# Patient Record
Sex: MALE | Race: OTHER | NOT HISPANIC OR LATINO | URBAN - METROPOLITAN AREA
[De-identification: names, ages, dates, MRNs, and addresses within clinical notes are randomized per-mention and may not be internally consistent; named-entity substitution may affect disease eponyms.]

---

## 2018-03-04 ENCOUNTER — INPATIENT (INPATIENT)
Facility: HOSPITAL | Age: 68
LOS: 1 days | Discharge: ANOTHER HOSPITAL | End: 2018-03-06
Attending: HOSPITALIST | Admitting: HOSPITALIST
Payer: MEDICARE

## 2018-03-04 VITALS
WEIGHT: 169.98 LBS | HEART RATE: 94 BPM | OXYGEN SATURATION: 92 % | TEMPERATURE: 98 F | HEIGHT: 71 IN | SYSTOLIC BLOOD PRESSURE: 213 MMHG | RESPIRATION RATE: 22 BRPM | DIASTOLIC BLOOD PRESSURE: 84 MMHG

## 2018-03-04 DIAGNOSIS — Z98.890 OTHER SPECIFIED POSTPROCEDURAL STATES: Chronic | ICD-10-CM

## 2018-03-04 DIAGNOSIS — Z91.19 PATIENT'S NONCOMPLIANCE WITH OTHER MEDICAL TREATMENT AND REGIMEN: ICD-10-CM

## 2018-03-04 DIAGNOSIS — R73.9 HYPERGLYCEMIA, UNSPECIFIED: ICD-10-CM

## 2018-03-04 DIAGNOSIS — I21.4 NON-ST ELEVATION (NSTEMI) MYOCARDIAL INFARCTION: ICD-10-CM

## 2018-03-04 DIAGNOSIS — Z29.9 ENCOUNTER FOR PROPHYLACTIC MEASURES, UNSPECIFIED: ICD-10-CM

## 2018-03-04 DIAGNOSIS — I50.9 HEART FAILURE, UNSPECIFIED: ICD-10-CM

## 2018-03-04 DIAGNOSIS — N28.9 DISORDER OF KIDNEY AND URETER, UNSPECIFIED: ICD-10-CM

## 2018-03-04 DIAGNOSIS — Z11.59 ENCOUNTER FOR SCREENING FOR OTHER VIRAL DISEASES: ICD-10-CM

## 2018-03-04 LAB
ALBUMIN SERPL ELPH-MCNC: 2.6 G/DL — LOW (ref 3.3–5)
ALP SERPL-CCNC: 267 U/L — HIGH (ref 40–120)
ALT FLD-CCNC: 19 U/L — SIGNIFICANT CHANGE UP (ref 12–78)
ANION GAP SERPL CALC-SCNC: 8 MMOL/L — SIGNIFICANT CHANGE UP (ref 5–17)
APTT BLD: 42.4 SEC — HIGH (ref 27.5–37.4)
APTT BLD: 45.2 SEC — HIGH (ref 27.5–37.4)
APTT BLD: 60.6 SEC — HIGH (ref 27.5–37.4)
APTT BLD: 80.3 SEC — HIGH (ref 27.5–37.4)
AST SERPL-CCNC: 23 U/L — SIGNIFICANT CHANGE UP (ref 15–37)
BASOPHILS # BLD AUTO: 0.07 K/UL — SIGNIFICANT CHANGE UP (ref 0–0.2)
BASOPHILS NFR BLD AUTO: 0.7 % — SIGNIFICANT CHANGE UP (ref 0–2)
BILIRUB SERPL-MCNC: 0.3 MG/DL — SIGNIFICANT CHANGE UP (ref 0.2–1.2)
BUN SERPL-MCNC: 21 MG/DL — SIGNIFICANT CHANGE UP (ref 7–23)
CALCIUM SERPL-MCNC: 8.4 MG/DL — LOW (ref 8.5–10.1)
CHLORIDE SERPL-SCNC: 107 MMOL/L — SIGNIFICANT CHANGE UP (ref 96–108)
CHOLEST SERPL-MCNC: 221 MG/DL — HIGH (ref 10–199)
CK MB BLD-MCNC: 1.5 % — SIGNIFICANT CHANGE UP (ref 0–3.5)
CK MB BLD-MCNC: 2.5 % — SIGNIFICANT CHANGE UP (ref 0–3.5)
CK MB CFR SERPL CALC: 1.9 NG/ML — SIGNIFICANT CHANGE UP (ref 0.5–3.6)
CK MB CFR SERPL CALC: 2 NG/ML — SIGNIFICANT CHANGE UP (ref 0.5–3.6)
CK MB CFR SERPL CALC: 2.1 NG/ML — SIGNIFICANT CHANGE UP (ref 0.5–3.6)
CK SERPL-CCNC: 136 U/L — SIGNIFICANT CHANGE UP (ref 26–308)
CK SERPL-CCNC: 75 U/L — SIGNIFICANT CHANGE UP (ref 26–308)
CO2 SERPL-SCNC: 24 MMOL/L — SIGNIFICANT CHANGE UP (ref 22–31)
CREAT SERPL-MCNC: 1.5 MG/DL — HIGH (ref 0.5–1.3)
EOSINOPHIL # BLD AUTO: 0.2 K/UL — SIGNIFICANT CHANGE UP (ref 0–0.5)
EOSINOPHIL NFR BLD AUTO: 2 % — SIGNIFICANT CHANGE UP (ref 0–6)
GLUCOSE SERPL-MCNC: 355 MG/DL — HIGH (ref 70–99)
HBA1C BLD-MCNC: 11.4 % — HIGH (ref 4–5.6)
HCT VFR BLD CALC: 41.1 % — SIGNIFICANT CHANGE UP (ref 39–50)
HCT VFR BLD CALC: 42.3 % — SIGNIFICANT CHANGE UP (ref 39–50)
HCV AB S/CO SERPL IA: 0.29 S/CO — SIGNIFICANT CHANGE UP
HCV AB SERPL-IMP: SIGNIFICANT CHANGE UP
HDLC SERPL-MCNC: 40 MG/DL — SIGNIFICANT CHANGE UP (ref 40–125)
HGB BLD-MCNC: 14.3 G/DL — SIGNIFICANT CHANGE UP (ref 13–17)
HGB BLD-MCNC: 14.4 G/DL — SIGNIFICANT CHANGE UP (ref 13–17)
IMM GRANULOCYTES NFR BLD AUTO: 0.3 % — SIGNIFICANT CHANGE UP (ref 0–1.5)
INR BLD: 1.05 RATIO — SIGNIFICANT CHANGE UP (ref 0.88–1.16)
LIPID PNL WITH DIRECT LDL SERPL: 168 MG/DL — HIGH
LYMPHOCYTES # BLD AUTO: 1.53 K/UL — SIGNIFICANT CHANGE UP (ref 1–3.3)
LYMPHOCYTES # BLD AUTO: 15.6 % — SIGNIFICANT CHANGE UP (ref 13–44)
MCHC RBC-ENTMCNC: 30.8 PG — SIGNIFICANT CHANGE UP (ref 27–34)
MCHC RBC-ENTMCNC: 31.4 PG — SIGNIFICANT CHANGE UP (ref 27–34)
MCHC RBC-ENTMCNC: 34 GM/DL — SIGNIFICANT CHANGE UP (ref 32–36)
MCHC RBC-ENTMCNC: 34.8 GM/DL — SIGNIFICANT CHANGE UP (ref 32–36)
MCV RBC AUTO: 90.1 FL — SIGNIFICANT CHANGE UP (ref 80–100)
MCV RBC AUTO: 90.4 FL — SIGNIFICANT CHANGE UP (ref 80–100)
MONOCYTES # BLD AUTO: 0.73 K/UL — SIGNIFICANT CHANGE UP (ref 0–0.9)
MONOCYTES NFR BLD AUTO: 7.5 % — SIGNIFICANT CHANGE UP (ref 2–14)
NEUTROPHILS # BLD AUTO: 7.22 K/UL — SIGNIFICANT CHANGE UP (ref 1.8–7.4)
NEUTROPHILS NFR BLD AUTO: 73.9 % — SIGNIFICANT CHANGE UP (ref 43–77)
NRBC # BLD: 0 /100 WBCS — SIGNIFICANT CHANGE UP (ref 0–0)
NT-PROBNP SERPL-SCNC: HIGH PG/ML (ref 0–125)
PLATELET # BLD AUTO: 156 K/UL — SIGNIFICANT CHANGE UP (ref 150–400)
PLATELET # BLD AUTO: 162 K/UL — SIGNIFICANT CHANGE UP (ref 150–400)
POTASSIUM SERPL-MCNC: 4.6 MMOL/L — SIGNIFICANT CHANGE UP (ref 3.5–5.3)
POTASSIUM SERPL-SCNC: 4.6 MMOL/L — SIGNIFICANT CHANGE UP (ref 3.5–5.3)
PROT SERPL-MCNC: 6.8 GM/DL — SIGNIFICANT CHANGE UP (ref 6–8.3)
PROTHROM AB SERPL-ACNC: 11.5 SEC — SIGNIFICANT CHANGE UP (ref 9.8–12.7)
RBC # BLD: 4.56 M/UL — SIGNIFICANT CHANGE UP (ref 4.2–5.8)
RBC # BLD: 4.68 M/UL — SIGNIFICANT CHANGE UP (ref 4.2–5.8)
RBC # FLD: 13.4 % — SIGNIFICANT CHANGE UP (ref 10.3–14.5)
RBC # FLD: 13.5 % — SIGNIFICANT CHANGE UP (ref 10.3–14.5)
SODIUM SERPL-SCNC: 139 MMOL/L — SIGNIFICANT CHANGE UP (ref 135–145)
TOTAL CHOLESTEROL/HDL RATIO MEASUREMENT: 5.5 RATIO — SIGNIFICANT CHANGE UP (ref 3.4–9.6)
TRIGL SERPL-MCNC: 67 MG/DL — SIGNIFICANT CHANGE UP (ref 10–149)
TROPONIN I SERPL-MCNC: 0.98 NG/ML — HIGH (ref 0.01–0.04)
TROPONIN I SERPL-MCNC: 1.44 NG/ML — HIGH (ref 0.01–0.04)
TROPONIN I SERPL-MCNC: 1.46 NG/ML — HIGH (ref 0.01–0.04)
WBC # BLD: 11.29 K/UL — HIGH (ref 3.8–10.5)
WBC # BLD: 9.78 K/UL — SIGNIFICANT CHANGE UP (ref 3.8–10.5)
WBC # FLD AUTO: 11.29 K/UL — HIGH (ref 3.8–10.5)
WBC # FLD AUTO: 9.78 K/UL — SIGNIFICANT CHANGE UP (ref 3.8–10.5)

## 2018-03-04 PROCEDURE — 99291 CRITICAL CARE FIRST HOUR: CPT

## 2018-03-04 PROCEDURE — 12345: CPT | Mod: NC

## 2018-03-04 PROCEDURE — 99223 1ST HOSP IP/OBS HIGH 75: CPT

## 2018-03-04 PROCEDURE — 74177 CT ABD & PELVIS W/CONTRAST: CPT | Mod: 26

## 2018-03-04 PROCEDURE — 71045 X-RAY EXAM CHEST 1 VIEW: CPT | Mod: 26

## 2018-03-04 PROCEDURE — 93010 ELECTROCARDIOGRAM REPORT: CPT

## 2018-03-04 PROCEDURE — 99053 MED SERV 10PM-8AM 24 HR FAC: CPT

## 2018-03-04 RX ORDER — ASPIRIN/CALCIUM CARB/MAGNESIUM 324 MG
325 TABLET ORAL DAILY
Qty: 0 | Refills: 0 | Status: DISCONTINUED | OUTPATIENT
Start: 2018-03-05 | End: 2018-03-06

## 2018-03-04 RX ORDER — NITROGLYCERIN 6.5 MG
0.3 CAPSULE, EXTENDED RELEASE ORAL ONCE
Qty: 0 | Refills: 0 | Status: COMPLETED | OUTPATIENT
Start: 2018-03-04 | End: 2018-03-04

## 2018-03-04 RX ORDER — SODIUM CHLORIDE 9 MG/ML
1000 INJECTION, SOLUTION INTRAVENOUS
Qty: 0 | Refills: 0 | Status: DISCONTINUED | OUTPATIENT
Start: 2018-03-04 | End: 2018-03-06

## 2018-03-04 RX ORDER — INFLUENZA VIRUS VACCINE 15; 15; 15; 15 UG/.5ML; UG/.5ML; UG/.5ML; UG/.5ML
0.5 SUSPENSION INTRAMUSCULAR ONCE
Qty: 0 | Refills: 0 | Status: COMPLETED | OUTPATIENT
Start: 2018-03-04 | End: 2018-03-04

## 2018-03-04 RX ORDER — INSULIN HUMAN 100 [IU]/ML
6 INJECTION, SOLUTION SUBCUTANEOUS ONCE
Qty: 0 | Refills: 0 | Status: COMPLETED | OUTPATIENT
Start: 2018-03-04 | End: 2018-03-04

## 2018-03-04 RX ORDER — FUROSEMIDE 40 MG
40 TABLET ORAL EVERY 12 HOURS
Qty: 0 | Refills: 0 | Status: DISCONTINUED | OUTPATIENT
Start: 2018-03-04 | End: 2018-03-04

## 2018-03-04 RX ORDER — HEPARIN SODIUM 5000 [USP'U]/ML
INJECTION INTRAVENOUS; SUBCUTANEOUS
Qty: 25000 | Refills: 0 | Status: DISCONTINUED | OUTPATIENT
Start: 2018-03-04 | End: 2018-03-04

## 2018-03-04 RX ORDER — HEPARIN SODIUM 5000 [USP'U]/ML
850 INJECTION INTRAVENOUS; SUBCUTANEOUS
Qty: 25000 | Refills: 0 | Status: DISCONTINUED | OUTPATIENT
Start: 2018-03-04 | End: 2018-03-04

## 2018-03-04 RX ORDER — INSULIN LISPRO 100/ML
VIAL (ML) SUBCUTANEOUS
Qty: 0 | Refills: 0 | Status: DISCONTINUED | OUTPATIENT
Start: 2018-03-04 | End: 2018-03-06

## 2018-03-04 RX ORDER — ASPIRIN/CALCIUM CARB/MAGNESIUM 324 MG
81 TABLET ORAL ONCE
Qty: 0 | Refills: 0 | Status: COMPLETED | OUTPATIENT
Start: 2018-03-04 | End: 2018-03-04

## 2018-03-04 RX ORDER — ASPIRIN/CALCIUM CARB/MAGNESIUM 324 MG
162 TABLET ORAL ONCE
Qty: 0 | Refills: 0 | Status: DISCONTINUED | OUTPATIENT
Start: 2018-03-04 | End: 2018-03-04

## 2018-03-04 RX ORDER — DEXTROSE 50 % IN WATER 50 %
25 SYRINGE (ML) INTRAVENOUS ONCE
Qty: 0 | Refills: 0 | Status: DISCONTINUED | OUTPATIENT
Start: 2018-03-04 | End: 2018-03-06

## 2018-03-04 RX ORDER — FUROSEMIDE 40 MG
80 TABLET ORAL ONCE
Qty: 0 | Refills: 0 | Status: COMPLETED | OUTPATIENT
Start: 2018-03-04 | End: 2018-03-04

## 2018-03-04 RX ORDER — ENOXAPARIN SODIUM 100 MG/ML
80 INJECTION SUBCUTANEOUS EVERY 12 HOURS
Qty: 0 | Refills: 0 | Status: DISCONTINUED | OUTPATIENT
Start: 2018-03-05 | End: 2018-03-06

## 2018-03-04 RX ORDER — FUROSEMIDE 40 MG
40 TABLET ORAL DAILY
Qty: 0 | Refills: 0 | Status: DISCONTINUED | OUTPATIENT
Start: 2018-03-05 | End: 2018-03-05

## 2018-03-04 RX ORDER — GLUCAGON INJECTION, SOLUTION 0.5 MG/.1ML
1 INJECTION, SOLUTION SUBCUTANEOUS ONCE
Qty: 0 | Refills: 0 | Status: DISCONTINUED | OUTPATIENT
Start: 2018-03-04 | End: 2018-03-06

## 2018-03-04 RX ORDER — HEPARIN SODIUM 5000 [USP'U]/ML
4700 INJECTION INTRAVENOUS; SUBCUTANEOUS EVERY 6 HOURS
Qty: 0 | Refills: 0 | Status: DISCONTINUED | OUTPATIENT
Start: 2018-03-04 | End: 2018-03-04

## 2018-03-04 RX ORDER — DEXTROSE 50 % IN WATER 50 %
1 SYRINGE (ML) INTRAVENOUS ONCE
Qty: 0 | Refills: 0 | Status: DISCONTINUED | OUTPATIENT
Start: 2018-03-04 | End: 2018-03-06

## 2018-03-04 RX ORDER — HEPARIN SODIUM 5000 [USP'U]/ML
4700 INJECTION INTRAVENOUS; SUBCUTANEOUS ONCE
Qty: 0 | Refills: 0 | Status: COMPLETED | OUTPATIENT
Start: 2018-03-04 | End: 2018-03-04

## 2018-03-04 RX ORDER — INSULIN LISPRO 100/ML
VIAL (ML) SUBCUTANEOUS AT BEDTIME
Qty: 0 | Refills: 0 | Status: DISCONTINUED | OUTPATIENT
Start: 2018-03-04 | End: 2018-03-06

## 2018-03-04 RX ORDER — REGADENOSON 0.08 MG/ML
0.4 INJECTION, SOLUTION INTRAVENOUS ONCE
Qty: 0 | Refills: 0 | Status: COMPLETED | OUTPATIENT
Start: 2018-03-04 | End: 2018-03-05

## 2018-03-04 RX ORDER — DEXTROSE 50 % IN WATER 50 %
12.5 SYRINGE (ML) INTRAVENOUS ONCE
Qty: 0 | Refills: 0 | Status: DISCONTINUED | OUTPATIENT
Start: 2018-03-04 | End: 2018-03-06

## 2018-03-04 RX ADMIN — HEPARIN SODIUM 950 UNIT(S)/HR: 5000 INJECTION INTRAVENOUS; SUBCUTANEOUS at 01:47

## 2018-03-04 RX ADMIN — HEPARIN SODIUM 850 UNIT(S)/HR: 5000 INJECTION INTRAVENOUS; SUBCUTANEOUS at 18:22

## 2018-03-04 RX ADMIN — Medication 4: at 17:15

## 2018-03-04 RX ADMIN — HEPARIN SODIUM 850 UNIT(S)/HR: 5000 INJECTION INTRAVENOUS; SUBCUTANEOUS at 16:02

## 2018-03-04 RX ADMIN — Medication 0.3 MILLIGRAM(S): at 01:25

## 2018-03-04 RX ADMIN — INSULIN HUMAN 6 UNIT(S): 100 INJECTION, SOLUTION SUBCUTANEOUS at 03:22

## 2018-03-04 RX ADMIN — Medication 81 MILLIGRAM(S): at 01:42

## 2018-03-04 RX ADMIN — Medication 80 MILLIGRAM(S): at 01:05

## 2018-03-04 RX ADMIN — HEPARIN SODIUM 4700 UNIT(S): 5000 INJECTION INTRAVENOUS; SUBCUTANEOUS at 01:43

## 2018-03-04 RX ADMIN — HEPARIN SODIUM 850 UNIT(S)/HR: 5000 INJECTION INTRAVENOUS; SUBCUTANEOUS at 09:36

## 2018-03-04 RX ADMIN — Medication 40 MILLIGRAM(S): at 17:18

## 2018-03-04 NOTE — ED ADULT NURSE REASSESSMENT NOTE - NS ED NURSE REASSESS COMMENT FT1
Phlebotomist claudia PPT at 0803 this morning. PTT not resulted. LAB called and spoke to Stuart. Sample would be processed.

## 2018-03-04 NOTE — ED PROVIDER NOTE - OBJECTIVE STATEMENT
68 y/o male hx cad with ?cabg (open heart surgery) in 2014, not taking any meds c/o some sob/cp today, lower abd pain today and intermittently for several days, and generalized weight loss over past few months because of significantly decreased appetite. No fever, chills, cough, n/v/d. Translated by JASON Sales. Received nitro and 162 aspirin pta. Denies any symptoms now, states feels normal    limited ros from language

## 2018-03-04 NOTE — ED PROVIDER NOTE - PHYSICAL EXAMINATION
Gen: No acute distress, non toxic  HEENT: Mucous membranes moist, pink conjunctivae, EOMI  CV: RRR, nl s1/s2.  Resp: b/l crackles at bases. no retractions, speaking full sentences  GI: Abdomen soft, NT, ND. No rebound, no guarding  : No CVAT  Neuro: A&O x 3, moving all 4 extremities  MSK: No spine or joint tenderness to palpation  Skin: No rashes. intact and perfused.

## 2018-03-04 NOTE — ED ADULT NURSE NOTE - OBJECTIVE STATEMENT
68 y/o male PMHx CABG presents to the ED with midsternal chest pain and SOB that resided at this time. Patient admits to smoking tobacco. Denies medication use. , NITRO sublingual given by EMS, 18G R- AC

## 2018-03-04 NOTE — H&P ADULT - ASSESSMENT
Vital Signs Last 24 Hrs  T(C): 36.6 (04 Mar 2018 07:29), Max: 36.6 (04 Mar 2018 07:29)  T(F): 97.9 (04 Mar 2018 07:29), Max: 97.9 (04 Mar 2018 07:29)  HR: 82 (04 Mar 2018 07:29) (82 - 94)  BP: 151/84 (04 Mar 2018 07:29) (151/84 - 213/84)  BP(mean): --  RR: 16 (04 Mar 2018 07:29) (16 - 23)  SpO2: 94% (04 Mar 2018 07:29) (90% - 94%)        LABS:                        14.4   9.78  )-----------( 156      ( 04 Mar 2018 01:05 )             42.3     03-04    139  |  107  |  21  ----------------------------<  355<H>  4.6   |  24  |  1.50<H>    Ca    8.4<L>      04 Mar 2018 01:05    TPro  6.8  /  Alb  2.6<L>  /  TBili  0.3  /  DBili  x   /  AST  23  /  ALT  19  /  AlkPhos  267<H>  03-04    PT/INR - ( 04 Mar 2018 01:05 )   PT: 11.5 sec;   INR: 1.05 ratio         PTT - ( 04 Mar 2018 01:05 )  PTT:42.4 sec      RADIOLOGY & ADDITIONAL STUDIES:    CT ab/plv:  IMPRESSION:  No bowel obstruction or evidence of acute inflammation. Moderate to large   colonic fecal load.  Small to moderate bilateral pleural effusions with bilateral airspace   opacities, which may be seen in the setting of CHF. Non-complaint with patient with NSTEMI and CHF exacerbation needs admission for at least 2 midnights as detailed below:    IMPROVE VTE Individual Risk Assessment          RISK                                                          Points    [  ] Previous VTE                                                3    [  ] Thrombophilia                                             2    [  ] Lower limb paralysis                                    2        (unable to hold up >15 seconds)      [  ] Current Cancer                                             2         (within 6 months)    [ x ] Immobilization > 24 hrs                              1    [  ] ICU/CCU stay > 24 hours                            1    [ x ] Age > 60                                                    1    IMPROVE VTE Score _______2__

## 2018-03-04 NOTE — CONSULT NOTE ADULT - SUBJECTIVE AND OBJECTIVE BOX
HPI:  HPI:  67 year old man with PMH CAD s/p cabg 2014, not taking any medications presents with complaint of epigastric pressure and SOB on exertion.  He is not being cooperative during interview and is saying that his pain is not chest pain, it is his stomach and he needs to eat.  Denies palpitations, nausea, vomiting, diarrhea, lightheadedness, leg swelling.    In the ED, troponin 0.9, BNP 77354, CT Ab/plv shows pulm vasc congestion, pt hyperglycemic. (04 Mar 2018 03:35)      Chief Complaint:  Patient is a 67y old  Male who presents with a chief complaint of chest pain, SOB (04 Mar 2018 03:35)      Review of Systems:  see above          Social History/Family History  SOCHX:   tobacco,  -  alcohol    FMHX: FA/MO  - contributory       Discussed with:  PMD, Family    Physical Exam:    Vital Signs:  Vital Signs Last 24 Hrs  T(C): 36.8 (04 Mar 2018 14:19), Max: 36.8 (04 Mar 2018 14:19)  T(F): 98.2 (04 Mar 2018 14:19), Max: 98.2 (04 Mar 2018 14:19)  HR: 85 (04 Mar 2018 16:03) (82 - 94)  BP: 150/96 (04 Mar 2018 16:00) (150/96 - 213/84)  BP(mean): --  RR: 18 (04 Mar 2018 16:00) (16 - 24)  SpO2: 93% (04 Mar 2018 16:00) (90% - 96%)  Daily Height in cm: 180.34 (04 Mar 2018 00:32)    Daily   I&O's Summary    03 Mar 2018 07:01  -  04 Mar 2018 07:00  --------------------------------------------------------  IN: 0 mL / OUT: 500 mL / NET: -500 mL    04 Mar 2018 07:01  -  04 Mar 2018 19:51  --------------------------------------------------------  IN: 260 mL / OUT: 0 mL / NET: 260 mL            Chest:  Full & symmetric excursion, no increased effort, breath sounds clear  Cardiovascular:  Regular rhythm, S1, S2, no murmur/rub/S3/S4, no carotid/femoral/abdominal bruit, radial/pedal pulses 2+, no edema  Abdomen:  Soft, non-tender, non-distended, normoactive bowel sounds, no HSM       Laboratory:                          14.3   11.29 )-----------( 162      ( 04 Mar 2018 08:53 )             41.1     03-04    141  |  106  |  21  ----------------------------<  119<H>  3.7   |  27  |  1.41<H>    Ca    8.6      04 Mar 2018 08:57    TPro  6.8  /  Alb  2.6<L>  /  TBili  0.3  /  DBili  x   /  AST  23  /  ALT  19  /  AlkPhos  267<H>  03-04      CARDIAC MARKERS ( 04 Mar 2018 18:01 )  1.440 ng/mL / x     / 136 U/L / x     / 2.1 ng/mL  CARDIAC MARKERS ( 04 Mar 2018 08:57 )  1.460 ng/mL / x     / 75 U/L / x     / 1.9 ng/mL  CARDIAC MARKERS ( 04 Mar 2018 01:05 )  .982 ng/mL / x     / x     / x     / 2.0 ng/mL      CAPILLARY BLOOD GLUCOSE      POCT Blood Glucose.: 222 mg/dL (04 Mar 2018 17:14)  POCT Blood Glucose.: 130 mg/dL (04 Mar 2018 10:55)  POCT Blood Glucose.: 128 mg/dL (04 Mar 2018 08:52)  POCT Blood Glucose.: 282 mg/dL (04 Mar 2018 03:52)    LIVER FUNCTIONS - ( 04 Mar 2018 01:05 )  Alb: 2.6 g/dL / Pro: 6.8 gm/dL / ALK PHOS: 267 U/L / ALT: 19 U/L / AST: 23 U/L / GGT: x           PT/INR - ( 04 Mar 2018 01:05 )   PT: 11.5 sec;   INR: 1.05 ratio         PTT - ( 04 Mar 2018 14:37 )  PTT:60.6 sec          Assessment:  NSTEMI  Lovenox, ASA, Lasix  TTE  TELE  Stress test  H/O CAD, [facility]

## 2018-03-04 NOTE — H&P ADULT - PROBLEM SELECTOR PLAN 6
Discussed with patient and advised that lack of compliance could result in injury, disability, and/or Death.  Pt verbalized understanding.

## 2018-03-04 NOTE — ED PROVIDER NOTE - CRITICAL CARE PROVIDED
documentation/direct patient care (not related to procedure)/additional history taking/interpretation of diagnostic studies/consultation with other physicians

## 2018-03-04 NOTE — H&P ADULT - PROBLEM SELECTOR PLAN 1
Telemetry monitoring.  3 Sets of cardiac enzymes and ekg q8hrs--> 1st set elevated  Aspirin 325mg po qdaily  Start Heparin drip for anticoagulation, monitor PTT q6hrs  TTE  O2 via NC@2l/min, keep sat>94% at all times.  Cardiology consult

## 2018-03-04 NOTE — ED PROVIDER NOTE - CARE PLAN
Principal Discharge DX:	CHF (congestive heart failure)  Secondary Diagnosis:	NSTEMI (non-ST elevated myocardial infarction)

## 2018-03-04 NOTE — H&P ADULT - PROBLEM SELECTOR PLAN 2
Telemetry monitoring.  Lasix 40mg IVP Q12hrs.  Strict input/output monitoring,  Daily weight monitoring.  Fluid restriction to 1 liter/24 hrs.  Follow TTE  Cardiology consult  Elevate Head end of bed >35*, aspiration prec

## 2018-03-04 NOTE — H&P ADULT - HISTORY OF PRESENT ILLNESS
Pt being admitted for chest pain r/o ACS, in progress. 67 year old man with PMH CAD s/p cabg 2014, not taking any medications presents with complaint of epigastric pressure and SOB on exertion.  He is not being cooperative during interview and is saying that his pain is no 67 year old man with PMH CAD s/p cabg 2014, not taking any medications presents with complaint of epigastric pressure and SOB on exertion.  He is not being cooperative during interview and is saying that his pain is not chest pain, it is his stomach and he needs to eat.  Denies palpitations, nausea, vomiting, diarrhea, lightheadedness, leg swelling.    In the ED, troponin 0.9, BNP 17499, CTA shows pulm vasc congestion, pt hyperglycemic. 67 year old man with PMH CAD s/p cabg 2014, not taking any medications presents with complaint of epigastric pressure and SOB on exertion.  He is not being cooperative during interview and is saying that his pain is not chest pain, it is his stomach and he needs to eat.  Denies palpitations, nausea, vomiting, diarrhea, lightheadedness, leg swelling.    In the ED, troponin 0.9, BNP 79650, CT Ab/plv shows pulm vasc congestion, pt hyperglycemic.

## 2018-03-04 NOTE — CHART NOTE - NSCHARTNOTEFT_GEN_A_CORE
67 year old man with PMH CAD s/p cabg 2014, not taking any medications presents with complaint of epigastric pressure and SOB on exertion.  In the ED, troponin 0.9, BNP 91808, CT Ab/plv shows pulm vasc congestion, pt hyperglycemic.  CT ab/plv: IMPRESSION: No bowel obstruction or evidence of acute inflammation. Moderate to large colonic fecal load. Small to moderate bilateral pleural effusions with bilateral airspace opacities, which may be seen in the setting of CHF. Non-complaint with patient with NSTEMI and CHF exacerbation.   ·  Problem: NSTEMI (non-ST elevated myocardial infarction).  Plan: Telemetry monitoring.  3 Sets of cardiac enzymes and ekg q8hrs--> 1st set elevated  Aspirin 325mg po qdaily  Started Heparin drip for anticoagulation, monitor PTT q6hrs  TTE  O2 via NC@2l/min, keep sat>94% at all times.  Cardiology consult.     ·  Problem: Acute resp distress 2/2 suspected acute syst Congestive heart failure exacerbation.  Plan: Telemetry monitoring.  Lasix 40mg IVP Q12hrs.  Strict input/output monitoring,  Daily weight monitoring.  Fluid restriction to 1 liter/24 hrs.  Follow TTE  Cardiology consult  Elevate Head end of bed >35*, aspiration prec.     ·  Problem: Hyperglycemia.  Plan: Likely has DM2  Sliding scale for now  CHO diet.     ·  Problem: [place].  Plan: No baseline  Trend BMP for now, likely chronic CKD given history of non-compliance and other findings on initial work up (NSTEMI, CHF, hyperglycemia).

## 2018-03-04 NOTE — H&P ADULT - PROBLEM SELECTOR PLAN 4
No baseline  Trend BMP for now, likely chronic CKD given history of non-compliance and other findings on initial work up (NSTEMI, CHF, hyperglycemia)

## 2018-03-04 NOTE — H&P ADULT - NSHPPHYSICALEXAM_GEN_ALL_CORE
GENERAL: NAD, well-groomed, well-developed  HEAD:  Atraumatic, Normocephalic  EYES: EOMI, PERRLA, conjunctiva and sclera clear  ENMT: No tonsillar erythema, exudates, or enlargement; Moist mucous membranes, No lesions  NECK: Supple, No JVD, Normal thyroid  NERVOUS SYSTEM:  Alert & Oriented X3, Good concentration  CHEST/LUNG: Clear to ascultation bilaterally; No rales, rhonchi, wheezing, or rubs  HEART: Regular rate and rhythm; No murmurs, rubs, or gallops  ABDOMEN: Soft, Nontender, Nondistended; Bowel sounds present  EXTREMITIES: 1+ b/l edema  SKIN: no rashes or lesions   PSYCH: normal affect and behavior

## 2018-03-04 NOTE — ED PROVIDER NOTE - MEDICAL DECISION MAKING DETAILS
significant acs risk, clinically chf. bnp >13k, elevated trop nstemi (no clear st elevation on ekg or stemi equivalent). aspirin, heparin, lasix, nitro, ct abd, admission

## 2018-03-05 LAB
ANION GAP SERPL CALC-SCNC: 8 MMOL/L — SIGNIFICANT CHANGE UP (ref 5–17)
BUN SERPL-MCNC: 29 MG/DL — HIGH (ref 7–23)
CALCIUM SERPL-MCNC: 8.9 MG/DL — SIGNIFICANT CHANGE UP (ref 8.5–10.1)
CHLORIDE SERPL-SCNC: 103 MMOL/L — SIGNIFICANT CHANGE UP (ref 96–108)
CO2 SERPL-SCNC: 28 MMOL/L — SIGNIFICANT CHANGE UP (ref 22–31)
CREAT SERPL-MCNC: 1.62 MG/DL — HIGH (ref 0.5–1.3)
GLUCOSE BLDC GLUCOMTR-MCNC: 189 MG/DL — HIGH (ref 70–99)
GLUCOSE BLDC GLUCOMTR-MCNC: 226 MG/DL — HIGH (ref 70–99)
GLUCOSE BLDC GLUCOMTR-MCNC: 228 MG/DL — HIGH (ref 70–99)
GLUCOSE BLDC GLUCOMTR-MCNC: 230 MG/DL — HIGH (ref 70–99)
GLUCOSE SERPL-MCNC: 144 MG/DL — HIGH (ref 70–99)
HCT VFR BLD CALC: 41.4 % — SIGNIFICANT CHANGE UP (ref 39–50)
HCT VFR BLD CALC: 42.6 % — SIGNIFICANT CHANGE UP (ref 39–50)
HGB BLD-MCNC: 14 G/DL — SIGNIFICANT CHANGE UP (ref 13–17)
HGB BLD-MCNC: 14.3 G/DL — SIGNIFICANT CHANGE UP (ref 13–17)
MCHC RBC-ENTMCNC: 30.2 PG — SIGNIFICANT CHANGE UP (ref 27–34)
MCHC RBC-ENTMCNC: 30.2 PG — SIGNIFICANT CHANGE UP (ref 27–34)
MCHC RBC-ENTMCNC: 33.6 GM/DL — SIGNIFICANT CHANGE UP (ref 32–36)
MCHC RBC-ENTMCNC: 33.8 GM/DL — SIGNIFICANT CHANGE UP (ref 32–36)
MCV RBC AUTO: 89.4 FL — SIGNIFICANT CHANGE UP (ref 80–100)
MCV RBC AUTO: 89.9 FL — SIGNIFICANT CHANGE UP (ref 80–100)
NRBC # BLD: 0 /100 WBCS — SIGNIFICANT CHANGE UP (ref 0–0)
NRBC # BLD: 0 /100 WBCS — SIGNIFICANT CHANGE UP (ref 0–0)
PLATELET # BLD AUTO: 151 K/UL — SIGNIFICANT CHANGE UP (ref 150–400)
PLATELET # BLD AUTO: 163 K/UL — SIGNIFICANT CHANGE UP (ref 150–400)
POTASSIUM SERPL-MCNC: 4.1 MMOL/L — SIGNIFICANT CHANGE UP (ref 3.5–5.3)
POTASSIUM SERPL-SCNC: 4.1 MMOL/L — SIGNIFICANT CHANGE UP (ref 3.5–5.3)
RBC # BLD: 4.63 M/UL — SIGNIFICANT CHANGE UP (ref 4.2–5.8)
RBC # BLD: 4.74 M/UL — SIGNIFICANT CHANGE UP (ref 4.2–5.8)
RBC # FLD: 13.2 % — SIGNIFICANT CHANGE UP (ref 10.3–14.5)
RBC # FLD: 13.4 % — SIGNIFICANT CHANGE UP (ref 10.3–14.5)
SODIUM SERPL-SCNC: 139 MMOL/L — SIGNIFICANT CHANGE UP (ref 135–145)
TROPONIN I SERPL-MCNC: 1.24 NG/ML — HIGH (ref 0.01–0.04)
WBC # BLD: 9.16 K/UL — SIGNIFICANT CHANGE UP (ref 3.8–10.5)
WBC # BLD: 9.49 K/UL — SIGNIFICANT CHANGE UP (ref 3.8–10.5)
WBC # FLD AUTO: 9.16 K/UL — SIGNIFICANT CHANGE UP (ref 3.8–10.5)
WBC # FLD AUTO: 9.49 K/UL — SIGNIFICANT CHANGE UP (ref 3.8–10.5)

## 2018-03-05 PROCEDURE — 99233 SBSQ HOSP IP/OBS HIGH 50: CPT

## 2018-03-05 PROCEDURE — 78452 HT MUSCLE IMAGE SPECT MULT: CPT | Mod: 26

## 2018-03-05 RX ORDER — NICOTINE POLACRILEX 2 MG
4 GUM BUCCAL ONCE
Qty: 0 | Refills: 0 | Status: DISCONTINUED | OUTPATIENT
Start: 2018-03-05 | End: 2018-03-06

## 2018-03-05 RX ORDER — METOPROLOL TARTRATE 50 MG
25 TABLET ORAL
Qty: 0 | Refills: 0 | Status: DISCONTINUED | OUTPATIENT
Start: 2018-03-05 | End: 2018-03-06

## 2018-03-05 RX ORDER — NICOTINE POLACRILEX 2 MG
1 GUM BUCCAL DAILY
Qty: 0 | Refills: 0 | Status: DISCONTINUED | OUTPATIENT
Start: 2018-03-05 | End: 2018-03-06

## 2018-03-05 RX ORDER — ACETAMINOPHEN 500 MG
650 TABLET ORAL EVERY 6 HOURS
Qty: 0 | Refills: 0 | Status: DISCONTINUED | OUTPATIENT
Start: 2018-03-05 | End: 2018-03-06

## 2018-03-05 RX ADMIN — Medication 4: at 13:45

## 2018-03-05 RX ADMIN — Medication 650 MILLIGRAM(S): at 07:00

## 2018-03-05 RX ADMIN — ENOXAPARIN SODIUM 80 MILLIGRAM(S): 100 INJECTION SUBCUTANEOUS at 05:46

## 2018-03-05 RX ADMIN — REGADENOSON 0.4 MILLIGRAM(S): 0.08 INJECTION, SOLUTION INTRAVENOUS at 12:26

## 2018-03-05 RX ADMIN — Medication 650 MILLIGRAM(S): at 05:46

## 2018-03-05 RX ADMIN — ENOXAPARIN SODIUM 80 MILLIGRAM(S): 100 INJECTION SUBCUTANEOUS at 17:33

## 2018-03-05 RX ADMIN — Medication 325 MILLIGRAM(S): at 13:48

## 2018-03-05 RX ADMIN — Medication 40 MILLIGRAM(S): at 05:46

## 2018-03-05 RX ADMIN — Medication 4: at 17:32

## 2018-03-05 RX ADMIN — Medication 1 PATCH: at 22:04

## 2018-03-05 NOTE — PROGRESS NOTE ADULT - PROBLEM SELECTOR PLAN 1
Telemetry monitoring.  3 Sets of cardiac enzymes and ekg q8hrs--> 1st set elevated improved one tropin tomorrow   Aspirin 325mg po qdaily  Start Heparin drip for anticoagulation, monitor PTT q6hrs  TTE  O2 via NC@2l/min, keep sat>94% at all times.  Cardiology consult appreciated s/p NM stress today

## 2018-03-05 NOTE — PROGRESS NOTE ADULT - SUBJECTIVE AND OBJECTIVE BOX
Patient is a 67y old  Male who presents with a chief complaint of chest pain, SOB (04 Mar 2018 03:35)      INTERVAL HPI/OVERNIGHT EVENTS: no acute events overnight awaiting results of stress test current smoker     MEDICATIONS  (STANDING):  aspirin 325 milliGRAM(s) Oral daily  dextrose 5%. 1000 milliLiter(s) (50 mL/Hr) IV Continuous <Continuous>  dextrose 50% Injectable 12.5 Gram(s) IV Push once  dextrose 50% Injectable 25 Gram(s) IV Push once  dextrose 50% Injectable 25 Gram(s) IV Push once  enoxaparin Injectable 80 milliGRAM(s) SubCutaneous every 12 hours  furosemide    Tablet 40 milliGRAM(s) Oral daily  influenza   Vaccine 0.5 milliLiter(s) IntraMuscular once  insulin lispro (HumaLOG) corrective regimen sliding scale   SubCutaneous three times a day before meals  insulin lispro (HumaLOG) corrective regimen sliding scale   SubCutaneous at bedtime    MEDICATIONS  (PRN):  acetaminophen   Tablet. 650 milliGRAM(s) Oral every 6 hours PRN Mild Pain (1 - 3)  dextrose Gel 1 Dose(s) Oral once PRN Blood Glucose LESS THAN 70 milliGRAM(s)/deciliter  glucagon  Injectable 1 milliGRAM(s) IntraMuscular once PRN Glucose LESS THAN 70 milligrams/deciliter      Allergies    No Known Allergies    Intolerances        REVIEW OF SYSTEMS:  CONSTITUTIONAL: No fever, weight loss, or fatigue  EYES: No eye pain, visual disturbances, or discharge  ENMT:  No difficulty hearing, tinnitus, vertigo; No sinus or throat pain  NECK: No pain or stiffness  BREASTS: No pain, masses, or nipple discharge  RESPIRATORY: No cough, wheezing, chills or hemoptysis; No shortness of breath  CARDIOVASCULAR: No chest pain, palpitations, dizziness, or leg swelling  GASTROINTESTINAL: No abdominal or epigastric pain. No nausea, vomiting, or hematemesis; No diarrhea or constipation. No melena or hematochezia.  GENITOURINARY: No dysuria, frequency, hematuria, or incontinence  NEUROLOGICAL: No headaches, memory loss, loss of strength, numbness, or tremors  SKIN: No itching, burning, rashes, or lesions   LYMPH NODES: No enlarged glands  ENDOCRINE: No heat or cold intolerance; No hair loss  MUSCULOSKELETAL: No joint pain or swelling; No muscle, back, or extremity pain  PSYCHIATRIC: No depression, anxiety, mood swings, or difficulty sleeping  HEME/LYMPH: No easy bruising, or bleeding gums  ALLERGY AND IMMUNOLOGIC: No hives or eczema    Vital Signs Last 24 Hrs  T(C): 36.6 (05 Mar 2018 04:02), Max: 36.8 (05 Mar 2018 00:39)  T(F): 97.8 (05 Mar 2018 04:02), Max: 98.2 (05 Mar 2018 00:39)  HR: 83 (05 Mar 2018 07:03) (82 - 89)  BP: 156/96 (05 Mar 2018 04:02) (142/77 - 156/96)  BP(mean): --  RR: 17 (05 Mar 2018 04:02) (17 - 18)  SpO2: 93% (05 Mar 2018 04:02) (93% - 93%)    PHYSICAL EXAM:  GENERAL: NAD, well-groomed, well-developed  HEAD:  Atraumatic, Normocephalic  EYES: EOMI, PERRLA, conjunctiva and sclera clear  ENMT: No tonsillar erythema, exudates, or enlargement; Moist mucous membranes, Good dentition, No lesions  NECK: Supple, No JVD, Normal thyroid  NERVOUS SYSTEM:  Alert & Oriented X3, Good concentration; Motor Strength 5/5 B/L upper and lower extremities; DTRs 2+ intact and symmetric  CHEST/LUNG: Clear to percussion bilaterally; No rales, rhonchi, wheezing, or rubs  HEART: Regular rate and rhythm; No murmurs, rubs, or gallops  ABDOMEN: Soft, Nontender, Nondistended; Bowel sounds present  EXTREMITIES:  2+ Peripheral Pulses, No clubbing, cyanosis, or edema  LYMPH: No lymphadenopathy noted  SKIN: No rashes or lesions    LABS:                        14.3   9.16  )-----------( 163      ( 05 Mar 2018 08:23 )             42.6     03-05    139  |  103  |  29<H>  ----------------------------<  144<H>  4.1   |  28  |  1.62<H>    Ca    8.9      05 Mar 2018 08:23    TPro  6.8  /  Alb  2.6<L>  /  TBili  0.3  /  DBili  x   /  AST  23  /  ALT  19  /  AlkPhos  267<H>  03-04    PT/INR - ( 04 Mar 2018 01:05 )   PT: 11.5 sec;   INR: 1.05 ratio         PTT - ( 04 Mar 2018 23:24 )  PTT:45.2 sec    CAPILLARY BLOOD GLUCOSE      POCT Blood Glucose.: 226 mg/dL (05 Mar 2018 13:44)  POCT Blood Glucose.: 155 mg/dL (04 Mar 2018 21:55)  POCT Blood Glucose.: 222 mg/dL (04 Mar 2018 17:14)      RADIOLOGY & ADDITIONAL TESTS:    Imaging Personally Reviewed:  [ X] YES  [ ] NO    Consultant(s) Notes Reviewed:  [X ] YES  [ ] NO    Care Discussed with Consultants/Other Providers [X ] YES  [ ] NO

## 2018-03-05 NOTE — PROGRESS NOTE ADULT - PROBLEM SELECTOR PLAN 3
Likely has DM2  Sliding scale for now  Regimen based on A1C Hemoglobin A1C, Whole Blood (03.04.18 @ 11:53)    Hemoglobin A1C, Whole Blood: 11.4:   CHO diet

## 2018-03-05 NOTE — PROGRESS NOTE ADULT - ASSESSMENT
Non-complaint with patient with NSTEMI and CHF exacerbation needs admission for at least 2 midnights     Improving  chest pain free awaiting result of echo and NM stress test

## 2018-03-05 NOTE — PROGRESS NOTE ADULT - SUBJECTIVE AND OBJECTIVE BOX
Assessment:  NSTEMI  Lovenox, ASA, Lasix held  BB started  TTE noted, EF 40-45%  TELE normal  Stress test result with infarct  H/O CAD, [facility]  Given these stress test and echo findings and troponin over 1.0, patient mohit continue the medical regiment but may benefit from a   diagnostic angiogram. I will d/w him and his family and try to make arrangements

## 2018-03-06 ENCOUNTER — TRANSCRIPTION ENCOUNTER (OUTPATIENT)
Age: 68
End: 2018-03-06

## 2018-03-06 VITALS — WEIGHT: 147.71 LBS

## 2018-03-06 LAB
ALBUMIN SERPL ELPH-MCNC: 2.4 G/DL — LOW (ref 3.3–5)
ALP SERPL-CCNC: 235 U/L — HIGH (ref 40–120)
ALT FLD-CCNC: 14 U/L — SIGNIFICANT CHANGE UP (ref 12–78)
ANION GAP SERPL CALC-SCNC: 6 MMOL/L — SIGNIFICANT CHANGE UP (ref 5–17)
AST SERPL-CCNC: 18 U/L — SIGNIFICANT CHANGE UP (ref 15–37)
BILIRUB SERPL-MCNC: 0.5 MG/DL — SIGNIFICANT CHANGE UP (ref 0.2–1.2)
BUN SERPL-MCNC: 37 MG/DL — HIGH (ref 7–23)
CALCIUM SERPL-MCNC: 8.4 MG/DL — LOW (ref 8.5–10.1)
CHLORIDE SERPL-SCNC: 103 MMOL/L — SIGNIFICANT CHANGE UP (ref 96–108)
CO2 SERPL-SCNC: 28 MMOL/L — SIGNIFICANT CHANGE UP (ref 22–31)
CREAT SERPL-MCNC: 1.64 MG/DL — HIGH (ref 0.5–1.3)
GLUCOSE BLDC GLUCOMTR-MCNC: 188 MG/DL — HIGH (ref 70–99)
GLUCOSE BLDC GLUCOMTR-MCNC: 209 MG/DL — HIGH (ref 70–99)
GLUCOSE BLDC GLUCOMTR-MCNC: 225 MG/DL — HIGH (ref 70–99)
GLUCOSE SERPL-MCNC: 213 MG/DL — HIGH (ref 70–99)
HCT VFR BLD CALC: 42.4 % — SIGNIFICANT CHANGE UP (ref 39–50)
HGB BLD-MCNC: 14.6 G/DL — SIGNIFICANT CHANGE UP (ref 13–17)
MAGNESIUM SERPL-MCNC: 2.1 MG/DL — SIGNIFICANT CHANGE UP (ref 1.6–2.6)
MCHC RBC-ENTMCNC: 30.7 PG — SIGNIFICANT CHANGE UP (ref 27–34)
MCHC RBC-ENTMCNC: 34.4 GM/DL — SIGNIFICANT CHANGE UP (ref 32–36)
MCV RBC AUTO: 89.1 FL — SIGNIFICANT CHANGE UP (ref 80–100)
NRBC # BLD: 0 /100 WBCS — SIGNIFICANT CHANGE UP (ref 0–0)
PHOSPHATE SERPL-MCNC: 3.6 MG/DL — SIGNIFICANT CHANGE UP (ref 2.5–4.5)
PLATELET # BLD AUTO: 157 K/UL — SIGNIFICANT CHANGE UP (ref 150–400)
POTASSIUM SERPL-MCNC: 4.2 MMOL/L — SIGNIFICANT CHANGE UP (ref 3.5–5.3)
POTASSIUM SERPL-SCNC: 4.2 MMOL/L — SIGNIFICANT CHANGE UP (ref 3.5–5.3)
PROT SERPL-MCNC: 6.6 GM/DL — SIGNIFICANT CHANGE UP (ref 6–8.3)
RBC # BLD: 4.76 M/UL — SIGNIFICANT CHANGE UP (ref 4.2–5.8)
RBC # FLD: 13.1 % — SIGNIFICANT CHANGE UP (ref 10.3–14.5)
SODIUM SERPL-SCNC: 137 MMOL/L — SIGNIFICANT CHANGE UP (ref 135–145)
TROPONIN I SERPL-MCNC: 0.88 NG/ML — HIGH (ref 0.01–0.04)
WBC # BLD: 8.18 K/UL — SIGNIFICANT CHANGE UP (ref 3.8–10.5)
WBC # FLD AUTO: 8.18 K/UL — SIGNIFICANT CHANGE UP (ref 3.8–10.5)

## 2018-03-06 PROCEDURE — 99239 HOSP IP/OBS DSCHRG MGMT >30: CPT

## 2018-03-06 RX ORDER — ACETYLCYSTEINE 200 MG/ML
600 VIAL (ML) MISCELLANEOUS ONCE
Qty: 0 | Refills: 0 | Status: DISCONTINUED | OUTPATIENT
Start: 2018-03-06 | End: 2018-03-06

## 2018-03-06 RX ORDER — NICOTINE POLACRILEX 2 MG
0 GUM BUCCAL
Qty: 0 | Refills: 0 | COMMUNITY
Start: 2018-03-06

## 2018-03-06 RX ORDER — ACETYLCYSTEINE 200 MG/ML
0 VIAL (ML) MISCELLANEOUS
Qty: 0 | Refills: 0 | COMMUNITY
Start: 2018-03-06

## 2018-03-06 RX ORDER — ACETAMINOPHEN 500 MG
2 TABLET ORAL
Qty: 0 | Refills: 0 | COMMUNITY
Start: 2018-03-06

## 2018-03-06 RX ORDER — ENOXAPARIN SODIUM 100 MG/ML
0 INJECTION SUBCUTANEOUS
Qty: 0 | Refills: 0 | COMMUNITY
Start: 2018-03-06

## 2018-03-06 RX ORDER — INSULIN LISPRO 100/ML
0 VIAL (ML) SUBCUTANEOUS
Qty: 0 | Refills: 0 | COMMUNITY
Start: 2018-03-06

## 2018-03-06 RX ORDER — METOPROLOL TARTRATE 50 MG
1 TABLET ORAL
Qty: 0 | Refills: 0 | COMMUNITY
Start: 2018-03-06

## 2018-03-06 RX ORDER — ASPIRIN/CALCIUM CARB/MAGNESIUM 324 MG
1 TABLET ORAL
Qty: 0 | Refills: 0 | COMMUNITY
Start: 2018-03-06

## 2018-03-06 RX ORDER — ASPIRIN/CALCIUM CARB/MAGNESIUM 324 MG
1 TABLET ORAL
Qty: 0 | Refills: 0 | DISCHARGE
Start: 2018-03-06

## 2018-03-06 RX ADMIN — Medication 4: at 16:44

## 2018-03-06 RX ADMIN — Medication 325 MILLIGRAM(S): at 11:37

## 2018-03-06 RX ADMIN — Medication 25 MILLIGRAM(S): at 05:12

## 2018-03-06 RX ADMIN — Medication 1 PATCH: at 11:37

## 2018-03-06 RX ADMIN — ENOXAPARIN SODIUM 80 MILLIGRAM(S): 100 INJECTION SUBCUTANEOUS at 05:12

## 2018-03-06 RX ADMIN — Medication 2: at 11:37

## 2018-03-06 RX ADMIN — Medication 4: at 08:09

## 2018-03-06 NOTE — DIETITIAN INITIAL EVALUATION ADULT. - PERTINENT MEDS FT
metoprolol tartrate , aspirin , lispro corrective regimen sliding scale 3 x day before meals & bedtime , influenza vaccine , nicotine

## 2018-03-06 NOTE — DIETITIAN INITIAL EVALUATION ADULT. - NUTRITION INTERVENTION
Nutrition Education/Collaboration and Referral of Nutrition Care/Medical Food Supplements/Meals and Snack

## 2018-03-06 NOTE — DIETITIAN INITIAL EVALUATION ADULT. - NS AS NUTRI INTERV ED CONTENT
Purpose of the nutrition education/Educate pt on consumption of 6 small meals & ways to improve energy intake

## 2018-03-06 NOTE — DIETITIAN INITIAL EVALUATION ADULT. - PERTINENT LABORATORY DATA
03-06 Na137 mmol/L Glu 213 mg/dL<H> K+ 4.2 mmol/L Cr  1.64 mg/dL<H> BUN 37 mg/dL<H> EstGFR (AA)49 mL/min/1.73M2<L> EstGFR 43 mL/min/1.73M2<L> Phos 3.6 mg/dL Ca 8.4 mg/dL<L> Alb 2.4 g/dL<L> PAB n/a   BNP n/a   Hgb 14.6 g/dL Hct 42.4 % 03-04 serum pro-brain natriuretic peptide 15065 pg/dL<H> HbA1/GC 11.4% , CHOL 227 mg/dL<H>   mg/dL<H>

## 2018-03-06 NOTE — DIETITIAN INITIAL EVALUATION ADULT. - ETIOLOGY
inadequate protein-energy intake in setting of altered GI function, c/o stomach pain, psychological causes

## 2018-03-06 NOTE — DIETITIAN INITIAL EVALUATION ADULT. - ENERGY NEEDS
Height (cm): 180.34 (03-04)  Weight (kg): 77 (03-04)  BMI (kg/m2): 23.7 (03-04)  IBW:   78 kg      % IBW:   86%          UBW: 86.1 kg           %UBW: 77%, I/O: 260/500(-240)

## 2018-03-06 NOTE — DISCHARGE NOTE ADULT - CARE PROVIDER_API CALL
Eitan Orozco), Cardiology  [address] [facility]  [address] [place] [address]  Phone: [phone]  Fax: [phone]

## 2018-03-06 NOTE — DISCHARGE NOTE ADULT - CARE PLAN
Principal Discharge DX:	NSTEMI (non-ST elevated myocardial infarction)  Goal:	transfer to [facility] for cath  Assessment and plan of treatment:	alyx for cath

## 2018-03-06 NOTE — DIETITIAN INITIAL EVALUATION ADULT. - PHYSICAL APPEARANCE
BMI=20.6( for weight of 67 kg on 03-06), Nutrition focused physical exam conducted; Subcutaneous fat Exam;  [ moderate  ]  Orbital fat pads region,  [ moderate  ]Buccal fat region,  [  moderate ]triceps region, [ moderate ]ribs region.  Muscle Exam; [ moderate ]temples region, [ moderate ]clavicle region, [ moderate ]shoulder region, [ moderate ]Scapula region, [ moderate ]Interosseous region, [ moderate ]thigh region, [ moderte ]Calf region/underweight

## 2018-03-06 NOTE — DISCHARGE NOTE ADULT - PATIENT PORTAL LINK FT
You can access the [facility] Patient Portal, offered by [facility], by registering with the following website: [email or web address]

## 2018-03-06 NOTE — PROGRESS NOTE ADULT - SUBJECTIVE AND OBJECTIVE BOX
Assessment:  NSTEMI  Lovenox, ASA, Lasix held  BB started  TTE noted, EF 40-45%  TELE normal  Stress test result with infarct  H/O CAD, Amber CABG 2014  Given these stress test and echo findings and troponin over 1.0, patient will continue the medical regiment but may benefit from a   diagnostic angiogram. I  d/w him  arrangements are [place] [facility] [place] family request.

## 2018-03-06 NOTE — DIETITIAN INITIAL EVALUATION ADULT. - NS AS NUTRI INTERV COLLABORAT
Collaboration with other providers/Recommend outpatient psychological consultation for c/o depression

## 2018-03-06 NOTE — DISCHARGE NOTE ADULT - MEDICATION SUMMARY - MEDICATIONS TO TAKE
I will START or STAY ON the medications listed below when I get home from the hospital:    acetaminophen 325 mg oral tablet  -- 2 tab(s) by mouth every 6 hours, As needed, Mild Pain (1 - 3)  -- Indication: For fever pain     aspirin 325 mg oral tablet  -- 1 tab(s) by mouth once a day  -- Indication: For NSTEMI (non-ST elevated myocardial infarction)    enoxaparin  -- Indication: For NSTEMI (non-ST elevated myocardial infarction)    insulin lispro (concentrated) 200 units/mL subcutaneous solution  --  subcutaneous   -- Indication: For diabetes    insulin lispro (concentrated) 200 units/mL subcutaneous solution  --  subcutaneous   -- Indication: For diabetes    acetylcysteine  -- Indication: For secretions     metoprolol tartrate 25 mg oral tablet  -- 1 tab(s) by mouth 2 times a day  -- Indication: For blood pressure    nicotine 14 mg/24 hr transdermal film, extended release  --  by transdermal patch   -- Indication: For smoking

## 2018-03-06 NOTE — DISCHARGE NOTE ADULT - HOSPITAL COURSE
Assessment:  NSTEMI  Lovenox, ASA, Lasix held  BB started  TTE noted, EF 40-45%  TELE normal  Stress test result with infarct  H/O CAD, [facility] CABG 2014  Given these stress test and echo findings and troponin over 1.0, patient will continue the medical regiment but may benefit from a   diagnostic angiogram. I  d/w him  [place]ang[place] [facility] [place]     PHYSICAL EXAM:  GENERAL: NAD, well-groomed, well-developed  HEAD:  Atraumatic, Normocephalic  EYES: EOMI, PERRLA, conjunctiva and sclera clear  ENMT: No tonsillar erythema, exudates, or enlargement; Moist mucous membranes, Good dentition, No lesions  NECK: Supple, No JVD, Normal thyroid  NERVOUS SYSTEM:  Alert & Oriented X3, Good concentration; Motor Strength 5/5 B/L upper and lower extremities; DTRs 2+ intact and symmetric  CHEST/LUNG: Clear to percussion bilaterally; No rales, rhonchi, wheezing, or rubs  HEART: Regular rate and rhythm; No murmurs, rubs, or gallops  ABDOMEN: Soft, Nontender, Nondistended; Bowel sounds present  EXTREMITIES:  2+ Peripheral Pulses, No clubbing, cyanosis, or edema  LYMPH: No lymphadenopathy noted  SKIN: No rashes or lesions        RADIOLOGY & ADDITIONAL TESTS:    Imaging Personally Reviewed:  [ X] YES  [ ] NO    Consultant(s) Notes Reviewed:  [X ] YES  [ ] NO    Care Discussed with Consultants/Other Providers [X ] YES  [ ] NO    Assessment and Plan:   · Assessment		  Non-complaint with patient with NSTEMI and CHF exacerbation needs admission for at least 2 midnights     Improving  chest pain free awaiting result of echo and NM stress test      Problem/Plan - 1:  ·  Problem: NSTEMI (non-ST elevated myocardial infarction).  Plan: Telemetry monitoring.  3 Sets of cardiac enzymes and ekg q8hrs--> 1st set elevated improved one tropin tomorrow   Aspirin 325mg po qdaily  Start Heparin drip for anticoagulation, monitor PTT q6hrs  TTE  O2 via NC@2l/min, keep sat>94% at all times.  Cardiology consult appreciated s/p NM stress today.      Problem/Plan - 2:  ·  Problem: Congestive heart failure, unspecified congestive heart failure chronicity, unspecified congestive heart failure type.  Plan: Telemetry monitoring.  Lasix 40mg IVP Q12hrs.  Strict input/output monitoring,  Daily weight monitoring.  Fluid restriction to 1 liter/24 hrs.  Follow TTE  Cardiology consult  Elevate Head end of bed >35*, aspiration prec.      Problem/Plan - 3:  ·  Problem: Hyperglycemia.  Plan: Likely has DM2  Sliding scale for now  Regimen based on A1C Hemoglobin A1C, Whole Blood (03.04.18 @ 11:53)    Hemoglobin A1C, Whole Blood: 11.4:   CHO diet.      Problem/Plan - 4:  ·  Problem: Abnormal renal function.  Plan: No baseline  Trend BMP for now, likely chronic CKD given history of non-compliance and other findings on initial work up (NSTEMI, CHF, hyperglycemia).      Problem/Plan - 5:  ·  Problem: Need for hepatitis C screening test.  Plan: Hep c Negative.      Problem/Plan - 6:  Problem: Non compliance with medical treatment. Plan: Discussed with patient and advised that lack of compliance could result in injury, disability, and/or Death.  Pt verbalized understanding.     Problem/Plan - 7:  ·  Problem: Preventive measure.  Plan: DVT Prophylaxis with Heparin ggt  General precautions.     Attending Attestation:   I was physically present for the key portions of the evaluation and management (E/M) service provided.  I agree with the above history, physical, and plan which I have reviewed and edited where appropriate.     45 minutes spent on this discharge     Plan discussed with family.

## 2018-03-06 NOTE — DIETITIAN INITIAL EVALUATION ADULT. - OTHER INFO
Pt seen for diagnosis of CHF. Pt states that he had no appetite & was mainly consuming soups and teaspoons of dry food PTA.  Pt threw away his diabetic pills and was not self monitoring blood glucose PTA.  Pt reports feeling down and depressed due to his medical condition.  Pt is currently scheduled for transfer for cardiac work up. Pt seen for diagnosis of CHF. Pt states that he had no appetite & was mainly consuming soups and teaspoons of dry food PTA.  Pt threw away his diabetic pills and was not self monitoring blood glucose PTA.  Pt reports feeling down and depressed due to his medical condition.  Pt c improved oral intake since adm, reports to be eating better. Pt is currently scheduled for transfer for cardiac work up.

## 2018-03-06 NOTE — CHART NOTE - NSCHARTNOTEFT_GEN_A_CORE
Upon Nutritional Assessment by the Registered Dietitian your patient was determined to meet criteria / has evidence of the following diagnosis/diagnoses:          [ ]  Mild Protein Calorie Malnutrition        [ ]  Moderate Protein Calorie Malnutrition        [ x] Severe Protein Calorie Malnutrition        [ ] Unspecified Protein Calorie Malnutrition        [ ] Underweight / BMI <19        [ ] Morbid Obesity / BMI > 40      Findings as based on:  •  Comprehensive nutrition assessment and consultation  •  Calorie counts (nutrient intake analysis)  •  Food acceptance and intake status from observations by staff  •  Follow up  •  Patient education  •  Intervention secondary to interdisciplinary rounds  •   concerns      Treatment:    The following diet has been recommended:   Low sodium , consistent carbohydrate c evening snack ,  Glucerna Shake 2 x daily= 200 calories, 20 grams protein daily    Outpatient consultation for c/o feeling down & depressed       PROVIDER Section:     By signing this assessment you are acknowledging and agree with the diagnosis/diagnoses assigned by the Registered Dietitian    Comments:

## 2018-03-08 DIAGNOSIS — Z91.19 PATIENT'S NONCOMPLIANCE WITH OTHER MEDICAL TREATMENT AND REGIMEN: ICD-10-CM

## 2018-03-08 DIAGNOSIS — Z95.1 PRESENCE OF AORTOCORONARY BYPASS GRAFT: ICD-10-CM

## 2018-03-08 DIAGNOSIS — I21.4 NON-ST ELEVATION (NSTEMI) MYOCARDIAL INFARCTION: ICD-10-CM

## 2018-03-08 DIAGNOSIS — R06.02 SHORTNESS OF BREATH: ICD-10-CM

## 2018-03-08 DIAGNOSIS — I50.23 ACUTE ON CHRONIC SYSTOLIC (CONGESTIVE) HEART FAILURE: ICD-10-CM

## 2018-03-08 DIAGNOSIS — E11.65 TYPE 2 DIABETES MELLITUS WITH HYPERGLYCEMIA: ICD-10-CM

## 2018-03-08 DIAGNOSIS — E43 UNSPECIFIED SEVERE PROTEIN-CALORIE MALNUTRITION: ICD-10-CM

## 2018-03-08 DIAGNOSIS — I25.10 ATHEROSCLEROTIC HEART DISEASE OF NATIVE CORONARY ARTERY WITHOUT ANGINA PECTORIS: ICD-10-CM

## 2018-03-08 DIAGNOSIS — E11.22 TYPE 2 DIABETES MELLITUS WITH DIABETIC CHRONIC KIDNEY DISEASE: ICD-10-CM

## 2018-03-14 ENCOUNTER — INPATIENT (INPATIENT)
Facility: HOSPITAL | Age: 68
LOS: 0 days | Discharge: ANOTHER HOSPITAL | End: 2018-03-15
Attending: INTERNAL MEDICINE | Admitting: INTERNAL MEDICINE
Payer: MEDICARE

## 2018-03-14 VITALS
WEIGHT: 149.91 LBS | RESPIRATION RATE: 19 BRPM | HEIGHT: 69 IN | SYSTOLIC BLOOD PRESSURE: 170 MMHG | DIASTOLIC BLOOD PRESSURE: 103 MMHG | TEMPERATURE: 98 F | OXYGEN SATURATION: 97 % | HEART RATE: 90 BPM

## 2018-03-14 DIAGNOSIS — R07.9 CHEST PAIN, UNSPECIFIED: ICD-10-CM

## 2018-03-14 DIAGNOSIS — Z98.890 OTHER SPECIFIED POSTPROCEDURAL STATES: Chronic | ICD-10-CM

## 2018-03-14 DIAGNOSIS — I50.9 HEART FAILURE, UNSPECIFIED: ICD-10-CM

## 2018-03-14 DIAGNOSIS — E11.9 TYPE 2 DIABETES MELLITUS WITHOUT COMPLICATIONS: ICD-10-CM

## 2018-03-14 DIAGNOSIS — E78.4 OTHER HYPERLIPIDEMIA: ICD-10-CM

## 2018-03-14 DIAGNOSIS — I10 ESSENTIAL (PRIMARY) HYPERTENSION: ICD-10-CM

## 2018-03-14 LAB
ALBUMIN SERPL ELPH-MCNC: 2.5 G/DL — LOW (ref 3.3–5)
ALP SERPL-CCNC: 249 U/L — HIGH (ref 40–120)
ALT FLD-CCNC: 16 U/L — SIGNIFICANT CHANGE UP (ref 12–78)
ANION GAP SERPL CALC-SCNC: 4 MMOL/L — LOW (ref 5–17)
APTT BLD: 42.7 SEC — HIGH (ref 27.5–37.4)
AST SERPL-CCNC: 22 U/L — SIGNIFICANT CHANGE UP (ref 15–37)
BASOPHILS # BLD AUTO: 0.06 K/UL — SIGNIFICANT CHANGE UP (ref 0–0.2)
BASOPHILS NFR BLD AUTO: 0.7 % — SIGNIFICANT CHANGE UP (ref 0–2)
BILIRUB SERPL-MCNC: 0.2 MG/DL — SIGNIFICANT CHANGE UP (ref 0.2–1.2)
BUN SERPL-MCNC: 20 MG/DL — SIGNIFICANT CHANGE UP (ref 7–23)
CALCIUM SERPL-MCNC: 8.5 MG/DL — SIGNIFICANT CHANGE UP (ref 8.5–10.1)
CHLORIDE SERPL-SCNC: 110 MMOL/L — HIGH (ref 96–108)
CK MB CFR SERPL CALC: 1.2 NG/ML — SIGNIFICANT CHANGE UP (ref 0.5–3.6)
CO2 SERPL-SCNC: 28 MMOL/L — SIGNIFICANT CHANGE UP (ref 22–31)
CREAT SERPL-MCNC: 1.29 MG/DL — SIGNIFICANT CHANGE UP (ref 0.5–1.3)
EOSINOPHIL # BLD AUTO: 0.31 K/UL — SIGNIFICANT CHANGE UP (ref 0–0.5)
EOSINOPHIL NFR BLD AUTO: 3.7 % — SIGNIFICANT CHANGE UP (ref 0–6)
GLUCOSE BLDC GLUCOMTR-MCNC: 148 MG/DL — HIGH (ref 70–99)
GLUCOSE BLDC GLUCOMTR-MCNC: 179 MG/DL — HIGH (ref 70–99)
GLUCOSE SERPL-MCNC: 165 MG/DL — HIGH (ref 70–99)
HCT VFR BLD CALC: 38.5 % — LOW (ref 39–50)
HGB BLD-MCNC: 13.1 G/DL — SIGNIFICANT CHANGE UP (ref 13–17)
IMM GRANULOCYTES NFR BLD AUTO: 0.2 % — SIGNIFICANT CHANGE UP (ref 0–1.5)
INR BLD: 1.04 RATIO — SIGNIFICANT CHANGE UP (ref 0.88–1.16)
LIDOCAIN IGE QN: 122 U/L — SIGNIFICANT CHANGE UP (ref 73–393)
LYMPHOCYTES # BLD AUTO: 1.51 K/UL — SIGNIFICANT CHANGE UP (ref 1–3.3)
LYMPHOCYTES # BLD AUTO: 18.2 % — SIGNIFICANT CHANGE UP (ref 13–44)
MCHC RBC-ENTMCNC: 31.7 PG — SIGNIFICANT CHANGE UP (ref 27–34)
MCHC RBC-ENTMCNC: 34 GM/DL — SIGNIFICANT CHANGE UP (ref 32–36)
MCV RBC AUTO: 93.2 FL — SIGNIFICANT CHANGE UP (ref 80–100)
MONOCYTES # BLD AUTO: 0.64 K/UL — SIGNIFICANT CHANGE UP (ref 0–0.9)
MONOCYTES NFR BLD AUTO: 7.7 % — SIGNIFICANT CHANGE UP (ref 2–14)
NEUTROPHILS # BLD AUTO: 5.75 K/UL — SIGNIFICANT CHANGE UP (ref 1.8–7.4)
NEUTROPHILS NFR BLD AUTO: 69.5 % — SIGNIFICANT CHANGE UP (ref 43–77)
NRBC # BLD: 0 /100 WBCS — SIGNIFICANT CHANGE UP (ref 0–0)
NT-PROBNP SERPL-SCNC: 9545 PG/ML — HIGH (ref 0–125)
PLATELET # BLD AUTO: 167 K/UL — SIGNIFICANT CHANGE UP (ref 150–400)
POTASSIUM SERPL-MCNC: 5.3 MMOL/L — SIGNIFICANT CHANGE UP (ref 3.5–5.3)
POTASSIUM SERPL-SCNC: 5.3 MMOL/L — SIGNIFICANT CHANGE UP (ref 3.5–5.3)
PROT SERPL-MCNC: 7.1 GM/DL — SIGNIFICANT CHANGE UP (ref 6–8.3)
PROTHROM AB SERPL-ACNC: 11.4 SEC — SIGNIFICANT CHANGE UP (ref 9.8–12.7)
RBC # BLD: 4.13 M/UL — LOW (ref 4.2–5.8)
RBC # FLD: 13.2 % — SIGNIFICANT CHANGE UP (ref 10.3–14.5)
SODIUM SERPL-SCNC: 142 MMOL/L — SIGNIFICANT CHANGE UP (ref 135–145)
TROPONIN I SERPL-MCNC: 0.03 NG/ML — SIGNIFICANT CHANGE UP (ref 0.01–0.04)
TROPONIN I SERPL-MCNC: 0.03 NG/ML — SIGNIFICANT CHANGE UP (ref 0.01–0.04)
WBC # BLD: 8.29 K/UL — SIGNIFICANT CHANGE UP (ref 3.8–10.5)
WBC # FLD AUTO: 8.29 K/UL — SIGNIFICANT CHANGE UP (ref 3.8–10.5)

## 2018-03-14 PROCEDURE — 99223 1ST HOSP IP/OBS HIGH 75: CPT

## 2018-03-14 PROCEDURE — 99285 EMERGENCY DEPT VISIT HI MDM: CPT

## 2018-03-14 PROCEDURE — 71045 X-RAY EXAM CHEST 1 VIEW: CPT | Mod: 26

## 2018-03-14 RX ORDER — INSULIN LISPRO 100/ML
VIAL (ML) SUBCUTANEOUS
Qty: 0 | Refills: 0 | Status: DISCONTINUED | OUTPATIENT
Start: 2018-03-14 | End: 2018-03-15

## 2018-03-14 RX ORDER — DEXTROSE 50 % IN WATER 50 %
1 SYRINGE (ML) INTRAVENOUS ONCE
Qty: 0 | Refills: 0 | Status: DISCONTINUED | OUTPATIENT
Start: 2018-03-14 | End: 2018-03-15

## 2018-03-14 RX ORDER — DEXTROSE 50 % IN WATER 50 %
12.5 SYRINGE (ML) INTRAVENOUS ONCE
Qty: 0 | Refills: 0 | Status: DISCONTINUED | OUTPATIENT
Start: 2018-03-14 | End: 2018-03-15

## 2018-03-14 RX ORDER — GLUCAGON INJECTION, SOLUTION 0.5 MG/.1ML
1 INJECTION, SOLUTION SUBCUTANEOUS ONCE
Qty: 0 | Refills: 0 | Status: DISCONTINUED | OUTPATIENT
Start: 2018-03-14 | End: 2018-03-15

## 2018-03-14 RX ORDER — METOPROLOL TARTRATE 50 MG
25 TABLET ORAL
Qty: 0 | Refills: 0 | Status: DISCONTINUED | OUTPATIENT
Start: 2018-03-14 | End: 2018-03-15

## 2018-03-14 RX ORDER — ASPIRIN/CALCIUM CARB/MAGNESIUM 324 MG
325 TABLET ORAL DAILY
Qty: 0 | Refills: 0 | Status: DISCONTINUED | OUTPATIENT
Start: 2018-03-14 | End: 2018-03-15

## 2018-03-14 RX ORDER — SODIUM CHLORIDE 9 MG/ML
1000 INJECTION, SOLUTION INTRAVENOUS
Qty: 0 | Refills: 0 | Status: DISCONTINUED | OUTPATIENT
Start: 2018-03-14 | End: 2018-03-15

## 2018-03-14 RX ORDER — INSULIN LISPRO 100/ML
VIAL (ML) SUBCUTANEOUS AT BEDTIME
Qty: 0 | Refills: 0 | Status: DISCONTINUED | OUTPATIENT
Start: 2018-03-14 | End: 2018-03-15

## 2018-03-14 RX ORDER — FUROSEMIDE 40 MG
40 TABLET ORAL EVERY 12 HOURS
Qty: 0 | Refills: 0 | Status: DISCONTINUED | OUTPATIENT
Start: 2018-03-14 | End: 2018-03-15

## 2018-03-14 RX ORDER — ATORVASTATIN CALCIUM 80 MG/1
40 TABLET, FILM COATED ORAL AT BEDTIME
Qty: 0 | Refills: 0 | Status: DISCONTINUED | OUTPATIENT
Start: 2018-03-14 | End: 2018-03-15

## 2018-03-14 RX ORDER — ACETAMINOPHEN 500 MG
650 TABLET ORAL EVERY 6 HOURS
Qty: 0 | Refills: 0 | Status: DISCONTINUED | OUTPATIENT
Start: 2018-03-14 | End: 2018-03-15

## 2018-03-14 RX ORDER — DEXTROSE 50 % IN WATER 50 %
25 SYRINGE (ML) INTRAVENOUS ONCE
Qty: 0 | Refills: 0 | Status: DISCONTINUED | OUTPATIENT
Start: 2018-03-14 | End: 2018-03-15

## 2018-03-14 RX ORDER — ENOXAPARIN SODIUM 100 MG/ML
40 INJECTION SUBCUTANEOUS DAILY
Qty: 0 | Refills: 0 | Status: DISCONTINUED | OUTPATIENT
Start: 2018-03-14 | End: 2018-03-15

## 2018-03-14 RX ORDER — FUROSEMIDE 40 MG
40 TABLET ORAL ONCE
Qty: 0 | Refills: 0 | Status: COMPLETED | OUTPATIENT
Start: 2018-03-14 | End: 2018-03-14

## 2018-03-14 RX ORDER — NICOTINE POLACRILEX 2 MG
1 GUM BUCCAL DAILY
Qty: 0 | Refills: 0 | Status: DISCONTINUED | OUTPATIENT
Start: 2018-03-14 | End: 2018-03-15

## 2018-03-14 RX ORDER — LISINOPRIL 2.5 MG/1
5 TABLET ORAL DAILY
Qty: 0 | Refills: 0 | Status: DISCONTINUED | OUTPATIENT
Start: 2018-03-14 | End: 2018-03-15

## 2018-03-14 RX ADMIN — Medication 40 MILLIGRAM(S): at 17:22

## 2018-03-14 RX ADMIN — Medication 1 PATCH: at 17:22

## 2018-03-14 RX ADMIN — Medication 40 MILLIGRAM(S): at 13:07

## 2018-03-14 RX ADMIN — ENOXAPARIN SODIUM 40 MILLIGRAM(S): 100 INJECTION SUBCUTANEOUS at 17:22

## 2018-03-14 RX ADMIN — Medication 25 MILLIGRAM(S): at 17:22

## 2018-03-14 RX ADMIN — LISINOPRIL 5 MILLIGRAM(S): 2.5 TABLET ORAL at 17:22

## 2018-03-14 RX ADMIN — Medication 325 MILLIGRAM(S): at 17:22

## 2018-03-14 RX ADMIN — ATORVASTATIN CALCIUM 40 MILLIGRAM(S): 80 TABLET, FILM COATED ORAL at 21:45

## 2018-03-14 NOTE — H&P ADULT - NSHPLABSRESULTS_GEN_ALL_CORE
13.1   8.29  )-----------( 167      ( 14 Mar 2018 10:42 )             38.5   03-14    142  |  110<H>  |  20  ----------------------------<  165<H>  5.3   |  28  |  1.29    Ca    8.5      14 Mar 2018 10:42    TPro  7.1  /  Alb  2.5<L>  /  TBili  0.2  /  DBili  x   /  AST  22  /  ALT  16  /  AlkPhos  249<H>  03-14  < from: Xray Chest 1 View AP/PA. (03.14.18 @ 11:13) >    Bilateral lung opacities, which may represent pulmonary edema or   pneumonia.  Troponin I, Serum (03.14.18 @ 10:42)    Troponin I, Serum: .025: The new reference range for Troponin-I performed on the Siemens [place]

## 2018-03-14 NOTE — H&P ADULT - ASSESSMENT
67M WITH HX OF CABG WITH C/P      IMPROVE VTE Individual Risk Assessment          RISK                                                          Points    [  ] Previous VTE                                                3    [  ] Thrombophilia                                             2    [  ] Lower limb paralysis                                    2        (unable to hold up >15 seconds)      [  ] Current Cancer                                             2         (within 6 months)    [  ] Immobilization > 24 hrs                              1    [  ] ICU/CCU stay > 24 hours                            1    [X  ] Age > 60                                                    1    IMPROVE VTE Score ____0_____ 67M WITH HX OF CABG WITH C/P      IMPROVE VTE Individual Risk Assessment          RISK                                                          Points    [  ] Previous VTE                                                3    [  ] Thrombophilia                                             2    [  ] Lower limb paralysis                                    2        (unable to hold up >15 seconds)      [  ] Current Cancer                                             2         (within 6 months)    [  ] Immobilization > 24 hrs                              1    [  ] ICU/CCU stay > 24 hours                            1    [X  ] Age > 60                                                    1    IMPROVE VTE Score __1___

## 2018-03-14 NOTE — ED PROVIDER NOTE - CARE PLAN
Principal Discharge DX:	Chest pain, unspecified type  Secondary Diagnosis:	Acute on chronic congestive heart failure, unspecified congestive heart failure type

## 2018-03-14 NOTE — CONSULT NOTE ADULT - SUBJECTIVE AND OBJECTIVE BOX
HPI:  HPI:  · HPI Objective Statement: Pt is a 68 yo gentleman with a pmhx of HTN, HL, IDDM2, CABG, who presents to the ED with chest pain and sob. It started today, has been intermittent, and lasts for about 3 minutes. Is currently a smoker. Was recently in hospital for similar complaint, and discharged home with Holter monitor. Has not seen any doctors since discharge. No leg swelling, denies any hx of dvt/pe, no n/v/d, no cough, no fevers. Does not use oxygen at home. (14 Mar 2018 14:44)      Chief Complaint:  Patient is a 67y old  Male who presents with a chief complaint of chest pain (14 Mar 2018 14:44)      Review of Systems:    see above       Social History/Family History  SOCHX:   tobacco,  -  alcohol    FMHX: FA/MO  - contributory       Discussed with:  PMD, Family    Physical Exam:    Vital Signs:  Vital Signs Last 24 Hrs  T(C): 36.9 (15 Mar 2018 05:00), Max: 36.9 (15 Mar 2018 05:00)  T(F): 98.4 (15 Mar 2018 05:00), Max: 98.4 (15 Mar 2018 05:00)  HR: 81 (15 Mar 2018 05:00) (71 - 81)  BP: 126/73 (15 Mar 2018 05:00) (126/73 - 141/79)  BP(mean): --  RR: 18 (15 Mar 2018 05:00) (16 - 18)  SpO2: 97% (15 Mar 2018 05:00) (97% - 97%)  Daily     Daily   I&O's Summary      Chest:  Full & symmetric excursion, no increased effort, breath sounds clear  Cardiovascular:  Regular rhythm, S1, S2, no murmur/rub/S3/S4, no carotid/femoral/abdominal bruit, radial/pedal pulses 2+, no edema  Abdomen:  Soft, non-tender, non-distended, normoactive bowel sounds, no HSM      Laboratory:                          13.1   8.29  )-----------( 167      ( 14 Mar 2018 10:42 )             38.5     03-14    142  |  110<H>  |  20  ----------------------------<  165<H>  5.3   |  28  |  1.29    Ca    8.5      14 Mar 2018 10:42    TPro  7.1  /  Alb  2.5<L>  /  TBili  0.2  /  DBili  x   /  AST  22  /  ALT  16  /  AlkPhos  249<H>  03-14      CARDIAC MARKERS ( 15 Mar 2018 01:48 )  .022 ng/mL / x     / x     / x     / x      CARDIAC MARKERS ( 14 Mar 2018 17:44 )  .025 ng/mL / x     / x     / x     / x      CARDIAC MARKERS ( 14 Mar 2018 10:42 )  .025 ng/mL / x     / x     / x     / 1.2 ng/mL      CAPILLARY BLOOD GLUCOSE      POCT Blood Glucose.: 179 mg/dL (14 Mar 2018 21:44)    LIVER FUNCTIONS - ( 14 Mar 2018 10:42 )  Alb: 2.5 g/dL / Pro: 7.1 gm/dL / ALK PHOS: 249 U/L / ALT: 16 U/L / AST: 22 U/L / GGT: x           PT/INR - ( 14 Mar 2018 10:42 )   PT: 11.4 sec;   INR: 1.04 ratio         PTT - ( 14 Mar 2018 10:42 )  PTT:42.7 sec          Assessment:  chest pain  recent discharge and transfer to [facility] for cath  will transfer back to [facility] for possible PCI.

## 2018-03-14 NOTE — CONSULT NOTE ADULT - SUBJECTIVE AND OBJECTIVE BOX
HPI:  HPI:  · HPI Objective Statement: Pt is a 66 yo gentleman with a pmhx of HTN, HL, IDDM2, CABG, who presents to the ED with chest pain and sob. It started today, has been intermittent, and lasts for about 3 minutes. Is currently a smoker. Was recently in hospital for similar complaint, and discharged home with Holter monitor. Has not seen any doctors since discharge. No leg swelling, denies any hx of dvt/pe, no n/v/d, no cough, no fevers. Does not use oxygen at home. (14 Mar 2018 14:44)      Chief Complaint:  Patient is a 67y old  Male who presents with a chief complaint of chest pain (14 Mar 2018 14:44)      Review of Systems:    see above         Social History/Family History  SOCHX:   tobacco,  -  alcohol    FMHX: FA/MO  - contributory       Discussed with:  PMD, Family    Physical Exam:    Vital Signs:  Vital Signs Last 24 Hrs  T(C): 37 (14 Mar 2018 16:42), Max: 37 (14 Mar 2018 16:42)  T(F): 98.6 (14 Mar 2018 16:42), Max: 98.6 (14 Mar 2018 16:42)  HR: 74 (14 Mar 2018 16:42) (74 - 90)  BP: 156/89 (14 Mar 2018 16:42) (142/81 - 171/100)  BP(mean): --  RR: 18 (14 Mar 2018 16:42) (18 - 25)  SpO2: 96% (14 Mar 2018 16:42) (96% - 98%)  Daily Height in cm: 175.26 (14 Mar 2018 10:06)    Daily   I&O's Summary        Chest:  Full & symmetric excursion, no increased effort, breath sounds clear  Cardiovascular:  Regular rhythm, S1, S2, no murmur/rub/S3/S4, no carotid/femoral/abdominal bruit, radial/pedal pulses 2+, no edema  Abdomen:  Soft, non-tender, non-distended, normoactive bowel sounds, no HSM      Laboratory:                          13.1   8.29  )-----------( 167      ( 14 Mar 2018 10:42 )             38.5     03-14    142  |  110<H>  |  20  ----------------------------<  165<H>  5.3   |  28  |  1.29    Ca    8.5      14 Mar 2018 10:42    TPro  7.1  /  Alb  2.5<L>  /  TBili  0.2  /  DBili  x   /  AST  22  /  ALT  16  /  AlkPhos  249<H>  03-14      CARDIAC MARKERS ( 14 Mar 2018 17:44 )  .025 ng/mL / x     / x     / x     / x      CARDIAC MARKERS ( 14 Mar 2018 10:42 )  .025 ng/mL / x     / x     / x     / 1.2 ng/mL      CAPILLARY BLOOD GLUCOSE      POCT Blood Glucose.: 148 mg/dL (14 Mar 2018 15:47)    LIVER FUNCTIONS - ( 14 Mar 2018 10:42 )  Alb: 2.5 g/dL / Pro: 7.1 gm/dL / ALK PHOS: 249 U/L / ALT: 16 U/L / AST: 22 U/L / GGT: x           PT/INR - ( 14 Mar 2018 10:42 )   PT: 11.4 sec;   INR: 1.04 ratio         PTT - ( 14 Mar 2018 10:42 )  PTT:42.7 sec        Assessment:  Recurrent CP and SOB  Recent stress test with infarct, sent to [facility] for cath  No stents placed with CAD evident.   Discussion with patient at that time wanted to think about it.   This represents typical ischic type chest pain  To transfer back [facility] for PCI

## 2018-03-14 NOTE — ED ADULT TRIAGE NOTE - CHIEF COMPLAINT QUOTE
patient c/o of chest pain started last night , denied radiation , denied dizziness , no headache , no N/V , patient has a Halter monitor , patient does not the reason for the Halter monitor patient c/o of chest pain started last night , denied radiation , denied dizziness , no headache , no N/V , patient has a Halter monitor , patient does not the reason for the Halter monitor , EMS give patient Nitro spry and Aspirin 81 mg x2

## 2018-03-14 NOTE — ED PROVIDER NOTE - OBJECTIVE STATEMENT
Pt is a 68 yo gentleman with a pmhx of HTN, HL, IDDM2, CABG, who presents to the ED with chest pain and sob. It started today, has been intermittent, and lasts for about 3 minutes. Is currently a smoker. Was recently in hospital for similar complaint, and discharged home with Holter monitor. Has not seen any doctors since discharge. No leg swelling, denies any hx of dvt/pe, no n/v/d, no cough, no fevers. Does not use oxygen at home.

## 2018-03-14 NOTE — ED ADULT NURSE NOTE - CHIEF COMPLAINT QUOTE
patient c/o of chest pain started last night , denied radiation , denied dizziness , no headache , no N/V , patient has a Halter monitor , patient does not the reason for the Halter monitor , EMS give patient Nitro spry and Aspirin 81 mg x2

## 2018-03-14 NOTE — H&P ADULT - NSHPREVIEWOFSYSTEMS_GEN_ALL_CORE

## 2018-03-14 NOTE — ED ADULT NURSE REASSESSMENT NOTE - NS ED NURSE REASSESS COMMENT FT1
pt a&O no pain at present Dr at bedside interpreting pt poor historian. Shakeel monitor with pt and removed by pt for not working, Homecare papers with pt from [facility]

## 2018-03-14 NOTE — H&P ADULT - HISTORY OF PRESENT ILLNESS
· HPI Objective Statement: Pt is a 66 yo gentleman with a pmhx of HTN, HL, IDDM2, CABG, who presents to the ED with chest pain and sob. It started today, has been intermittent, and lasts for about 3 minutes. Is currently a smoker. Was recently in hospital for similar complaint, and discharged home with Holter monitor. Has not seen any doctors since discharge. No leg swelling, denies any hx of dvt/pe, no n/v/d, no cough, no fevers. Does not use oxygen at home.

## 2018-03-15 VITALS
DIASTOLIC BLOOD PRESSURE: 73 MMHG | HEART RATE: 81 BPM | TEMPERATURE: 98 F | SYSTOLIC BLOOD PRESSURE: 126 MMHG | OXYGEN SATURATION: 97 % | RESPIRATION RATE: 18 BRPM

## 2018-03-15 LAB — TROPONIN I SERPL-MCNC: 0.02 NG/ML — SIGNIFICANT CHANGE UP (ref 0.01–0.04)

## 2018-03-19 DIAGNOSIS — Z95.1 PRESENCE OF AORTOCORONARY BYPASS GRAFT: ICD-10-CM

## 2018-03-19 DIAGNOSIS — F17.200 NICOTINE DEPENDENCE, UNSPECIFIED, UNCOMPLICATED: ICD-10-CM

## 2018-03-19 DIAGNOSIS — E11.9 TYPE 2 DIABETES MELLITUS WITHOUT COMPLICATIONS: ICD-10-CM

## 2018-03-19 DIAGNOSIS — I25.119 ATHEROSCLEROTIC HEART DISEASE OF NATIVE CORONARY ARTERY WITH UNSPECIFIED ANGINA PECTORIS: ICD-10-CM

## 2018-03-19 DIAGNOSIS — E78.5 HYPERLIPIDEMIA, UNSPECIFIED: ICD-10-CM

## 2018-03-19 DIAGNOSIS — Z79.82 LONG TERM (CURRENT) USE OF ASPIRIN: ICD-10-CM

## 2018-03-19 DIAGNOSIS — R07.9 CHEST PAIN, UNSPECIFIED: ICD-10-CM

## 2018-03-19 DIAGNOSIS — I11.0 HYPERTENSIVE HEART DISEASE WITH HEART FAILURE: ICD-10-CM

## 2018-03-19 DIAGNOSIS — I25.2 OLD MYOCARDIAL INFARCTION: ICD-10-CM

## 2018-03-19 DIAGNOSIS — I50.9 HEART FAILURE, UNSPECIFIED: ICD-10-CM

## 2018-03-19 DIAGNOSIS — Z79.4 LONG TERM (CURRENT) USE OF INSULIN: ICD-10-CM

## 2018-04-03 ENCOUNTER — INPATIENT (INPATIENT)
Facility: HOSPITAL | Age: 68
LOS: 6 days | Discharge: NURSING, REHAB OR LONG-TERM CARE | End: 2018-04-10
Attending: INTERNAL MEDICINE | Admitting: INTERNAL MEDICINE
Payer: MEDICARE

## 2018-04-03 VITALS
SYSTOLIC BLOOD PRESSURE: 149 MMHG | HEART RATE: 120 BPM | WEIGHT: 153 LBS | HEIGHT: 69 IN | DIASTOLIC BLOOD PRESSURE: 91 MMHG | TEMPERATURE: 103 F | RESPIRATION RATE: 20 BRPM | OXYGEN SATURATION: 92 %

## 2018-04-03 DIAGNOSIS — I50.33 ACUTE ON CHRONIC DIASTOLIC (CONGESTIVE) HEART FAILURE: ICD-10-CM

## 2018-04-03 DIAGNOSIS — Z29.9 ENCOUNTER FOR PROPHYLACTIC MEASURES, UNSPECIFIED: ICD-10-CM

## 2018-04-03 DIAGNOSIS — E11.9 TYPE 2 DIABETES MELLITUS WITHOUT COMPLICATIONS: ICD-10-CM

## 2018-04-03 DIAGNOSIS — E78.2 MIXED HYPERLIPIDEMIA: ICD-10-CM

## 2018-04-03 DIAGNOSIS — Z72.0 TOBACCO USE: ICD-10-CM

## 2018-04-03 DIAGNOSIS — Z98.890 OTHER SPECIFIED POSTPROCEDURAL STATES: Chronic | ICD-10-CM

## 2018-04-03 DIAGNOSIS — J10.1 INFLUENZA DUE TO OTHER IDENTIFIED INFLUENZA VIRUS WITH OTHER RESPIRATORY MANIFESTATIONS: ICD-10-CM

## 2018-04-03 DIAGNOSIS — I10 ESSENTIAL (PRIMARY) HYPERTENSION: ICD-10-CM

## 2018-04-03 LAB
ALBUMIN SERPL ELPH-MCNC: 2.9 G/DL — LOW (ref 3.3–5)
ALP SERPL-CCNC: 428 U/L — HIGH (ref 40–120)
ALT FLD-CCNC: 26 U/L — SIGNIFICANT CHANGE UP (ref 12–78)
ANION GAP SERPL CALC-SCNC: 8 MMOL/L — SIGNIFICANT CHANGE UP (ref 5–17)
ANISOCYTOSIS BLD QL: SLIGHT — SIGNIFICANT CHANGE UP
APPEARANCE UR: CLEAR — SIGNIFICANT CHANGE UP
APTT BLD: 47.5 SEC — HIGH (ref 27.5–37.4)
AST SERPL-CCNC: 41 U/L — HIGH (ref 15–37)
BACTERIA # UR AUTO: ABNORMAL
BASE EXCESS BLDA CALC-SCNC: -4 MMOL/L — LOW (ref -2–2)
BASOPHILS # BLD AUTO: 0.09 K/UL — SIGNIFICANT CHANGE UP (ref 0–0.2)
BASOPHILS NFR BLD AUTO: 1 % — SIGNIFICANT CHANGE UP (ref 0–2)
BILIRUB SERPL-MCNC: 0.7 MG/DL — SIGNIFICANT CHANGE UP (ref 0.2–1.2)
BILIRUB UR-MCNC: NEGATIVE — SIGNIFICANT CHANGE UP
BLOOD GAS COMMENTS: SIGNIFICANT CHANGE UP
BLOOD GAS COMMENTS: SIGNIFICANT CHANGE UP
BLOOD GAS SOURCE: SIGNIFICANT CHANGE UP
BUN SERPL-MCNC: 32 MG/DL — HIGH (ref 7–23)
CALCIUM SERPL-MCNC: 8.8 MG/DL — SIGNIFICANT CHANGE UP (ref 8.5–10.1)
CHLORIDE SERPL-SCNC: 106 MMOL/L — SIGNIFICANT CHANGE UP (ref 96–108)
CO2 SERPL-SCNC: 24 MMOL/L — SIGNIFICANT CHANGE UP (ref 22–31)
COLOR SPEC: YELLOW — SIGNIFICANT CHANGE UP
CREAT SERPL-MCNC: 1.87 MG/DL — HIGH (ref 0.5–1.3)
DIFF PNL FLD: ABNORMAL
EOSINOPHIL # BLD AUTO: 0 K/UL — SIGNIFICANT CHANGE UP (ref 0–0.5)
EOSINOPHIL NFR BLD AUTO: 0 % — SIGNIFICANT CHANGE UP (ref 0–6)
EPI CELLS # UR: SIGNIFICANT CHANGE UP
FLUAV SPEC QL CULT: POSITIVE
FLUBV AG SPEC QL IA: NEGATIVE — SIGNIFICANT CHANGE UP
GLUCOSE BLDC GLUCOMTR-MCNC: 125 MG/DL — HIGH (ref 70–99)
GLUCOSE SERPL-MCNC: 83 MG/DL — SIGNIFICANT CHANGE UP (ref 70–99)
GLUCOSE UR QL: NEGATIVE MG/DL — SIGNIFICANT CHANGE UP
HCO3 BLDA-SCNC: 21 MMOL/L — SIGNIFICANT CHANGE UP (ref 21–29)
HCT VFR BLD CALC: 34.8 % — LOW (ref 39–50)
HGB BLD-MCNC: 11.4 G/DL — LOW (ref 13–17)
HOROWITZ INDEX BLDA+IHG-RTO: 60 — SIGNIFICANT CHANGE UP
INR BLD: 1.18 RATIO — HIGH (ref 0.88–1.16)
KETONES UR-MCNC: NEGATIVE — SIGNIFICANT CHANGE UP
LACTATE SERPL-SCNC: 2 MMOL/L — SIGNIFICANT CHANGE UP (ref 0.7–2)
LACTATE SERPL-SCNC: 2.9 MMOL/L — HIGH (ref 0.7–2)
LEUKOCYTE ESTERASE UR-ACNC: NEGATIVE — SIGNIFICANT CHANGE UP
LYMPHOCYTES # BLD AUTO: 0.18 K/UL — LOW (ref 1–3.3)
LYMPHOCYTES # BLD AUTO: 2 % — LOW (ref 13–44)
MANUAL SMEAR VERIFICATION: SIGNIFICANT CHANGE UP
MCHC RBC-ENTMCNC: 30.7 PG — SIGNIFICANT CHANGE UP (ref 27–34)
MCHC RBC-ENTMCNC: 32.8 GM/DL — SIGNIFICANT CHANGE UP (ref 32–36)
MCV RBC AUTO: 93.8 FL — SIGNIFICANT CHANGE UP (ref 80–100)
MONOCYTES # BLD AUTO: 0.36 K/UL — SIGNIFICANT CHANGE UP (ref 0–0.9)
MONOCYTES NFR BLD AUTO: 4 % — SIGNIFICANT CHANGE UP (ref 2–14)
NEUTROPHILS # BLD AUTO: 8.4 K/UL — HIGH (ref 1.8–7.4)
NEUTROPHILS NFR BLD AUTO: 89 % — HIGH (ref 43–77)
NEUTS BAND # BLD: 4 % — SIGNIFICANT CHANGE UP (ref 0–8)
NITRITE UR-MCNC: NEGATIVE — SIGNIFICANT CHANGE UP
NRBC # BLD: 0 /100 — SIGNIFICANT CHANGE UP (ref 0–0)
NRBC # BLD: SIGNIFICANT CHANGE UP /100 WBCS (ref 0–0)
NT-PROBNP SERPL-SCNC: HIGH PG/ML (ref 0–125)
PCO2 BLDA: 43 MMHG — SIGNIFICANT CHANGE UP (ref 32–46)
PH BLD: 7.32 — LOW (ref 7.35–7.45)
PH UR: 5 — SIGNIFICANT CHANGE UP (ref 5–8)
PLAT MORPH BLD: NORMAL — SIGNIFICANT CHANGE UP
PLATELET # BLD AUTO: 141 K/UL — LOW (ref 150–400)
PO2 BLDA: 151 MMHG — HIGH (ref 74–108)
POTASSIUM SERPL-MCNC: 4.9 MMOL/L — SIGNIFICANT CHANGE UP (ref 3.5–5.3)
POTASSIUM SERPL-SCNC: 4.9 MMOL/L — SIGNIFICANT CHANGE UP (ref 3.5–5.3)
PROT SERPL-MCNC: 8 GM/DL — SIGNIFICANT CHANGE UP (ref 6–8.3)
PROT UR-MCNC: 100 MG/DL
PROTHROM AB SERPL-ACNC: 12.9 SEC — HIGH (ref 9.8–12.7)
RBC # BLD: 3.71 M/UL — LOW (ref 4.2–5.8)
RBC # FLD: 13.5 % — SIGNIFICANT CHANGE UP (ref 10.3–14.5)
RBC BLD AUTO: SIGNIFICANT CHANGE UP
RBC CASTS # UR COMP ASSIST: ABNORMAL /HPF (ref 0–4)
SAO2 % BLDA: 97 % — HIGH (ref 92–96)
SODIUM SERPL-SCNC: 138 MMOL/L — SIGNIFICANT CHANGE UP (ref 135–145)
SP GR SPEC: 1.01 — SIGNIFICANT CHANGE UP (ref 1.01–1.02)
UROBILINOGEN FLD QL: NEGATIVE MG/DL — SIGNIFICANT CHANGE UP
WBC # BLD: 9.03 K/UL — SIGNIFICANT CHANGE UP (ref 3.8–10.5)
WBC # FLD AUTO: 9.03 K/UL — SIGNIFICANT CHANGE UP (ref 3.8–10.5)
WBC UR QL: SIGNIFICANT CHANGE UP

## 2018-04-03 PROCEDURE — 71045 X-RAY EXAM CHEST 1 VIEW: CPT | Mod: 26

## 2018-04-03 PROCEDURE — 99223 1ST HOSP IP/OBS HIGH 75: CPT

## 2018-04-03 PROCEDURE — 99291 CRITICAL CARE FIRST HOUR: CPT

## 2018-04-03 RX ORDER — FUROSEMIDE 40 MG
40 TABLET ORAL ONCE
Qty: 0 | Refills: 0 | Status: COMPLETED | OUTPATIENT
Start: 2018-04-03 | End: 2018-04-03

## 2018-04-03 RX ORDER — TAMSULOSIN HYDROCHLORIDE 0.4 MG/1
0.4 CAPSULE ORAL DAILY
Qty: 0 | Refills: 0 | Status: DISCONTINUED | OUTPATIENT
Start: 2018-04-03 | End: 2018-04-04

## 2018-04-03 RX ORDER — SODIUM CHLORIDE 9 MG/ML
3 INJECTION INTRAMUSCULAR; INTRAVENOUS; SUBCUTANEOUS ONCE
Qty: 0 | Refills: 0 | Status: COMPLETED | OUTPATIENT
Start: 2018-04-03 | End: 2018-04-03

## 2018-04-03 RX ORDER — DEXTROSE 50 % IN WATER 50 %
12.5 SYRINGE (ML) INTRAVENOUS ONCE
Qty: 0 | Refills: 0 | Status: DISCONTINUED | OUTPATIENT
Start: 2018-04-03 | End: 2018-04-10

## 2018-04-03 RX ORDER — SODIUM CHLORIDE 9 MG/ML
1000 INJECTION, SOLUTION INTRAVENOUS
Qty: 0 | Refills: 0 | Status: DISCONTINUED | OUTPATIENT
Start: 2018-04-03 | End: 2018-04-10

## 2018-04-03 RX ORDER — SODIUM CHLORIDE 9 MG/ML
2500 INJECTION INTRAMUSCULAR; INTRAVENOUS; SUBCUTANEOUS ONCE
Qty: 0 | Refills: 0 | Status: COMPLETED | OUTPATIENT
Start: 2018-04-03 | End: 2018-04-03

## 2018-04-03 RX ORDER — NICOTINE POLACRILEX 2 MG
1 GUM BUCCAL DAILY
Qty: 0 | Refills: 0 | Status: DISCONTINUED | OUTPATIENT
Start: 2018-04-03 | End: 2018-04-10

## 2018-04-03 RX ORDER — DEXTROSE 50 % IN WATER 50 %
25 SYRINGE (ML) INTRAVENOUS ONCE
Qty: 0 | Refills: 0 | Status: DISCONTINUED | OUTPATIENT
Start: 2018-04-03 | End: 2018-04-10

## 2018-04-03 RX ORDER — ACETAMINOPHEN 500 MG
650 TABLET ORAL ONCE
Qty: 0 | Refills: 0 | Status: COMPLETED | OUTPATIENT
Start: 2018-04-03 | End: 2018-04-03

## 2018-04-03 RX ORDER — DEXTROSE 50 % IN WATER 50 %
1 SYRINGE (ML) INTRAVENOUS ONCE
Qty: 0 | Refills: 0 | Status: DISCONTINUED | OUTPATIENT
Start: 2018-04-03 | End: 2018-04-10

## 2018-04-03 RX ORDER — ATORVASTATIN CALCIUM 80 MG/1
80 TABLET, FILM COATED ORAL AT BEDTIME
Qty: 0 | Refills: 0 | Status: DISCONTINUED | OUTPATIENT
Start: 2018-04-03 | End: 2018-04-04

## 2018-04-03 RX ORDER — ACETAMINOPHEN 500 MG
650 TABLET ORAL EVERY 6 HOURS
Qty: 0 | Refills: 0 | Status: DISCONTINUED | OUTPATIENT
Start: 2018-04-03 | End: 2018-04-10

## 2018-04-03 RX ORDER — GLUCAGON INJECTION, SOLUTION 0.5 MG/.1ML
1 INJECTION, SOLUTION SUBCUTANEOUS ONCE
Qty: 0 | Refills: 0 | Status: DISCONTINUED | OUTPATIENT
Start: 2018-04-03 | End: 2018-04-10

## 2018-04-03 RX ORDER — FUROSEMIDE 40 MG
40 TABLET ORAL EVERY 12 HOURS
Qty: 0 | Refills: 0 | Status: DISCONTINUED | OUTPATIENT
Start: 2018-04-03 | End: 2018-04-04

## 2018-04-03 RX ORDER — PANTOPRAZOLE SODIUM 20 MG/1
40 TABLET, DELAYED RELEASE ORAL
Qty: 0 | Refills: 0 | Status: DISCONTINUED | OUTPATIENT
Start: 2018-04-03 | End: 2018-04-04

## 2018-04-03 RX ORDER — ASPIRIN/CALCIUM CARB/MAGNESIUM 324 MG
81 TABLET ORAL DAILY
Qty: 0 | Refills: 0 | Status: DISCONTINUED | OUTPATIENT
Start: 2018-04-03 | End: 2018-04-10

## 2018-04-03 RX ORDER — PIPERACILLIN AND TAZOBACTAM 4; .5 G/20ML; G/20ML
3.38 INJECTION, POWDER, LYOPHILIZED, FOR SOLUTION INTRAVENOUS ONCE
Qty: 0 | Refills: 0 | Status: COMPLETED | OUTPATIENT
Start: 2018-04-03 | End: 2018-04-03

## 2018-04-03 RX ORDER — INSULIN LISPRO 100/ML
VIAL (ML) SUBCUTANEOUS AT BEDTIME
Qty: 0 | Refills: 0 | Status: DISCONTINUED | OUTPATIENT
Start: 2018-04-03 | End: 2018-04-04

## 2018-04-03 RX ORDER — HEPARIN SODIUM 5000 [USP'U]/ML
5000 INJECTION INTRAVENOUS; SUBCUTANEOUS EVERY 8 HOURS
Qty: 0 | Refills: 0 | Status: DISCONTINUED | OUTPATIENT
Start: 2018-04-03 | End: 2018-04-04

## 2018-04-03 RX ORDER — INSULIN GLARGINE 100 [IU]/ML
10 INJECTION, SOLUTION SUBCUTANEOUS AT BEDTIME
Qty: 0 | Refills: 0 | Status: DISCONTINUED | OUTPATIENT
Start: 2018-04-03 | End: 2018-04-04

## 2018-04-03 RX ORDER — SPIRONOLACTONE 25 MG/1
25 TABLET, FILM COATED ORAL DAILY
Qty: 0 | Refills: 0 | Status: DISCONTINUED | OUTPATIENT
Start: 2018-04-03 | End: 2018-04-04

## 2018-04-03 RX ORDER — CLOPIDOGREL BISULFATE 75 MG/1
75 TABLET, FILM COATED ORAL DAILY
Qty: 0 | Refills: 0 | Status: DISCONTINUED | OUTPATIENT
Start: 2018-04-03 | End: 2018-04-04

## 2018-04-03 RX ORDER — INSULIN LISPRO 100/ML
VIAL (ML) SUBCUTANEOUS
Qty: 0 | Refills: 0 | Status: DISCONTINUED | OUTPATIENT
Start: 2018-04-03 | End: 2018-04-04

## 2018-04-03 RX ORDER — METOPROLOL TARTRATE 50 MG
50 TABLET ORAL DAILY
Qty: 0 | Refills: 0 | Status: DISCONTINUED | OUTPATIENT
Start: 2018-04-03 | End: 2018-04-04

## 2018-04-03 RX ADMIN — PIPERACILLIN AND TAZOBACTAM 200 GRAM(S): 4; .5 INJECTION, POWDER, LYOPHILIZED, FOR SOLUTION INTRAVENOUS at 17:27

## 2018-04-03 RX ADMIN — SODIUM CHLORIDE 3 MILLILITER(S): 9 INJECTION INTRAMUSCULAR; INTRAVENOUS; SUBCUTANEOUS at 17:11

## 2018-04-03 RX ADMIN — Medication 30 MILLIGRAM(S): at 18:39

## 2018-04-03 RX ADMIN — Medication 40 MILLIGRAM(S): at 18:35

## 2018-04-03 RX ADMIN — SODIUM CHLORIDE 1250 MILLILITER(S): 9 INJECTION INTRAMUSCULAR; INTRAVENOUS; SUBCUTANEOUS at 17:11

## 2018-04-03 RX ADMIN — Medication 650 MILLIGRAM(S): at 16:55

## 2018-04-03 NOTE — ED ADULT NURSE NOTE - OBJECTIVE STATEMENT
pt received awake, alert, restless, sob, febrile, pt placed on bipap fio2-60%, pt appear more comfortable.

## 2018-04-03 NOTE — ED ADULT NURSE NOTE - ED STAT RN HANDOFF WHERE
pt is on bipap machine and tolerating well. pt is sinus tach on cadriac monitor. pt alert and oriented by 4.

## 2018-04-03 NOTE — H&P ADULT - PROBLEM SELECTOR PLAN 2
Telemetry monitoring.  Lasix 40mg IVP Q12hrs.  Strict input/output monitoring,  Daily weight monitoring.  Fluid restriction to 1 liter/24 hrs.  O2 via NC@2l/min, keep sat>94% at all times.  3 Sets of cardiac enzymes  Recent TTE reviewed  Cardiology consult  Elevate Head end of bed >35*, aspiration prec

## 2018-04-03 NOTE — H&P ADULT - ASSESSMENT
67 year old man with PMH CAD s/p CABG 2014, had not been on meds for years until last month, DM2, HTN, HLD presents with complaint of fever, chills, cough, lethargy for the last few days.  Signs, symptoms, and work up consistent with influenza and CHF exacerbation.  Patient will require admission for at least 2 midnights for further management as detailed below:    IMPROVE VTE Individual Risk Assessment          RISK                                                          Points    [  ] Previous VTE                                                3    [  ] Thrombophilia                                             2    [  ] Lower limb paralysis                                    2        (unable to hold up >15 seconds)      [  ] Current Cancer                                             2         (within 6 months)    [ x ] Immobilization > 24 hrs                              1    [  ] ICU/CCU stay > 24 hours                            1    [ x ] Age > 60                                                    1    IMPROVE VTE Score ______2___

## 2018-04-03 NOTE — H&P ADULT - HISTORY OF PRESENT ILLNESS
67 year old man with PMH CAD s/p CABG 2014, had not been on meds for years until last month, DM2, HTN, HLD presents with complaint of fever, chills, cough, lethargy for the last few days.  He states that he thought he had a bad cold but could barely get out of bed today so he came here.  He denies chest pain, palpitations, lightheadedness, nausea, vomiting diarrhea.    In the ED, flu +, BNP 94839 (last month was ~9000).  Pt febrile to 102.8 F. CXR with pulmonary vascular congestion.

## 2018-04-03 NOTE — H&P ADULT - NSHPLABSRESULTS_GEN_ALL_CORE
Vital Signs Last 24 Hrs  T(C): 39.3 (03 Apr 2018 16:20), Max: 39.3 (03 Apr 2018 16:20)  T(F): 102.8 (03 Apr 2018 16:20), Max: 102.8 (03 Apr 2018 16:20)  HR: 109 (03 Apr 2018 18:33) (88 - 132)  BP: 115/58 (03 Apr 2018 18:33) (115/58 - 175/95)  BP(mean): --  RR: 25 (03 Apr 2018 18:33) (20 - 25)  SpO2: 97% (03 Apr 2018 18:33) (92% - 100%)        LABS:                        11.4   9.03  )-----------( 141      ( 03 Apr 2018 16:52 )             34.8     04-03    138  |  106  |  32<H>  ----------------------------<  83  4.9   |  24  |  1.87<H>    Ca    8.8      03 Apr 2018 16:52    TPro  8.0  /  Alb  2.9<L>  /  TBili  0.7  /  DBili  x   /  AST  41<H>  /  ALT  26  /  AlkPhos  428<H>  04-03    PT/INR - ( 03 Apr 2018 16:52 )   PT: 12.9 sec;   INR: 1.18 ratio         PTT - ( 03 Apr 2018 16:52 )  PTT:47.5 sec      RADIOLOGY & ADDITIONAL STUDIES:    CXR:  Again noted is significant diffuse congestive picture. The chest film is   surprisingly similar to March 14 of this year.  IMPRESSION: Persistent congestive like findings as above.    TTE 3/5/2018:  Summary:   1. Left ventricular ejection fraction, by visual estimation, is 40 to   45%.   2. Mildly increased LV wall thickness.   3. Spectral Doppler shows pseudonormal pattern of left ventricular   myocardial filling (Grade II diastolic dysfunction).   4. Normal right ventricular size and function.   5. The left atrium is normal in size.   6. The right atrium is normal in size.   7. Moderate pleural effusion in the left lateral region.   8. Trivial pericardial effusion.   9. Mild mitral valve regurgitation.  10. Thickening of the anterior mitral valve leaflet.  11. Structurally normal tricuspid valve, with normal leaflet excursion.  12. Normal trileaflet aortic valve with normal opening.  13. Structurally normal pulmonic valve, with normal leaflet excursion.

## 2018-04-03 NOTE — ED PROVIDER NOTE - CARE PLAN
Principal Discharge DX:	Influenza A  Secondary Diagnosis:	Renal insufficiency  Secondary Diagnosis:	CHF (congestive heart failure)

## 2018-04-03 NOTE — ED PROVIDER NOTE - OBJECTIVE STATEMENT
67 year old male presents today biba from home accompanied with his wife c/o not feeling well for the last two days, he reports decreased appetite, dry cough and sob, today pt c/o a fever and worsening sob (-) chest pain +dizziness (-) nausea or vomiting (-) diarrhea (-) sick contacts (-) recent travel 67 year old male presents today biba from home accompanied with his wife c/o not feeling well for the last two days, he reports decreased appetite, dry cough and sob, today pt c/o a fever and worsening sob (-) chest pain +dizziness (-) nausea or vomiting (-) diarrhea (-) sick contacts (-) recent travel, at the bedside pt is febrile and sob, oxygen saturation 88-91% on 100% NRB, pt placed on bipap

## 2018-04-03 NOTE — H&P ADULT - NSHPPHYSICALEXAM_GEN_ALL_CORE
GENERAL: NAD, well-groomed, well-developed, ill-appearing  HEAD:  Atraumatic, Normocephalic  EYES: EOMI, PERRLA, conjunctiva and sclera clear  ENMT: No tonsillar erythema, exudates, or enlargement; Moist mucous membranes, No lesions  NECK: Supple, No JVD, Normal thyroid  NERVOUS SYSTEM:  Alert & Oriented X3, Good concentration DTRs 2+ intact and symmetric  CHEST/LUNG: B/l rales, No rhonchi, wheezing, or rubs  HEART: Regular rate and rhythm; No murmurs, rubs, or gallops  ABDOMEN: Soft, Nontender, Nondistended; Bowel sounds present  EXTREMITIES: 1+ pitting edema  SKIN: no rashes or lesions   PSYCH: normal affect and behavior

## 2018-04-04 PROBLEM — I10 ESSENTIAL (PRIMARY) HYPERTENSION: Chronic | Status: ACTIVE | Noted: 2018-03-14

## 2018-04-04 PROBLEM — E11.9 TYPE 2 DIABETES MELLITUS WITHOUT COMPLICATIONS: Chronic | Status: ACTIVE | Noted: 2018-03-14

## 2018-04-04 PROBLEM — E78.5 HYPERLIPIDEMIA, UNSPECIFIED: Chronic | Status: ACTIVE | Noted: 2018-03-14

## 2018-04-04 LAB
ALBUMIN SERPL ELPH-MCNC: 2.4 G/DL — LOW (ref 3.3–5)
ALP SERPL-CCNC: 409 U/L — HIGH (ref 40–120)
ALT FLD-CCNC: 33 U/L — SIGNIFICANT CHANGE UP (ref 12–78)
ANION GAP SERPL CALC-SCNC: 7 MMOL/L — SIGNIFICANT CHANGE UP (ref 5–17)
ANION GAP SERPL CALC-SCNC: 9 MMOL/L — SIGNIFICANT CHANGE UP (ref 5–17)
APTT BLD: 116.6 SEC — HIGH (ref 27.5–37.4)
APTT BLD: 197.7 SEC — CRITICAL HIGH (ref 27.5–37.4)
APTT BLD: 45.5 SEC — HIGH (ref 27.5–37.4)
AST SERPL-CCNC: 68 U/L — HIGH (ref 15–37)
BASE EXCESS BLDA CALC-SCNC: -3.3 MMOL/L — LOW (ref -2–2)
BASE EXCESS BLDA CALC-SCNC: -8.4 MMOL/L — LOW (ref -2–2)
BASOPHILS # BLD AUTO: 0.03 K/UL — SIGNIFICANT CHANGE UP (ref 0–0.2)
BASOPHILS NFR BLD AUTO: 0.3 % — SIGNIFICANT CHANGE UP (ref 0–2)
BILIRUB SERPL-MCNC: 0.7 MG/DL — SIGNIFICANT CHANGE UP (ref 0.2–1.2)
BLOOD GAS COMMENTS: SIGNIFICANT CHANGE UP
BLOOD GAS SOURCE: SIGNIFICANT CHANGE UP
BLOOD GAS SOURCE: SIGNIFICANT CHANGE UP
BUN SERPL-MCNC: 39 MG/DL — HIGH (ref 7–23)
BUN SERPL-MCNC: 45 MG/DL — HIGH (ref 7–23)
CALCIUM SERPL-MCNC: 8.1 MG/DL — LOW (ref 8.5–10.1)
CALCIUM SERPL-MCNC: 8.2 MG/DL — LOW (ref 8.5–10.1)
CHLORIDE SERPL-SCNC: 105 MMOL/L — SIGNIFICANT CHANGE UP (ref 96–108)
CHLORIDE SERPL-SCNC: 108 MMOL/L — SIGNIFICANT CHANGE UP (ref 96–108)
CK MB BLD-MCNC: 3 % — SIGNIFICANT CHANGE UP (ref 0–3.5)
CK MB CFR SERPL CALC: 5.5 NG/ML — HIGH (ref 0.5–3.6)
CK MB CFR SERPL CALC: 5.8 NG/ML — HIGH (ref 0.5–3.6)
CK SERPL-CCNC: 193 U/L — SIGNIFICANT CHANGE UP (ref 26–308)
CO2 SERPL-SCNC: 22 MMOL/L — SIGNIFICANT CHANGE UP (ref 22–31)
CO2 SERPL-SCNC: 25 MMOL/L — SIGNIFICANT CHANGE UP (ref 22–31)
CREAT SERPL-MCNC: 2.18 MG/DL — HIGH (ref 0.5–1.3)
CREAT SERPL-MCNC: 2.2 MG/DL — HIGH (ref 0.5–1.3)
EOSINOPHIL # BLD AUTO: 0 K/UL — SIGNIFICANT CHANGE UP (ref 0–0.5)
EOSINOPHIL NFR BLD AUTO: 0 % — SIGNIFICANT CHANGE UP (ref 0–6)
FLUAV H1 2009 PAND RNA SPEC QL NAA+PROBE: DETECTED
GLUCOSE BLDC GLUCOMTR-MCNC: 103 MG/DL — HIGH (ref 70–99)
GLUCOSE BLDC GLUCOMTR-MCNC: 125 MG/DL — HIGH (ref 70–99)
GLUCOSE BLDC GLUCOMTR-MCNC: 126 MG/DL — HIGH (ref 70–99)
GLUCOSE BLDC GLUCOMTR-MCNC: 142 MG/DL — HIGH (ref 70–99)
GLUCOSE BLDC GLUCOMTR-MCNC: 186 MG/DL — HIGH (ref 70–99)
GLUCOSE SERPL-MCNC: 118 MG/DL — HIGH (ref 70–99)
GLUCOSE SERPL-MCNC: 140 MG/DL — HIGH (ref 70–99)
GRAM STN FLD: SIGNIFICANT CHANGE UP
HCO3 BLDA-SCNC: 22 MMOL/L — SIGNIFICANT CHANGE UP (ref 21–29)
HCO3 BLDA-SCNC: 22 MMOL/L — SIGNIFICANT CHANGE UP (ref 21–29)
HCT VFR BLD CALC: 27.6 % — LOW (ref 39–50)
HCT VFR BLD CALC: 32.1 % — LOW (ref 39–50)
HCT VFR BLD CALC: 32.6 % — LOW (ref 39–50)
HGB BLD-MCNC: 10.5 G/DL — LOW (ref 13–17)
HGB BLD-MCNC: 10.7 G/DL — LOW (ref 13–17)
HGB BLD-MCNC: 9.3 G/DL — LOW (ref 13–17)
HOROWITZ INDEX BLDA+IHG-RTO: 100 — SIGNIFICANT CHANGE UP
HOROWITZ INDEX BLDA+IHG-RTO: 60 — SIGNIFICANT CHANGE UP
IMM GRANULOCYTES NFR BLD AUTO: 0.7 % — SIGNIFICANT CHANGE UP (ref 0–1.5)
INR BLD: 1.3 RATIO — HIGH (ref 0.88–1.16)
LACTATE SERPL-SCNC: 1.6 MMOL/L — SIGNIFICANT CHANGE UP (ref 0.7–2)
LEGIONELLA AG UR QL: NEGATIVE — SIGNIFICANT CHANGE UP
LYMPHOCYTES # BLD AUTO: 0.25 K/UL — LOW (ref 1–3.3)
LYMPHOCYTES # BLD AUTO: 2.4 % — LOW (ref 13–44)
MAGNESIUM SERPL-MCNC: 2 MG/DL — SIGNIFICANT CHANGE UP (ref 1.6–2.6)
MCHC RBC-ENTMCNC: 30.6 PG — SIGNIFICANT CHANGE UP (ref 27–34)
MCHC RBC-ENTMCNC: 31.1 PG — SIGNIFICANT CHANGE UP (ref 27–34)
MCHC RBC-ENTMCNC: 31.4 PG — SIGNIFICANT CHANGE UP (ref 27–34)
MCHC RBC-ENTMCNC: 32.7 GM/DL — SIGNIFICANT CHANGE UP (ref 32–36)
MCHC RBC-ENTMCNC: 32.8 GM/DL — SIGNIFICANT CHANGE UP (ref 32–36)
MCHC RBC-ENTMCNC: 33.7 GM/DL — SIGNIFICANT CHANGE UP (ref 32–36)
MCV RBC AUTO: 92.3 FL — SIGNIFICANT CHANGE UP (ref 80–100)
MCV RBC AUTO: 93.6 FL — SIGNIFICANT CHANGE UP (ref 80–100)
MCV RBC AUTO: 95.6 FL — SIGNIFICANT CHANGE UP (ref 80–100)
MONOCYTES # BLD AUTO: 0.49 K/UL — SIGNIFICANT CHANGE UP (ref 0–0.9)
MONOCYTES NFR BLD AUTO: 4.6 % — SIGNIFICANT CHANGE UP (ref 2–14)
NEUTROPHILS # BLD AUTO: 9.77 K/UL — HIGH (ref 1.8–7.4)
NEUTROPHILS NFR BLD AUTO: 92 % — HIGH (ref 43–77)
NRBC # BLD: 0 /100 WBCS — SIGNIFICANT CHANGE UP (ref 0–0)
PCO2 BLDA: 42 MMHG — SIGNIFICANT CHANGE UP (ref 32–46)
PCO2 BLDA: 76 MMHG — CRITICAL HIGH (ref 32–46)
PH BLD: 7.1 — CRITICAL LOW (ref 7.35–7.45)
PH BLD: 7.34 — LOW (ref 7.35–7.45)
PHOSPHATE SERPL-MCNC: 6.7 MG/DL — HIGH (ref 2.5–4.5)
PLATELET # BLD AUTO: 108 K/UL — LOW (ref 150–400)
PLATELET # BLD AUTO: 112 K/UL — LOW (ref 150–400)
PLATELET # BLD AUTO: 122 K/UL — LOW (ref 150–400)
PO2 BLDA: 73 MMHG — LOW (ref 74–108)
PO2 BLDA: 98 MMHG — SIGNIFICANT CHANGE UP (ref 74–108)
POTASSIUM SERPL-MCNC: 4.5 MMOL/L — SIGNIFICANT CHANGE UP (ref 3.5–5.3)
POTASSIUM SERPL-MCNC: 6.3 MMOL/L — CRITICAL HIGH (ref 3.5–5.3)
POTASSIUM SERPL-SCNC: 4.5 MMOL/L — SIGNIFICANT CHANGE UP (ref 3.5–5.3)
POTASSIUM SERPL-SCNC: 6.3 MMOL/L — CRITICAL HIGH (ref 3.5–5.3)
PROCALCITONIN SERPL-MCNC: 3.7 NG/ML — HIGH (ref 0–0.04)
PROT SERPL-MCNC: 7.3 GM/DL — SIGNIFICANT CHANGE UP (ref 6–8.3)
PROTHROM AB SERPL-ACNC: 14.3 SEC — HIGH (ref 9.8–12.7)
RAPID RVP RESULT: DETECTED
RBC # BLD: 2.99 M/UL — LOW (ref 4.2–5.8)
RBC # BLD: 3.41 M/UL — LOW (ref 4.2–5.8)
RBC # BLD: 3.43 M/UL — LOW (ref 4.2–5.8)
RBC # FLD: 13.5 % — SIGNIFICANT CHANGE UP (ref 10.3–14.5)
RBC # FLD: 13.6 % — SIGNIFICANT CHANGE UP (ref 10.3–14.5)
RBC # FLD: 13.8 % — SIGNIFICANT CHANGE UP (ref 10.3–14.5)
SAO2 % BLDA: 93 % — SIGNIFICANT CHANGE UP (ref 92–96)
SAO2 % BLDA: 94 % — SIGNIFICANT CHANGE UP (ref 92–96)
SODIUM SERPL-SCNC: 137 MMOL/L — SIGNIFICANT CHANGE UP (ref 135–145)
SODIUM SERPL-SCNC: 139 MMOL/L — SIGNIFICANT CHANGE UP (ref 135–145)
SPECIMEN SOURCE: SIGNIFICANT CHANGE UP
TROPONIN I SERPL-MCNC: 5.71 NG/ML — HIGH (ref 0.01–0.04)
TROPONIN I SERPL-MCNC: 6.6 NG/ML — HIGH (ref 0.01–0.04)
TROPONIN I SERPL-MCNC: 8.39 NG/ML — HIGH (ref 0.01–0.04)
TROPONIN I SERPL-MCNC: 8.78 NG/ML — HIGH (ref 0.01–0.04)
WBC # BLD: 10.61 K/UL — HIGH (ref 3.8–10.5)
WBC # BLD: 6.98 K/UL — SIGNIFICANT CHANGE UP (ref 3.8–10.5)
WBC # BLD: 7.5 K/UL — SIGNIFICANT CHANGE UP (ref 3.8–10.5)
WBC # FLD AUTO: 10.61 K/UL — HIGH (ref 3.8–10.5)
WBC # FLD AUTO: 6.98 K/UL — SIGNIFICANT CHANGE UP (ref 3.8–10.5)
WBC # FLD AUTO: 7.5 K/UL — SIGNIFICANT CHANGE UP (ref 3.8–10.5)

## 2018-04-04 PROCEDURE — 99291 CRITICAL CARE FIRST HOUR: CPT

## 2018-04-04 PROCEDURE — 93010 ELECTROCARDIOGRAM REPORT: CPT

## 2018-04-04 PROCEDURE — 71045 X-RAY EXAM CHEST 1 VIEW: CPT | Mod: 26

## 2018-04-04 RX ORDER — FUROSEMIDE 40 MG
40 TABLET ORAL DAILY
Qty: 0 | Refills: 0 | Status: DISCONTINUED | OUTPATIENT
Start: 2018-04-04 | End: 2018-04-08

## 2018-04-04 RX ORDER — MIDAZOLAM HYDROCHLORIDE 1 MG/ML
4 INJECTION, SOLUTION INTRAMUSCULAR; INTRAVENOUS ONCE
Qty: 0 | Refills: 0 | Status: DISCONTINUED | OUTPATIENT
Start: 2018-04-04 | End: 2018-04-04

## 2018-04-04 RX ORDER — SODIUM CHLORIDE 9 MG/ML
1000 INJECTION INTRAMUSCULAR; INTRAVENOUS; SUBCUTANEOUS ONCE
Qty: 0 | Refills: 0 | Status: DISCONTINUED | OUTPATIENT
Start: 2018-04-04 | End: 2018-04-04

## 2018-04-04 RX ORDER — CALCIUM GLUCONATE 100 MG/ML
1 VIAL (ML) INTRAVENOUS ONCE
Qty: 0 | Refills: 0 | Status: COMPLETED | OUTPATIENT
Start: 2018-04-04 | End: 2018-04-04

## 2018-04-04 RX ORDER — INSULIN HUMAN 100 [IU]/ML
10 INJECTION, SOLUTION SUBCUTANEOUS ONCE
Qty: 0 | Refills: 0 | Status: COMPLETED | OUTPATIENT
Start: 2018-04-04 | End: 2018-04-04

## 2018-04-04 RX ORDER — SODIUM BICARBONATE 1 MEQ/ML
50 SYRINGE (ML) INTRAVENOUS ONCE
Qty: 0 | Refills: 0 | Status: COMPLETED | OUTPATIENT
Start: 2018-04-04 | End: 2018-04-04

## 2018-04-04 RX ORDER — ASPIRIN/CALCIUM CARB/MAGNESIUM 325 MG
325 TABLET ORAL ONCE
Qty: 0 | Refills: 0 | Status: COMPLETED | OUTPATIENT
Start: 2018-04-04 | End: 2018-04-04

## 2018-04-04 RX ORDER — INSULIN LISPRO 100/ML
VIAL (ML) SUBCUTANEOUS EVERY 6 HOURS
Qty: 0 | Refills: 0 | Status: DISCONTINUED | OUTPATIENT
Start: 2018-04-04 | End: 2018-04-06

## 2018-04-04 RX ORDER — HEPARIN SODIUM 5000 [USP'U]/ML
4100 INJECTION INTRAVENOUS; SUBCUTANEOUS EVERY 6 HOURS
Qty: 0 | Refills: 0 | Status: DISCONTINUED | OUTPATIENT
Start: 2018-04-04 | End: 2018-04-05

## 2018-04-04 RX ORDER — CHLORHEXIDINE GLUCONATE 213 G/1000ML
1 SOLUTION TOPICAL
Qty: 0 | Refills: 0 | Status: DISCONTINUED | OUTPATIENT
Start: 2018-04-04 | End: 2018-04-06

## 2018-04-04 RX ORDER — PIPERACILLIN AND TAZOBACTAM 4; .5 G/20ML; G/20ML
3.38 INJECTION, POWDER, LYOPHILIZED, FOR SOLUTION INTRAVENOUS EVERY 8 HOURS
Qty: 0 | Refills: 0 | Status: DISCONTINUED | OUTPATIENT
Start: 2018-04-04 | End: 2018-04-04

## 2018-04-04 RX ORDER — HEPARIN SODIUM 5000 [USP'U]/ML
5500 INJECTION INTRAVENOUS; SUBCUTANEOUS EVERY 6 HOURS
Qty: 0 | Refills: 0 | Status: DISCONTINUED | OUTPATIENT
Start: 2018-04-04 | End: 2018-04-04

## 2018-04-04 RX ORDER — PANTOPRAZOLE SODIUM 20 MG/1
40 TABLET, DELAYED RELEASE ORAL DAILY
Qty: 0 | Refills: 0 | Status: DISCONTINUED | OUTPATIENT
Start: 2018-04-04 | End: 2018-04-10

## 2018-04-04 RX ORDER — HEPARIN SODIUM 5000 [USP'U]/ML
INJECTION INTRAVENOUS; SUBCUTANEOUS
Qty: 25000 | Refills: 0 | Status: DISCONTINUED | OUTPATIENT
Start: 2018-04-04 | End: 2018-04-05

## 2018-04-04 RX ORDER — HEPARIN SODIUM 5000 [USP'U]/ML
5500 INJECTION INTRAVENOUS; SUBCUTANEOUS ONCE
Qty: 0 | Refills: 0 | Status: COMPLETED | OUTPATIENT
Start: 2018-04-04 | End: 2018-04-04

## 2018-04-04 RX ORDER — HEPARIN SODIUM 5000 [USP'U]/ML
4100 INJECTION INTRAVENOUS; SUBCUTANEOUS ONCE
Qty: 0 | Refills: 0 | Status: DISCONTINUED | OUTPATIENT
Start: 2018-04-04 | End: 2018-04-04

## 2018-04-04 RX ORDER — PIPERACILLIN AND TAZOBACTAM 4; .5 G/20ML; G/20ML
3.38 INJECTION, POWDER, LYOPHILIZED, FOR SOLUTION INTRAVENOUS EVERY 8 HOURS
Qty: 0 | Refills: 0 | Status: DISCONTINUED | OUTPATIENT
Start: 2018-04-04 | End: 2018-04-09

## 2018-04-04 RX ORDER — FENTANYL CITRATE 50 UG/ML
1 INJECTION INTRAVENOUS
Qty: 2500 | Refills: 0 | Status: DISCONTINUED | OUTPATIENT
Start: 2018-04-04 | End: 2018-04-05

## 2018-04-04 RX ORDER — VANCOMYCIN HCL 1 G
1000 VIAL (EA) INTRAVENOUS ONCE
Qty: 0 | Refills: 0 | Status: COMPLETED | OUTPATIENT
Start: 2018-04-04 | End: 2018-04-04

## 2018-04-04 RX ORDER — ALBUTEROL 90 UG/1
2.5 AEROSOL, METERED ORAL ONCE
Qty: 0 | Refills: 0 | Status: COMPLETED | OUTPATIENT
Start: 2018-04-04 | End: 2018-04-04

## 2018-04-04 RX ORDER — CHLORHEXIDINE GLUCONATE 213 G/1000ML
15 SOLUTION TOPICAL
Qty: 0 | Refills: 0 | Status: DISCONTINUED | OUTPATIENT
Start: 2018-04-04 | End: 2018-04-05

## 2018-04-04 RX ORDER — PROPOFOL 10 MG/ML
5 INJECTION, EMULSION INTRAVENOUS
Qty: 1000 | Refills: 0 | Status: DISCONTINUED | OUTPATIENT
Start: 2018-04-04 | End: 2018-04-05

## 2018-04-04 RX ORDER — DEXTROSE 50 % IN WATER 50 %
50 SYRINGE (ML) INTRAVENOUS ONCE
Qty: 0 | Refills: 0 | Status: COMPLETED | OUTPATIENT
Start: 2018-04-04 | End: 2018-04-04

## 2018-04-04 RX ORDER — PHENYLEPHRINE HYDROCHLORIDE 10 MG/ML
0.5 INJECTION INTRAVENOUS
Qty: 80 | Refills: 0 | Status: DISCONTINUED | OUTPATIENT
Start: 2018-04-04 | End: 2018-04-04

## 2018-04-04 RX ORDER — SODIUM POLYSTYRENE SULFONATE 4.1 MEQ/G
15 POWDER, FOR SUSPENSION ORAL ONCE
Qty: 0 | Refills: 0 | Status: COMPLETED | OUTPATIENT
Start: 2018-04-04 | End: 2018-04-04

## 2018-04-04 RX ORDER — HEPARIN SODIUM 5000 [USP'U]/ML
INJECTION INTRAVENOUS; SUBCUTANEOUS
Qty: 25000 | Refills: 0 | Status: DISCONTINUED | OUTPATIENT
Start: 2018-04-04 | End: 2018-04-04

## 2018-04-04 RX ORDER — SIMVASTATIN 20 MG/1
40 TABLET, FILM COATED ORAL AT BEDTIME
Qty: 0 | Refills: 0 | Status: DISCONTINUED | OUTPATIENT
Start: 2018-04-04 | End: 2018-04-10

## 2018-04-04 RX ORDER — HEPARIN SODIUM 5000 [USP'U]/ML
2500 INJECTION INTRAVENOUS; SUBCUTANEOUS EVERY 6 HOURS
Qty: 0 | Refills: 0 | Status: DISCONTINUED | OUTPATIENT
Start: 2018-04-04 | End: 2018-04-04

## 2018-04-04 RX ORDER — CLOPIDOGREL BISULFATE 75 MG/1
75 TABLET, FILM COATED ORAL DAILY
Qty: 0 | Refills: 0 | Status: DISCONTINUED | OUTPATIENT
Start: 2018-04-04 | End: 2018-04-10

## 2018-04-04 RX ADMIN — MIDAZOLAM HYDROCHLORIDE 4 MILLIGRAM(S): 1 INJECTION, SOLUTION INTRAMUSCULAR; INTRAVENOUS at 02:49

## 2018-04-04 RX ADMIN — Medication 50 MILLILITER(S): at 06:03

## 2018-04-04 RX ADMIN — Medication 250 MILLIGRAM(S): at 03:53

## 2018-04-04 RX ADMIN — CHLORHEXIDINE GLUCONATE 15 MILLILITER(S): 213 SOLUTION TOPICAL at 05:18

## 2018-04-04 RX ADMIN — Medication 650 MILLIGRAM(S): at 04:24

## 2018-04-04 RX ADMIN — Medication 30 MILLIGRAM(S): at 18:36

## 2018-04-04 RX ADMIN — Medication 50 MILLIEQUIVALENT(S): at 06:01

## 2018-04-04 RX ADMIN — Medication 325 MILLIGRAM(S): at 04:24

## 2018-04-04 RX ADMIN — CHLORHEXIDINE GLUCONATE 15 MILLILITER(S): 213 SOLUTION TOPICAL at 18:36

## 2018-04-04 RX ADMIN — HEPARIN SODIUM 800 UNIT(S)/HR: 5000 INJECTION INTRAVENOUS; SUBCUTANEOUS at 12:48

## 2018-04-04 RX ADMIN — HEPARIN SODIUM 0 UNIT(S)/HR: 5000 INJECTION INTRAVENOUS; SUBCUTANEOUS at 11:25

## 2018-04-04 RX ADMIN — Medication 40 MILLIGRAM(S): at 03:52

## 2018-04-04 RX ADMIN — Medication 200 GRAM(S): at 06:01

## 2018-04-04 RX ADMIN — FENTANYL CITRATE 6.87 MICROGRAM(S)/KG/HR: 50 INJECTION INTRAVENOUS at 11:37

## 2018-04-04 RX ADMIN — CHLORHEXIDINE GLUCONATE 1 APPLICATION(S): 213 SOLUTION TOPICAL at 05:19

## 2018-04-04 RX ADMIN — Medication 2: at 06:07

## 2018-04-04 RX ADMIN — PIPERACILLIN AND TAZOBACTAM 12.5 GRAM(S): 4; .5 INJECTION, POWDER, LYOPHILIZED, FOR SOLUTION INTRAVENOUS at 13:57

## 2018-04-04 RX ADMIN — CLOPIDOGREL BISULFATE 75 MILLIGRAM(S): 75 TABLET, FILM COATED ORAL at 11:11

## 2018-04-04 RX ADMIN — INSULIN HUMAN 10 UNIT(S): 100 INJECTION, SOLUTION SUBCUTANEOUS at 06:02

## 2018-04-04 RX ADMIN — INSULIN GLARGINE 10 UNIT(S): 100 INJECTION, SOLUTION SUBCUTANEOUS at 00:31

## 2018-04-04 RX ADMIN — PROPOFOL 2.08 MICROGRAM(S)/KG/MIN: 10 INJECTION, EMULSION INTRAVENOUS at 11:37

## 2018-04-04 RX ADMIN — Medication 81 MILLIGRAM(S): at 11:11

## 2018-04-04 RX ADMIN — SODIUM POLYSTYRENE SULFONATE 15 GRAM(S): 4.1 POWDER, FOR SUSPENSION ORAL at 06:02

## 2018-04-04 RX ADMIN — HEPARIN SODIUM 5500 UNIT(S): 5000 INJECTION INTRAVENOUS; SUBCUTANEOUS at 03:52

## 2018-04-04 RX ADMIN — ALBUTEROL 2.5 MILLIGRAM(S): 90 AEROSOL, METERED ORAL at 05:49

## 2018-04-04 RX ADMIN — ALBUTEROL 2.5 MILLIGRAM(S): 90 AEROSOL, METERED ORAL at 05:50

## 2018-04-04 RX ADMIN — Medication 1 PATCH: at 11:11

## 2018-04-04 RX ADMIN — PIPERACILLIN AND TAZOBACTAM 25 GRAM(S): 4; .5 INJECTION, POWDER, LYOPHILIZED, FOR SOLUTION INTRAVENOUS at 05:19

## 2018-04-04 RX ADMIN — PIPERACILLIN AND TAZOBACTAM 12.5 GRAM(S): 4; .5 INJECTION, POWDER, LYOPHILIZED, FOR SOLUTION INTRAVENOUS at 23:00

## 2018-04-04 RX ADMIN — PANTOPRAZOLE SODIUM 40 MILLIGRAM(S): 20 TABLET, DELAYED RELEASE ORAL at 11:11

## 2018-04-04 RX ADMIN — ATORVASTATIN CALCIUM 80 MILLIGRAM(S): 80 TABLET, FILM COATED ORAL at 00:34

## 2018-04-04 RX ADMIN — SIMVASTATIN 40 MILLIGRAM(S): 20 TABLET, FILM COATED ORAL at 23:29

## 2018-04-04 RX ADMIN — PROPOFOL 2.08 MICROGRAM(S)/KG/MIN: 10 INJECTION, EMULSION INTRAVENOUS at 02:50

## 2018-04-04 RX ADMIN — PROPOFOL 2.08 MICROGRAM(S)/KG/MIN: 10 INJECTION, EMULSION INTRAVENOUS at 20:00

## 2018-04-04 RX ADMIN — Medication 650 MILLIGRAM(S): at 23:58

## 2018-04-04 RX ADMIN — HEPARIN SODIUM 1200 UNIT(S)/HR: 5000 INJECTION INTRAVENOUS; SUBCUTANEOUS at 04:06

## 2018-04-04 NOTE — CHART NOTE - NSCHARTNOTEFT_GEN_A_CORE
Patient is a 67y old  Male who presents with a chief complaint of SOB, cough, fever, chills, found to be Flu + also CHF exacerbation (2018 20:51)      HPI:  67 year old man with PMH CAD s/p CABG , had not been on meds for years until last month, DM2, HTN, HLD presents with complaint of fever, chills, cough, lethargy for the last few days.  He states that he thought he had a bad cold but could barely get out of bed today so he came here.  He denies chest pain, palpitations, lightheadedness, nausea, vomiting diarrhea.    In the ED, flu +, BNP 05534 (last month was ~9000).  Pt febrile to 102.8 F. CXR with pulmonary vascular congestion. (2018 20:51)  -------------------------------------------------------------------------------------------------------------------------------------------------------------------------------------------------------------  RRT called for hypercapnic RF and increased lethargy while on BiPAP, as well as elevated troponins of 5 with concern for NSTEMI.  Pt was assessed and examined at the bedside and was found to have increased WOB with lethargy.  Pt audibly sounds wet with SBP in 170's likely in acute pulmonary edema.  ABG on BiPAP read as 7.10/76/98/22, pt found to have respiratory acidosis with hypercapnic respiratory failure.  I spoke with wife at bedside, and wife was in agreement to have patient placed on mechanical ventilation.  Pt was stable in EDHO, and was transferred up to the ICU for intubation.        Allergies    No Known Allergies    Intolerances        MEDICATIONS  (STANDING):  aspirin  chewable 81 milliGRAM(s) Oral daily  aspirin buffered 325 milliGRAM(s) Oral once  chlorhexidine 0.12% Liquid 15 milliLiter(s) Swish and Spit two times a day  chlorhexidine 4% Liquid 1 Application(s) Topical <User Schedule>  dextrose 5%. 1000 milliLiter(s) (50 mL/Hr) IV Continuous <Continuous>  dextrose 50% Injectable 12.5 Gram(s) IV Push once  dextrose 50% Injectable 25 Gram(s) IV Push once  dextrose 50% Injectable 25 Gram(s) IV Push once  furosemide   Injectable 40 milliGRAM(s) IV Push daily  heparin  Infusion.  Unit(s)/Hr (12 mL/Hr) IV Continuous <Continuous>  heparin  Injectable 5500 Unit(s) IV Push once  insulin lispro (HumaLOG) corrective regimen sliding scale   SubCutaneous every 6 hours  nicotine -  14 mG/24Hr(s) Patch 1 patch Transdermal daily  oseltamivir 30 milliGRAM(s) Oral every 24 hours  pantoprazole  Injectable 40 milliGRAM(s) IV Push daily  piperacillin/tazobactam IVPB. 3.375 Gram(s) IV Intermittent every 8 hours  propofol Infusion 5 MICROgram(s)/kG/Min (2.082 mL/Hr) IV Continuous <Continuous>  vancomycin  IVPB 1000 milliGRAM(s) IV Intermittent once    MEDICATIONS  (PRN):  acetaminophen   Tablet 650 milliGRAM(s) Oral every 6 hours PRN Mild Pain (1 - 3)  acetaminophen   Tablet 650 milliGRAM(s) Oral every 6 hours PRN For Temp greater than 38 C (100.4 F)  dextrose Gel 1 Dose(s) Oral once PRN Blood Glucose LESS THAN 70 milliGRAM(s)/deciliter  glucagon  Injectable 1 milliGRAM(s) IntraMuscular once PRN Glucose LESS THAN 70 milligrams/deciliter  heparin  Injectable 5500 Unit(s) IV Push every 6 hours PRN For aPTT less than 40  heparin  Injectable 2500 Unit(s) IV Push every 6 hours PRN For aPTT between 40 - 57      Daily Height in cm: 175.26 (2018 16:20)    Daily     Drug Dosing Weight  Height (cm): 175.26 (2018 16:20)  Weight (kg): 69.4 (2018 16:20)  BMI (kg/m2): 22.6 (2018 16:20)  BSA (m2): 1.84 (2018 16:20)    PAST MEDICAL & SURGICAL HISTORY:  Hyperlipemia  DM (diabetes mellitus)  HTN (hypertension)  History of open heart surgery: bypass      FAMILY HISTORY:  No pertinent family history in first degree relatives    ADVANCE DIRECTIVES:  Full Code    REVIEW OF SYSTEMS:  ROS not able to be obtained as patient is intubated and sedated at this time     Mode: AC/ CMV (Assist Control/ Continuous Mandatory Ventilation)  RR (machine): 18  TV (machine): 450  FiO2: 60  PEEP: 5  ITime: 1  MAP: 12  PIP: 25      ICU Vital Signs Last 24 Hrs  T(C): 39 (2018 03:15), Max: 39.3 (2018 16:20)  T(F): 102.2 (2018 03:15), Max: 102.8 (2018 16:20)  HR: 111 (2018 03:15) (88 - 132)  BP: 134/81 (2018 03:00) (115/58 - 183/92)  BP(mean): 94 (2018 03:00) (92 - 94)  ABP: --  ABP(mean): --  RR: 26 (2018 03:15) (20 - 31)  SpO2: 99% (2018 03:15) (92% - 100%)      ABG - ( 2018 01:37 )  pH: x     /  pCO2: 76    /  pO2: 98    / HCO3: 22    / Base Excess: -8.4  /  SaO2: 94            I&O's Detail      PHYSICAL EXAM:    GENERAL: intubated and sedated   EYES: EOMI, PERRLA, conjunctiva and sclera clear  NERVOUS SYSTEM:  intubated and sedated, but moves extremities off sedation  ; Motor Strength 5/5 B/L upper and lower extremities;   CHEST/LUNG: BL audible rales at lung bases   HEART: tachycardia ; No murmurs, rubs, or gallops  ABDOMEN: Soft, Nontender, Nondistended; Bowel sounds present  EXTREMITIES:  2+ Peripheral Pulses, No clubbing, cyanosis, or edema      LABS:  CBC Full  -  ( 2018 16:52 )  WBC Count : 9.03 K/uL  Hemoglobin : 11.4 g/dL  Hematocrit : 34.8 %  Platelet Count - Automated : 141 K/uL  Mean Cell Volume : 93.8 fl  Mean Cell Hemoglobin : 30.7 pg  Mean Cell Hemoglobin Concentration : 32.8 gm/dL  Auto Neutrophil # : 8.40 K/uL  Auto Lymphocyte # : 0.18 K/uL  Auto Monocyte # : 0.36 K/uL  Auto Eosinophil # : 0.00 K/uL  Auto Basophil # : 0.09 K/uL  Auto Neutrophil % : 89.0 %  Auto Lymphocyte % : 2.0 %  Auto Monocyte % : 4.0 %  Auto Eosinophil % : 0.0 %  Auto Basophil % : 1.0 %        138  |  106  |  32<H>  ----------------------------<  83  4.9   |  24  |  1.87<H>    Ca    8.8      2018 16:52    TPro  8.0  /  Alb  2.9<L>  /  TBili  0.7  /  DBili  x   /  AST  41<H>  /  ALT  26  /  AlkPhos  428<H>      CAPILLARY BLOOD GLUCOSE      POCT Blood Glucose.: 125 mg/dL (2018 02:54)    PT/INR - ( 2018 16:52 )   PT: 12.9 sec;   INR: 1.18 ratio         PTT - ( 2018 16:52 )  PTT:47.5 sec  Urinalysis Basic - ( 2018 22:41 )    Color: Yellow / Appearance: Clear / S.015 / pH: x  Gluc: x / Ketone: Negative  / Bili: Negative / Urobili: Negative mg/dL   Blood: x / Protein: 100 mg/dL / Nitrite: Negative   Leuk Esterase: Negative / RBC: 3-5 /HPF / WBC 0-2   Sq Epi: x / Non Sq Epi: Few / Bacteria: Moderate      CARDIAC MARKERS ( 2018 00:00 )  5.710 ng/mL / x     / 193 U/L / x     / 5.8 ng/mL      EKG:  sinus tachycardia with T wave inversions in lateral limb and precordial leads     ECHO, US:    RADIOLOGY:  < from: Xray Chest 1 View- PORTABLE-Urgent (18 @ 18:35) >    Heart is magnified by technique. Sternotomy again noted.    Again noted is significant diffuse congestive picture. The chest film is   surprisingly similar to  of this year.    IMPRESSION: Persistent congestive like findings as above.    < end of copied text >      Assessment    67 year old man with PMH CAD s/p CABG , had not been on meds for years until last month, DM2, HTN, HLD admitted to medicine for SOB found to be Influenza +, meeting sepsis criteria.  RRT called for hypercapnic RF and increased lethargy while on BiPAP, as well as elevated troponins of 5 with concern for NSTEMI.  Pt was assessed and examined at the bedside and was found to have increased WOB with lethargy. Pt subsequently admitted to the ICU for acute hypercapnic respiratory failure with failure of BiPAP requiring intubation on mechanical ventilation in the setting of +influenza A PNA and acute pulmonary edema, NSTEMI with troponemia if 5, JAKOB likely 2/2 to ischemic ATN.      Plan     1. Acute hypercapnic respiratory failure requiring intubation on mechanical ventilation in the setting of +influenza A PNA and acute pulmonary edema  -Patient currently on Full vent support  -titrate settings to maintain SaO2 >90%, or pH >7.25  -consider low tidal volume ventilation strategy w/ goal Tv 4-6 cc/kg of ideal body weight  -plateau pressure goal <30  -Peridex oral care and VAP prophylaxis with HOB 30 degrees   -aggressive chest PT and suctioning   -daily sedation vacation with spontaneous breathing trial if clinical condition warrants, discuss with respiratory therapy  -Pt placed on BS ABX and on tamiflu for influenza   -F/u urine/blood/sputum cx   -F/y repeat labs: lactate, CMP, Mg, Phos, CBC   -Tylenol for fever   -Lasix for diuresis     2. NSTEMI with troponemia if 5 and EKG noted to have sinus tachycardia with T wave inversions in lateral limb and precordial lead likely indicating myocardial ischemia   -Cardiology [facility] contacted   -Pt placed on heparin gtt   -high dose ASA X1 and ASA 81 mg daily   -serial EKG's   -trend troponin

## 2018-04-04 NOTE — AIRWAY PLACEMENT NOTE ADULT - POST AIRWAY PLACEMENT ASSESSMENT:
breath sounds equal/skin color improved/chest excursion noted/breath sounds bilateral/positive end tidal CO2 noted

## 2018-04-04 NOTE — CONSULT NOTE ADULT - SUBJECTIVE AND OBJECTIVE BOX
HPI:  HPI:  67 year old man with PMH CAD s/p CABG , had not been on meds for years until last month, DM2, HTN, HLD presents with complaint of fever, chills, cough, lethargy for the last few days.  He states that he thought he had a bad cold but could barely get out of bed today so he came here.  He denies chest pain, palpitations, lightheadedness, nausea, vomiting diarrhea.    In the ED, flu +, BNP 44883 (last month was ~9000).  Pt febrile to 102.8 F. CXR with pulmonary vascular congestion. (2018 20:51)      Chief Complaint:  Patient is a 67y old  Male who presents with a chief complaint of SOB, cough, fever, chills, found to be Flu + also CHF exacerbation (2018 20:51)      Review of Systems:    see above           Social History/Family History  SOCHX:   tobacco,  -  alcohol    FMHX: FA/MO  - contributory       Discussed with:  PMD, Family    Physical Exam:    Vital Signs:  Vital Signs Last 24 Hrs  T(C): 37.3 (2018 15:35), Max: 39.3 (2018 16:20)  T(F): 99.2 (2018 15:35), Max: 102.8 (2018 16:20)  HR: 71 (2018 15:00) (66 - 132)  BP: 115/68 (2018 15:00) (90/57 - 183/92)  BP(mean): 83 (2018 15:00) (68 - 98)  RR: 18 (2018 15:00) (18 - 31)  SpO2: 98% (2018 15:00) (87% - 100%)  Daily Height in cm: 175.26 (2018 16:20)    Daily Weight in k.7 (2018 03:00)  I&O's Summary    2018 07:01  -  2018 07:00  --------------------------------------------------------  IN: 535.9 mL / OUT: 520 mL / NET: 15.9 mL    2018 07:01  -  2018 16:00  --------------------------------------------------------  IN: 285.4 mL / OUT: 640 mL / NET: -354.6 mL        Chest:  rhonchi, vent support  Cardiovascular:  Regular rhythm, S1, S2, no murmur/rub/S3/S4, no carotid/femoral/abdominal bruit, radial/pedal pulses 2+, no edema  Abdomen:  Soft, non-tender, non-distended, normoactive bowel sounds, no HSM        Laboratory:                          9.3    7.50  )-----------( 108      ( 2018 10:54 )             27.6     04-04    139  |  105  |  45<H>  ----------------------------<  140<H>  4.5   |  25  |  2.18<H>    Ca    8.2<L>      2018 10:54  Phos  6.7     04-  Mg     2.0     -    TPro  7.3  /  Alb  2.4<L>  /  TBili  0.7  /  DBili  x   /  AST  68<H>  /  ALT  33  /  AlkPhos  409<H>  0404    ABG - ( 2018 03:42 )  pH: x     /  pCO2: 42    /  pO2: 73    / HCO3: 22    / Base Excess: -3.3  /  SaO2: 93                CARDIAC MARKERS ( 2018 10:54 )  8.780 ng/mL / x     / x     / x     / x      CARDIAC MARKERS ( 2018 04:08 )  6.600 ng/mL / x     / x     / x     / x      CARDIAC MARKERS ( 2018 00:00 )  5.710 ng/mL / x     / 193 U/L / x     / 5.8 ng/mL      CAPILLARY BLOOD GLUCOSE      POCT Blood Glucose.: 142 mg/dL (2018 11:35)  POCT Blood Glucose.: 186 mg/dL (2018 05:43)  POCT Blood Glucose.: 125 mg/dL (2018 02:54)  POCT Blood Glucose.: 126 mg/dL (2018 02:32)  POCT Blood Glucose.: 125 mg/dL (2018 23:46)    LIVER FUNCTIONS - ( 2018 04:08 )  Alb: 2.4 g/dL / Pro: 7.3 gm/dL / ALK PHOS: 409 U/L / ALT: 33 U/L / AST: 68 U/L / GGT: x           PT/INR - ( 2018 04:08 )   PT: 14.3 sec;   INR: 1.30 ratio         PTT - ( 2018 10:54 )  PTT:197.7 sec  Urinalysis Basic - ( 2018 22:41 )    Color: Yellow / Appearance: Clear / S.015 / pH: x  Gluc: x / Ketone: Negative  / Bili: Negative / Urobili: Negative mg/dL   Blood: x / Protein: 100 mg/dL / Nitrite: Negative   Leuk Esterase: Negative / RBC: 3-5 /HPF / WBC 0-2   Sq Epi: x / Non Sq Epi: Few / Bacteria: Moderate        Assessment:  Influenzae  Resp failure  CAD  Recent Cath at [facility] 3/19, no new CAD  Patient is noted to be non compliant  Does not take his meds sometimes  EF on Cath was 30%  Continue ICU support,

## 2018-04-05 LAB
ALBUMIN SERPL ELPH-MCNC: 1.8 G/DL — LOW (ref 3.3–5)
ALP SERPL-CCNC: 315 U/L — HIGH (ref 40–120)
ALT FLD-CCNC: 23 U/L — SIGNIFICANT CHANGE UP (ref 12–78)
ANION GAP SERPL CALC-SCNC: 7 MMOL/L — SIGNIFICANT CHANGE UP (ref 5–17)
APTT BLD: 66.1 SEC — HIGH (ref 27.5–37.4)
APTT BLD: 71.3 SEC — HIGH (ref 27.5–37.4)
AST SERPL-CCNC: 54 U/L — HIGH (ref 15–37)
BASE EXCESS BLDA CALC-SCNC: -0.5 MMOL/L — SIGNIFICANT CHANGE UP (ref -2–2)
BILIRUB SERPL-MCNC: 0.6 MG/DL — SIGNIFICANT CHANGE UP (ref 0.2–1.2)
BLOOD GAS COMMENTS: SIGNIFICANT CHANGE UP
BLOOD GAS SOURCE: SIGNIFICANT CHANGE UP
BUN SERPL-MCNC: 51 MG/DL — HIGH (ref 7–23)
CALCIUM SERPL-MCNC: 7.3 MG/DL — LOW (ref 8.5–10.1)
CHLORIDE SERPL-SCNC: 108 MMOL/L — SIGNIFICANT CHANGE UP (ref 96–108)
CK MB BLD-MCNC: 0.7 % — SIGNIFICANT CHANGE UP (ref 0–3.5)
CK MB CFR SERPL CALC: 4.2 NG/ML — HIGH (ref 0.5–3.6)
CK SERPL-CCNC: 634 U/L — HIGH (ref 26–308)
CO2 SERPL-SCNC: 25 MMOL/L — SIGNIFICANT CHANGE UP (ref 22–31)
CREAT SERPL-MCNC: 1.92 MG/DL — HIGH (ref 0.5–1.3)
CULTURE RESULTS: NO GROWTH — SIGNIFICANT CHANGE UP
GLUCOSE BLDC GLUCOMTR-MCNC: 105 MG/DL — HIGH (ref 70–99)
GLUCOSE BLDC GLUCOMTR-MCNC: 139 MG/DL — HIGH (ref 70–99)
GLUCOSE BLDC GLUCOMTR-MCNC: 89 MG/DL — SIGNIFICANT CHANGE UP (ref 70–99)
GLUCOSE BLDC GLUCOMTR-MCNC: 90 MG/DL — SIGNIFICANT CHANGE UP (ref 70–99)
GLUCOSE BLDC GLUCOMTR-MCNC: 91 MG/DL — SIGNIFICANT CHANGE UP (ref 70–99)
GLUCOSE SERPL-MCNC: 84 MG/DL — SIGNIFICANT CHANGE UP (ref 70–99)
HCO3 BLDA-SCNC: 20 MMOL/L — LOW (ref 21–29)
HCT VFR BLD CALC: 27 % — LOW (ref 39–50)
HCT VFR BLD CALC: 31.3 % — LOW (ref 39–50)
HGB BLD-MCNC: 10.3 G/DL — LOW (ref 13–17)
HGB BLD-MCNC: 9.1 G/DL — LOW (ref 13–17)
HOROWITZ INDEX BLDA+IHG-RTO: 40 — SIGNIFICANT CHANGE UP
MAGNESIUM SERPL-MCNC: 2.1 MG/DL — SIGNIFICANT CHANGE UP (ref 1.6–2.6)
MCHC RBC-ENTMCNC: 30.7 PG — SIGNIFICANT CHANGE UP (ref 27–34)
MCHC RBC-ENTMCNC: 31.6 PG — SIGNIFICANT CHANGE UP (ref 27–34)
MCHC RBC-ENTMCNC: 32.9 GM/DL — SIGNIFICANT CHANGE UP (ref 32–36)
MCHC RBC-ENTMCNC: 33.7 GM/DL — SIGNIFICANT CHANGE UP (ref 32–36)
MCV RBC AUTO: 93.2 FL — SIGNIFICANT CHANGE UP (ref 80–100)
MCV RBC AUTO: 93.8 FL — SIGNIFICANT CHANGE UP (ref 80–100)
NRBC # BLD: 0 /100 WBCS — SIGNIFICANT CHANGE UP (ref 0–0)
NRBC # BLD: 0 /100 WBCS — SIGNIFICANT CHANGE UP (ref 0–0)
PCO2 BLDA: 23 MMHG — LOW (ref 32–46)
PH BLD: 7.56 — HIGH (ref 7.35–7.45)
PHOSPHATE SERPL-MCNC: 4.4 MG/DL — SIGNIFICANT CHANGE UP (ref 2.5–4.5)
PLATELET # BLD AUTO: 100 K/UL — LOW (ref 150–400)
PLATELET # BLD AUTO: 110 K/UL — LOW (ref 150–400)
PO2 BLDA: 174 MMHG — HIGH (ref 74–108)
POTASSIUM SERPL-MCNC: 4.4 MMOL/L — SIGNIFICANT CHANGE UP (ref 3.5–5.3)
POTASSIUM SERPL-SCNC: 4.4 MMOL/L — SIGNIFICANT CHANGE UP (ref 3.5–5.3)
PROT SERPL-MCNC: 5.7 GM/DL — LOW (ref 6–8.3)
RBC # BLD: 2.88 M/UL — LOW (ref 4.2–5.8)
RBC # BLD: 3.36 M/UL — LOW (ref 4.2–5.8)
RBC # FLD: 13.5 % — SIGNIFICANT CHANGE UP (ref 10.3–14.5)
RBC # FLD: 13.8 % — SIGNIFICANT CHANGE UP (ref 10.3–14.5)
SAO2 % BLDA: 99 % — HIGH (ref 92–96)
SODIUM SERPL-SCNC: 140 MMOL/L — SIGNIFICANT CHANGE UP (ref 135–145)
SPECIMEN SOURCE: SIGNIFICANT CHANGE UP
TROPONIN I SERPL-MCNC: 8.46 NG/ML — HIGH (ref 0.01–0.04)
VANCOMYCIN TROUGH SERPL-MCNC: 6.7 UG/ML — LOW (ref 10–20)
WBC # BLD: 5.52 K/UL — SIGNIFICANT CHANGE UP (ref 3.8–10.5)
WBC # BLD: 6.88 K/UL — SIGNIFICANT CHANGE UP (ref 3.8–10.5)
WBC # FLD AUTO: 5.52 K/UL — SIGNIFICANT CHANGE UP (ref 3.8–10.5)
WBC # FLD AUTO: 6.88 K/UL — SIGNIFICANT CHANGE UP (ref 3.8–10.5)

## 2018-04-05 PROCEDURE — 71045 X-RAY EXAM CHEST 1 VIEW: CPT | Mod: 26

## 2018-04-05 PROCEDURE — 99291 CRITICAL CARE FIRST HOUR: CPT

## 2018-04-05 PROCEDURE — 93010 ELECTROCARDIOGRAM REPORT: CPT

## 2018-04-05 RX ORDER — METOPROLOL TARTRATE 50 MG
25 TABLET ORAL
Qty: 0 | Refills: 0 | Status: DISCONTINUED | OUTPATIENT
Start: 2018-04-05 | End: 2018-04-10

## 2018-04-05 RX ORDER — HEPARIN SODIUM 5000 [USP'U]/ML
4100 INJECTION INTRAVENOUS; SUBCUTANEOUS EVERY 6 HOURS
Qty: 0 | Refills: 0 | Status: DISCONTINUED | OUTPATIENT
Start: 2018-04-05 | End: 2018-04-06

## 2018-04-05 RX ORDER — FUROSEMIDE 40 MG
40 TABLET ORAL ONCE
Qty: 0 | Refills: 0 | Status: COMPLETED | OUTPATIENT
Start: 2018-04-05 | End: 2018-04-05

## 2018-04-05 RX ORDER — SODIUM CHLORIDE 9 MG/ML
500 INJECTION INTRAMUSCULAR; INTRAVENOUS; SUBCUTANEOUS ONCE
Qty: 0 | Refills: 0 | Status: COMPLETED | OUTPATIENT
Start: 2018-04-05 | End: 2018-04-05

## 2018-04-05 RX ORDER — HEPARIN SODIUM 5000 [USP'U]/ML
600 INJECTION INTRAVENOUS; SUBCUTANEOUS
Qty: 25000 | Refills: 0 | Status: DISCONTINUED | OUTPATIENT
Start: 2018-04-05 | End: 2018-04-06

## 2018-04-05 RX ADMIN — CHLORHEXIDINE GLUCONATE 1 APPLICATION(S): 213 SOLUTION TOPICAL at 09:15

## 2018-04-05 RX ADMIN — Medication 81 MILLIGRAM(S): at 11:54

## 2018-04-05 RX ADMIN — PIPERACILLIN AND TAZOBACTAM 12.5 GRAM(S): 4; .5 INJECTION, POWDER, LYOPHILIZED, FOR SOLUTION INTRAVENOUS at 14:03

## 2018-04-05 RX ADMIN — CLOPIDOGREL BISULFATE 75 MILLIGRAM(S): 75 TABLET, FILM COATED ORAL at 11:54

## 2018-04-05 RX ADMIN — Medication 40 MILLIGRAM(S): at 07:42

## 2018-04-05 RX ADMIN — Medication 650 MILLIGRAM(S): at 12:58

## 2018-04-05 RX ADMIN — CHLORHEXIDINE GLUCONATE 15 MILLILITER(S): 213 SOLUTION TOPICAL at 05:53

## 2018-04-05 RX ADMIN — Medication 25 MILLIGRAM(S): at 17:41

## 2018-04-05 RX ADMIN — PIPERACILLIN AND TAZOBACTAM 12.5 GRAM(S): 4; .5 INJECTION, POWDER, LYOPHILIZED, FOR SOLUTION INTRAVENOUS at 05:54

## 2018-04-05 RX ADMIN — SIMVASTATIN 40 MILLIGRAM(S): 20 TABLET, FILM COATED ORAL at 22:42

## 2018-04-05 RX ADMIN — PROPOFOL 2.08 MICROGRAM(S)/KG/MIN: 10 INJECTION, EMULSION INTRAVENOUS at 04:30

## 2018-04-05 RX ADMIN — Medication 40 MILLIGRAM(S): at 20:30

## 2018-04-05 RX ADMIN — PIPERACILLIN AND TAZOBACTAM 12.5 GRAM(S): 4; .5 INJECTION, POWDER, LYOPHILIZED, FOR SOLUTION INTRAVENOUS at 22:41

## 2018-04-05 RX ADMIN — HEPARIN SODIUM 600 UNIT(S)/HR: 5000 INJECTION INTRAVENOUS; SUBCUTANEOUS at 17:28

## 2018-04-05 RX ADMIN — HEPARIN SODIUM 600 UNIT(S)/HR: 5000 INJECTION INTRAVENOUS; SUBCUTANEOUS at 10:32

## 2018-04-05 RX ADMIN — SODIUM CHLORIDE 500 MILLILITER(S): 9 INJECTION INTRAMUSCULAR; INTRAVENOUS; SUBCUTANEOUS at 05:18

## 2018-04-05 RX ADMIN — Medication 30 MILLIGRAM(S): at 17:41

## 2018-04-05 RX ADMIN — Medication 1 PATCH: at 11:54

## 2018-04-05 RX ADMIN — Medication 1 PATCH: at 14:02

## 2018-04-05 RX ADMIN — PANTOPRAZOLE SODIUM 40 MILLIGRAM(S): 20 TABLET, DELAYED RELEASE ORAL at 11:54

## 2018-04-05 RX ADMIN — HEPARIN SODIUM 0 UNIT(S)/HR: 5000 INJECTION INTRAVENOUS; SUBCUTANEOUS at 01:00

## 2018-04-05 NOTE — DIETITIAN INITIAL EVALUATION ADULT. - PERTINENT LABORATORY DATA
04-05 Na140 mmol/L Glu 84 mg/dL K+ 4.4 mmol/L Cr  1.92 mg/dL<H> BUN 51 mg/dL<H> 04-05 Phos 4.4 mg/dL 04-05 Alb 1.8 g/dL<L>04-05 ALT 23 U/L AST 54 U/L<H> Alkaline Phosphatase 315 U/L<H> 04-05 Na140 mmol/L Glu 84 mg/dL K+ 4.4 mmol/L Cr  1.92 mg/dL<H> BUN 51 mg/dL<H> 04-05 Phos 4.4 mg/dL 04-05 Alb 1.8 g/dL<L>04-05 ALT 23 U/L AST 54 U/L<H> Alkaline Phosphatase 315 U/L<H>, 3/4 - HgbA1c = 11.4

## 2018-04-05 NOTE — DIETITIAN INITIAL EVALUATION ADULT. - FACTORS AFF FOOD INTAKE
off sedation, feeding held for weaning/other (specify) other (specify)/off sedation, feeding currently be held for weaning

## 2018-04-05 NOTE — DIETITIAN INITIAL EVALUATION ADULT. - OTHER INFO
Pt seen today due to Nutrition support and admission to ICU. Unable to obtain usual diet/weight hx as no family members at bedside and pt recently off sedation. Pt was receiving Glucerna 1.2 via NGT @ 30 ml/hr = 720 ml, 864 kcal, & 30 g pr  w/ 15.8 kcal from propofol without residual. Was to be advanced to 40ml/hr.

## 2018-04-05 NOTE — PROVIDER CONTACT NOTE (EICU) - SITUATION
pt passed swallowing eval, requires dietary order: diabetic, dash; regular consistency as per verbal sign-out by RN

## 2018-04-05 NOTE — DIETITIAN INITIAL EVALUATION ADULT. - NS AS NUTRI INTERV MEALS SNACK
Recommend: Advance diet as medically feasible -> CCHO, Clear liquids (1500 ml FR) -> CCHO w/ evening snack, Dash/TLC w/ 1500 ml/FR/General/healthful diet

## 2018-04-06 LAB
ALBUMIN SERPL ELPH-MCNC: 2 G/DL — LOW (ref 3.3–5)
ALP SERPL-CCNC: 494 U/L — HIGH (ref 40–120)
ALT FLD-CCNC: 23 U/L — SIGNIFICANT CHANGE UP (ref 12–78)
ANION GAP SERPL CALC-SCNC: 8 MMOL/L — SIGNIFICANT CHANGE UP (ref 5–17)
APTT BLD: 67.9 SEC — HIGH (ref 27.5–37.4)
AST SERPL-CCNC: 72 U/L — HIGH (ref 15–37)
BASOPHILS # BLD AUTO: 0.01 K/UL — SIGNIFICANT CHANGE UP (ref 0–0.2)
BASOPHILS NFR BLD AUTO: 0.2 % — SIGNIFICANT CHANGE UP (ref 0–2)
BILIRUB SERPL-MCNC: 0.8 MG/DL — SIGNIFICANT CHANGE UP (ref 0.2–1.2)
BUN SERPL-MCNC: 59 MG/DL — HIGH (ref 7–23)
CALCIUM SERPL-MCNC: 7.9 MG/DL — LOW (ref 8.5–10.1)
CHLORIDE SERPL-SCNC: 101 MMOL/L — SIGNIFICANT CHANGE UP (ref 96–108)
CO2 SERPL-SCNC: 27 MMOL/L — SIGNIFICANT CHANGE UP (ref 22–31)
CREAT SERPL-MCNC: 2.15 MG/DL — HIGH (ref 0.5–1.3)
CULTURE RESULTS: SIGNIFICANT CHANGE UP
EOSINOPHIL # BLD AUTO: 0.06 K/UL — SIGNIFICANT CHANGE UP (ref 0–0.5)
EOSINOPHIL NFR BLD AUTO: 1.1 % — SIGNIFICANT CHANGE UP (ref 0–6)
GLUCOSE BLDC GLUCOMTR-MCNC: 112 MG/DL — HIGH (ref 70–99)
GLUCOSE BLDC GLUCOMTR-MCNC: 159 MG/DL — HIGH (ref 70–99)
GLUCOSE BLDC GLUCOMTR-MCNC: 160 MG/DL — HIGH (ref 70–99)
GLUCOSE BLDC GLUCOMTR-MCNC: 166 MG/DL — HIGH (ref 70–99)
GLUCOSE SERPL-MCNC: 150 MG/DL — HIGH (ref 70–99)
GRAM STN FLD: SIGNIFICANT CHANGE UP
HCT VFR BLD CALC: 30.2 % — LOW (ref 39–50)
HGB BLD-MCNC: 10.2 G/DL — LOW (ref 13–17)
IMM GRANULOCYTES NFR BLD AUTO: 0.2 % — SIGNIFICANT CHANGE UP (ref 0–1.5)
LYMPHOCYTES # BLD AUTO: 0.67 K/UL — LOW (ref 1–3.3)
LYMPHOCYTES # BLD AUTO: 12.2 % — LOW (ref 13–44)
MAGNESIUM SERPL-MCNC: 2.1 MG/DL — SIGNIFICANT CHANGE UP (ref 1.6–2.6)
MCHC RBC-ENTMCNC: 30.9 PG — SIGNIFICANT CHANGE UP (ref 27–34)
MCHC RBC-ENTMCNC: 33.8 GM/DL — SIGNIFICANT CHANGE UP (ref 32–36)
MCV RBC AUTO: 91.5 FL — SIGNIFICANT CHANGE UP (ref 80–100)
MONOCYTES # BLD AUTO: 0.44 K/UL — SIGNIFICANT CHANGE UP (ref 0–0.9)
MONOCYTES NFR BLD AUTO: 8 % — SIGNIFICANT CHANGE UP (ref 2–14)
NEUTROPHILS # BLD AUTO: 4.29 K/UL — SIGNIFICANT CHANGE UP (ref 1.8–7.4)
NEUTROPHILS NFR BLD AUTO: 78.3 % — HIGH (ref 43–77)
NRBC # BLD: 0 /100 WBCS — SIGNIFICANT CHANGE UP (ref 0–0)
PHOSPHATE SERPL-MCNC: 5 MG/DL — HIGH (ref 2.5–4.5)
PLATELET # BLD AUTO: 119 K/UL — LOW (ref 150–400)
POTASSIUM SERPL-MCNC: 3.9 MMOL/L — SIGNIFICANT CHANGE UP (ref 3.5–5.3)
POTASSIUM SERPL-SCNC: 3.9 MMOL/L — SIGNIFICANT CHANGE UP (ref 3.5–5.3)
PROT SERPL-MCNC: 6.7 GM/DL — SIGNIFICANT CHANGE UP (ref 6–8.3)
RBC # BLD: 3.3 M/UL — LOW (ref 4.2–5.8)
RBC # FLD: 13.6 % — SIGNIFICANT CHANGE UP (ref 10.3–14.5)
SODIUM SERPL-SCNC: 136 MMOL/L — SIGNIFICANT CHANGE UP (ref 135–145)
SPECIMEN SOURCE: SIGNIFICANT CHANGE UP
WBC # BLD: 5.48 K/UL — SIGNIFICANT CHANGE UP (ref 3.8–10.5)
WBC # FLD AUTO: 5.48 K/UL — SIGNIFICANT CHANGE UP (ref 3.8–10.5)

## 2018-04-06 PROCEDURE — 99233 SBSQ HOSP IP/OBS HIGH 50: CPT

## 2018-04-06 RX ORDER — HEPARIN SODIUM 5000 [USP'U]/ML
5000 INJECTION INTRAVENOUS; SUBCUTANEOUS EVERY 12 HOURS
Qty: 0 | Refills: 0 | Status: DISCONTINUED | OUTPATIENT
Start: 2018-04-06 | End: 2018-04-10

## 2018-04-06 RX ORDER — INSULIN LISPRO 100/ML
VIAL (ML) SUBCUTANEOUS
Qty: 0 | Refills: 0 | Status: DISCONTINUED | OUTPATIENT
Start: 2018-04-06 | End: 2018-04-10

## 2018-04-06 RX ADMIN — CHLORHEXIDINE GLUCONATE 1 APPLICATION(S): 213 SOLUTION TOPICAL at 05:59

## 2018-04-06 RX ADMIN — Medication 25 MILLIGRAM(S): at 05:59

## 2018-04-06 RX ADMIN — Medication 650 MILLIGRAM(S): at 21:58

## 2018-04-06 RX ADMIN — HEPARIN SODIUM 600 UNIT(S)/HR: 5000 INJECTION INTRAVENOUS; SUBCUTANEOUS at 02:31

## 2018-04-06 RX ADMIN — Medication 2: at 09:43

## 2018-04-06 RX ADMIN — CLOPIDOGREL BISULFATE 75 MILLIGRAM(S): 75 TABLET, FILM COATED ORAL at 13:12

## 2018-04-06 RX ADMIN — Medication 30 MILLIGRAM(S): at 18:24

## 2018-04-06 RX ADMIN — Medication 1 PATCH: at 11:55

## 2018-04-06 RX ADMIN — Medication 81 MILLIGRAM(S): at 13:12

## 2018-04-06 RX ADMIN — HEPARIN SODIUM 5000 UNIT(S): 5000 INJECTION INTRAVENOUS; SUBCUTANEOUS at 18:23

## 2018-04-06 RX ADMIN — SIMVASTATIN 40 MILLIGRAM(S): 20 TABLET, FILM COATED ORAL at 21:58

## 2018-04-06 RX ADMIN — Medication 2: at 11:54

## 2018-04-06 RX ADMIN — PIPERACILLIN AND TAZOBACTAM 12.5 GRAM(S): 4; .5 INJECTION, POWDER, LYOPHILIZED, FOR SOLUTION INTRAVENOUS at 21:57

## 2018-04-06 RX ADMIN — PIPERACILLIN AND TAZOBACTAM 12.5 GRAM(S): 4; .5 INJECTION, POWDER, LYOPHILIZED, FOR SOLUTION INTRAVENOUS at 14:53

## 2018-04-06 RX ADMIN — Medication 325 MILLIGRAM(S): at 06:11

## 2018-04-06 RX ADMIN — PANTOPRAZOLE SODIUM 40 MILLIGRAM(S): 20 TABLET, DELAYED RELEASE ORAL at 11:55

## 2018-04-06 RX ADMIN — Medication 25 MILLIGRAM(S): at 18:24

## 2018-04-06 RX ADMIN — Medication 1 PATCH: at 11:54

## 2018-04-06 RX ADMIN — Medication 40 MILLIGRAM(S): at 05:59

## 2018-04-06 RX ADMIN — PIPERACILLIN AND TAZOBACTAM 12.5 GRAM(S): 4; .5 INJECTION, POWDER, LYOPHILIZED, FOR SOLUTION INTRAVENOUS at 05:58

## 2018-04-06 RX ADMIN — Medication 2: at 17:47

## 2018-04-06 NOTE — CHART NOTE - NSCHARTNOTEFT_GEN_A_CORE
HPI  67 year old man with PMH CAD s/p CABG 2014, had not been on meds for years until last month, DM2, HTN, HLD presents with complaint of fever, chills, cough, lethargy for the last few days.  He states that he thought he had a bad cold but could barely get out of bed today so he came here.  He denies chest pain, palpitations, lightheadedness, nausea, vomiting diarrhea.  In the ED, flu +, BNP 50532 (last month was ~9000).  Pt febrile to 102.8 F. CXR with pulmonary vascular congestion.  Admitted with influenza infection c/w respiratory failure intubated, JAKOB,  NSTEMI and HF decompensation. In ICU placed on heparin gtt started on Plavix and aspirin.  Seen by cardiology Dr. Orozco, Patient with recent cath, EF 30%.  Continue diuresis. No further intervention at this time.  Patient extubated without difficulty 4/5/18. Cont abx for potential PNA  f/u sputum and blood cx for staph cont Tamiflu for Influenza. Patient stable for transfer to medicine service.     Report given to        hospitalist service    Pastora Morris NP-C  Critical Care HPI  67 year old man with PMH CAD s/p CABG 2014, had not been on meds for years until last month, DM2, HTN, HLD presents with complaint of fever, chills, cough, lethargy for the last few days.  He states that he thought he had a bad cold but could barely get out of bed today so he came here.  He denies chest pain, palpitations, lightheadedness, nausea, vomiting diarrhea.  In the ED, flu +, BNP 38642 (last month was ~9000).  Pt febrile to 102.8 F. CXR with pulmonary vascular congestion.  RRT called for hypercapnic RF and increased lethargy while on BiPAP, as well as elevated troponins of 5 with concern for NSTEMI.  Pt was assessed and examined at the bedside and was found to have increased WOB with lethargy.  Pt audibly sounds wet with SBP in 170's likely in acute pulmonary edema.  ABG on BiPAP read as 7.10/76/98/22, pt found to have respiratory acidosis with hypercapnic respiratory failure.  I spoke with wife at bedside, and wife was in agreement to have patient placed on mechanical ventilation.  Pt was stable in EDHO, and was transferred up to the ICU for intubation.  Tansferred to ICU with influenza infection c/w respiratory failure intubated, JAKOB,  NSTEMI and HF decompensation. In ICU placed on heparin gtt started on Plavix and aspirin.  Seen by cardiology Dr. Orozco, Patient with recent cath, EF 30%.  Continue diuresis. No further intervention at this time.  Patient extubated without difficulty 4/5/18. Cont abx for potential PNA  f/u sputum and blood cx for staph cont Tamiflu for Influenza. Patient stable for transfer to medicine service.     Report given to        [facility] service    Pastora Morris, HUNTER-PHIL  Critical Care HPI  67 year old man with PMH CAD s/p CABG 2014, had not been on meds for years until last month, DM2, HTN, HLD presents with complaint of fever, chills, cough, lethargy for the last few days.  He states that he thought he had a bad cold but could barely get out of bed today so he came here.  He denies chest pain, palpitations, lightheadedness, nausea, vomiting diarrhea.  In the ED, flu +, BNP 38645 (last month was ~9000).  Pt febrile to 102.8 F. CXR with pulmonary vascular congestion.  RRT called for hypercapnic RF and increased lethargy while on BiPAP, as well as elevated troponins of 5 with concern for NSTEMI.  Pt was assessed and examined at the bedside and was found to have increased WOB with lethargy.  Pt audibly sounds wet with SBP in 170's likely in acute pulmonary edema.  ABG on BiPAP read as 7.10/76/98/22, pt found to have respiratory acidosis with hypercapnic respiratory failure.  I spoke with wife at bedside, and wife was in agreement to have patient placed on mechanical ventilation.  Pt was stable in EDHO, and was transferred up to the ICU for intubation.  Tansferred to ICU with influenza infection c/w respiratory failure intubated, JAKOB,  NSTEMI and HF decompensation. In ICU placed on heparin gtt started on Plavix and aspirin.  Seen by cardiology Dr. Orozco, Patient with recent cath, EF 30%.  Continue diuresis. No further intervention at this time.  Patient extubated without difficulty 4/5/18. Cont abx for potential PNA  f/u sputum and blood cx for staph cont Tamiflu for Influenza. Patient stable for transfer to medicine service.     Report given to Dr. Sterling -    hospitalist service    Pastora Morris, SULAIMAN  Critical Care

## 2018-04-07 DIAGNOSIS — J15.6 PNEUMONIA DUE TO OTHER GRAM-NEGATIVE BACTERIA: ICD-10-CM

## 2018-04-07 DIAGNOSIS — N17.9 ACUTE KIDNEY FAILURE, UNSPECIFIED: ICD-10-CM

## 2018-04-07 LAB
APTT BLD: 54.2 SEC — HIGH (ref 27.5–37.4)
GLUCOSE BLDC GLUCOMTR-MCNC: 109 MG/DL — HIGH (ref 70–99)
GLUCOSE BLDC GLUCOMTR-MCNC: 168 MG/DL — HIGH (ref 70–99)
GLUCOSE BLDC GLUCOMTR-MCNC: 202 MG/DL — HIGH (ref 70–99)
GLUCOSE BLDC GLUCOMTR-MCNC: 208 MG/DL — HIGH (ref 70–99)
HCT VFR BLD CALC: 32.7 % — LOW (ref 39–50)
HGB BLD-MCNC: 11 G/DL — LOW (ref 13–17)
MCHC RBC-ENTMCNC: 30.5 PG — SIGNIFICANT CHANGE UP (ref 27–34)
MCHC RBC-ENTMCNC: 33.6 GM/DL — SIGNIFICANT CHANGE UP (ref 32–36)
MCV RBC AUTO: 90.6 FL — SIGNIFICANT CHANGE UP (ref 80–100)
NRBC # BLD: 0 /100 WBCS — SIGNIFICANT CHANGE UP (ref 0–0)
PLATELET # BLD AUTO: 116 K/UL — LOW (ref 150–400)
RBC # BLD: 3.61 M/UL — LOW (ref 4.2–5.8)
RBC # FLD: 13.5 % — SIGNIFICANT CHANGE UP (ref 10.3–14.5)
WBC # BLD: 4.45 K/UL — SIGNIFICANT CHANGE UP (ref 3.8–10.5)
WBC # FLD AUTO: 4.45 K/UL — SIGNIFICANT CHANGE UP (ref 3.8–10.5)

## 2018-04-07 PROCEDURE — 99233 SBSQ HOSP IP/OBS HIGH 50: CPT

## 2018-04-07 PROCEDURE — 71045 X-RAY EXAM CHEST 1 VIEW: CPT | Mod: 26

## 2018-04-07 RX ORDER — IPRATROPIUM/ALBUTEROL SULFATE 18-103MCG
3 AEROSOL WITH ADAPTER (GRAM) INHALATION EVERY 6 HOURS
Qty: 0 | Refills: 0 | Status: DISCONTINUED | OUTPATIENT
Start: 2018-04-07 | End: 2018-04-10

## 2018-04-07 RX ADMIN — Medication 2: at 11:02

## 2018-04-07 RX ADMIN — Medication 1 PATCH: at 11:23

## 2018-04-07 RX ADMIN — SIMVASTATIN 40 MILLIGRAM(S): 20 TABLET, FILM COATED ORAL at 21:35

## 2018-04-07 RX ADMIN — PIPERACILLIN AND TAZOBACTAM 12.5 GRAM(S): 4; .5 INJECTION, POWDER, LYOPHILIZED, FOR SOLUTION INTRAVENOUS at 05:40

## 2018-04-07 RX ADMIN — HEPARIN SODIUM 5000 UNIT(S): 5000 INJECTION INTRAVENOUS; SUBCUTANEOUS at 05:40

## 2018-04-07 RX ADMIN — HEPARIN SODIUM 5000 UNIT(S): 5000 INJECTION INTRAVENOUS; SUBCUTANEOUS at 17:22

## 2018-04-07 RX ADMIN — Medication 40 MILLIGRAM(S): at 05:40

## 2018-04-07 RX ADMIN — Medication 30 MILLIGRAM(S): at 17:22

## 2018-04-07 RX ADMIN — Medication 4: at 16:10

## 2018-04-07 RX ADMIN — Medication 25 MILLIGRAM(S): at 17:22

## 2018-04-07 RX ADMIN — Medication 4: at 22:40

## 2018-04-07 RX ADMIN — Medication 25 MILLIGRAM(S): at 05:40

## 2018-04-07 RX ADMIN — PIPERACILLIN AND TAZOBACTAM 12.5 GRAM(S): 4; .5 INJECTION, POWDER, LYOPHILIZED, FOR SOLUTION INTRAVENOUS at 21:32

## 2018-04-07 RX ADMIN — Medication 3 MILLILITER(S): at 06:42

## 2018-04-07 RX ADMIN — CLOPIDOGREL BISULFATE 75 MILLIGRAM(S): 75 TABLET, FILM COATED ORAL at 11:00

## 2018-04-07 RX ADMIN — Medication 81 MILLIGRAM(S): at 11:00

## 2018-04-07 RX ADMIN — PIPERACILLIN AND TAZOBACTAM 12.5 GRAM(S): 4; .5 INJECTION, POWDER, LYOPHILIZED, FOR SOLUTION INTRAVENOUS at 13:04

## 2018-04-07 RX ADMIN — Medication 1 PATCH: at 11:00

## 2018-04-07 RX ADMIN — PANTOPRAZOLE SODIUM 40 MILLIGRAM(S): 20 TABLET, DELAYED RELEASE ORAL at 11:02

## 2018-04-07 NOTE — PHYSICAL THERAPY INITIAL EVALUATION ADULT - PERTINENT HX OF CURRENT PROBLEM, REHAB EVAL
Pt is a 68yo Mosotho-speaking M admitted secondary to SOB, cough, fever, chills, & CHF exacerbation (confirmed by imaging). Pt found to be Flu A +. Pt s/p RRT on 4/4 secondary to hypercapnic respiratory failure, increased lethargy while on BIPAP, & elevated trponins (5.0) with conern for NSTEMI. Pt intubated for respiratory failure & transferred to ICU. Pt successfully extubated on 4/5 & downgraded to telemetry on 4/6.

## 2018-04-07 NOTE — PHYSICAL THERAPY INITIAL EVALUATION ADULT - ADDITIONAL COMMENTS
Pt lives with his wife in a private home, 3 steps to enter (+ rail), flight (+ rail) inside. Pt states he owns a cane but did not use it prior to admission. Pt states prior to admission he was independent with all functional mobility including community ambulation without a device & ADL's. Pt is right hand dominant. Goal of therapy: feel better.

## 2018-04-07 NOTE — PHYSICAL THERAPY INITIAL EVALUATION ADULT - PRECAUTIONS/LIMITATIONS, REHAB EVAL
droplet. 2L O2 at bedside/cardiac precautions/oxygen therapy device and L/min/fall precautions/isolation precautions

## 2018-04-07 NOTE — PHYSICAL THERAPY INITIAL EVALUATION ADULT - PERSONAL SAFETY AND JUDGMENT, REHAB EVAL
impaired/at risk behaviors demonstrated/pt went to bathroom on his own without calling for help, pt observed turning bed alarm off

## 2018-04-08 LAB
ALBUMIN SERPL ELPH-MCNC: 2.1 G/DL — LOW (ref 3.3–5)
ALP SERPL-CCNC: 585 U/L — HIGH (ref 40–120)
ALT FLD-CCNC: 16 U/L — SIGNIFICANT CHANGE UP (ref 12–78)
ANION GAP SERPL CALC-SCNC: 9 MMOL/L — SIGNIFICANT CHANGE UP (ref 5–17)
AST SERPL-CCNC: 51 U/L — HIGH (ref 15–37)
BILIRUB SERPL-MCNC: 0.9 MG/DL — SIGNIFICANT CHANGE UP (ref 0.2–1.2)
BUN SERPL-MCNC: 48 MG/DL — HIGH (ref 7–23)
CALCIUM SERPL-MCNC: 8.5 MG/DL — SIGNIFICANT CHANGE UP (ref 8.5–10.1)
CHLORIDE SERPL-SCNC: 105 MMOL/L — SIGNIFICANT CHANGE UP (ref 96–108)
CO2 SERPL-SCNC: 26 MMOL/L — SIGNIFICANT CHANGE UP (ref 22–31)
CREAT SERPL-MCNC: 2.01 MG/DL — HIGH (ref 0.5–1.3)
GLUCOSE BLDC GLUCOMTR-MCNC: 114 MG/DL — HIGH (ref 70–99)
GLUCOSE BLDC GLUCOMTR-MCNC: 195 MG/DL — HIGH (ref 70–99)
GLUCOSE BLDC GLUCOMTR-MCNC: 200 MG/DL — HIGH (ref 70–99)
GLUCOSE BLDC GLUCOMTR-MCNC: 205 MG/DL — HIGH (ref 70–99)
GLUCOSE SERPL-MCNC: 109 MG/DL — HIGH (ref 70–99)
HCT VFR BLD CALC: 33.8 % — LOW (ref 39–50)
HGB BLD-MCNC: 11.5 G/DL — LOW (ref 13–17)
MAGNESIUM SERPL-MCNC: 2.3 MG/DL — SIGNIFICANT CHANGE UP (ref 1.6–2.6)
MCHC RBC-ENTMCNC: 30.7 PG — SIGNIFICANT CHANGE UP (ref 27–34)
MCHC RBC-ENTMCNC: 34 GM/DL — SIGNIFICANT CHANGE UP (ref 32–36)
MCV RBC AUTO: 90.4 FL — SIGNIFICANT CHANGE UP (ref 80–100)
NRBC # BLD: 0 /100 WBCS — SIGNIFICANT CHANGE UP (ref 0–0)
PHOSPHATE SERPL-MCNC: 2.9 MG/DL — SIGNIFICANT CHANGE UP (ref 2.5–4.5)
PLATELET # BLD AUTO: 146 K/UL — LOW (ref 150–400)
POTASSIUM SERPL-MCNC: 3.7 MMOL/L — SIGNIFICANT CHANGE UP (ref 3.5–5.3)
POTASSIUM SERPL-SCNC: 3.7 MMOL/L — SIGNIFICANT CHANGE UP (ref 3.5–5.3)
PROT SERPL-MCNC: 7.3 GM/DL — SIGNIFICANT CHANGE UP (ref 6–8.3)
RBC # BLD: 3.74 M/UL — LOW (ref 4.2–5.8)
RBC # FLD: 13.2 % — SIGNIFICANT CHANGE UP (ref 10.3–14.5)
SODIUM SERPL-SCNC: 140 MMOL/L — SIGNIFICANT CHANGE UP (ref 135–145)
WBC # BLD: 4.52 K/UL — SIGNIFICANT CHANGE UP (ref 3.8–10.5)
WBC # FLD AUTO: 4.52 K/UL — SIGNIFICANT CHANGE UP (ref 3.8–10.5)

## 2018-04-08 PROCEDURE — 99233 SBSQ HOSP IP/OBS HIGH 50: CPT

## 2018-04-08 RX ORDER — BUMETANIDE 0.25 MG/ML
1 INJECTION INTRAMUSCULAR; INTRAVENOUS
Qty: 0 | Refills: 0 | Status: DISCONTINUED | OUTPATIENT
Start: 2018-04-08 | End: 2018-04-10

## 2018-04-08 RX ADMIN — Medication 4: at 16:16

## 2018-04-08 RX ADMIN — Medication 2: at 11:15

## 2018-04-08 RX ADMIN — Medication 40 MILLIGRAM(S): at 06:14

## 2018-04-08 RX ADMIN — Medication 1 PATCH: at 11:16

## 2018-04-08 RX ADMIN — PIPERACILLIN AND TAZOBACTAM 12.5 GRAM(S): 4; .5 INJECTION, POWDER, LYOPHILIZED, FOR SOLUTION INTRAVENOUS at 13:06

## 2018-04-08 RX ADMIN — CLOPIDOGREL BISULFATE 75 MILLIGRAM(S): 75 TABLET, FILM COATED ORAL at 11:09

## 2018-04-08 RX ADMIN — Medication 25 MILLIGRAM(S): at 17:10

## 2018-04-08 RX ADMIN — PIPERACILLIN AND TAZOBACTAM 12.5 GRAM(S): 4; .5 INJECTION, POWDER, LYOPHILIZED, FOR SOLUTION INTRAVENOUS at 22:29

## 2018-04-08 RX ADMIN — Medication 1 PATCH: at 11:09

## 2018-04-08 RX ADMIN — SIMVASTATIN 40 MILLIGRAM(S): 20 TABLET, FILM COATED ORAL at 22:29

## 2018-04-08 RX ADMIN — PANTOPRAZOLE SODIUM 40 MILLIGRAM(S): 20 TABLET, DELAYED RELEASE ORAL at 11:08

## 2018-04-08 RX ADMIN — PIPERACILLIN AND TAZOBACTAM 12.5 GRAM(S): 4; .5 INJECTION, POWDER, LYOPHILIZED, FOR SOLUTION INTRAVENOUS at 06:14

## 2018-04-08 RX ADMIN — HEPARIN SODIUM 5000 UNIT(S): 5000 INJECTION INTRAVENOUS; SUBCUTANEOUS at 06:14

## 2018-04-08 RX ADMIN — Medication 25 MILLIGRAM(S): at 06:14

## 2018-04-08 RX ADMIN — Medication 81 MILLIGRAM(S): at 11:09

## 2018-04-08 RX ADMIN — Medication 30 MILLIGRAM(S): at 17:10

## 2018-04-08 RX ADMIN — HEPARIN SODIUM 5000 UNIT(S): 5000 INJECTION INTRAVENOUS; SUBCUTANEOUS at 17:10

## 2018-04-08 RX ADMIN — Medication 2: at 22:29

## 2018-04-09 ENCOUNTER — TRANSCRIPTION ENCOUNTER (OUTPATIENT)
Age: 68
End: 2018-04-09

## 2018-04-09 LAB
CULTURE RESULTS: SIGNIFICANT CHANGE UP
CULTURE RESULTS: SIGNIFICANT CHANGE UP
GLUCOSE BLDC GLUCOMTR-MCNC: 132 MG/DL — HIGH (ref 70–99)
GLUCOSE BLDC GLUCOMTR-MCNC: 176 MG/DL — HIGH (ref 70–99)
GLUCOSE BLDC GLUCOMTR-MCNC: 227 MG/DL — HIGH (ref 70–99)
GLUCOSE BLDC GLUCOMTR-MCNC: 272 MG/DL — HIGH (ref 70–99)
HCT VFR BLD CALC: 34.5 % — LOW (ref 39–50)
HGB BLD-MCNC: 11.5 G/DL — LOW (ref 13–17)
MCHC RBC-ENTMCNC: 30.5 PG — SIGNIFICANT CHANGE UP (ref 27–34)
MCHC RBC-ENTMCNC: 33.3 GM/DL — SIGNIFICANT CHANGE UP (ref 32–36)
MCV RBC AUTO: 91.5 FL — SIGNIFICANT CHANGE UP (ref 80–100)
NRBC # BLD: 0 /100 WBCS — SIGNIFICANT CHANGE UP (ref 0–0)
PLATELET # BLD AUTO: 169 K/UL — SIGNIFICANT CHANGE UP (ref 150–400)
RBC # BLD: 3.77 M/UL — LOW (ref 4.2–5.8)
RBC # FLD: 13.1 % — SIGNIFICANT CHANGE UP (ref 10.3–14.5)
SPECIMEN SOURCE: SIGNIFICANT CHANGE UP
SPECIMEN SOURCE: SIGNIFICANT CHANGE UP
WBC # BLD: 5.23 K/UL — SIGNIFICANT CHANGE UP (ref 3.8–10.5)
WBC # FLD AUTO: 5.23 K/UL — SIGNIFICANT CHANGE UP (ref 3.8–10.5)

## 2018-04-09 PROCEDURE — 99233 SBSQ HOSP IP/OBS HIGH 50: CPT

## 2018-04-09 RX ORDER — FUROSEMIDE 40 MG
1 TABLET ORAL
Qty: 0 | Refills: 0 | COMMUNITY

## 2018-04-09 RX ORDER — BUMETANIDE 0.25 MG/ML
1 INJECTION INTRAMUSCULAR; INTRAVENOUS
Qty: 60 | Refills: 0
Start: 2018-04-09 | End: 2018-05-08

## 2018-04-09 RX ORDER — NICOTINE POLACRILEX 2 MG
1 GUM BUCCAL
Qty: 30 | Refills: 0
Start: 2018-04-09 | End: 2018-05-08

## 2018-04-09 RX ADMIN — Medication 4: at 22:10

## 2018-04-09 RX ADMIN — SIMVASTATIN 40 MILLIGRAM(S): 20 TABLET, FILM COATED ORAL at 22:06

## 2018-04-09 RX ADMIN — BUMETANIDE 1 MILLIGRAM(S): 0.25 INJECTION INTRAMUSCULAR; INTRAVENOUS at 17:20

## 2018-04-09 RX ADMIN — Medication 25 MILLIGRAM(S): at 17:21

## 2018-04-09 RX ADMIN — Medication 1 PATCH: at 11:03

## 2018-04-09 RX ADMIN — Medication 81 MILLIGRAM(S): at 11:03

## 2018-04-09 RX ADMIN — Medication 1 TABLET(S): at 17:21

## 2018-04-09 RX ADMIN — Medication 6: at 11:05

## 2018-04-09 RX ADMIN — HEPARIN SODIUM 5000 UNIT(S): 5000 INJECTION INTRAVENOUS; SUBCUTANEOUS at 05:35

## 2018-04-09 RX ADMIN — Medication 1 PATCH: at 11:06

## 2018-04-09 RX ADMIN — Medication 25 MILLIGRAM(S): at 05:35

## 2018-04-09 RX ADMIN — HEPARIN SODIUM 5000 UNIT(S): 5000 INJECTION INTRAVENOUS; SUBCUTANEOUS at 17:21

## 2018-04-09 RX ADMIN — BUMETANIDE 1 MILLIGRAM(S): 0.25 INJECTION INTRAMUSCULAR; INTRAVENOUS at 05:35

## 2018-04-09 RX ADMIN — PIPERACILLIN AND TAZOBACTAM 12.5 GRAM(S): 4; .5 INJECTION, POWDER, LYOPHILIZED, FOR SOLUTION INTRAVENOUS at 05:35

## 2018-04-09 RX ADMIN — Medication 2: at 15:53

## 2018-04-09 RX ADMIN — CLOPIDOGREL BISULFATE 75 MILLIGRAM(S): 75 TABLET, FILM COATED ORAL at 11:03

## 2018-04-09 RX ADMIN — Medication 650 MILLIGRAM(S): at 02:48

## 2018-04-09 RX ADMIN — PANTOPRAZOLE SODIUM 40 MILLIGRAM(S): 20 TABLET, DELAYED RELEASE ORAL at 11:08

## 2018-04-09 NOTE — DISCHARGE NOTE ADULT - MEDICATION SUMMARY - MEDICATIONS TO STOP TAKING
I will STOP taking the medications listed below when I get home from the hospital:    enoxaparin    insulin lispro (concentrated) 200 units/mL subcutaneous solution  --  subcutaneous    insulin lispro (concentrated) 200 units/mL subcutaneous solution  --  subcutaneous    acetylcysteine    furosemide 40 mg oral tablet  -- 1 tab(s) by mouth once a day    spironolactone 25 mg oral tablet  -- orally once a day    enalapril 5 mg oral tablet  -- 1 tab(s) by mouth once a day    glimepiride 4 mg oral tablet  -- 1 tab(s) by mouth once a day

## 2018-04-09 NOTE — PROGRESS NOTE ADULT - PROBLEM SELECTOR PROBLEM 3
Pneumonia of both lower lobes due to other aerobic gram-negative bacteria

## 2018-04-09 NOTE — DISCHARGE NOTE ADULT - SECONDARY DIAGNOSIS.
CHF (congestive heart failure), NYHA class IV, acute on chronic, combined Pneumonia of both lower lobes due to other aerobic gram-negative bacteria Influenza A Tobacco abuse Type 2 diabetes mellitus without complication, without long-term current use of insulin CKD stage 3 secondary to diabetes

## 2018-04-09 NOTE — DISCHARGE NOTE ADULT - PATIENT PORTAL LINK FT
You can access the [email or web address], offered by [facility], by registering with the following website: [email or web address]

## 2018-04-09 NOTE — DISCHARGE NOTE ADULT - PLAN OF CARE
s/p Intubation and successful extubation Secondary to Acute CHF Systolic/diastolic Continue with Bumex   Follow up with Cardiology in one week. Dr. Orozco Continue with Augmentin for 7 days. Course completed with Tamiflu X7 days. Quit smoking Nicotine patch  [facility] Smoking cessation program [phone] follow up with PCP. Monitor Renal function with PCP s/p Intubation and successful extubation, Resolved Continue with Augmentin for 6 days. Continue with Augmentin for 6 days.  Follow up with PCP in 1-2 weeks.

## 2018-04-09 NOTE — DISCHARGE NOTE ADULT - MEDICATION SUMMARY - MEDICATIONS TO CHANGE
I will SWITCH the dose or number of times a day I take the medications listed below when I get home from the hospital:    metoprolol succinate 50 mg oral tablet, extended release  -- orally once a day (at bedtime)

## 2018-04-09 NOTE — PROGRESS NOTE ADULT - PROBLEM SELECTOR PROBLEM 1
Acute on chronic diastolic congestive heart failure

## 2018-04-09 NOTE — DISCHARGE NOTE ADULT - MEDICATION SUMMARY - MEDICATIONS TO TAKE
I will START or STAY ON the medications listed below when I get home from the hospital:    aspirin 81 mg oral tablet  -- 1 tab(s) by mouth once a day  -- Indication: For Preventive measure    tamsulosin 0.4 mg oral capsule  -- 1 cap(s) by mouth once a day  -- Indication: For BPH     metFORMIN 1000 mg oral tablet  -- 1 tab(s) by mouth 2 times a day  -- Indication: For Type 2 diabetes mellitus without complication, without long-term current use of insulin    Lantus 100 units/mL subcutaneous solution  -- Indication: For Type 2 diabetes mellitus without complication, without long-term current use of insulin    Tradjenta 5 mg oral tablet  -- 1 tab(s) by mouth once a day  -- Indication: For Type 2 diabetes mellitus without complication, without long-term current use of insulin    atorvastatin 80 mg oral tablet  -- 1 tab(s) by mouth once a day  -- Indication: For Mixed hyperlipidemia    clopidogrel 75 mg oral tablet  -- 1 tab(s) by mouth once a day  -- Indication: For Coronary disease     metoprolol tartrate 25 mg oral tablet  -- 1 tab(s) by mouth 2 times a day  -- Indication: For Primary hypertension    bumetanide 1 mg oral tablet  -- 1 tab(s) by mouth 2 times a day  -- Indication: For  chronic Systolic diastolic congestive heart failure    amoxicillin-clavulanate 875 mg-125 mg oral tablet  -- 1 tab(s) by mouth 2 times a day  -- Indication: For Pneumonia of both lower lobes due to other aerobic gram-negative bacteria    omeprazole 20 mg oral delayed release capsule  -- 1 cap(s) by mouth once a day  -- Indication: For GERD     nicotine 14 mg/24 hr transdermal film, extended release  -- 1 milligram(s) by transdermal patch once a day   -- Indication: For Tobacco abuse

## 2018-04-09 NOTE — DISCHARGE NOTE ADULT - HOSPITAL COURSE
67 year old man with PMH CAD s/p CABG 2014, had not been on meds for years until last month, DM2, HTN, HLD presents with complaint of fever, chills, cough, lethargy for the last few days.  He states that he thought he had a bad cold but could barely get out of bed today so he came here.  He denies chest pain, palpitations, lightheadedness, nausea, vomiting diarrhea.    In the ED, flu +, BNP 33326 (last month was ~9000).  Pt febrile to 102.8 F. CXR with pulmonary vascular congestion. 67 year old man with PMH CAD s/p CABG 2014, had not been on meds for years until last month, DM2, HTN, HLD presents with complaint of fever, chills, cough, lethargy for the last few days.  He states that he thought he had a bad cold but could barely get out of bed today so he came here.  He denies chest pain, palpitations, lightheadedness, nausea, vomiting diarrhea.    ER Course: Pt febrile to 102.8,  Influenza A +,  BNP 56958 (last month was ~9000).  CXR with pulmonary vascular congestion, pt was placed on Bipap.      RRT called for hypercapnic RF and increased lethargy while on BiPAP, as well as elevated troponins of 5 with concern for NSTEMI.  Pt was assessed and examined at the bedside and was found to have increased WOB with lethargy.  Pt audibly sounds wet with SBP in 170's likely in acute pulmonary edema.  ABG on BiPAP read as 7.10/76/98/22, pt was found to have respiratory acidosis with hypercapnic respiratory failure. The patient was transferred up to the ICU for intubation.  In ICU heparin drip was started as well as Plavix and aspirin.  Pt was seen by cardiology Dr. Orozco, Patient with recent cath, EF 30%.  Recommendations were to continue with diuresis. The was extubated without difficulty 4/5/18. Cont abx for potential PNA  f/u sputum and blood cx for staph cont Tamiflu for Influenza. Patient stable for transfer to medicine service. 67 year old man with PMH CAD s/p CABG 2014, had not been on meds for years until last month, DM2, HTN, HLD presents with complaint of fever, chills, cough, lethargy for the last few days.  He states that he thought he had a bad cold but could barely get out of bed today so he came here.  He denies chest pain, palpitations, lightheadedness, nausea, vomiting diarrhea.    ER Course: Sepsis present on admission: Pt febrile to 102.8,  Influenza A +,  BNP 06561 (last month was ~9000).  CXR with pulmonary vascular congestion, pt was placed on Bipap.      RRT called for hypercapnic RF and increased lethargy while on BiPAP, as well as elevated troponins of 5 with concern for NSTEMI.  Pt was assessed and examined at the bedside and was found to have increased WOB with lethargy.  Pt audibly sounds wet with SBP in 170's likely in acute pulmonary edema.  ABG on BiPAP read as 7.10/76/98/22, pt was found to have respiratory acidosis with hypercapnic respiratory failure. The patient was transferred up to the ICU for intubation.  In ICU heparin drip was started as well as Plavix and aspirin.  Pt was seen by cardiology Dr. Orozco, Patient with recent cath, EF 30%.  Recommendations were to continue with diuresis. The was extubated without difficulty 4/5/18. Lasix were eventually changed to Bumex and the patient tolerated well. The patient was also being treated with Tamiflu and IV antibiotics for gram negative pneumonia. Physical Therapy was consulted and recommended Subacute Rehab. Pt and family are declining Rehab and want the patient to go home. 67 year old man with PMH CAD s/p CABG 2014, had not been on meds for years until last month, DM2, HTN, HLD presents with complaint of fever, chills, cough, lethargy for the last few days.  He states that he thought he had a bad cold but could barely get out of bed today so he came here.  He denies chest pain, palpitations, lightheadedness, nausea, vomiting diarrhea.    ER Course: Sepsis present on admission: Pt febrile to 102.8,  Influenza A +,  BNP 93792 (last month was ~9000).  CXR with pulmonary vascular congestion, pt was placed on Bipap.      RRT called for hypercapnic RF and increased lethargy while on BiPAP, as well as elevated troponins of 5 with concern for NSTEMI.  Pt was assessed and examined at the bedside and was found to have increased WOB with lethargy.  Pt audibly sounds wet with SBP in 170's likely in acute pulmonary edema.  ABG on BiPAP read as 7.10/76/98/22, pt was found to have respiratory acidosis with hypercapnic respiratory failure. The patient was transferred up to the ICU for intubation.  In ICU heparin drip was started as well as Plavix and aspirin.  Pt was seen by cardiology Dr. Orozco, Patient with recent cath, EF 30%.  Recommendations were to continue with diuresis. The was extubated without difficulty 4/5/18. Lasix were eventually changed to Bumex and the patient tolerated well. The patient was also being treated with Tamiflu and IV antibiotics for gram negative pneumonia. Physical Therapy was consulted and recommended Subacute Rehab. 67 year old man with PMH CAD s/p CABG 2014, had not been on meds for years until last month, DM2, HTN, HLD presents with complaint of fever, chills, cough, lethargy for the last few days.  He states that he thought he had a bad cold but could barely get out of bed today so he came here.  He denies chest pain, palpitations, lightheadedness, nausea, vomiting diarrhea.    ER Course: Sepsis present on admission: Pt febrile to 102.8,  Influenza A +,  BNP 27427 (last month was ~9000).  CXR with pulmonary vascular congestion, pt was placed on Bipap.      RRT called for hypercapnic RF and increased lethargy while on BiPAP, as well as elevated troponins of 5 with concern for NSTEMI.  Pt was assessed and examined at the bedside and was found to have increased WOB with lethargy.  Pt audibly sounds wet with SBP in 170's likely in acute pulmonary edema.  ABG on BiPAP read as 7.10/76/98/22, pt was found to have respiratory acidosis with hypercapnic respiratory failure. The patient was transferred up to the ICU for intubation.  In ICU heparin drip was started as well as Plavix and aspirin.  Pt was seen by cardiology Dr. Orozco, Patient with recent cath, EF 30%.  Recommendations were to continue with diuresis. The was extubated without difficulty 4/5/18. Lasix were eventually changed to Bumex and the patient tolerated well. The patient was also being treated with Tamiflu and IV antibiotics for gram negative pneumonia. The patient completed his Tamiflu course, droplet precautions discontinued. IV antibiotics were changed to oral and the patient will continue oral antibiotics for 6 more days. Physical Therapy was consulted and recommended Subacute Rehab.

## 2018-04-09 NOTE — DISCHARGE NOTE ADULT - CARE PLAN
Principal Discharge DX:	Acute respiratory failure with hypoxia and hypercapnia  Goal:	s/p Intubation and successful extubation  Assessment and plan of treatment:	Secondary to Acute CHF  Secondary Diagnosis:	CHF (congestive heart failure), NYHA class IV, acute on chronic, combined  Goal:	Systolic/diastolic  Assessment and plan of treatment:	Continue with Bumex   Follow up with Cardiology in one week. Dr. Orozco  Secondary Diagnosis:	Pneumonia of both lower lobes due to other aerobic gram-negative bacteria  Assessment and plan of treatment:	Continue with Augmentin for 7 days.  Secondary Diagnosis:	Influenza A  Goal:	Course completed with Tamiflu X7 days.  Secondary Diagnosis:	Tobacco abuse  Goal:	Quit smoking  Assessment and plan of treatment:	Nicotine patch  [facility] Smoking cessation program [phone]  Secondary Diagnosis:	Type 2 diabetes mellitus without complication, without long-term current use of insulin Principal Discharge DX:	Acute respiratory failure with hypoxia and hypercapnia  Goal:	s/p Intubation and successful extubation  Assessment and plan of treatment:	Secondary to Acute CHF  Secondary Diagnosis:	CHF (congestive heart failure), NYHA class IV, acute on chronic, combined  Goal:	Systolic/diastolic  Assessment and plan of treatment:	Continue with Bumex   Follow up with Cardiology in one week. Dr. Orozco  Secondary Diagnosis:	Pneumonia of both lower lobes due to other aerobic gram-negative bacteria  Assessment and plan of treatment:	Continue with Augmentin for 7 days.  Secondary Diagnosis:	Influenza A  Goal:	Course completed with Tamiflu X7 days.  Secondary Diagnosis:	Tobacco abuse  Goal:	Quit smoking  Assessment and plan of treatment:	Nicotine patch  [facility] Smoking cessation program [phone]  Secondary Diagnosis:	Type 2 diabetes mellitus without complication, without long-term current use of insulin  Assessment and plan of treatment:	follow up with PCP.  Secondary Diagnosis:	CKD stage 3 secondary to diabetes  Assessment and plan of treatment:	Monitor Renal function with PCP Principal Discharge DX:	Acute respiratory failure with hypoxia and hypercapnia  Goal:	s/p Intubation and successful extubation, Resolved  Assessment and plan of treatment:	Secondary to Acute CHF  Secondary Diagnosis:	CHF (congestive heart failure), NYHA class IV, acute on chronic, combined  Goal:	Systolic/diastolic  Assessment and plan of treatment:	Continue with Bumex   Follow up with Cardiology in one week. Dr. Orozco  Secondary Diagnosis:	Pneumonia of both lower lobes due to other aerobic gram-negative bacteria  Assessment and plan of treatment:	Continue with Augmentin for 6 days.  Secondary Diagnosis:	Influenza A  Goal:	Course completed with Tamiflu X7 days.  Secondary Diagnosis:	Tobacco abuse  Goal:	Quit smoking  Assessment and plan of treatment:	Nicotine patch  [facility] Smoking cessation program [phone]  Secondary Diagnosis:	Type 2 diabetes mellitus without complication, without long-term current use of insulin  Assessment and plan of treatment:	follow up with PCP.  Secondary Diagnosis:	CKD stage 3 secondary to diabetes  Assessment and plan of treatment:	Monitor Renal function with PCP Principal Discharge DX:	Acute respiratory failure with hypoxia and hypercapnia  Goal:	s/p Intubation and successful extubation, Resolved  Assessment and plan of treatment:	Secondary to Acute CHF  Secondary Diagnosis:	CHF (congestive heart failure), NYHA class IV, acute on chronic, combined  Goal:	Systolic/diastolic  Assessment and plan of treatment:	Continue with Bumex   Follow up with Cardiology in one week. Dr. Orozco  Secondary Diagnosis:	Pneumonia of both lower lobes due to other aerobic gram-negative bacteria  Assessment and plan of treatment:	Continue with Augmentin for 6 days.  Follow up with PCP in 1-2 weeks.  Secondary Diagnosis:	Influenza A  Goal:	Course completed with Tamiflu X7 days.  Secondary Diagnosis:	Tobacco abuse  Goal:	Quit smoking  Assessment and plan of treatment:	Nicotine patch  [facility] Smoking cessation program [phone]  Secondary Diagnosis:	Type 2 diabetes mellitus without complication, without long-term current use of insulin  Assessment and plan of treatment:	follow up with PCP.  Secondary Diagnosis:	CKD stage 3 secondary to diabetes  Assessment and plan of treatment:	Monitor Renal function with PCP

## 2018-04-09 NOTE — PROGRESS NOTE ADULT - ASSESSMENT
67 year old man with PMH CAD s/p CABG 2014, had not been on meds for years until last month, DM2, HTN, HLD presents with complaint of fever, chills, cough, lethargy for the last few days.  He states that he thought he had a bad cold but could barely get out of bed today so he came here.  He denies chest pain, palpitations, lightheadedness, nausea, vomiting diarrhea.  In the ED, flu +, BNP 88864 (last month was ~9000).  Pt febrile to 102.8 F. CXR with pulmonary vascular congestion.  RRT called for hypercapnic RF and increased lethargy while on BiPAP, as well as elevated troponins of 5 with concern for NSTEMI.  Pt was assessed and examined at the bedside and was found to have increased WOB with lethargy.  Pt audibly sounds wet with SBP in 170's likely in acute pulmonary edema.  ABG on BiPAP read as 7.10/76/98/22, pt found to have respiratory acidosis with hypercapnic respiratory failure.  I spoke with wife at bedside, and wife was in agreement to have patient placed on mechanical ventilation.  Pt was stable in EDHO, and was transferred up to the ICU for intubation.  Tansferred to ICU with influenza infection c/w respiratory failure intubated, JAKOB,  NSTEMI and HF decompensation. In ICU placed on heparin gtt started on Plavix and aspirin.  Seen by cardiology Dr. Orozco, Patient with recent cath, EF 30%.  Continue diuresis. No further intervention at this time.  Patient extubated without difficulty 4/5/18. Cont abx for potential PNA  f/u sputum and blood cx for staph cont Tamiflu for Influenza.      From a medical standpoint is nearing discharge
67 M hx HF, CAD/CABG, DM admitted with influenza infection c/b  respiratory failure, JAKOB,  NSTEMI and HF decompensation.    Neuro  cont sedation with propofol  daily sedation vacation    Pulm  cont abx for potential PNA   f/u sputum and blood cx  cont tamiflu  cont vent support    Cardiac  cont diuresis for HF  cont hep gtt, asa, statin and plavix for nstemi  cards consult  monitor trop, ekg w/ t wave inversions     Critical Care Time 45min
67 M hx HF, CAD/CABG, DM admitted with influenza infection c/b  respiratory failure, JAKOB,  NSTEMI and HF decompensation. Resp status improved and plan for transfer    Neuro  at baseline  no delirium    Pulm  cont abx for potential PNA   f/u sputum and blood cx for staph, so far negative  cont tamiflu  bipap prn, comfortable on NC    Cardiac  cont diuresis for HF  cont asa, statin and plavix for nstemi  d/c hep gtt  add bb  cards consult appreciated, recent cath was negative for significant CAD and pt noted to be noncompliant with some therapy  monitor trop, ekg w/ t wave inversions
67 M hx HF, CAD/CABG, DM admitted with influenza infection c/b  respiratory failure, JAKOB,  NSTEMI and HF decompensation. Resps tatus improved and plan for extubation    Neuro  off sedation     Pulm  cont abx for potential PNA   f/u sputum and blood cx for staph  cont tamiflu  extubate to bipap    Cardiac  cont diuresis for HF  cont hep gtt, asa, statin and plavix for nstemi  add bb as BP improved  cards consult appreciated, recent cath was negative for significant CAD and pt noted to be noncompliant with some therapy  monitor trop, ekg w/ t wave inversions     Critical Care Time 45min
67 year old man with PMH CAD s/p CABG 2014, had not been on meds for years until last month, DM2, HTN, HLD presents with complaint of fever, chills, cough, lethargy for the last few days.  He states that he thought he had a bad cold but could barely get out of bed today so he came here.  He denies chest pain, palpitations, lightheadedness, nausea, vomiting diarrhea.  In the ED, flu +, BNP 61960 (last month was ~9000).  Pt febrile to 102.8 F. CXR with pulmonary vascular congestion.  RRT called for hypercapnic RF and increased lethargy while on BiPAP, as well as elevated troponins of 5 with concern for NSTEMI.  Pt was assessed and examined at the bedside and was found to have increased WOB with lethargy.  Pt audibly sounds wet with SBP in 170's likely in acute pulmonary edema.  ABG on BiPAP read as 7.10/76/98/22, pt found to have respiratory acidosis with hypercapnic respiratory failure.  I spoke with wife at bedside, and wife was in agreement to have patient placed on mechanical ventilation.  Pt was stable in EDHO, and was transferred up to the ICU for intubation.  Tansferred to ICU with influenza infection c/w respiratory failure intubated, JAKOB,  NSTEMI and HF decompensation. In ICU placed on heparin gtt started on Plavix and aspirin.  Seen by cardiology Dr. Orozco, Patient with recent cath, EF 30%.  Continue diuresis. No further intervention at this time.  Patient extubated without difficulty 4/5/18. Cont abx for potential PNA  f/u sputum and blood cx for staph cont Tamiflu for Influenza.      From a medical standpoint is nearing discharge
67 year old man with PMH CAD s/p CABG 2014, had not been on meds for years until last month, DM2, HTN, HLD presents with complaint of fever, chills, cough, lethargy for the last few days.  He states that he thought he had a bad cold but could barely get out of bed today so he came here.  He denies chest pain, palpitations, lightheadedness, nausea, vomiting diarrhea.  In the ED, flu +, BNP 28663 (last month was ~9000).  Pt febrile to 102.8 F. CXR with pulmonary vascular congestion.  RRT called for hypercapnic RF and increased lethargy while on BiPAP, as well as elevated troponins of 5 with concern for NSTEMI.  Pt was assessed and examined at the bedside and was found to have increased WOB with lethargy.  Pt audibly sounds wet with SBP in 170's likely in acute pulmonary edema.  ABG on BiPAP read as 7.10/76/98/22, pt found to have respiratory acidosis with hypercapnic respiratory failure.  I spoke with wife at bedside, and wife was in agreement to have patient placed on mechanical ventilation.  Pt was stable in EDHO, and was transferred up to the ICU for intubation.  Tansferred to ICU with influenza infection c/w respiratory failure intubated, JAKOB,  NSTEMI and HF decompensation. In ICU placed on heparin gtt started on Plavix and aspirin.  Seen by cardiology Dr. Orozco, Patient with recent cath, EF 30%.  Continue diuresis. No further intervention at this time.  Patient extubated without difficulty 4/5/18. Cont abx for potential PNA  f/u sputum and blood cx for staph cont Tamiflu for Influenza.

## 2018-04-09 NOTE — DISCHARGE NOTE ADULT - OTHER SIGNIFICANT FINDINGS
< from: Xray Chest 1 View- PORTABLE-Urgent (04.03.18 @ 18:35) >  INTERPRETATION:  AP semierect chest on April 3, 2018 at 6:35 PM. Patient   has cough, fever, and is short of breath.    Heart is magnified by technique. Sternotomy again noted.    Again noted is significant diffuse congestive picture. The chest film is   surprisingly similar to March 14 of this year.    IMPRESSION: Persistent congestive like findings as above.    < from: CT Abdomen and Pelvis w/ IV Cont (03.04.18 @ 02:39) >  FINDINGS:    LOWER CHEST: Small to moderate bilateral pleural effusionswith   associated compressive atelectasis. Additional bilateral primarily   central airspace opacities, nonspecific, but may be seen in the setting   of CHF. Cardiomegaly. Status post median sternotomy.    LIVER: Within normal limits.  BILE DUCTS: Normal caliber.  GALLBLADDER: Within normal limits.  SPLEEN: Within normal limits.  PANCREAS: Within normal limits.  ADRENALS: Within normal limits.  KIDNEYS/URETERS: Within normal limits.    BLADDER: Fluid distended, but otherwise unremarkable.  REPRODUCTIVE ORGANS: The prostate is within normal limits.    BOWEL: No bowel obstruction. Moderate to large colonic fecal load.   Appendix normal.  PERITONEUM: No ascites.  VESSELS:  Atherosclerosis of the abdominal aorta and its branch vessels.  RETROPERITONEUM: No lymphadenopathy.    ABDOMINAL WALL: Within normal limits.  BONES: No acute osseous abnormality.    IMPRESSION:    No bowel obstruction or evidence of acute inflammation. Moderate to large   colonic fecal load.    Small to moderate bilateralpleural effusions with bilateral airspace   opacities, which may be seen in the setting of CHF.    < from: NM Stress Test, Dual Isotope (03.05.18 @ 13:36) >  FINDINGS:  The technical quality of the resting and post stress perfusion images was   suboptimal due to motion artifact (mild) and diaphragmatic attenuation   (moderate)..  Review of resting and post stress myocardial scintigram revealed a small   fixed apical inferior wall perfusion defect and an additional defect in   the mid anterior wall..  Gated wall motion study revealed globally diminished normal left   ventricular contractility.  No significant wall motion abnormalities were noted.  No paradoxical septal motion was seen.  The right ventricle appeared unremarkable.    Calculated left ventricular ejection fraction post stress was 37% (based   on a left ventricular end-diastolic volume of 210 ml and an systolic   volume of 133 ml. Stroke volume was 77 ml.        IMPRESSION: Abnormal myocardial perfusion scan.    1. There is scintigraphic evidence for myocardial infarct in the apical   inferior (distal LAD territory) and mid anterior (proximal LAD), no   evidence of ischemia seen.    2. There is abnormal left ventricular contractility, diminished ejection   fraction and normal myocardial thickening at rest. Overall post stress   ejection fraction was 37%     3. Please see the cardiac test report for EKG findings and symptoms   during the procedure    < from: TTE Echo Doppler w/o Cont (03.05.18 @ 09:01) >  Summary:   1. Left ventricular ejection fraction, by visual estimation, is 40 to   45%.   2. Mildly increased LV wall thickness.   3. Spectral Doppler shows pseudonormal pattern of left ventricular   myocardial filling (Grade II diastolic dysfunction).   4. Normal right ventricular size and function.   5. The left atrium is normal in size.   6. The right atrium is normal in size.   7. Moderate pleural effusion in the left lateral region.   8. Trivial pericardial effusion.   9. Mild mitral valve regurgitation.  10. Thickening of the anterior mitral valve leaflet.  11. Structurally normal tricuspid valve, with normal leaflet excursion.  12. Normal trileaflet aortic valve with normal opening.  13. Structurally normal pulmonic valve, with normal leaflet excursion. < from: Xray Chest 1 View- PORTABLE-Urgent (04.03.18 @ 18:35) >  INTERPRETATION:  AP semierect chest on April 3, 2018 at 6:35 PM. Patient   has cough, fever, and is short of breath.    Heart is magnified by technique. Sternotomy again noted.    Again noted is significant diffuse congestive picture. The chest film is   surprisingly similar to March 14 of this year.    IMPRESSION: Persistent congestive like findings as above.

## 2018-04-10 VITALS
HEART RATE: 74 BPM | DIASTOLIC BLOOD PRESSURE: 76 MMHG | SYSTOLIC BLOOD PRESSURE: 134 MMHG | OXYGEN SATURATION: 94 % | TEMPERATURE: 97 F | RESPIRATION RATE: 18 BRPM

## 2018-04-10 LAB
GLUCOSE BLDC GLUCOMTR-MCNC: 153 MG/DL — HIGH (ref 70–99)
GLUCOSE BLDC GLUCOMTR-MCNC: 188 MG/DL — HIGH (ref 70–99)
GLUCOSE BLDC GLUCOMTR-MCNC: 256 MG/DL — HIGH (ref 70–99)

## 2018-04-10 RX ORDER — SPIRONOLACTONE 25 MG/1
0 TABLET, FILM COATED ORAL
Qty: 0 | Refills: 0 | COMMUNITY

## 2018-04-10 RX ORDER — METOPROLOL TARTRATE 50 MG
1 TABLET ORAL
Qty: 0 | Refills: 0 | DISCHARGE
Start: 2018-04-10

## 2018-04-10 RX ORDER — GLIMEPIRIDE 1 MG
1 TABLET ORAL
Qty: 0 | Refills: 0 | COMMUNITY

## 2018-04-10 RX ORDER — METOPROLOL TARTRATE 50 MG
0 TABLET ORAL
Qty: 0 | Refills: 0 | COMMUNITY

## 2018-04-10 RX ADMIN — PANTOPRAZOLE SODIUM 40 MILLIGRAM(S): 20 TABLET, DELAYED RELEASE ORAL at 12:05

## 2018-04-10 RX ADMIN — Medication 81 MILLIGRAM(S): at 12:05

## 2018-04-10 RX ADMIN — HEPARIN SODIUM 5000 UNIT(S): 5000 INJECTION INTRAVENOUS; SUBCUTANEOUS at 05:54

## 2018-04-10 RX ADMIN — Medication 25 MILLIGRAM(S): at 05:54

## 2018-04-10 RX ADMIN — Medication 1 PATCH: at 12:09

## 2018-04-10 RX ADMIN — Medication 2: at 17:15

## 2018-04-10 RX ADMIN — BUMETANIDE 1 MILLIGRAM(S): 0.25 INJECTION INTRAMUSCULAR; INTRAVENOUS at 17:15

## 2018-04-10 RX ADMIN — Medication 1 PATCH: at 12:08

## 2018-04-10 RX ADMIN — Medication 1 TABLET(S): at 05:54

## 2018-04-10 RX ADMIN — Medication 1 TABLET(S): at 17:15

## 2018-04-10 RX ADMIN — Medication 6: at 12:05

## 2018-04-10 RX ADMIN — Medication 2: at 07:47

## 2018-04-10 RX ADMIN — CLOPIDOGREL BISULFATE 75 MILLIGRAM(S): 75 TABLET, FILM COATED ORAL at 12:05

## 2018-04-10 RX ADMIN — BUMETANIDE 1 MILLIGRAM(S): 0.25 INJECTION INTRAMUSCULAR; INTRAVENOUS at 05:54

## 2018-04-10 NOTE — PROGRESS NOTE ADULT - PROVIDER SPECIALTY LIST ADULT
Cardiology
Critical Care
Hospitalist

## 2018-04-10 NOTE — PROGRESS NOTE ADULT - SUBJECTIVE AND OBJECTIVE BOX
Patient is a 67y old  Male who presents with a chief complaint of SOB, cough, fever, chills, found to be Flu + also CHF exacerbation (03 Apr 2018 20:51)      INTERVAL HPI/OVERNIGHT EVENTS: no acute events overnight comfortable     MEDICATIONS  (STANDING):  aspirin  chewable 81 milliGRAM(s) Oral daily  clopidogrel Tablet 75 milliGRAM(s) Oral daily  dextrose 5%. 1000 milliLiter(s) (50 mL/Hr) IV Continuous <Continuous>  dextrose 50% Injectable 12.5 Gram(s) IV Push once  dextrose 50% Injectable 25 Gram(s) IV Push once  dextrose 50% Injectable 25 Gram(s) IV Push once  furosemide   Injectable 40 milliGRAM(s) IV Push daily  heparin  Injectable 5000 Unit(s) SubCutaneous every 12 hours  insulin lispro (HumaLOG) corrective regimen sliding scale   SubCutaneous Before meals and at bedtime  metoprolol tartrate 25 milliGRAM(s) Oral two times a day  nicotine -  14 mG/24Hr(s) Patch 1 patch Transdermal daily  oseltamivir 30 milliGRAM(s) Oral every 24 hours  pantoprazole  Injectable 40 milliGRAM(s) IV Push daily  piperacillin/tazobactam IVPB. 3.375 Gram(s) IV Intermittent every 8 hours  simvastatin 40 milliGRAM(s) Oral at bedtime    MEDICATIONS  (PRN):  acetaminophen   Tablet 650 milliGRAM(s) Oral every 6 hours PRN Mild Pain (1 - 3)  acetaminophen   Tablet 650 milliGRAM(s) Oral every 6 hours PRN For Temp greater than 38 C (100.4 F)  ALBUTerol/ipratropium for Nebulization 3 milliLiter(s) Nebulizer every 6 hours PRN Shortness of Breath and/or Wheezing  dextrose Gel 1 Dose(s) Oral once PRN Blood Glucose LESS THAN 70 milliGRAM(s)/deciliter  glucagon  Injectable 1 milliGRAM(s) IntraMuscular once PRN Glucose LESS THAN 70 milligrams/deciliter      Allergies    No Known Allergies    Intolerances        REVIEW OF SYSTEMS:  CONSTITUTIONAL: No fever, weight loss, or fatigue  EYES: No eye pain, visual disturbances, or discharge  ENMT:  No difficulty hearing, tinnitus, vertigo; No sinus or throat pain  NECK: No pain or stiffness  RESPIRATORY: No cough, wheezing, chills or hemoptysis; No shortness of breath  CARDIOVASCULAR: No chest pain, palpitations, dizziness, or leg swelling  GASTROINTESTINAL: No abdominal or epigastric pain. No nausea, vomiting, or hematemesis; No diarrhea or constipation. No melena or hematochezia.  GENITOURINARY: No dysuria, frequency, hematuria, or incontinence  NEUROLOGICAL: No headaches, memory loss, loss of strength, numbness, or tremors  SKIN: No itching, burning, rashes, or lesions   LYMPH NODES: No enlarged glands  ENDOCRINE: No heat or cold intolerance; No hair loss  MUSCULOSKELETAL: No joint pain or swelling; No muscle, back, or extremity pain  PSYCHIATRIC: No depression, anxiety, mood swings, or difficulty sleeping  HEME/LYMPH: No easy bruising, or bleeding gums  ALLERGY AND IMMUNOLOGIC: No hives or eczema    Vital Signs Last 24 Hrs  T(C): 36.5 (08 Apr 2018 11:30), Max: 36.5 (07 Apr 2018 17:25)  T(F): 97.7 (08 Apr 2018 11:30), Max: 97.7 (07 Apr 2018 17:25)  HR: 89 (08 Apr 2018 11:30) (67 - 89)  BP: 151/87 (08 Apr 2018 11:30) (110/74 - 151/87)  BP(mean): --  RR: 18 (08 Apr 2018 11:30) (17 - 18)  SpO2: 95% (08 Apr 2018 11:30) (92% - 95%)    PHYSICAL EXAM:  GENERAL: NAD, well-groomed, well-developed  HEAD:  Atraumatic, Normocephalic  EYES: EOMI, PERRLA, conjunctiva and sclera clear  ENMT: No tonsillar erythema, exudates, or enlargement; Moist mucous membranes, Good dentition, No lesions  NECK: Supple, No JVD, Normal thyroid  NERVOUS SYSTEM:  Alert & Oriented X3, Good concentration; Motor Strength 5/5 B/L upper and lower extremities; DTRs 2+ intact and symmetric  CHEST/LUNG: crackles b/l HEART: Regular rate and rhythm; No murmurs, rubs, or gallops  ABDOMEN: Soft, Nontender, Nondistended; Bowel sounds present  EXTREMITIES:  2+ Peripheral Pulses, No clubbing, cyanosis, or edema  LYMPH: No lymphadenopathy noted  SKIN: No rashes or lesions    LABS:                        11.5   4.52  )-----------( 146      ( 08 Apr 2018 08:48 )             33.8     04-08    140  |  105  |  48<H>  ----------------------------<  109<H>  3.7   |  26  |  2.01<H>    Ca    8.5      08 Apr 2018 08:48  Phos  2.9     04-08  Mg     2.3     04-08    TPro  7.3  /  Alb  2.1<L>  /  TBili  0.9  /  DBili  x   /  AST  51<H>  /  ALT  16  /  AlkPhos  585<H>  04-08    PTT - ( 07 Apr 2018 09:34 )  PTT:54.2 sec    CAPILLARY BLOOD GLUCOSE      POCT Blood Glucose.: 200 mg/dL (08 Apr 2018 11:08)  POCT Blood Glucose.: 114 mg/dL (08 Apr 2018 08:01)  POCT Blood Glucose.: 208 mg/dL (07 Apr 2018 22:36)  POCT Blood Glucose.: 202 mg/dL (07 Apr 2018 16:08)      RADIOLOGY & ADDITIONAL TESTS:    Imaging Personally Reviewed:  [X ] YES  [ ] NO    Consultant(s) Notes Reviewed:  [X ] YES  [ ] NO    Care Discussed with Consultants/Other Providers [ X] YES  [ ] NO
67 M hx HF, CAD/CABG, DM admitted with influenza infection c/b  respiratory failure, JAKOB,  NSTEMI and HF decompensation.      Pt doing well on PS trial this AM and will extubate to bipap  received lasix this AM with 1 L output so far  Not on any sedation    ROS  Unable to answer due to intubation                          9.1    5.52  )-----------( 100      ( 05 Apr 2018 03:59 )             27.0   04-05    140  |  108  |  51<H>  ----------------------------<  84  4.4   |  25  |  1.92<H>    Ca    7.3<L>      05 Apr 2018 03:59  Phos  4.4     04-05  Mg     2.1     04-05    TPro  5.7<L>  /  Alb  1.8<L>  /  TBili  0.6  /  DBili  x   /  AST  54<H>  /  ALT  23  /  AlkPhos  315<H>  04-05    MEDICATIONS  (STANDING):  aspirin  chewable 81 milliGRAM(s) Oral daily  chlorhexidine 0.12% Liquid 15 milliLiter(s) Swish and Spit two times a day  chlorhexidine 4% Liquid 1 Application(s) Topical <User Schedule>  clopidogrel Tablet 75 milliGRAM(s) Oral daily  dextrose 5%. 1000 milliLiter(s) (50 mL/Hr) IV Continuous <Continuous>  dextrose 50% Injectable 12.5 Gram(s) IV Push once  dextrose 50% Injectable 25 Gram(s) IV Push once  dextrose 50% Injectable 25 Gram(s) IV Push once  fentaNYL   Infusion 1 MICROgram(s)/kG/Hr (6.87 mL/Hr) IV Continuous <Continuous>  furosemide   Injectable 40 milliGRAM(s) IV Push daily  heparin  Infusion. 600 Unit(s)/Hr (6 mL/Hr) IV Continuous <Continuous>  insulin lispro (HumaLOG) corrective regimen sliding scale   SubCutaneous every 6 hours  metoprolol tartrate 25 milliGRAM(s) Oral two times a day  nicotine -  14 mG/24Hr(s) Patch 1 patch Transdermal daily  oseltamivir 30 milliGRAM(s) Oral every 24 hours  pantoprazole  Injectable 40 milliGRAM(s) IV Push daily  piperacillin/tazobactam IVPB. 3.375 Gram(s) IV Intermittent every 8 hours  propofol Infusion 5 MICROgram(s)/kG/Min (2.082 mL/Hr) IV Continuous <Continuous>  simvastatin 40 milliGRAM(s) Oral at bedtime    ICU Vital Signs Last 24 Hrs  T(C): 38.4 (05 Apr 2018 11:50), Max: 38.4 (05 Apr 2018 11:50)  T(F): 101.2 (05 Apr 2018 11:50), Max: 101.2 (05 Apr 2018 11:50)  HR: 93 (05 Apr 2018 13:00) (62 - 114)  BP: 126/70 (05 Apr 2018 13:00) (73/52 - 169/95)  BP(mean): 87 (05 Apr 2018 13:00) (59 - 118)  ABP: --  ABP(mean): --  RR: 17 (05 Apr 2018 13:00) (13 - 28)  SpO2: 98% (05 Apr 2018 13:00) (89% - 100%)       intubated  neck supple, no LAD  s1s2 reg no murmur  b/l rhonchi  soft nontender, +BS  no edema
67 M hx HF, CAD/CABG, DM admitted with influenza infection c/b  respiratory failure, JAKOB,  NSTEMI and HF decompensation.      Pt doing well. intermittently hypoxic.    ROS  denies chest pain  denies SOB  denies abd pain  +cough  denies leg swelling  +fatugue                           10.2   5.48  )-----------( 119      ( 06 Apr 2018 04:18 )             30.2     04-06    136  |  101  |  59<H>  ----------------------------<  150<H>  3.9   |  27  |  2.15<H>    Ca    7.9<L>      06 Apr 2018 04:18  Phos  5.0     04-06  Mg     2.1     04-06    TPro  6.7  /  Alb  2.0<L>  /  TBili  0.8  /  DBili  x   /  AST  72<H>  /  ALT  23  /  AlkPhos  494<H>  04-06    MEDICATIONS  (STANDING):  aspirin  chewable 81 milliGRAM(s) Oral daily  clopidogrel Tablet 75 milliGRAM(s) Oral daily  dextrose 5%. 1000 milliLiter(s) (50 mL/Hr) IV Continuous <Continuous>  dextrose 50% Injectable 12.5 Gram(s) IV Push once  dextrose 50% Injectable 25 Gram(s) IV Push once  dextrose 50% Injectable 25 Gram(s) IV Push once  furosemide   Injectable 40 milliGRAM(s) IV Push daily  heparin  Injectable 5000 Unit(s) SubCutaneous every 12 hours  insulin lispro (HumaLOG) corrective regimen sliding scale   SubCutaneous Before meals and at bedtime  metoprolol tartrate 25 milliGRAM(s) Oral two times a day  nicotine -  14 mG/24Hr(s) Patch 1 patch Transdermal daily  oseltamivir 30 milliGRAM(s) Oral every 24 hours  pantoprazole  Injectable 40 milliGRAM(s) IV Push daily  piperacillin/tazobactam IVPB. 3.375 Gram(s) IV Intermittent every 8 hours  simvastatin 40 milliGRAM(s) Oral at bedtime    ICU Vital Signs Last 24 Hrs  T(C): 36.7 (06 Apr 2018 15:38), Max: 37.3 (05 Apr 2018 19:07)  T(F): 98.1 (06 Apr 2018 15:38), Max: 99.2 (05 Apr 2018 19:07)  HR: 77 (06 Apr 2018 18:00) (68 - 90)  BP: 121/74 (06 Apr 2018 18:00) (109/56 - 145/78)  BP(mean): 85 (06 Apr 2018 18:00) (72 - 108)  ABP: --  ABP(mean): --  RR: 16 (06 Apr 2018 18:00) (16 - 27)  SpO2: 98% (06 Apr 2018 18:00) (86% - 98%)      NAD, lying in bed  neck supple, no LAD  s1s2 reg no murmur  b/l rhonchi  soft nontender, +BS  no edema
67 M hx HF, CAD/CABG, DM admitted with influenza infection c/b  respiratory failure, JAKOB,  NSTEMI and HF decompensation.    Pt not requiring pressors.   with lasix administration    Remains on sedation with propofol     ROS  Unable due to sedation and intubation                          9.3    7.50  )-----------( 108      ( 04 Apr 2018 10:54 )             27.6   04-04    139  |  105  |  45<H>  ----------------------------<  140<H>  4.5   |  25  |  2.18<H>    Ca    8.2<L>      04 Apr 2018 10:54  Phos  6.7     04-04  Mg     2.0     04-04    TPro  7.3  /  Alb  2.4<L>  /  TBili  0.7  /  DBili  x   /  AST  68<H>  /  ALT  33  /  AlkPhos  409<H>  04-04    MEDICATIONS  (STANDING):  aspirin  chewable 81 milliGRAM(s) Oral daily  chlorhexidine 0.12% Liquid 15 milliLiter(s) Swish and Spit two times a day  chlorhexidine 4% Liquid 1 Application(s) Topical <User Schedule>  clopidogrel Tablet 75 milliGRAM(s) Oral daily  dextrose 5%. 1000 milliLiter(s) (50 mL/Hr) IV Continuous <Continuous>  dextrose 50% Injectable 12.5 Gram(s) IV Push once  dextrose 50% Injectable 25 Gram(s) IV Push once  dextrose 50% Injectable 25 Gram(s) IV Push once  fentaNYL   Infusion 1 MICROgram(s)/kG/Hr (6.87 mL/Hr) IV Continuous <Continuous>  furosemide   Injectable 40 milliGRAM(s) IV Push daily  heparin  Infusion.  Unit(s)/Hr (8 mL/Hr) IV Continuous <Continuous>  insulin lispro (HumaLOG) corrective regimen sliding scale   SubCutaneous every 6 hours  nicotine -  14 mG/24Hr(s) Patch 1 patch Transdermal daily  oseltamivir 30 milliGRAM(s) Oral every 24 hours  pantoprazole  Injectable 40 milliGRAM(s) IV Push daily  piperacillin/tazobactam IVPB. 3.375 Gram(s) IV Intermittent every 8 hours  propofol Infusion 5 MICROgram(s)/kG/Min (2.082 mL/Hr) IV Continuous <Continuous>  simvastatin 40 milliGRAM(s) Oral at bedtime    ICU Vital Signs Last 24 Hrs  T(C): 37.3 (04 Apr 2018 15:35), Max: 39.3 (03 Apr 2018 16:20)  T(F): 99.2 (04 Apr 2018 15:35), Max: 102.8 (03 Apr 2018 16:20)  HR: 71 (04 Apr 2018 15:00) (66 - 132)  BP: 115/68 (04 Apr 2018 15:00) (90/57 - 183/92)  BP(mean): 83 (04 Apr 2018 15:00) (68 - 98)  ABP: --  ABP(mean): --  RR: 18 (04 Apr 2018 15:00) (18 - 31)  SpO2: 98% (04 Apr 2018 15:00) (87% - 100%)    sedated and intubated  neck supple, no LAD  s1s2 reg no murmur  b/l rhonchi  soft nontender, +BS  no edema
Assessment:  Influenzae  Post Resp failure  CAD  Recent Cath at [facility] 3/19, no new CAD  Patient is noted to be non compliant  Does not take his meds sometimes  EF on Cath was 30%  Zosyn, Tamiflu  CXRY noted, Lasix to Bumex  DC
Assessment:  Influenzae  Post Resp failure  CAD  Recent Cath at [facility] 3/19, no new CAD  Patient is noted to be non compliant  Does not take his meds sometimes  EF on Cath was 30%  transfered to floor  Zosyn, Tamiflu
Assessment:  Influenzae  Post Resp failure  CAD  Recent Cath at [facility] 3/19, no new CAD  Patient is noted to be non compliant  Does not take his meds sometimes  EF on Cath was 30%  Zosyn, Tamiflu  CXRY noted, Lasix to Bumex  DC
Assessment:  Influenzae  Post Resp failure  CAD  Recent Cath at [facility] 3/19, no new CAD  Patient is noted to be non compliant  Does not take his meds sometimes  EF on Cath was 30%  Zosyn, Tamiflu  CXRY noted, Lasix to Bumex
Assessment:  Influenzae  Post Resp failure, extubated  CAD  Recent Cath at [facility] 3/19, no new CAD  Patient is noted to be non compliant  Does not take his meds sometimes  EF on Cath was 30%  Continue ICU support, may transfer to floor today
Assessment:  Influenzae  Resp failure, doing well after Lasix on PS trial  CAD  Recent Cath at [facility] 3/19, no new CAD  Patient is noted to be non compliant  Does not take his meds sometimes  EF on Cath was 30%  Continue ICU support,
Patient is a 67y old  Male who presents with a chief complaint of SOB, cough, fever, chills, found to be Flu + also CHF exacerbation (09 Apr 2018 17:18)      INTERVAL HPI/ OVERNIGHT EVENTS: Pt was seen and examined at bedside today, No significant overnight events, pt continues to feel better, no SOB, CP and fever    MEDICATIONS  (STANDING):  amoxicillin  875 milliGRAM(s)/clavulanate 1 Tablet(s) Oral two times a day  aspirin  chewable 81 milliGRAM(s) Oral daily  buMETAnide 1 milliGRAM(s) Oral two times a day  clopidogrel Tablet 75 milliGRAM(s) Oral daily  dextrose 5%. 1000 milliLiter(s) (50 mL/Hr) IV Continuous <Continuous>  dextrose 50% Injectable 12.5 Gram(s) IV Push once  dextrose 50% Injectable 25 Gram(s) IV Push once  dextrose 50% Injectable 25 Gram(s) IV Push once  heparin  Injectable 5000 Unit(s) SubCutaneous every 12 hours  insulin lispro (HumaLOG) corrective regimen sliding scale   SubCutaneous Before meals and at bedtime  metoprolol tartrate 25 milliGRAM(s) Oral two times a day  nicotine -  14 mG/24Hr(s) Patch 1 patch Transdermal daily  pantoprazole  Injectable 40 milliGRAM(s) IV Push daily  simvastatin 40 milliGRAM(s) Oral at bedtime    MEDICATIONS  (PRN):  acetaminophen   Tablet 650 milliGRAM(s) Oral every 6 hours PRN Mild Pain (1 - 3)  acetaminophen   Tablet 650 milliGRAM(s) Oral every 6 hours PRN For Temp greater than 38 C (100.4 F)  ALBUTerol/ipratropium for Nebulization 3 milliLiter(s) Nebulizer every 6 hours PRN Shortness of Breath and/or Wheezing  dextrose Gel 1 Dose(s) Oral once PRN Blood Glucose LESS THAN 70 milliGRAM(s)/deciliter  glucagon  Injectable 1 milliGRAM(s) IntraMuscular once PRN Glucose LESS THAN 70 milligrams/deciliter      Allergies    No Known Allergies    Intolerances        REVIEW OF SYSTEMS:    Unable to examine due to [ ] Altered Mental Status [ ] Advanced Dementia [ ] Expressive Aphasia [ ] Non-verbal patient    CONSTITUTIONAL: No fever, NO generalized weakness/Fatigue, No weight loss  EYES: No eye pain, visual disturbances, or discharge  ENMT:  No difficulty hearing, tinnitus, vertigo; No sinus or throat pain  NECK: No pain or stiffness  RESPIRATORY: No shortness of breath,  cough, wheezing, sputum or hemoptysis   CARDIOVASCULAR: No chest pain, palpitations, or leg swelling  GASTROINTESTINAL: No abdominal pain. No nausea, vomiting, diarrhea or constipation. No melena or hematochezia.  GENITOURINARY: No dysuria, frequency, hematuria, or incontinence  NEUROLOGICAL: No headaches, Dizziness, memory loss, loss of strength, numbness, or tremors  SKIN: No itching, burning, rashes, or lesions   MUSCULOSKELETAL: No joint pain or swelling; No muscle, back, or extremity pain  PSYCHIATRIC: No depression, anxiety, mood swings, or difficulty sleeping  HEME/LYMPH: No easy bruising, or bleeding gums  ALLERGY AND IMMUNOLOGIC: No hives or eczema    Vital Signs Last 24 Hrs  T(C): 36.1 (09 Apr 2018 17:25), Max: 36.4 (09 Apr 2018 00:00)  T(F): 97 (09 Apr 2018 17:25), Max: 97.5 (09 Apr 2018 00:00)  HR: 77 (09 Apr 2018 17:25) (66 - 87)  BP: 178/90 (09 Apr 2018 17:25) (116/69 - 178/90)  BP(mean): --  RR: 18 (09 Apr 2018 17:25) (18 - 18)  SpO2: 99% (09 Apr 2018 17:25) (95% - 99%)    PHYSICAL EXAM:  GENERAL: NAD, thin, well-groomed  HEAD:  Atraumatic, Normocephalic  EYES: conjunctiva and sclera clear  ENMT: Moist mucous membranes  NECK: Supple, No JVD, Normal thyroid  CHEST/LUNG: Clear to Auscultation bilaterally; No rales, rhonchi, wheezing, or rubs  HEART: Regular rate and rhythm; No murmurs, rubs, or gallops  ABDOMEN: Soft, Nontender, Nondistended; Bowel sounds present  EXTREMITIES:  2+ Peripheral Pulses, No clubbing, cyanosis, or edema  LYMPH: No lymphadenopathy noted  SKIN: No rashes or lesions  NERVOUS SYSTEM:  Alert & Oriented X3, Good concentration; Motor Strength 4/5 B/L upper and lower extremities    LABS:                        11.5   5.23  )-----------( 169      ( 09 Apr 2018 09:07 )             34.5     04-08    140  |  105  |  48<H>  ----------------------------<  109<H>  3.7   |  26  |  2.01<H>    Ca    8.5      08 Apr 2018 08:48  Phos  2.9     04-08  Mg     2.3     04-08    TPro  7.3  /  Alb  2.1<L>  /  TBili  0.9  /  DBili  x   /  AST  51<H>  /  ALT  16  /  AlkPhos  585<H>  04-08        CAPILLARY BLOOD GLUCOSE      POCT Blood Glucose.: 176 mg/dL (09 Apr 2018 15:52)  POCT Blood Glucose.: 272 mg/dL (09 Apr 2018 11:03)  POCT Blood Glucose.: 132 mg/dL (09 Apr 2018 08:18)  POCT Blood Glucose.: 195 mg/dL (08 Apr 2018 22:28)        Culture - Sputum (collected 04-04-18)  Source: .Sputum Sputumreceived in conical  Gram Stain (04-06-18):    Rare polymorphonuclear leukocytes per low power field    No Squamous epithelial cells per low power field    Rare Gram positive cocci in pairs per oil power field  Final Report (04-06-18):    Normal Respiratory Lynne present    Culture - Urine (collected 04-04-18)  Source: .Urine Clean Catch (Midstream)  Final Report (04-05-18):    No growth    Culture - Blood (collected 04-03-18)  Source: .Blood Blood-Peripheral  Final Report (04-09-18):    No growth at 5 days.    Culture - Blood (collected 04-03-18)  Source: .Blood Blood-Peripheral  Final Report (04-09-18):    No growth at 5 days.        RADIOLOGY & ADDITIONAL TESTS:          Imaging Personally Reviewed:  [ ] YES  [ ] NO    Consultant(s) Notes Reviewed:  [ ] YES  [ ] NO    Care Discussed with Consultants/Other Providers [x ] YES  [ ] NO
Patient is a 67y old  Male who presents with a chief complaint of SOB, cough, fever, chills, found to be Flu + also CHF exacerbation (03 Apr 2018 20:51)      INTERVAL HPI/OVERNIGHT EVENTS: transfer to the hospice service     MEDICATIONS  (STANDING):  aspirin  chewable 81 milliGRAM(s) Oral daily  clopidogrel Tablet 75 milliGRAM(s) Oral daily  dextrose 5%. 1000 milliLiter(s) (50 mL/Hr) IV Continuous <Continuous>  dextrose 50% Injectable 12.5 Gram(s) IV Push once  dextrose 50% Injectable 25 Gram(s) IV Push once  dextrose 50% Injectable 25 Gram(s) IV Push once  furosemide   Injectable 40 milliGRAM(s) IV Push daily  heparin  Injectable 5000 Unit(s) SubCutaneous every 12 hours  insulin lispro (HumaLOG) corrective regimen sliding scale   SubCutaneous Before meals and at bedtime  metoprolol tartrate 25 milliGRAM(s) Oral two times a day  nicotine -  14 mG/24Hr(s) Patch 1 patch Transdermal daily  oseltamivir 30 milliGRAM(s) Oral every 24 hours  pantoprazole  Injectable 40 milliGRAM(s) IV Push daily  piperacillin/tazobactam IVPB. 3.375 Gram(s) IV Intermittent every 8 hours  simvastatin 40 milliGRAM(s) Oral at bedtime    MEDICATIONS  (PRN):  acetaminophen   Tablet 650 milliGRAM(s) Oral every 6 hours PRN Mild Pain (1 - 3)  acetaminophen   Tablet 650 milliGRAM(s) Oral every 6 hours PRN For Temp greater than 38 C (100.4 F)  ALBUTerol/ipratropium for Nebulization 3 milliLiter(s) Nebulizer every 6 hours PRN Shortness of Breath and/or Wheezing  dextrose Gel 1 Dose(s) Oral once PRN Blood Glucose LESS THAN 70 milliGRAM(s)/deciliter  glucagon  Injectable 1 milliGRAM(s) IntraMuscular once PRN Glucose LESS THAN 70 milligrams/deciliter      Allergies    No Known Allergies    Intolerances        REVIEW OF SYSTEMS: no complaints   CONSTITUTIONAL: No fever, weight loss, or fatigue  EYES: No eye pain, visual disturbances, or discharge  ENMT:  No difficulty hearing, tinnitus, vertigo; No sinus or throat pain  NECK: No pain or stiffness  RESPIRATORY: No cough, wheezing, chills or hemoptysis; No shortness of breath  CARDIOVASCULAR: No chest pain, palpitations, dizziness, or leg swelling  GASTROINTESTINAL: No abdominal or epigastric pain. No nausea, vomiting, or hematemesis; No diarrhea or constipation. No melena or hematochezia.  GENITOURINARY: No dysuria, frequency, hematuria, or incontinence  NEUROLOGICAL: No headaches, memory loss, loss of strength, numbness, or tremors  SKIN: No itching, burning, rashes, or lesions   LYMPH NODES: No enlarged glands  ENDOCRINE: No heat or cold intolerance; No hair loss  MUSCULOSKELETAL: No joint pain or swelling; No muscle, back, or extremity pain  PSYCHIATRIC: No depression, anxiety, mood swings, or difficulty sleeping  HEME/LYMPH: No easy bruising, or bleeding gums  ALLERGY AND IMMUNOLOGIC: No hives or eczema    Vital Signs Last 24 Hrs  T(C): 36.4 (07 Apr 2018 11:12), Max: 37 (06 Apr 2018 19:49)  T(F): 97.5 (07 Apr 2018 11:12), Max: 98.6 (06 Apr 2018 19:49)  HR: 80 (07 Apr 2018 12:13) (68 - 80)  BP: 146/76 (07 Apr 2018 12:13) (117/64 - 153/70)  BP(mean): 90 (06 Apr 2018 19:49) (79 - 99)  RR: 20 (07 Apr 2018 12:13) (16 - 25)  SpO2: 92% (07 Apr 2018 12:13) (92% - 98%)    PHYSICAL EXAM:  GENERAL: NAD, well-groomed, well-developed  HEAD:  Atraumatic, Normocephalic  EYES: EOMI, PERRLA, conjunctiva and sclera clear  ENMT: No tonsillar erythema, exudates, or enlargement; Moist mucous membranes, Good dentition, No lesions  NECK: Supple, No JVD, Normal thyroid  NERVOUS SYSTEM:  Alert & Oriented X3, Good concentration; Motor Strength 5/5 B/L upper and lower extremities; DTRs 2+ intact and symmetric  CHEST/LUNG: b/l wheezes and congestion with rales HEART: Regular rate and rhythm; No murmurs, rubs, or gallops  ABDOMEN: Soft, Nontender, Nondistended; Bowel sounds present  EXTREMITIES:  2+ Peripheral Pulses, No clubbing, cyanosis, or edema  LYMPH: No lymphadenopathy noted  SKIN: No rashes or lesions    LABS:                        11.0   4.45  )-----------( 116      ( 07 Apr 2018 09:34 )             32.7     04-06    136  |  101  |  59<H>  ----------------------------<  150<H>  3.9   |  27  |  2.15<H>    Ca    7.9<L>      06 Apr 2018 04:18  Phos  5.0     04-06  Mg     2.1     04-06    TPro  6.7  /  Alb  2.0<L>  /  TBili  0.8  /  DBili  x   /  AST  72<H>  /  ALT  23  /  AlkPhos  494<H>  04-06    PTT - ( 07 Apr 2018 09:34 )  PTT:54.2 sec    CAPILLARY BLOOD GLUCOSE      POCT Blood Glucose.: 168 mg/dL (07 Apr 2018 11:00)  POCT Blood Glucose.: 109 mg/dL (07 Apr 2018 07:38)  POCT Blood Glucose.: 112 mg/dL (06 Apr 2018 21:56)  POCT Blood Glucose.: 159 mg/dL (06 Apr 2018 17:44)      RADIOLOGY & ADDITIONAL TESTS:  < from: Xray Chest 1 View- PORTABLE-Routine (04.07.18 @ 08:08) >  EXAM:  XR CHEST PORTABLE ROUTINE 1V                            PROCEDURE DATE:  04/07/2018          INTERPRETATION:  Clinical information: Status post CABG. Vented.    Technique: Frontal view of the chest.    Comparison: Prior chest x-ray examination from 4/5/2018.    Findings: There is been interval extubation and removal of the NG tube.    The patient is status post median sternotomy. Multiple mediastinal clips   are noted.    Moderate pulmonary edema and/or vascular congestion appears minimally   improved. The heart size is at the upper limits of normal. There are mild   multilevel degenerative changes of the thoracic spine.    IMPRESSION: Interval extubation and removal of the NG tube.    Minimal improvement of pulmonary edema and/or vascular congestion which   remains moderate in severity.    < end of copied text >    Imaging Personally Reviewed:  [X ] YES  [ ] NO    Consultant(s) Notes Reviewed:  [X ] YES  [ ] NO    Care Discussed with Consultants/Other Providers [ X] YES  [ ] NO

## 2018-04-10 NOTE — CHART NOTE - NSCHARTNOTEFT_GEN_A_CORE
The Patient is no longer on droplet precaution, the patient completed 7 days of Tamiflu, influenza is resolved.

## 2018-04-12 DIAGNOSIS — J96.02 ACUTE RESPIRATORY FAILURE WITH HYPERCAPNIA: ICD-10-CM

## 2018-04-12 DIAGNOSIS — J10.08 INFLUENZA DUE TO OTHER IDENTIFIED INFLUENZA VIRUS WITH OTHER SPECIFIED PNEUMONIA: ICD-10-CM

## 2018-04-12 DIAGNOSIS — E11.22 TYPE 2 DIABETES MELLITUS WITH DIABETIC CHRONIC KIDNEY DISEASE: ICD-10-CM

## 2018-04-12 DIAGNOSIS — I13.0 HYPERTENSIVE HEART AND CHRONIC KIDNEY DISEASE WITH HEART FAILURE AND STAGE 1 THROUGH STAGE 4 CHRONIC KIDNEY DISEASE, OR UNSPECIFIED CHRONIC KIDNEY DISEASE: ICD-10-CM

## 2018-04-12 DIAGNOSIS — J10.1 INFLUENZA DUE TO OTHER IDENTIFIED INFLUENZA VIRUS WITH OTHER RESPIRATORY MANIFESTATIONS: ICD-10-CM

## 2018-04-12 DIAGNOSIS — N18.3 CHRONIC KIDNEY DISEASE, STAGE 3 (MODERATE): ICD-10-CM

## 2018-04-12 DIAGNOSIS — Z79.82 LONG TERM (CURRENT) USE OF ASPIRIN: ICD-10-CM

## 2018-04-12 DIAGNOSIS — Z95.1 PRESENCE OF AORTOCORONARY BYPASS GRAFT: ICD-10-CM

## 2018-04-12 DIAGNOSIS — I50.43 ACUTE ON CHRONIC COMBINED SYSTOLIC (CONGESTIVE) AND DIASTOLIC (CONGESTIVE) HEART FAILURE: ICD-10-CM

## 2018-04-12 DIAGNOSIS — J15.6 PNEUMONIA DUE TO OTHER GRAM-NEGATIVE BACTERIA: ICD-10-CM

## 2018-04-12 DIAGNOSIS — Z79.4 LONG TERM (CURRENT) USE OF INSULIN: ICD-10-CM

## 2018-04-12 DIAGNOSIS — F17.200 NICOTINE DEPENDENCE, UNSPECIFIED, UNCOMPLICATED: ICD-10-CM

## 2018-04-12 DIAGNOSIS — I21.4 NON-ST ELEVATION (NSTEMI) MYOCARDIAL INFARCTION: ICD-10-CM

## 2018-04-12 DIAGNOSIS — E78.2 MIXED HYPERLIPIDEMIA: ICD-10-CM

## 2018-04-12 DIAGNOSIS — Z91.19 PATIENT'S NONCOMPLIANCE WITH OTHER MEDICAL TREATMENT AND REGIMEN: ICD-10-CM

## 2018-04-12 DIAGNOSIS — E87.2 ACIDOSIS: ICD-10-CM

## 2018-04-12 DIAGNOSIS — N17.0 ACUTE KIDNEY FAILURE WITH TUBULAR NECROSIS: ICD-10-CM

## 2018-04-12 DIAGNOSIS — A41.9 SEPSIS, UNSPECIFIED ORGANISM: ICD-10-CM

## 2019-02-25 NOTE — DISCHARGE NOTE ADULT - NS DC INTERPRETER YES NO
[Well Groomed] : well groomed [General Appearance - In No Acute Distress] : no acute distress [FreeTextEntry1] : appears pale [Normal Conjunctiva] : the conjunctiva exhibited no abnormalities [Eyelids - No Xanthelasma] : the eyelids demonstrated no xanthelasmas [Normal Oral Mucosa] : normal oral mucosa [No Oral Pallor] : no oral pallor [No Oral Cyanosis] : no oral cyanosis [Normal Jugular Venous A Waves Present] : normal jugular venous A waves present [Normal Jugular Venous V Waves Present] : normal jugular venous V waves present [No Jugular Venous Garcia A Waves] : no jugular venous garcia A waves [Abdomen Soft] : soft [Abdomen Tenderness] : non-tender [Abdomen Mass (___ Cm)] : no abdominal mass palpated [Abnormal Walk] : normal gait [Gait - Sufficient For Exercise Testing] : the gait was sufficient for exercise testing [Nail Clubbing] : no clubbing of the fingernails [Cyanosis, Localized] : no localized cyanosis [Petechial Hemorrhages (___cm)] : no petechial hemorrhages [Skin Color & Pigmentation] : normal skin color and pigmentation [] : no rash [No Venous Stasis] : no venous stasis [Skin Lesions] : no skin lesions [No Skin Ulcers] : no skin ulcer [No Xanthoma] : no  xanthoma was observed [Oriented To Time, Place, And Person] : oriented to person, place, and time [Affect] : the affect was normal [Mood] : the mood was normal [No Anxiety] : not feeling anxious [Normal Rhythm/Effort] : normal respiratory rhythm and effort [Clear Bilaterally] : the lungs were clear to auscultation bilaterally [Normal Rate] : normal [Rhythm Regular] : regular [Normal S1] : normal S1 [Normal S2] : normal S2 [No Gallop] : no gallop heard [No Murmur] : no murmurs heard [I] : a grade 1 [Right Carotid Bruit] : no bruit heard over the right carotid [Left Carotid Bruit] : left carotid bruit heard [2+] : left 2+ [Bruit] : no bruit heard [___ +] : bilateral [unfilled]U+ pretibial pitting edema Yes

## 2020-06-08 ENCOUNTER — EMERGENCY (EMERGENCY)
Facility: HOSPITAL | Age: 70
LOS: 0 days | Discharge: HOME | End: 2020-06-08
Attending: EMERGENCY MEDICINE
Payer: MEDICARE

## 2020-06-08 VITALS
HEART RATE: 100 BPM | HEIGHT: 66 IN | WEIGHT: 147.93 LBS | SYSTOLIC BLOOD PRESSURE: 150 MMHG | DIASTOLIC BLOOD PRESSURE: 80 MMHG | OXYGEN SATURATION: 100 % | RESPIRATION RATE: 16 BRPM | TEMPERATURE: 98 F

## 2020-06-08 DIAGNOSIS — Z87.891 PERSONAL HISTORY OF NICOTINE DEPENDENCE: ICD-10-CM

## 2020-06-08 DIAGNOSIS — Z79.899 OTHER LONG TERM (CURRENT) DRUG THERAPY: ICD-10-CM

## 2020-06-08 DIAGNOSIS — E78.5 HYPERLIPIDEMIA, UNSPECIFIED: ICD-10-CM

## 2020-06-08 DIAGNOSIS — T83.091A OTHER MECHANICAL COMPLICATION OF INDWELLING URETHRAL CATHETER, INITIAL ENCOUNTER: ICD-10-CM

## 2020-06-08 DIAGNOSIS — R60.9 EDEMA, UNSPECIFIED: ICD-10-CM

## 2020-06-08 DIAGNOSIS — Z98.890 OTHER SPECIFIED POSTPROCEDURAL STATES: Chronic | ICD-10-CM

## 2020-06-08 DIAGNOSIS — E11.9 TYPE 2 DIABETES MELLITUS WITHOUT COMPLICATIONS: ICD-10-CM

## 2020-06-08 DIAGNOSIS — Z79.4 LONG TERM (CURRENT) USE OF INSULIN: ICD-10-CM
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
## 2020-06-08 DIAGNOSIS — I11.0 HYPERTENSIVE HEART DISEASE WITH HEART FAILURE: ICD-10-CM

## 2020-06-08 DIAGNOSIS — Z79.82 LONG TERM (CURRENT) USE OF ASPIRIN: ICD-10-CM
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
## 2020-06-08 DIAGNOSIS — I50.9 HEART FAILURE, UNSPECIFIED: ICD-10-CM

## 2020-06-08 PROCEDURE — 99284 EMERGENCY DEPT VISIT MOD MDM: CPT

## 2020-06-08 RX ORDER — FUROSEMIDE 40 MG
20 TABLET ORAL ONCE
Refills: 0 | Status: COMPLETED | OUTPATIENT
Start: 2020-06-08 | End: 2020-06-08

## 2020-06-08 RX ADMIN — Medication 20 MILLIGRAM(S): at 15:03

## 2020-06-08 NOTE — ED PROVIDER NOTE - CLINICAL SUMMARY MEDICAL DECISION MAKING FREE TEXT BOX
Patient with nava in place; placed at another facility; nava found to discocnnected; will d/c home for urology f/u Patient with nava in place; placed at another facility; nava found to discocnnected; will d/c home for urology f/u    Nava with urine in bag, will discharge

## 2020-06-08 NOTE — ED ADULT NURSE NOTE - OBJECTIVE STATEMENT
Patient is alert and oriented, no distress noted. Came in with leaking Marks catheter which was not securely tightening

## 2020-06-08 NOTE — ED PROVIDER NOTE - PATIENT PORTAL LINK FT
You can access the FollowMyHealth Patient Portal offered by [facility] by registering at the following website: [email or web address]. By joining [facility]’s FollowMyHealth portal, you will also be able to view your health information using other applications (apps) compatible with our system.

## 2020-06-08 NOTE — ED PROVIDER NOTE - PMH
DM (diabetes mellitus)    HTN (hypertension)    Hyperlipemia CHF (congestive heart failure)    DM (diabetes mellitus)    HTN (hypertension)    Hyperlipemia

## 2020-06-08 NOTE — ED PROVIDER NOTE - OBJECTIVE STATEMENT
68 yo male with history of chf, d/c'ed from [facility] yesterday with nava presents with decreased output into nava. Patient denies fever,chills, cough, abdominal pain.

## 2020-06-08 NOTE — ED ADULT NURSE NOTE - NSIMPLEMENTINTERV_GEN_ALL_ED
Implemented All Fall Risk Interventions:  [place] to call system. Call bell, personal items and telephone within reach. Instruct patient to call for assistance. Room bathroom lighting operational. Non-slip footwear when patient is off stretcher. Physically safe environment: no spills, clutter or unnecessary equipment. Stretcher in lowest position, wheels locked, appropriate side rails in place. Provide visual cue, wrist band, yellow gown, etc. Monitor gait and stability. Monitor for mental status changes and reorient to person, place, and time. Review medications for side effects contributing to fall risk. Reinforce activity limits and safety measures with patient and family.

## 2020-07-15 ENCOUNTER — INPATIENT (INPATIENT)
Facility: HOSPITAL | Age: 70
LOS: 18 days | Discharge: NURSING, REHAB OR LONG-TERM CARE | DRG: 682 | End: 2020-08-03
Attending: INTERNAL MEDICINE | Admitting: INTERNAL MEDICINE
Payer: MEDICARE

## 2020-07-15 VITALS
RESPIRATION RATE: 18 BRPM | OXYGEN SATURATION: 97 % | HEART RATE: 66 BPM | SYSTOLIC BLOOD PRESSURE: 162 MMHG | DIASTOLIC BLOOD PRESSURE: 79 MMHG | TEMPERATURE: 98 F

## 2020-07-15 DIAGNOSIS — S32.599A OTHER SPECIFIED FRACTURE OF UNSPECIFIED PUBIS, INITIAL ENCOUNTER FOR CLOSED FRACTURE: ICD-10-CM

## 2020-07-15 DIAGNOSIS — Z29.9 ENCOUNTER FOR PROPHYLACTIC MEASURES, UNSPECIFIED: ICD-10-CM

## 2020-07-15 DIAGNOSIS — F03.90 UNSPECIFIED DEMENTIA, UNSPECIFIED SEVERITY, WITHOUT BEHAVIORAL DISTURBANCE, PSYCHOTIC DISTURBANCE, MOOD DISTURBANCE, AND ANXIETY: ICD-10-CM

## 2020-07-15 DIAGNOSIS — R79.89 OTHER SPECIFIED ABNORMAL FINDINGS OF BLOOD CHEMISTRY: ICD-10-CM

## 2020-07-15 DIAGNOSIS — I50.21 ACUTE SYSTOLIC (CONGESTIVE) HEART FAILURE: ICD-10-CM

## 2020-07-15 DIAGNOSIS — N17.9 ACUTE KIDNEY FAILURE, UNSPECIFIED: ICD-10-CM

## 2020-07-15 DIAGNOSIS — Z98.890 OTHER SPECIFIED POSTPROCEDURAL STATES: Chronic | ICD-10-CM

## 2020-07-15 DIAGNOSIS — D64.9 ANEMIA, UNSPECIFIED: ICD-10-CM

## 2020-07-15 DIAGNOSIS — E11.9 TYPE 2 DIABETES MELLITUS WITHOUT COMPLICATIONS: ICD-10-CM

## 2020-07-15 LAB
ALBUMIN SERPL ELPH-MCNC: 3.1 G/DL — LOW (ref 3.3–5)
ALP SERPL-CCNC: 295 U/L — HIGH (ref 40–120)
ALT FLD-CCNC: 13 U/L — SIGNIFICANT CHANGE UP (ref 10–45)
ANION GAP SERPL CALC-SCNC: 12 MMOL/L — SIGNIFICANT CHANGE UP (ref 5–17)
ANION GAP SERPL CALC-SCNC: 13 MMOL/L — SIGNIFICANT CHANGE UP (ref 5–17)
APTT BLD: 39.7 SEC — HIGH (ref 27.5–35.5)
AST SERPL-CCNC: 21 U/L — SIGNIFICANT CHANGE UP (ref 10–40)
BASOPHILS # BLD AUTO: 0.04 K/UL — SIGNIFICANT CHANGE UP (ref 0–0.2)
BASOPHILS NFR BLD AUTO: 0.8 % — SIGNIFICANT CHANGE UP (ref 0–2)
BILIRUB SERPL-MCNC: 0.3 MG/DL — SIGNIFICANT CHANGE UP (ref 0.2–1.2)
BUN SERPL-MCNC: 75 MG/DL — HIGH (ref 7–23)
BUN SERPL-MCNC: 77 MG/DL — HIGH (ref 7–23)
CALCIUM SERPL-MCNC: 9.3 MG/DL — SIGNIFICANT CHANGE UP (ref 8.4–10.5)
CALCIUM SERPL-MCNC: 9.4 MG/DL — SIGNIFICANT CHANGE UP (ref 8.4–10.5)
CHLORIDE SERPL-SCNC: 103 MMOL/L — SIGNIFICANT CHANGE UP (ref 96–108)
CHLORIDE SERPL-SCNC: 104 MMOL/L — SIGNIFICANT CHANGE UP (ref 96–108)
CK MB BLD-MCNC: 6.5 % — HIGH (ref 0–3.5)
CK MB CFR SERPL CALC: 6.8 NG/ML — HIGH (ref 0–6.7)
CK SERPL-CCNC: 105 U/L — SIGNIFICANT CHANGE UP (ref 30–200)
CO2 SERPL-SCNC: 21 MMOL/L — LOW (ref 22–31)
CO2 SERPL-SCNC: 21 MMOL/L — LOW (ref 22–31)
CREAT SERPL-MCNC: 3.4 MG/DL — HIGH (ref 0.5–1.3)
CREAT SERPL-MCNC: 3.55 MG/DL — HIGH (ref 0.5–1.3)
EOSINOPHIL # BLD AUTO: 0.21 K/UL — SIGNIFICANT CHANGE UP (ref 0–0.5)
EOSINOPHIL NFR BLD AUTO: 4.2 % — SIGNIFICANT CHANGE UP (ref 0–6)
GAS PNL BLDV: SIGNIFICANT CHANGE UP
GLUCOSE SERPL-MCNC: 114 MG/DL — HIGH (ref 70–99)
GLUCOSE SERPL-MCNC: 174 MG/DL — HIGH (ref 70–99)
HCT VFR BLD CALC: 28.5 % — LOW (ref 39–50)
HGB BLD-MCNC: 8.8 G/DL — LOW (ref 13–17)
IMM GRANULOCYTES NFR BLD AUTO: 0.4 % — SIGNIFICANT CHANGE UP (ref 0–1.5)
INR BLD: 1.12 RATIO — SIGNIFICANT CHANGE UP (ref 0.88–1.16)
LYMPHOCYTES # BLD AUTO: 0.83 K/UL — LOW (ref 1–3.3)
LYMPHOCYTES # BLD AUTO: 16.7 % — SIGNIFICANT CHANGE UP (ref 13–44)
MCHC RBC-ENTMCNC: 30.2 PG — SIGNIFICANT CHANGE UP (ref 27–34)
MCHC RBC-ENTMCNC: 30.9 GM/DL — LOW (ref 32–36)
MCV RBC AUTO: 97.9 FL — SIGNIFICANT CHANGE UP (ref 80–100)
MONOCYTES # BLD AUTO: 0.5 K/UL — SIGNIFICANT CHANGE UP (ref 0–0.9)
MONOCYTES NFR BLD AUTO: 10.1 % — SIGNIFICANT CHANGE UP (ref 2–14)
NEUTROPHILS # BLD AUTO: 3.37 K/UL — SIGNIFICANT CHANGE UP (ref 1.8–7.4)
NEUTROPHILS NFR BLD AUTO: 67.8 % — SIGNIFICANT CHANGE UP (ref 43–77)
NRBC # BLD: 0 /100 WBCS — SIGNIFICANT CHANGE UP (ref 0–0)
NT-PROBNP SERPL-SCNC: HIGH PG/ML (ref 0–300)
PLATELET # BLD AUTO: 153 K/UL — SIGNIFICANT CHANGE UP (ref 150–400)
POTASSIUM SERPL-MCNC: 5.4 MMOL/L — HIGH (ref 3.5–5.3)
POTASSIUM SERPL-MCNC: 5.6 MMOL/L — HIGH (ref 3.5–5.3)
POTASSIUM SERPL-SCNC: 5.4 MMOL/L — HIGH (ref 3.5–5.3)
POTASSIUM SERPL-SCNC: 5.6 MMOL/L — HIGH (ref 3.5–5.3)
PROT SERPL-MCNC: 7.2 G/DL — SIGNIFICANT CHANGE UP (ref 6–8.3)
PROTHROM AB SERPL-ACNC: 13.2 SEC — SIGNIFICANT CHANGE UP (ref 10.6–13.6)
RBC # BLD: 2.91 M/UL — LOW (ref 4.2–5.8)
RBC # FLD: 17.3 % — HIGH (ref 10.3–14.5)
SARS-COV-2 RNA SPEC QL NAA+PROBE: SIGNIFICANT CHANGE UP
SODIUM SERPL-SCNC: 137 MMOL/L — SIGNIFICANT CHANGE UP (ref 135–145)
SODIUM SERPL-SCNC: 137 MMOL/L — SIGNIFICANT CHANGE UP (ref 135–145)
TROPONIN T, HIGH SENSITIVITY RESULT: 214 NG/L — HIGH (ref 0–51)
TROPONIN T, HIGH SENSITIVITY RESULT: 214 NG/L — HIGH (ref 0–51)
WBC # BLD: 4.97 K/UL — SIGNIFICANT CHANGE UP (ref 3.8–10.5)
WBC # FLD AUTO: 4.97 K/UL — SIGNIFICANT CHANGE UP (ref 3.8–10.5)

## 2020-07-15 PROCEDURE — 70450 CT HEAD/BRAIN W/O DYE: CPT | Mod: 26

## 2020-07-15 PROCEDURE — 71045 X-RAY EXAM CHEST 1 VIEW: CPT | Mod: 26

## 2020-07-15 PROCEDURE — 73502 X-RAY EXAM HIP UNI 2-3 VIEWS: CPT | Mod: 26,LT

## 2020-07-15 PROCEDURE — 72125 CT NECK SPINE W/O DYE: CPT | Mod: 26

## 2020-07-15 PROCEDURE — 93010 ELECTROCARDIOGRAM REPORT: CPT

## 2020-07-15 PROCEDURE — 99285 EMERGENCY DEPT VISIT HI MDM: CPT

## 2020-07-15 RX ORDER — DEXTROSE 50 % IN WATER 50 %
25 SYRINGE (ML) INTRAVENOUS ONCE
Refills: 0 | Status: DISCONTINUED | OUTPATIENT
Start: 2020-07-15 | End: 2020-08-03

## 2020-07-15 RX ORDER — INSULIN LISPRO 100/ML
VIAL (ML) SUBCUTANEOUS AT BEDTIME
Refills: 0 | Status: DISCONTINUED | OUTPATIENT
Start: 2020-07-15 | End: 2020-08-03

## 2020-07-15 RX ORDER — DEXTROSE 50 % IN WATER 50 %
15 SYRINGE (ML) INTRAVENOUS ONCE
Refills: 0 | Status: DISCONTINUED | OUTPATIENT
Start: 2020-07-15 | End: 2020-08-03

## 2020-07-15 RX ORDER — DEXTROSE 50 % IN WATER 50 %
12.5 SYRINGE (ML) INTRAVENOUS ONCE
Refills: 0 | Status: DISCONTINUED | OUTPATIENT
Start: 2020-07-15 | End: 2020-08-03

## 2020-07-15 RX ORDER — FUROSEMIDE 40 MG
40 TABLET ORAL ONCE
Refills: 0 | Status: COMPLETED | OUTPATIENT
Start: 2020-07-15 | End: 2020-07-15

## 2020-07-15 RX ORDER — FUROSEMIDE 40 MG
40 TABLET ORAL DAILY
Refills: 0 | Status: DISCONTINUED | OUTPATIENT
Start: 2020-07-16 | End: 2020-07-17

## 2020-07-15 RX ORDER — SODIUM ZIRCONIUM CYCLOSILICATE 10 G/10G
10 POWDER, FOR SUSPENSION ORAL THREE TIMES A DAY
Refills: 0 | Status: COMPLETED | OUTPATIENT
Start: 2020-07-15 | End: 2020-07-16

## 2020-07-15 RX ORDER — INSULIN LISPRO 100/ML
VIAL (ML) SUBCUTANEOUS
Refills: 0 | Status: DISCONTINUED | OUTPATIENT
Start: 2020-07-15 | End: 2020-08-03

## 2020-07-15 RX ORDER — SODIUM CHLORIDE 9 MG/ML
1000 INJECTION, SOLUTION INTRAVENOUS
Refills: 0 | Status: DISCONTINUED | OUTPATIENT
Start: 2020-07-15 | End: 2020-08-03

## 2020-07-15 RX ORDER — GLUCAGON INJECTION, SOLUTION 0.5 MG/.1ML
1 INJECTION, SOLUTION SUBCUTANEOUS ONCE
Refills: 0 | Status: DISCONTINUED | OUTPATIENT
Start: 2020-07-15 | End: 2020-08-03

## 2020-07-15 RX ORDER — INSULIN GLARGINE 100 [IU]/ML
25 INJECTION, SOLUTION SUBCUTANEOUS AT BEDTIME
Refills: 0 | Status: DISCONTINUED | OUTPATIENT
Start: 2020-07-15 | End: 2020-07-16

## 2020-07-15 RX ADMIN — Medication 40 MILLIGRAM(S): at 14:31

## 2020-07-15 RX ADMIN — SODIUM ZIRCONIUM CYCLOSILICATE 10 GRAM(S): 10 POWDER, FOR SUSPENSION ORAL at 20:27

## 2020-07-15 NOTE — H&P ADULT - NSHPPHYSICALEXAM_GEN_ALL_CORE
Vital Signs Last 24 Hrs  T(C): 36.5 (15 Jul 2020 14:28), Max: 36.9 (15 Jul 2020 11:34)  T(F): 97.7 (15 Jul 2020 14:28), Max: 98.4 (15 Jul 2020 11:34)  HR: 66 (15 Jul 2020 14:28) (66 - 74)  BP: 156/82 (15 Jul 2020 14:28) (156/82 - 164/77)  BP(mean): --  RR: 18 (15 Jul 2020 14:28) (16 - 20)  SpO2: 100% (15 Jul 2020 14:28) (97% - 100%) Vital Signs Last 24 Hrs  T(C): 36.5 (15 Jul 2020 14:28), Max: 36.9 (15 Jul 2020 11:34)  T(F): 97.7 (15 Jul 2020 14:28), Max: 98.4 (15 Jul 2020 11:34)  HR: 66 (15 Jul 2020 14:28) (66 - 74)  BP: 156/82 (15 Jul 2020 14:28) (156/82 - 164/77)  BP(mean): --  RR: 18 (15 Jul 2020 14:28) (16 - 20)  SpO2: 100% (15 Jul 2020 14:28) (97% - 100%)    Appearance: Normal	  HEENT:   Normal oral mucosa, PERRL, EOMI	  Lymphatic: No lymphadenopathy , no edema  Cardiovascular: Normal S1 S2, No JVD  Respiratory: Lungs clear to auscultation, normal effort 	  Gastrointestinal:  Soft, Non-tender, + BS	  Skin: No rashes, No ecchymoses, No cyanosis, warm to touch  Musculoskeletal: Normal range of motion, normal strength  Psychiatry:  Mood & affect appropriate  Ext: No edema

## 2020-07-15 NOTE — ED ADULT NURSE REASSESSMENT NOTE - NS ED NURSE REASSESS COMMENT FT1
Pt back from CT. No changes observed, Pt AAOx1-2, NAD, resp nonlabored, resting comfortably in bed with call bell at bedside. Pt c/o same fatigue. Pt denies headache, dizziness, chest pain, palpitations, SOB, abd pain, n/v/d, fevers, chills at this time. Pt awaiting results and dispo. Safety maintained.

## 2020-07-15 NOTE — CONSULT NOTE ADULT - ATTENDING COMMENTS
Differential diagnosis and plan of care discussed with patient after the evaluation  65 Minutes spent on total encounter of which more than fifty percent of the encounter was spent counseling and/or coordinating care by the attending physician.  Advanced care planning was discussed with the patient and/or surrogate decision makers. Advanced care planning forms were discussed. The risks benefits and alternatives to pertinent gastrointestinal procedures and interventions were discussed in detail and all questions were answered. Duration: 30 Minutes.      Moiz Meek M.D.   Gastroenterology and Hepatology  Cell: [phone]

## 2020-07-15 NOTE — ED PROVIDER NOTE - ATTENDING CONTRIBUTION TO CARE
Attending MD Stock:   I personally have seen and examined this patient.  Physician assistant note reviewed and agree on plan of care and except where noted.  See below for details.     Seen in [facility] (Purple)  DIFFERENT MRN [id number]    69M with (from EMR review) DM, HTN, HLD, CAD s/p CABG, cath ([facility], 3/19/2018, EF on cath 30%, from cardiology note 4/9/18), (MICU admission for influenza complicated by respiratory failure, JAKOB, NSTEMI, HF decomp April 2018) presents to the ED s/p fall on 7/1/20.  Patient reports that he feels dizzy and weak.  Patient AAOx1-2 (oriented to self, initially said home, then hospital, not oriented to time, or reason for visit to ED). Denies chest pains, shortness of breath, abdominal pain, vomiting, diarrhea, pain on urination.  Unclear reliability of ROS.  Reports L hip pain.  Spoke with wife, Jo Ann [phone] (spoke to wife in person outside of ED), reports that patient had a fall on 7/1/20 and was taken to a hospital in [place], unclear name, reports there patient reported to her that he had bilateral knee injections.  Reports that since then patient has had difficulty walking/moving and is not at his baseline.  Reports since fall he has been "crawling".  Reports that she also notes that the patient is "yellow".  Denies vomiting, complaints of chest pain, shortness of breath.  Reports that patient "lives at [facility]".      TO BE COMPLETED     bruise to L hip, pitting edema Attending MD Stock:   I personally have seen and examined this patient.  Physician assistant note reviewed and agree on plan of care and except where noted.  See below for details.     Seen in [facility] 15 (Purple)  DIFFERENT MRN [id number]    69M with (from EMR review) DM, HTN, HLD, CAD s/p CABG, cath ([facility], 3/19/2018, EF on cath 30%, from cardiology note 4/9/18), (MICU admission for influenza complicated by respiratory failure, JAKOB, NSTEMI, HF decomp April 2018) presents to the ED s/p fall on 7/1/20.  Patient reports that he feels dizzy and weak.  Patient AAOx1-2 (oriented to self, initially said home, then hospital, not oriented to time, or reason for visit to ED). Denies chest pains, shortness of breath, abdominal pain, vomiting, diarrhea, pain on urination.  Unclear reliability of ROS.  Reports L hip pain.  Spoke with wife, Jo Ann [phone] (spoke to wife in person outside of ED), reports that patient had a fall on 7/1/20 and was taken to a hospital in [place], unclear name, reports there patient reported to her that he had bilateral knee injections.  Reports that since then patient has had difficulty walking/moving and is not at his baseline.  Reports since fall he has been "crawling".  Reports that she also notes that the patient is "yellow".  Denies vomiting, complaints of chest pain, shortness of breath.  Reports that patient "lives at [facility]". A ten (10) point review of systems was negative other than as stated in the HPI or elsewhere in the chart.     Exam:   General: NAD, AAOx1-2 (name, first said he he was home then hospital, not oriented to time)  HENT: head NCAT, airway patent with moist mucous membranes  Eyes: PERRL  Lungs: lungs CTAB, decreased breath sounds at bilateral bases, no wheezing, no rhonchi, no rales  Cardiac: +S1S2, no m/r/g, +2 pitting edema   GI: abdomen soft with +BS, NT, ND  : no CVAT  MSK: FROM at neck, no tenderness to midline palpation, no stepoffs along length of spine, +tenderness to L hip and groin, decreased passive/active ROM, +L hip ecchymosis, +distal pulses  Neuro: moving all extremities with 5/5 strength bilateral upper and lower extremities, good and equal  strength bilaterally, sensory grossly intact, no gross neuro deficits  Skin: L hip ecchymosis    A/P: 69M with R hip pain, fall, DDx includes L hip fracture, will obtain XR to rule out bony injury, will also obtain labs, UA, CXR to rule out undelying infectious process for cause of fall, CT head and C spine given trauma, will admit

## 2020-07-15 NOTE — H&P ADULT - ASSESSMENT
Patient is a 69 Male pmhx DM, HTN, HLD, CAD s/p CABG, cath ([facility] 3/19/2018), MICU admission for influenza complicated by respiratory failure, JAKOB, NSTEMI, HF, A&Ox1 Bulgarian speaking only, difficult to obtain information from. Collateral history obtained from wife, states patient had a fall on 7/1/2020 and has been complaining of L hip pain and since fall has been crawling to get around. Reports that he has been more confused and weak as well. Denies any chest pain, shortness of breath, fevers or chills. Patient is a 69 Male pmhx DM, HTN, HLD, CAD s/p CABG, cath ([facility] 3/19/2018), MICU admission for influenza complicated by respiratory failure, JAKOB, NSTEMI, HF, A&Ox1 Sinhala speaking only, difficult to obtain information from. Collateral history obtained from wife, states patient had a fall on 7/1/2020 and has been complaining of L hip pain and since fall has been crawling to get around. Reports that he has been more confused and weak as well. Denies any chest pain, shortness of breath, fevers or chills.

## 2020-07-15 NOTE — CONSULT NOTE ADULT - PROBLEM SELECTOR RECOMMENDATION 9
Unclear baseline. No overt GI bleeding reported. Patient denies hx of prior evaluation. Unknown if reliable.  -will obtain collateral info from family  -would check iron/TIBC/ferritin  -if this anemia represents an acute drop in hemoglobin, or iron deficiency pattern, then inpatient endoscopic workup may be warranted

## 2020-07-15 NOTE — CONSULT NOTE ADULT - SUBJECTIVE AND OBJECTIVE BOX
CHIEF COMPLAINT:Patient is a 69y old  Male who presents with a chief complaint of fall (15 Jul 2020 19:41)      HISTORY OF PRESENT ILLNESS:HPI:  Hx has been obtained from ER staff, patient is unable to provide any Hx   Patient is a 69 Male pmhx DM, HTN, HLD, CAD s/p CABG, cath ([facility] 3/19/2018) s/p stents, CHF, MICU admission for influenza complicated by respiratory failure, JAKOB, NSTEMI, HF, A&Ox1 Chilean speaking only, difficult to obtain information from. Collateral history obtained from wife, states patient had a fall on 7/1/2020 and has been complaining of L hip pain and since fall has been crawling to get around. Reports that he has been more confused and weak as well. Denies any chest pain, shortness of breath, fevers or chills. (15 Jul 2020 15:47)      PAST MEDICAL & SURGICAL HISTORY:  CAD   CABG  PCI  HLD  HTN  CKD  GERD  DM      MEDICATIONS:  dextrose 40% Gel 15 Gram(s) Oral once PRN  dextrose 50% Injectable 12.5 Gram(s) IV Push once  dextrose 50% Injectable 25 Gram(s) IV Push once  dextrose 50% Injectable 25 Gram(s) IV Push once  glucagon  Injectable 1 milliGRAM(s) IntraMuscular once PRN  insulin lispro (HumaLOG) corrective regimen sliding scale   SubCutaneous three times a day before meals  insulin lispro (HumaLOG) corrective regimen sliding scale   SubCutaneous at bedtime    dextrose 5%. 1000 milliLiter(s) IV Continuous <Continuous>      FAMILY HISTORY:      Non-contributory    SOCIAL HISTORY:    SMOKER    Allergies    No Known Allergies    Intolerances    	    REVIEW OF SYSTEMS: Dementia - unable to obtain ROS      All other ROS negative    PHYSICAL EXAM:  T(C): 36.3 (07-15-20 @ 19:04), Max: 36.9 (07-15-20 @ 11:34)  HR: 65 (07-15-20 @ 19:04) (65 - 74)  BP: 167/99 (07-15-20 @ 19:04) (156/76 - 167/99)  RR: 16 (07-15-20 @ 19:04) (14 - 20)  SpO2: 99% (07-15-20 @ 19:04) (97% - 100%)  Wt(kg): --  I&O's Summary    15 Jul 2020 07:01  -  15 Jul 2020 22:06  --------------------------------------------------------  IN: 0 mL / OUT: 500 mL / NET: -500 mL        Appearance: Normal	  HEENT:   Normal oral mucosa, EOMI	  Cardiovascular:  S1 S2, No JVD,    Respiratory: Lungs clear to auscultation	  Psychiatry: Alert  Gastrointestinal:  Soft, Non-tender, + BS	  Skin: No rashes   Neurologic: Non-focal  Extremities:  No edema  Vascular: Peripheral pulses palpable    	    	  	  CARDIAC MARKERS:  Labs personally reviewed by me                                  8.8    4.97  )-----------( 153      ( 15 Jul 2020 12:57 )             28.5     07-15    137  |  104  |  75<H>  ----------------------------<  174<H>  5.6<H>   |  21<L>  |  3.55<H>    Ca    9.3      15 Jul 2020 12:57    TPro  7.2  /  Alb  3.1<L>  /  TBili  0.3  /  DBili  0.1  /  AST  21  /  ALT  13  /  AlkPhos  295<H>  07-15          EKG: Personally reviewed by me - nsr, lvh with repol changes, asymmetric TWI  Radiology: Personally reviewed by me -   < from: Xray Chest 1 View- PORTABLE-Urgent (07.15.20 @ 13:19) >  Impression: Significant increased lung markings bilaterally, likely representing marked pulmonary edema, and less likely viral pneumonia. Clinical correlation recommended.          Assessment /Plan:   Assessment and Plan:   Assessment:  · Assessment		  Patient is a 69 Male pmhx DM, HTN, HLD, CAD s/p CABG, cath ([facility] 3/19/2018), MICU admission for influenza complicated by respiratory failure, JAKOB, NSTEMI, HF, A&Ox1 Chilean speaking only, difficult to obtain information from. Collateral history obtained from wife, states patient had a fall on 7/1/2020 and has been complaining of L hip pain and since fall has been crawling to get around. Reports that he has been more confused and weak as well. Denies any chest pain, shortness of breath, fevers or chills.    Problem/Plan - 1:  ·  Problem: CAD s/p CABG Plan: med records from [facility] obtained and reviewed. h/o CABG with cath in 12/2018 patent graft to LIMA to LAD and SVG to RPDA, stent to 80% ramus  -     Problem/Plan - 2:  ·  Problem: Acute kidney injury.     Problem/Plan - 3:  ·  Problem: Acute systolic congestive heart failure. Plan: known h/o sHF    Problem/Plan - 4:  ·  Problem: Diabetes.     Problem/Plan - 5:  ·  Problem: Elevated troponin.     Problem/Plan - 6:  Problem: Anemia.    Problem/Plan - 7:  ·  Problem: Prophylactic measure.       Advanced care planning/advanced directives discussed with patient/family. DNR status including forceful chest compressions to attempt to restart the heart, ventilator support/artificial breathing, electric shock, artificial nutrition, health care proxy, Molst form all discussed with pt. Pt wishes to consider. Thirty minutes spent on discussing advanced directives. Differential diagnosis and plan of care discussed with patient after the evaluation. Counseling on diet, nutritional counseling, weight management, exercise and medication compliance was done.  One hundred ten minutes spent on total encounter, of which more than fifty percent of the encounter was spent counseling and/or coordinating care by the attending physician      Ebenezer Jones DO [facility]  Cardiovascular Medicine  [address] [place] [address], [address]  Office [phone]  Cell [phone] CHIEF COMPLAINT:Patient is a 69y old  Male who presents with a chief complaint of fall (15 Jul 2020 19:41)      HISTORY OF PRESENT ILLNESS:HPI:  Hx has been obtained from ER staff, patient is unable to provide any Hx   Patient is a 69 Male pmhx DM, HTN, HLD, CAD s/p CABG, cath ([facility] 3/19/2018) s/p stents, CHF, MICU admission for influenza complicated by respiratory failure, JAKOB, NSTEMI, HF, A&Ox1 Afghan speaking only, difficult to obtain information from. Collateral history obtained from wife, states patient had a fall on 7/1/2020 and has been complaining of L hip pain and since fall has been crawling to get around. Reports that he has been more confused and weak as well. Denies any chest pain, shortness of breath, fevers or chills. (15 Jul 2020 15:47)      PAST MEDICAL & SURGICAL HISTORY:  CAD   CABG  PCI  HLD  HTN  CKD  GERD  DM      MEDICATIONS:  dextrose 40% Gel 15 Gram(s) Oral once PRN  dextrose 50% Injectable 12.5 Gram(s) IV Push once  dextrose 50% Injectable 25 Gram(s) IV Push once  dextrose 50% Injectable 25 Gram(s) IV Push once  glucagon  Injectable 1 milliGRAM(s) IntraMuscular once PRN  insulin lispro (HumaLOG) corrective regimen sliding scale   SubCutaneous three times a day before meals  insulin lispro (HumaLOG) corrective regimen sliding scale   SubCutaneous at bedtime    dextrose 5%. 1000 milliLiter(s) IV Continuous <Continuous>      FAMILY HISTORY:      Non-contributory    SOCIAL HISTORY:    SMOKER    Allergies    No Known Allergies    Intolerances    	    REVIEW OF SYSTEMS: Dementia - unable to obtain ROS      All other ROS negative    PHYSICAL EXAM:  T(C): 36.3 (07-15-20 @ 19:04), Max: 36.9 (07-15-20 @ 11:34)  HR: 65 (07-15-20 @ 19:04) (65 - 74)  BP: 167/99 (07-15-20 @ 19:04) (156/76 - 167/99)  RR: 16 (07-15-20 @ 19:04) (14 - 20)  SpO2: 99% (07-15-20 @ 19:04) (97% - 100%)  Wt(kg): --  I&O's Summary    15 Jul 2020 07:01  -  15 Jul 2020 22:06  --------------------------------------------------------  IN: 0 mL / OUT: 500 mL / NET: -500 mL        Appearance: Normal	  HEENT:   Normal oral mucosa, EOMI	  Cardiovascular:  S1 S2, No JVD,    Respiratory: Lungs clear to auscultation	  Psychiatry: Alert  Gastrointestinal:  Soft, Non-tender, + BS	  Skin: No rashes   Neurologic: Non-focal  Extremities:  No edema  Vascular: Peripheral pulses palpable    	    	  	  CARDIAC MARKERS:  Labs personally reviewed by me                                  8.8    4.97  )-----------( 153      ( 15 Jul 2020 12:57 )             28.5     07-15    137  |  104  |  75<H>  ----------------------------<  174<H>  5.6<H>   |  21<L>  |  3.55<H>    Ca    9.3      15 Jul 2020 12:57    TPro  7.2  /  Alb  3.1<L>  /  TBili  0.3  /  DBili  0.1  /  AST  21  /  ALT  13  /  AlkPhos  295<H>  07-15          EKG: Personally reviewed by me - nsr, lvh with repol changes, asymmetric TWI  Radiology: Personally reviewed by me -   < from: Xray Chest 1 View- PORTABLE-Urgent (07.15.20 @ 13:19) >  Impression: Significant increased lung markings bilaterally, likely representing marked pulmonary edema, and less likely viral pneumonia. Clinical correlation recommended.          Assessment /Plan:   Assessment and Plan:   Assessment:  · Assessment		  Patient is a 69 Male pmhx DM, HTN, HLD, CAD s/p CABG, cath ([facility] 3/19/2018), MICU admission for influenza complicated by respiratory failure, JAKOB, NSTEMI, HF, A&Ox1 Afghan speaking only, difficult to obtain information from. Collateral history obtained from wife, states patient had a fall on 7/1/2020 and has been complaining of L hip pain and since fall has been crawling to get around. Reports that he has been more confused and weak as well. Denies any chest pain, shortness of breath, fevers or chills.    Problem/Plan - 1:  ·  Problem: CAD s/p CABG Plan: med records from [facility] obtained and reviewed. h/o CABG with cath in 12/2018 patent graft to LIMA to LAD and SVG to RPDA, stent to 80% ramus  - Resume ASA 81mg, statin, Coreg,     Problem/Plan - 2:  ·  Problem: Acute kidney injury.  Plan: Monitor BUN/Cr  avoid nephrotoxic meds  on enlapril, hold for now.     Problem/Plan - 3:  ·  Problem: Acute systolic congestive heart failure.  Plan: Hx of CABG   ASA, statin, coreg, hold ACE given JAKOB  cont lasix 40.   TTE    Problem/Plan - 4:  ·  Problem: Diabetes.  Plan: sliding scale  lantus 25 units at night   diab diet  adjust insulin dose PRN   hold oral meds.     Problem/Plan - 5:  ·  Problem: Elevated troponin.  Plan: Likely 2/2 decompensated HF  Echo   Hx of CABG and stent     Problem/Plan - 6:  Problem: Anemia. Plan: Anemia W?U  GI eval called  check occult stool.    Problem/Plan - 7:  ·  Problem: Prophylactic measure.  Plan: DVT and gI PPX.       Advanced care planning/advanced directives discussed with patient/family. DNR status including forceful chest compressions to attempt to restart the heart, ventilator support/artificial breathing, electric shock, artificial nutrition, health care proxy, Molst form all discussed with pt. Pt wishes to consider. Thirty minutes spent on discussing advanced directives. Differential diagnosis and plan of care discussed with patient after the evaluation. Counseling on diet, nutritional counseling, weight management, exercise and medication compliance was done.  One hundred ten minutes spent on total encounter, of which more than fifty percent of the encounter was spent counseling and/or coordinating care by the attending physician      Ebenezer Jones DO [facility]  Cardiovascular Medicine  [address] [place] [address], [address]  Office [phone]  Cell [phone]

## 2020-07-15 NOTE — CONSULT NOTE ADULT - ASSESSMENT
69 Male pmhx DM, HTN, HLD, CAD s/p CABG 2014, cath ([facility] 3/19/2018), MICU admission for influenza complicated by respiratory failure, JAKOB, NSTEMI, HF admitted for right hip pain. Pt found to have renal failure and hyperkalemia. Nephrology consulted for renal failure.     JAKOB vs. underlying CKD  Pt admitted with elevated SCr 3.55  Unknown baseline  No UA available for review  Check UA with spot urine TP/Cr and urine electrolytes   Albumin low, noted LE Edema. If proteinuria, pt will need full work up   r/o obstruction. Check post void bladder scan and/or renal sonogram  Avoid nephrotoxins    Hyperkalemia  Medical management: lasix 40mg IV x 1  Can continue diuretic therapy tmrw   Lokelma 10mg PO x 3 doses today   BMP in Q12   No role for renal replacement therapy     Anemia  Hb low  Check iron studies    HTN  BP elevated  Pt started on Lasix 40mg IV daily  Restart home medications   Advised to continue at present 69 Male pmhx DM, HTN, HLD, CAD s/p CABG 2014, cath ([facility] 3/19/2018), MICU admission for influenza complicated by respiratory failure, JAKOB, NSTEMI, HF admitted for right hip pain. Pt found to have renal failure and hyperkalemia. Nephrology consulted for renal failure.     JAKOB vs. underlying CKD  Pt admitted with elevated SCr 3.55  Unknown baseline  No UA available for review  Check UA with spot urine TP/Cr and urine electrolytes   Albumin low, noted LE Edema. If proteinuria, pt will need full work up   r/o obstruction. Check post void bladder scan and/or renal sonogram  CBC with diff in AM   Avoid nephrotoxins    Hyperkalemia  Medical management: lasix 40mg IV x 1  Can continue diuretic therapy tmrw   Lokelma 10mg PO x 3 doses today   BMP in Q12   No role for renal replacement therapy   post void bladder scan    Anemia  Hb low  Check iron studies    HTN  BP elevated  Pt started on Lasix 40mg IV daily  Restart home medications   Advised to continue at present

## 2020-07-15 NOTE — H&P ADULT - NSHPLABSRESULTS_GEN_ALL_CORE
8.8    4.97  )-----------( 153      ( 15 Jul 2020 12:57 )             28.5               07-15    137  |  104  |  75<H>  ----------------------------<  174<H>  5.6<H>   |  21<L>  |  3.55<H>    Ca    9.3      15 Jul 2020 12:57    TPro  7.2  /  Alb  3.1<L>  /  TBili  0.3  /  DBili  0.1  /  AST  21  /  ALT  13  /  AlkPhos  295<H>  07-15    PT/INR - ( 15 Jul 2020 12:57 )   PT: 13.2 sec;   INR: 1.12 ratio         PTT - ( 15 Jul 2020 12:57 )  PTT:39.7 sec    < from: Xray Chest 1 View- PORTABLE-Urgent (07.15.20 @ 13:19) >    Findings: The heart size cannot be adequately assessed on this single view but is likely enlarged. Patient is status post median sternotomy. There are significant increased lung markings bilaterally, likely representing marked pulmonary interstitial edema and less likely viral pneumonia. There are no pleural effusions. The visualized osseous structures are intact.    Impression: Significant increased lung markings bilaterally, likely representing marked pulmonary edema, and less likely viral pneumonia. Clinical correlation recommended.

## 2020-07-15 NOTE — CONSULT NOTE ADULT - SUBJECTIVE AND OBJECTIVE BOX
[facility] NEPHROLOGY PRACTICE   MD Toñito Paris MD Fatima Sheikh, D.O. Ruoru Wong, PA    From 7 AM - 5 PM:  OFFICE: [phone]  Dr. Malcolm cell: [phone]  Dr. Teixeira Cell: [phone]  Dr. Meza cell: [phone]  JD Brunson cell: [phone]    From 5 PM - 7 AM: Answering Service: [phone]      -- INITIAL RENAL CONSULT NOTE  --------------------------------------------------------------------------------  HPI: 69 Male pmhx DM, HTN, HLD, CAD s/p CABG, cath ([facility] 3/19/2018), MICU admission for influenza complicated by respiratory failure, JAKOB, NSTEMI, HF admitted for right hip pain. Pt found to have renal failure and hyperkalemia. Nephrology consulted for renal failure.   Pt denies NSAID use. spoke to pts wife. He has hx of renal disease at [facility]. Unclear if JAKOB or CKD. No hx of kidney biopsy in the past. State he has HF and was told his renal failure was secondary to that.       PAST HISTORY  --------------------------------------------------------------------------------  PAST MEDICAL & SURGICAL HISTORY:    FAMILY HISTORY:    PAST SOCIAL HISTORY:    ALLERGIES & MEDICATIONS  --------------------------------------------------------------------------------  Allergies    No Known Allergies    Intolerances      Standing Inpatient Medications  sodium zirconium cyclosilicate 10 Gram(s) Oral three times a day    PRN Inpatient Medications      REVIEW OF SYSTEMS  --------------------------------------------------------------------------------  Gen: No fevers/chills  Skin: No rashes  Head/Eyes/Ears: Normal hearing,  Normal vision   Respiratory: No dyspnea, cough  CV: No chest pain  GI: No abdominal pain, diarrhea, constipation, nausea, vomiting  : No dysuria, hematuria  MSK: No  edema  Heme: No easy bruising or bleeding  Psych: No significant depression    All other systems were reviewed and are negative, except as noted.    VITALS/PHYSICAL EXAM  --------------------------------------------------------------------------------  T(C): 36.5 (07-15-20 @ 14:28), Max: 36.9 (07-15-20 @ 11:34)  HR: 65 (07-15-20 @ 15:45) (65 - 74)  BP: 156/76 (07-15-20 @ 15:45) (156/76 - 164/77)  RR: 14 (07-15-20 @ 15:45) (14 - 20)  SpO2: 100% (07-15-20 @ 15:45) (97% - 100%)  Wt(kg): --      Physical Exam:  	Gen: NAD  	HEENT: MMM  	Pulm: CTA B/L  	CV: S1S2  	Abd: Soft, +BS   	Ext: + LE edema B/L  	Neuro: Awake  	Skin: Warm and dry      LABS/STUDIES  --------------------------------------------------------------------------------              8.8    4.97  >-----------<  153      [07-15-20 @ 12:57]              28.5     137  |  104  |  75  ----------------------------<  174      [07-15-20 @ 12:57]  5.6   |  21  |  3.55        Ca     9.3     [07-15-20 @ 12:57]    TPro  7.2  /  Alb  3.1  /  TBili  0.3  /  DBili  0.1  /  AST  21  /  ALT  13  /  AlkPhos  295  [07-15-20 @ 12:57]    PT/INR: PT 13.2 , INR 1.12       [07-15-20 @ 12:57]  PTT: 39.7       [07-15-20 @ 12:57]      Creatinine Trend:  SCr 3.55 [07-15 @ 12:57]

## 2020-07-15 NOTE — ED ADULT NURSE REASSESSMENT NOTE - NS ED NURSE REASSESS COMMENT FT1
Report received from [employer] JASON Correa. Pt asleep in room with eyes closed, pt arouses easily to voice, pt is AAOx4, NAD, resp nonlabored, skin warm/dry, resting comfortably in bed with call bell at bedside. Pt c/o feeling fatigue. Pt denies headache, dizziness, chest pain, palpitations, SOB, fevers, chills at this time. MD at bedside evaluating pt, labs sent as ordered, Pt awaiting results and dispo. Pt on CCM. Safety maintained. Report received from [employer] JASON Correa. Pt asleep in room with eyes closed, pt arouses easily to voice, pt is AAOx4, NAD, resp nonlabored, skin warm/dry, resting comfortably in bed with call bell at bedside. Pt c/o feeling fatigue. Pt denies headache, dizziness, chest pain, palpitations, SOB, fevers, chills at this time. MD at bedside evaluating pt, labs sent as ordered, Pt awaiting CT/Xrays, lab results and dispo. Pt on CCM with NSR. Safety maintained. Report received from [employer] JASON Correa. Pt asleep in room with eyes closed, pt arouses easily to voice, pt is AAOx1-2, NAD, resp nonlabored, skin warm/dry, resting comfortably in bed with call bell at bedside. Pt c/o feeling fatigue. Pt denies headache, dizziness, chest pain, palpitations, SOB, fevers, chills at this time. MD at bedside evaluating pt, labs sent as ordered, Pt awaiting CT/Xrays, lab results and dispo. Pt on CCM with NSR. Safety maintained.

## 2020-07-15 NOTE — ED ADULT NURSE NOTE - OBJECTIVE STATEMENT
68 y/o M, PMH DM, HTN, HLD, CAD s/p CABG, prior MICU admission for influenza complicated by respiratory failure, JAKOB, NSTEMI, HF, Tongan speaking Male, A&Ox1, poor historian, s/p fall on 7/1/2020 as per wife and has been complaining of L hip pain and has been crawling to get around since fall. Wife reports pt has been more confused and weak. Denies chest pain, SOB, palpitations, fevers, chills. Pt is drowsy, falls asleep without stimulation, but arouses to voice and is AAOx1, NAD, resp unlabored, pt calm/cooperative, follows commands. Safety maintained, pt placed on CCM.

## 2020-07-15 NOTE — CONSULT NOTE ADULT - SUBJECTIVE AND OBJECTIVE BOX
69yMale s/p mechanical fall on 7/1 with groin pain and difficulty with ambulation. Denies other injury.  Patient Hungarian speaking only.  AOx1.  Patient was able to participate in H/P with encouragement.  Patient had a previous gamma nail placed, but does not recall by who.    T(C): 36.9 (07-15-20 @ 11:34), Max: 36.9 (07-15-20 @ 11:34)  HR: 74 (07-15-20 @ 12:30) (66 - 74)  BP: 156/83 (07-15-20 @ 12:30) (156/83 - 164/77)  RR: 16 (07-15-20 @ 12:30) (16 - 20)  SpO2: 100% (07-15-20 @ 12:30) (97% - 100%)    PE: NAD, Alert  LE: skin intact.   +DF/PF. moving toes. SILT. wwp at foot        XR: L inferior, possible superior pubic rami fractures. No hip fracture or other fractures identified. Old Gamma nail in place    A/P: 69yMale with L pubic rami fractures  -Pain control  -WBAT  -PT  -Dispo per primary team  -No ortho intervention. Signing off. May FU with Dr. Vance as outpatient as needed, or can f/u w/ his primary orthopaedic surgeon as needed.   -Please re-consult ortho with new concerns 69yMale s/p mechanical fall on 7/1 with groin pain and difficulty with ambulation. Denies other injury.  Patient Upper sorbian speaking only.  AOx1.  Patient was able to participate in H/P with encouragement.  Patient had a previous gamma nail placed, but does not recall by who.    T(C): 36.9 (07-15-20 @ 11:34), Max: 36.9 (07-15-20 @ 11:34)  HR: 74 (07-15-20 @ 12:30) (66 - 74)  BP: 156/83 (07-15-20 @ 12:30) (156/83 - 164/77)  RR: 16 (07-15-20 @ 12:30) (16 - 20)  SpO2: 100% (07-15-20 @ 12:30) (97% - 100%)    PE: NAD, Alert  LLE: skin intact.   Moving toes  SILT  WWP at foot  Able to SLR  No pain on log roll  No pain on heel strike      XR: L inferior, possible superior pubic rami fractures. No hip fracture or other fractures identified. Old Gamma nail in place    A/P: 69yMale with L pubic rami fractures  -Pain control  -WBAT  -PT  -Dispo per primary team  -No ortho intervention. Signing off. May FU with Dr. Vance as outpatient as needed, or can f/u w/ his primary orthopaedic surgeon as needed.   -Please re-consult ortho with new concerns

## 2020-07-15 NOTE — ED PROVIDER NOTE - PHYSICAL EXAMINATION
GEN: Well Appearing, Nontoxic, NAD  HEENT: NC/AT, Symm Facies. PERRL, EOMI, MMM, posterior pharynx clear  CV: No JVD/Bruits or stridor;  +S1S2, RRR w/o m/g/r  RESP: CTAB w/o w/r/r  ABD: Soft, nt/nd, +BS. No guarding/rebound. No RUQ tender, no CVAT  EXT/MSK: No lower extremity edema or calf tenderness. WWP, palpable pulses. L hip and groin tenderness to palpation and decreased range of motion secondary to pain  SKIN: L hip with ecchymosis   Neuro: Grossly intact, AOX1 with normal speech, CN II-XII intact; Sensation intact. motor on LLE decreased secondary to pain. GEN: Well Appearing, Nontoxic, NAD  HEENT: NC/AT, Symm Facies. PERRL, EOMI, MMM, posterior pharynx clear  CV: No JVD/Bruits or stridor;  +S1S2, RRR w/o m/g/r  RESP: minimal decreased breath sounds bilaterally  ABD: Soft, nt/nd, +BS. No guarding/rebound. No RUQ tender, no CVAT  EXT/MSK: significant 2+ bilateral lower extremity edema. No calf tenderness. WWP, palpable pulses. L hip and groin tenderness to palpation and decreased range of motion secondary to pain  SKIN: L hip with ecchymosis   Neuro: Grossly intact, AOX1 with normal speech, CN II-XII intact; Sensation intact. motor on LLE decreased secondary to pain.

## 2020-07-15 NOTE — ED ADULT NURSE NOTE - NSIMPLEMENTINTERV_GEN_ALL_ED
Implemented All Fall Risk Interventions:  [place] to call system. Call bell, personal items and telephone within reach. Instruct patient to call for assistance. Room bathroom lighting operational. Non-slip footwear when patient is off stretcher. Physically safe environment: no spills, clutter or unnecessary equipment. Stretcher in lowest position, wheels locked, appropriate side rails in place. Provide visual cue, wrist band, yellow gown, etc. Monitor gait and stability. Monitor for mental status changes and reorient to person, place, and time. Review medications for side effects contributing to fall risk. Reinforce activity limits and safety measures with patient and family.

## 2020-07-15 NOTE — CONSULT NOTE ADULT - SUBJECTIVE AND OBJECTIVE BOX
Chief Complaint:  Patient is a 69y old  Male who presents with a chief complaint of fall (15 Jul 2020 18:38)      HPI:  Patient is a 69 Male pmhx DM, HTN, HLD, CAD s/p CABG, cath ([facility] 3/19/2018), MICU admission for influenza complicated by respiratory failure, JAKOB, NSTEMI, HF, dementia presents with fall. Collateral history obtained from wife, states patient had a fall on 7/1/2020 and has been complaining of L hip pain and since fall has been crawling to get around. Reports that he has been more confused and weak as well. Denies any chest pain, shortness of breath, fevers or chills.    On my interview the patient denies dysphagia, nausea and vomiting, abdominal pain, diarrhea, unintentional weight loss, change in bowel habits or NSAID use, or history of gastroenterologic procedures.      Allergies:  No Known Allergies      Home Medications:    Hospital Medications:  sodium zirconium cyclosilicate 10 Gram(s) Oral three times a day      PMHX/PSHX:      Family history:      Social History:   Denies ethanol use.  Denies illicit drug use.    ROS:     General:  No wt loss, fevers, chills, night sweats, fatigue,   Eyes:  Good vision, no reported pain  ENT:  No sore throat, pain, runny nose, dysphagia  CV:  No pain, palpitations, hypo/hypertension  Resp:  No dyspnea, cough, tachypnea, wheezing  GI:  See HPI  :  No pain, bleeding, incontinence, nocturia  Muscle:  No pain, weakness  Neuro:  No weakness, tingling, memory problems  Psych:  No fatigue, insomnia, mood problems, depression  Endocrine:  No polyuria, polydipsia, cold/heat intolerance  Heme:  No petechiae, ecchymosis, easy bruisability  Integumentary:  No rash, edema      PHYSICAL EXAM:     GENERAL:  Appears stated age, well-groomed, well-nourished, no distress  HEENT:  NC/AT,  conjunctivae anicteric, clear and pink,   NECK: supple, trachea midline  CHEST:  Full & symmetric excursion, no increased effort, breath sounds clear  HEART:  Regular rhythm, no JVD  ABDOMEN:  Soft, non-tender, non-distended, normoactive bowel sounds,  no masses , no hepatosplenomegaly  EXTREMITIES:  no cyanosis,clubbing or edema  SKIN:  No rash, erythema, or, ecchymoses, no jaundice  NEURO:  Alert, non-focal, no asterixis  PSYCH: Appropriate affect, oriented to place and time  RECTAL: Deferred      Vital Signs:  Vital Signs Last 24 Hrs  T(C): 36.3 (15 Jul 2020 19:04), Max: 36.9 (15 Jul 2020 11:34)  T(F): 97.4 (15 Jul 2020 19:04), Max: 98.4 (15 Jul 2020 11:34)  HR: 65 (15 Jul 2020 19:04) (65 - 74)  BP: 167/99 (15 Jul 2020 19:04) (156/76 - 167/99)  BP(mean): --  RR: 16 (15 Jul 2020 19:04) (14 - 20)  SpO2: 99% (15 Jul 2020 19:04) (97% - 100%)  Daily     Daily     LABS:                        8.8    4.97  )-----------( 153      ( 15 Jul 2020 12:57 )             28.5     07-15    137  |  104  |  75<H>  ----------------------------<  174<H>  5.6<H>   |  21<L>  |  3.55<H>    Ca    9.3      15 Jul 2020 12:57    TPro  7.2  /  Alb  3.1<L>  /  TBili  0.3  /  DBili  0.1  /  AST  21  /  ALT  13  /  AlkPhos  295<H>  07-15    LIVER FUNCTIONS - ( 15 Jul 2020 12:57 )  Alb: 3.1 g/dL / Pro: 7.2 g/dL / ALK PHOS: 295 U/L / ALT: 13 U/L / AST: 21 U/L / GGT: x           PT/INR - ( 15 Jul 2020 12:57 )   PT: 13.2 sec;   INR: 1.12 ratio         PTT - ( 15 Jul 2020 12:57 )  PTT:39.7 sec

## 2020-07-15 NOTE — CONSULT NOTE ADULT - ASSESSMENT
Patient is a 69 Male pmhx DM, HTN, HLD, CAD s/p CABG, cath ([facility] 3/19/2018), MICU admission for influenza complicated by respiratory failure, JAKOB, NSTEMI, HF, dementia presents with fall.

## 2020-07-15 NOTE — ED PROVIDER NOTE - PROGRESS NOTE DETAILS
trop 214 and creatinine 3.5 with L pubic ramus fracture. admission to medicine Dr. Eber hansen and orthopedics consulted. Ortho states no surgical intervention, will see patient. -Ingrid Neal PA-C

## 2020-07-15 NOTE — ED PROVIDER NOTE - CLINICAL SUMMARY MEDICAL DECISION MAKING FREE TEXT BOX
69M A&Ox1 fall on 7/1 with L hip ecchymosis severe pain, decreased range of motion of L hip secondary to pain. Weakness on exam. Will obtain labs, CTH, xrays of hip and pelvis, EKG, CXR, and TBA

## 2020-07-15 NOTE — H&P ADULT - HISTORY OF PRESENT ILLNESS
Hx has been obtained from ER staff, patient is unable to provide any Hx   Patient is a 69 Male pmhx DM, HTN, HLD, CAD s/p CABG, cath ([facility] 3/19/2018), MICU admission for influenza complicated by respiratory failure, JAKOB, NSTEMI, HF, A&Ox1 Lebanese speaking only, difficult to obtain information from. Collateral history obtained from wife, states patient had a fall on 7/1/2020 and has been complaining of L hip pain and since fall has been crawling to get around. Reports that he has been more confused and weak as well. Denies any chest pain, shortness of breath, fevers or chills.

## 2020-07-16 LAB
A1C WITH ESTIMATED AVERAGE GLUCOSE RESULT: 6.6 % — HIGH (ref 4–5.6)
ALBUMIN SERPL ELPH-MCNC: 2.9 G/DL — LOW (ref 3.3–5)
ALP SERPL-CCNC: 271 U/L — HIGH (ref 40–120)
ALT FLD-CCNC: 11 U/L — SIGNIFICANT CHANGE UP (ref 10–45)
ANION GAP SERPL CALC-SCNC: 13 MMOL/L — SIGNIFICANT CHANGE UP (ref 5–17)
APPEARANCE UR: CLEAR — SIGNIFICANT CHANGE UP
AST SERPL-CCNC: 19 U/L — SIGNIFICANT CHANGE UP (ref 10–40)
BASOPHILS # BLD AUTO: 0.05 K/UL — SIGNIFICANT CHANGE UP (ref 0–0.2)
BASOPHILS NFR BLD AUTO: 1.1 % — SIGNIFICANT CHANGE UP (ref 0–2)
BILIRUB SERPL-MCNC: 0.3 MG/DL — SIGNIFICANT CHANGE UP (ref 0.2–1.2)
BILIRUB UR-MCNC: NEGATIVE — SIGNIFICANT CHANGE UP
BUN SERPL-MCNC: 77 MG/DL — HIGH (ref 7–23)
CALCIUM SERPL-MCNC: 9.2 MG/DL — SIGNIFICANT CHANGE UP (ref 8.4–10.5)
CHLORIDE SERPL-SCNC: 104 MMOL/L — SIGNIFICANT CHANGE UP (ref 96–108)
CHLORIDE UR-SCNC: 77 MMOL/L — SIGNIFICANT CHANGE UP
CHOLEST SERPL-MCNC: 122 MG/DL — SIGNIFICANT CHANGE UP (ref 10–199)
CO2 SERPL-SCNC: 21 MMOL/L — LOW (ref 22–31)
COLOR SPEC: COLORLESS — SIGNIFICANT CHANGE UP
CREAT ?TM UR-MCNC: 38 MG/DL — SIGNIFICANT CHANGE UP
CREAT SERPL-MCNC: 3.49 MG/DL — HIGH (ref 0.5–1.3)
DIFF PNL FLD: NEGATIVE — SIGNIFICANT CHANGE UP
EOSINOPHIL # BLD AUTO: 0.24 K/UL — SIGNIFICANT CHANGE UP (ref 0–0.5)
EOSINOPHIL NFR BLD AUTO: 5.1 % — SIGNIFICANT CHANGE UP (ref 0–6)
ESTIMATED AVERAGE GLUCOSE: 143 MG/DL — HIGH (ref 68–114)
FERRITIN SERPL-MCNC: 334 NG/ML — SIGNIFICANT CHANGE UP (ref 30–400)
FOLATE SERPL-MCNC: 8.1 NG/ML — SIGNIFICANT CHANGE UP
GLUCOSE SERPL-MCNC: 58 MG/DL — LOW (ref 70–99)
GLUCOSE UR QL: NEGATIVE — SIGNIFICANT CHANGE UP
HCT VFR BLD CALC: 28.6 % — LOW (ref 39–50)
HCV AB S/CO SERPL IA: 0.34 S/CO — SIGNIFICANT CHANGE UP (ref 0–0.99)
HCV AB SERPL-IMP: SIGNIFICANT CHANGE UP
HDLC SERPL-MCNC: 39 MG/DL — LOW
HGB BLD-MCNC: 9 G/DL — LOW (ref 13–17)
IMM GRANULOCYTES NFR BLD AUTO: 0.2 % — SIGNIFICANT CHANGE UP (ref 0–1.5)
IRON SATN MFR SERPL: 25 % — SIGNIFICANT CHANGE UP (ref 16–55)
IRON SATN MFR SERPL: 40 UG/DL — LOW (ref 45–165)
KETONES UR-MCNC: NEGATIVE — SIGNIFICANT CHANGE UP
LEUKOCYTE ESTERASE UR-ACNC: NEGATIVE — SIGNIFICANT CHANGE UP
LIPID PNL WITH DIRECT LDL SERPL: 73 MG/DL — SIGNIFICANT CHANGE UP
LYMPHOCYTES # BLD AUTO: 1.08 K/UL — SIGNIFICANT CHANGE UP (ref 1–3.3)
LYMPHOCYTES # BLD AUTO: 23.1 % — SIGNIFICANT CHANGE UP (ref 13–44)
MCHC RBC-ENTMCNC: 30.5 PG — SIGNIFICANT CHANGE UP (ref 27–34)
MCHC RBC-ENTMCNC: 31.5 GM/DL — LOW (ref 32–36)
MCV RBC AUTO: 96.9 FL — SIGNIFICANT CHANGE UP (ref 80–100)
MONOCYTES # BLD AUTO: 0.43 K/UL — SIGNIFICANT CHANGE UP (ref 0–0.9)
MONOCYTES NFR BLD AUTO: 9.2 % — SIGNIFICANT CHANGE UP (ref 2–14)
NEUTROPHILS # BLD AUTO: 2.87 K/UL — SIGNIFICANT CHANGE UP (ref 1.8–7.4)
NEUTROPHILS NFR BLD AUTO: 61.3 % — SIGNIFICANT CHANGE UP (ref 43–77)
NITRITE UR-MCNC: NEGATIVE — SIGNIFICANT CHANGE UP
NRBC # BLD: 0 /100 WBCS — SIGNIFICANT CHANGE UP (ref 0–0)
PH UR: 6.5 — SIGNIFICANT CHANGE UP (ref 5–8)
PLATELET # BLD AUTO: 160 K/UL — SIGNIFICANT CHANGE UP (ref 150–400)
POTASSIUM SERPL-MCNC: 5.2 MMOL/L — SIGNIFICANT CHANGE UP (ref 3.5–5.3)
POTASSIUM SERPL-SCNC: 5.2 MMOL/L — SIGNIFICANT CHANGE UP (ref 3.5–5.3)
PROT ?TM UR-MCNC: 88 MG/DL — HIGH (ref 0–12)
PROT SERPL-MCNC: 6.6 G/DL — SIGNIFICANT CHANGE UP (ref 6–8.3)
PROT UR-MCNC: ABNORMAL
PROT/CREAT UR-RTO: 2.3 RATIO — HIGH (ref 0–0.2)
RBC # BLD: 2.95 M/UL — LOW (ref 4.2–5.8)
RBC # FLD: 17.3 % — HIGH (ref 10.3–14.5)
SODIUM SERPL-SCNC: 138 MMOL/L — SIGNIFICANT CHANGE UP (ref 135–145)
SODIUM UR-SCNC: 98 MMOL/L — SIGNIFICANT CHANGE UP
SP GR SPEC: 1.01 — SIGNIFICANT CHANGE UP (ref 1.01–1.02)
TIBC SERPL-MCNC: 162 UG/DL — LOW (ref 220–430)
TOTAL CHOLESTEROL/HDL RATIO MEASUREMENT: 3.1 RATIO — LOW (ref 3.4–9.6)
TRIGL SERPL-MCNC: 47 MG/DL — SIGNIFICANT CHANGE UP (ref 10–149)
TSH SERPL-MCNC: 3.57 UIU/ML — SIGNIFICANT CHANGE UP (ref 0.27–4.2)
UIBC SERPL-MCNC: 121 UG/DL — SIGNIFICANT CHANGE UP (ref 110–370)
UROBILINOGEN FLD QL: SIGNIFICANT CHANGE UP
VIT B12 SERPL-MCNC: 830 PG/ML — SIGNIFICANT CHANGE UP (ref 232–1245)
WBC # BLD: 4.68 K/UL — SIGNIFICANT CHANGE UP (ref 3.8–10.5)
WBC # FLD AUTO: 4.68 K/UL — SIGNIFICANT CHANGE UP (ref 3.8–10.5)

## 2020-07-16 PROCEDURE — 73552 X-RAY EXAM OF FEMUR 2/>: CPT | Mod: 26,LT

## 2020-07-16 PROCEDURE — 76770 US EXAM ABDO BACK WALL COMP: CPT | Mod: 26

## 2020-07-16 RX ORDER — ACETAMINOPHEN 500 MG
650 TABLET ORAL EVERY 6 HOURS
Refills: 0 | Status: DISCONTINUED | OUTPATIENT
Start: 2020-07-16 | End: 2020-08-03

## 2020-07-16 RX ORDER — DEXTROSE 50 % IN WATER 50 %
15 SYRINGE (ML) INTRAVENOUS ONCE
Refills: 0 | Status: COMPLETED | OUTPATIENT
Start: 2020-07-16 | End: 2020-07-16

## 2020-07-16 RX ORDER — ACETAMINOPHEN 500 MG
1000 TABLET ORAL ONCE
Refills: 0 | Status: COMPLETED | OUTPATIENT
Start: 2020-07-16 | End: 2020-07-16

## 2020-07-16 RX ADMIN — SODIUM ZIRCONIUM CYCLOSILICATE 10 GRAM(S): 10 POWDER, FOR SUSPENSION ORAL at 05:58

## 2020-07-16 RX ADMIN — Medication 650 MILLIGRAM(S): at 21:32

## 2020-07-16 RX ADMIN — INSULIN GLARGINE 25 UNIT(S): 100 INJECTION, SOLUTION SUBCUTANEOUS at 00:50

## 2020-07-16 RX ADMIN — Medication 650 MILLIGRAM(S): at 15:29

## 2020-07-16 RX ADMIN — Medication 650 MILLIGRAM(S): at 22:17

## 2020-07-16 RX ADMIN — SODIUM ZIRCONIUM CYCLOSILICATE 10 GRAM(S): 10 POWDER, FOR SUSPENSION ORAL at 13:29

## 2020-07-16 RX ADMIN — Medication 650 MILLIGRAM(S): at 16:23

## 2020-07-16 RX ADMIN — Medication 1000 MILLIGRAM(S): at 01:44

## 2020-07-16 RX ADMIN — Medication 40 MILLIGRAM(S): at 05:58

## 2020-07-16 RX ADMIN — Medication 400 MILLIGRAM(S): at 01:29

## 2020-07-16 RX ADMIN — Medication 15 GRAM(S): at 09:30

## 2020-07-16 NOTE — PHYSICAL THERAPY INITIAL EVALUATION ADULT - STRENGTHENING, PT EVAL
Increase to 4-/5 throughout to assist with safe bed mobility, transfers, amb and functional activities.

## 2020-07-16 NOTE — PROGRESS NOTE ADULT - ASSESSMENT
Patient is a 69 Male pmhx DM, HTN, HLD, CAD s/p CABG, cath ([facility] 3/19/2018), MICU admission for influenza complicated by respiratory failure, JAKOB, NSTEMI, HF, A&Ox1 Maori speaking only, difficult to obtain information from. Collateral history obtained from wife, states patient had a fall on 7/1/2020 and has been complaining of L hip pain and since fall has been crawling to get around. Reports that he has been more confused and weak as well. Denies any chest pain, shortness of breath, fevers or chills.

## 2020-07-16 NOTE — PROGRESS NOTE ADULT - ASSESSMENT
Patient is a 69 Male pmhx DM, HTN, HLD, CAD s/p CABG, cath ([facility] 3/19/2018), MICU admission for influenza complicated by respiratory failure, JAKOB, NSTEMI, HF, dementia presents with fall.     Problem/Recommendation - 1:  Problem: Anemia. Recommendation: Unclear baseline. No overt GI bleeding reported. Patient denies hx of prior GI  evaluation. Anemia pattern on iron studies is consistent with anemia of chronic disease, renal insufficiency, as such, there is no strong indication for endoscopic workup, especially in the inpatient setting.  -would advise f/u with his outpatient GI to ensure up to date on recommended cancer screening etc.     Problem/Recommendation - 2:  ·  Problem: Diabetes.      Problem/Recommendation - 3:  ·  Problem: Acute kidney injury.  Recommendation: unknown baseline creatinine  will need to obtain collateral info.      Problem/Recommendation - 4:  ·  Problem: Pubic ramus fracture.      Problem/Recommendation - 5:  ·  Problem: Elevated troponin.      Problem/Recommendation - 6:  Problem: Dementia. Recommendation: Unclear baseline mental status.    Attending Attestation:   Differential diagnosis and plan of care discussed with patient after the evaluation  65 Minutes spent on total encounter of which more than fifty percent of the encounter was spent counseling and/or coordinating care by the attending physician.  Advanced care planning was discussed with the patient and/or surrogate decision makers. Advanced care planning forms were discussed. The risks benefits and alternatives to pertinent gastrointestinal procedures and interventions were discussed in detail and all questions were answered. Duration: 30 Minutes.

## 2020-07-16 NOTE — PHYSICAL THERAPY INITIAL EVALUATION ADULT - DISCHARGE DISPOSITION, PT EVAL
subacute rehab. if pt returns Home, Home PT to assess safety, increase strength and endurance assisting with functional activities and ADLs. Assist with all mobility and ADLs. DME: w/c, commode. Pt states he owns RW.

## 2020-07-16 NOTE — PHYSICAL THERAPY INITIAL EVALUATION ADULT - ADDITIONAL COMMENTS
As per care coord note: Prior to hospitalization patient resides with spouse, in a basement apartment of a house with 7 steps to negotiate. Patients spouse states that "patient requires total assistance with adls and has not been ambulatory . Patient denies having any dme As per care coord note: Prior to hospitalization patient resides with spouse, in a basement apartment of a house with 7 steps to negotiate. Patients spouse states that "patient requires total assistance with adls and has not been ambulatory . Patient denies having any dme.  Pt reports to this PT he was independent PTA and owns RW.

## 2020-07-16 NOTE — PHYSICAL THERAPY INITIAL EVALUATION ADULT - PRECAUTIONS/LIMITATIONS, REHAB EVAL
CONTINUED.. XRay Hip: 1. Acute nondisplaced fractures of the left inferior ramus. 2. This may imply a nondisplaced left superior ramus fracture that is difficult to visualize. 3. Left hip ORIF with longstem gamma nail. XRay Chest: Significant increased lung markings bilaterally, likely representing marked pulmonary edema, and less likely viral pneumonia. Clinical correlation recommended.

## 2020-07-16 NOTE — PHYSICAL THERAPY INITIAL EVALUATION ADULT - PERTINENT HX OF CURRENT PROBLEM, REHAB EVAL
Patient is a 69 Male pmhx DM, HTN, HLD, CAD s/p CABG, cath ([facility] 3/19/2018), MICU admission for influenza complicated by respiratory failure, JAKOB, NSTEMI, HF, A&Ox1 Upper sorbian speaking only, difficult to obtain information from. Collateral history obtained from wife, states patient had a fall on 7/1/2020 and has been complaining of L hip pain and since fall has been crawling to get around. Reports that he has been more confused and weak as well.

## 2020-07-16 NOTE — PROGRESS NOTE ADULT - ASSESSMENT
69 Male pmhx DM, HTN, HLD, CAD s/p CABG 2014, cath ([facility] 3/19/2018), MICU admission for influenza complicated by respiratory failure, JAKOB, NSTEMI, HF admitted for right hip pain. Pt found to have renal failure and hyperkalemia. Nephrology consulted for renal failure.     JAKOB vs. underlying CKD  Pt admitted with elevated SCr 3.55  Unknown baseline  UA with proteinuria - check vasculitis work up and renal sonogram  Monitor CBC with diff   Avoid nephrotoxins    Hyperkalemia  check post void bladder scan  improved with medical mgn  low K diet  MOnitor serum K      HTN  BP improving  Advised to continue at present   Low salt diet  MOnitor BP      Proteinuria  2.3gm proteinuria  Check Vasculitis work up   Monitor at present

## 2020-07-17 PROBLEM — I50.9 HEART FAILURE, UNSPECIFIED: Chronic | Status: ACTIVE | Noted: 2020-06-08

## 2020-07-17 LAB
ANION GAP SERPL CALC-SCNC: 13 MMOL/L — SIGNIFICANT CHANGE UP (ref 5–17)
BUN SERPL-MCNC: 84 MG/DL — HIGH (ref 7–23)
C3 SERPL-MCNC: 108 MG/DL — SIGNIFICANT CHANGE UP (ref 81–157)
C4 SERPL-MCNC: 24 MG/DL — SIGNIFICANT CHANGE UP (ref 13–39)
CALCIUM SERPL-MCNC: 8.6 MG/DL — SIGNIFICANT CHANGE UP (ref 8.4–10.5)
CHLORIDE SERPL-SCNC: 103 MMOL/L — SIGNIFICANT CHANGE UP (ref 96–108)
CO2 SERPL-SCNC: 22 MMOL/L — SIGNIFICANT CHANGE UP (ref 22–31)
CREAT SERPL-MCNC: 3.9 MG/DL — HIGH (ref 0.5–1.3)
GLUCOSE SERPL-MCNC: 61 MG/DL — LOW (ref 70–99)
HBV CORE AB SER-ACNC: REACTIVE
HBV SURFACE AB SER-ACNC: REACTIVE
HBV SURFACE AG SER-ACNC: SIGNIFICANT CHANGE UP
HCT VFR BLD CALC: 28.9 % — LOW (ref 39–50)
HGB BLD-MCNC: 9.1 G/DL — LOW (ref 13–17)
INTERPRETATION SERPL IFE-IMP: SIGNIFICANT CHANGE UP
KAPPA LC SER QL IFE: 12.1 MG/DL — HIGH (ref 0.33–1.94)
KAPPA/LAMBDA FREE LIGHT CHAIN RATIO, SERUM: 1.03 RATIO — SIGNIFICANT CHANGE UP (ref 0.26–1.65)
LAMBDA LC SER QL IFE: 11.77 MG/DL — HIGH (ref 0.57–2.63)
MCHC RBC-ENTMCNC: 30.1 PG — SIGNIFICANT CHANGE UP (ref 27–34)
MCHC RBC-ENTMCNC: 31.5 GM/DL — LOW (ref 32–36)
MCV RBC AUTO: 95.7 FL — SIGNIFICANT CHANGE UP (ref 80–100)
NRBC # BLD: 0 /100 WBCS — SIGNIFICANT CHANGE UP (ref 0–0)
PLATELET # BLD AUTO: 174 K/UL — SIGNIFICANT CHANGE UP (ref 150–400)
POTASSIUM SERPL-MCNC: 4.7 MMOL/L — SIGNIFICANT CHANGE UP (ref 3.5–5.3)
POTASSIUM SERPL-SCNC: 4.7 MMOL/L — SIGNIFICANT CHANGE UP (ref 3.5–5.3)
RBC # BLD: 3.02 M/UL — LOW (ref 4.2–5.8)
RBC # FLD: 17.2 % — HIGH (ref 10.3–14.5)
SARS-COV-2 IGG SERPL QL IA: NEGATIVE — SIGNIFICANT CHANGE UP
SARS-COV-2 IGM SERPL IA-ACNC: <3.8 AU/ML — SIGNIFICANT CHANGE UP
SODIUM SERPL-SCNC: 138 MMOL/L — SIGNIFICANT CHANGE UP (ref 135–145)
WBC # BLD: 5 K/UL — SIGNIFICANT CHANGE UP (ref 3.8–10.5)
WBC # FLD AUTO: 5 K/UL — SIGNIFICANT CHANGE UP (ref 3.8–10.5)

## 2020-07-17 PROCEDURE — 93306 TTE W/DOPPLER COMPLETE: CPT | Mod: 26

## 2020-07-17 RX ORDER — HYDRALAZINE HCL 50 MG
25 TABLET ORAL THREE TIMES A DAY
Refills: 0 | Status: DISCONTINUED | OUTPATIENT
Start: 2020-07-17 | End: 2020-07-20

## 2020-07-17 RX ORDER — CLOPIDOGREL BISULFATE 75 MG/1
75 TABLET, FILM COATED ORAL DAILY
Refills: 0 | Status: DISCONTINUED | OUTPATIENT
Start: 2020-07-17 | End: 2020-07-24

## 2020-07-17 RX ORDER — INSULIN GLARGINE 100 [IU]/ML
0 INJECTION, SOLUTION SUBCUTANEOUS
Qty: 0 | Refills: 0 | DISCHARGE

## 2020-07-17 RX ORDER — OMEPRAZOLE 10 MG/1
1 CAPSULE, DELAYED RELEASE ORAL
Qty: 0 | Refills: 0 | DISCHARGE

## 2020-07-17 RX ORDER — TAMSULOSIN HYDROCHLORIDE 0.4 MG/1
0.4 CAPSULE ORAL AT BEDTIME
Refills: 0 | Status: DISCONTINUED | OUTPATIENT
Start: 2020-07-17 | End: 2020-08-03

## 2020-07-17 RX ORDER — LINAGLIPTIN 5 MG/1
1 TABLET, FILM COATED ORAL
Qty: 0 | Refills: 0 | DISCHARGE

## 2020-07-17 RX ORDER — ATORVASTATIN CALCIUM 80 MG/1
40 TABLET, FILM COATED ORAL AT BEDTIME
Refills: 0 | Status: DISCONTINUED | OUTPATIENT
Start: 2020-07-17 | End: 2020-07-17

## 2020-07-17 RX ORDER — FERROUS SULFATE 325(65) MG
325 TABLET ORAL DAILY
Refills: 0 | Status: DISCONTINUED | OUTPATIENT
Start: 2020-07-17 | End: 2020-08-03

## 2020-07-17 RX ORDER — FINASTERIDE 5 MG/1
5 TABLET, FILM COATED ORAL DAILY
Refills: 0 | Status: DISCONTINUED | OUTPATIENT
Start: 2020-07-17 | End: 2020-08-03

## 2020-07-17 RX ORDER — ATORVASTATIN CALCIUM 80 MG/1
80 TABLET, FILM COATED ORAL AT BEDTIME
Refills: 0 | Status: DISCONTINUED | OUTPATIENT
Start: 2020-07-17 | End: 2020-08-03

## 2020-07-17 RX ORDER — METFORMIN HYDROCHLORIDE 850 MG/1
1 TABLET ORAL
Qty: 0 | Refills: 0 | DISCHARGE

## 2020-07-17 RX ORDER — CARVEDILOL PHOSPHATE 80 MG/1
12.5 CAPSULE, EXTENDED RELEASE ORAL EVERY 12 HOURS
Refills: 0 | Status: DISCONTINUED | OUTPATIENT
Start: 2020-07-17 | End: 2020-07-19

## 2020-07-17 RX ORDER — FUROSEMIDE 40 MG
40 TABLET ORAL DAILY
Refills: 0 | Status: DISCONTINUED | OUTPATIENT
Start: 2020-07-18 | End: 2020-07-31

## 2020-07-17 RX ORDER — INSULIN GLARGINE 100 [IU]/ML
10 INJECTION, SOLUTION SUBCUTANEOUS AT BEDTIME
Refills: 0 | Status: DISCONTINUED | OUTPATIENT
Start: 2020-07-17 | End: 2020-08-03

## 2020-07-17 RX ORDER — ASPIRIN/CALCIUM CARB/MAGNESIUM 324 MG
81 TABLET ORAL DAILY
Refills: 0 | Status: DISCONTINUED | OUTPATIENT
Start: 2020-07-17 | End: 2020-08-03

## 2020-07-17 RX ADMIN — CARVEDILOL PHOSPHATE 12.5 MILLIGRAM(S): 80 CAPSULE, EXTENDED RELEASE ORAL at 06:29

## 2020-07-17 RX ADMIN — Medication 81 MILLIGRAM(S): at 11:31

## 2020-07-17 RX ADMIN — Medication 650 MILLIGRAM(S): at 13:00

## 2020-07-17 RX ADMIN — CLOPIDOGREL BISULFATE 75 MILLIGRAM(S): 75 TABLET, FILM COATED ORAL at 14:50

## 2020-07-17 RX ADMIN — Medication 0: at 08:57

## 2020-07-17 RX ADMIN — Medication 25 MILLIGRAM(S): at 14:50

## 2020-07-17 RX ADMIN — ATORVASTATIN CALCIUM 80 MILLIGRAM(S): 80 TABLET, FILM COATED ORAL at 21:18

## 2020-07-17 RX ADMIN — Medication 1: at 17:53

## 2020-07-17 RX ADMIN — Medication 40 MILLIGRAM(S): at 06:29

## 2020-07-17 RX ADMIN — Medication 650 MILLIGRAM(S): at 06:29

## 2020-07-17 RX ADMIN — CARVEDILOL PHOSPHATE 12.5 MILLIGRAM(S): 80 CAPSULE, EXTENDED RELEASE ORAL at 17:54

## 2020-07-17 RX ADMIN — Medication 325 MILLIGRAM(S): at 14:52

## 2020-07-17 RX ADMIN — TAMSULOSIN HYDROCHLORIDE 0.4 MILLIGRAM(S): 0.4 CAPSULE ORAL at 21:19

## 2020-07-17 RX ADMIN — INSULIN GLARGINE 10 UNIT(S): 100 INJECTION, SOLUTION SUBCUTANEOUS at 22:48

## 2020-07-17 RX ADMIN — Medication 25 MILLIGRAM(S): at 21:19

## 2020-07-17 RX ADMIN — Medication 0: at 12:43

## 2020-07-17 RX ADMIN — FINASTERIDE 5 MILLIGRAM(S): 5 TABLET, FILM COATED ORAL at 14:50

## 2020-07-17 RX ADMIN — Medication 650 MILLIGRAM(S): at 07:15

## 2020-07-17 RX ADMIN — Medication 650 MILLIGRAM(S): at 12:01

## 2020-07-17 NOTE — PHARMACOTHERAPY INTERVENTION NOTE - COMMENTS
Medication reconciliation completed per pharmacy. Please refer to outpatient medication review for the updated list.    Home Medications:  alendronate weekly: 70 milligram(s) orally once a week (17 Jul 2020 10:13)  aspirin 81 mg oral tablet: 1 tab(s) orally once a day (17 Jul 2020 10:13)  atorvastatin 80 mg oral tablet: 1 tab(s) orally once a day (17 Jul 2020 10:13)  Basaglar KwikPen 100 units/mL subcutaneous solution: 10 unit(s) subcutaneous once a day (17 Jul 2020 10:13)  carvedilol 25 mg oral tablet: 1 tab(s) orally once a day (17 Jul 2020 10:13)  clopidogrel 75 mg oral tablet: 1 tab(s) orally once a day (17 Jul 2020 10:13)  cyproheptadine 2 mg/5 mL oral syrup: 5 milliliter(s) orally once a day (17 Jul 2020 10:13)  Entresto 24 mg-26 mg oral tablet: 1 tab(s) orally 2 times a day (17 Jul 2020 10:13)  ferrous sulfate 325 mg (65 mg elemental iron) oral tablet: 1 tab(s) orally once a day (17 Jul 2020 10:13)  finasteride 5 mg oral tablet: 1 tab(s) orally once a day (17 Jul 2020 10:13)  furosemide 40 mg oral tablet: 1 tab(s) orally once a day (17 Jul 2020 10:13)  gabapentin 300 mg oral tablet: 1 tab(s) orally once a day (17 Jul 2020 10:13)  metoprolol succinate 25 mg oral tablet, extended release: 1 tab(s) orally once a day (last picked up 6/2020) (17 Jul 2020 10:13)  NIFEdipine 30 mg oral tablet, extended release: 1 tab(s) orally once a day (17 Jul 2020 10:13)  NovoLOG 100 units/mL injectable solution: 10 unit(s) injectable 3 times a day (17 Jul 2020 10:13)  tamsulosin 0.4 mg oral capsule: 1 cap(s) orally once a day (17 Jul 2020 10:13)    Recommend adding tamsulosin, finasteride, clopidogrel, atorvastatin at home doses. Of note, on lower doses of insulin while inpatient; no longer on steroids at home; BG controlled inpatient.    Jaime Kim, PharmD Candidate  Adan Little, PharmD, BCPS  [phone]

## 2020-07-17 NOTE — PROGRESS NOTE ADULT - ASSESSMENT
69 Male pmhx DM, HTN, HLD, CAD s/p CABG 2014, cath ([facility] 3/19/2018), MICU admission for influenza complicated by respiratory failure, JAKOB, NSTEMI, HF admitted for right hip pain. Pt found to have renal failure and hyperkalemia. Nephrology consulted for renal failure.     JAKOB  Pt admitted with elevated SCr 3.55  Renal function worsening today  --possibly sec to diuretics  Improving fluid status -- advise to decrease lasix to 40mg QD PO  Renal sonogram with no hydro   Monitor CBC with diff   Avoid nephrotoxins    Hyperkalemia  check post void bladder scan  improved with medical mgn  low K diet  MOnitor serum K      HTN  BP fluctuating   Add hydralazine 25mg TID if remains elevated  Low salt diet  MOnitor BP      Proteinuria  2.3gm proteinuria  Check Vasculitis work up   Monitor at present

## 2020-07-17 NOTE — PROGRESS NOTE ADULT - ASSESSMENT
Patient is a 69 Male pmhx DM, HTN, HLD, CAD s/p CABG, cath ([facility] 3/19/2018), MICU admission for influenza complicated by respiratory failure, JAKOB, NSTEMI, HF, dementia presents with fall.     Problem/Recommendation - 1:  Problem: Anemia. Recommendation: Unclear baseline. No overt GI bleeding reported. Patient denies hx of prior GI  evaluation. Anemia pattern on iron studies is consistent with anemia of chronic disease, renal insufficiency, as such, there is no strong indication for endoscopic workup, especially in the inpatient setting.  -would advise f/u with his outpatient GI to ensure up to date on recommended cancer screening etc.     Problem/Recommendation - 2:  ·  Problem: Diabetes.      Problem/Recommendation - 3:  ·  Problem: Acute kidney injury.  Recommendation: unknown baseline creatinine  will need to obtain collateral info.      Problem/Recommendation - 4:  ·  Problem: Pubic ramus fracture.      Problem/Recommendation - 5:  ·  Problem: Elevated troponin.      Problem/Recommendation - 6:  Problem: Dementia. Recommendation: Unclear baseline mental status.    Attending Attestation:   Differential diagnosis and plan of care discussed with patient after the evaluation  65 Minutes spent on total encounter of which more than fifty percent of the encounter was spent counseling and/or coordinating care by the attending physician.  Advanced care planning was discussed with the patient and/or surrogate decision makers. Advanced care planning forms were discussed. The risks benefits and alternatives to pertinent gastrointestinal procedures and interventions were discussed in detail and all questions were answered. Duration: 30 Minutes.

## 2020-07-17 NOTE — PROGRESS NOTE ADULT - ASSESSMENT
Patient is a 69 Male pmhx DM, HTN, HLD, CAD s/p CABG, cath ([facility] 3/19/2018), MICU admission for influenza complicated by respiratory failure, JAKOB, NSTEMI, HF, A&Ox1 Hebrew speaking only, difficult to obtain information from. Collateral history obtained from wife, states patient had a fall on 7/1/2020 and has been complaining of L hip pain and since fall has been crawling to get around. Reports that he has been more confused and weak as well. Denies any chest pain, shortness of breath, fevers or chills.

## 2020-07-18 LAB
ANA TITR SER: NEGATIVE — SIGNIFICANT CHANGE UP
ANION GAP SERPL CALC-SCNC: 15 MMOL/L — SIGNIFICANT CHANGE UP (ref 5–17)
AUTO DIFF PNL BLD: ABNORMAL
BUN SERPL-MCNC: 87 MG/DL — HIGH (ref 7–23)
C-ANCA SER-ACNC: NEGATIVE — SIGNIFICANT CHANGE UP
CALCIUM SERPL-MCNC: 8.6 MG/DL — SIGNIFICANT CHANGE UP (ref 8.4–10.5)
CHLORIDE SERPL-SCNC: 103 MMOL/L — SIGNIFICANT CHANGE UP (ref 96–108)
CO2 SERPL-SCNC: 21 MMOL/L — LOW (ref 22–31)
CREAT SERPL-MCNC: 3.85 MG/DL — HIGH (ref 0.5–1.3)
GLUCOSE SERPL-MCNC: 164 MG/DL — HIGH (ref 70–99)
HCT VFR BLD CALC: 27.8 % — LOW (ref 39–50)
HGB BLD-MCNC: 8.8 G/DL — LOW (ref 13–17)
MCHC RBC-ENTMCNC: 30.1 PG — SIGNIFICANT CHANGE UP (ref 27–34)
MCHC RBC-ENTMCNC: 31.7 GM/DL — LOW (ref 32–36)
MCV RBC AUTO: 95.2 FL — SIGNIFICANT CHANGE UP (ref 80–100)
NRBC # BLD: 0 /100 WBCS — SIGNIFICANT CHANGE UP (ref 0–0)
P-ANCA SER-ACNC: NEGATIVE — SIGNIFICANT CHANGE UP
PLATELET # BLD AUTO: 163 K/UL — SIGNIFICANT CHANGE UP (ref 150–400)
POTASSIUM SERPL-MCNC: 4.4 MMOL/L — SIGNIFICANT CHANGE UP (ref 3.5–5.3)
POTASSIUM SERPL-SCNC: 4.4 MMOL/L — SIGNIFICANT CHANGE UP (ref 3.5–5.3)
PROT SERPL-MCNC: 6 G/DL — SIGNIFICANT CHANGE UP (ref 6–8.3)
PROT SERPL-MCNC: 6 G/DL — SIGNIFICANT CHANGE UP (ref 6–8.3)
RBC # BLD: 2.92 M/UL — LOW (ref 4.2–5.8)
RBC # FLD: 17.3 % — HIGH (ref 10.3–14.5)
SODIUM SERPL-SCNC: 139 MMOL/L — SIGNIFICANT CHANGE UP (ref 135–145)
WBC # BLD: 5.01 K/UL — SIGNIFICANT CHANGE UP (ref 3.8–10.5)
WBC # FLD AUTO: 5.01 K/UL — SIGNIFICANT CHANGE UP (ref 3.8–10.5)

## 2020-07-18 RX ADMIN — Medication 81 MILLIGRAM(S): at 11:46

## 2020-07-18 RX ADMIN — Medication 650 MILLIGRAM(S): at 10:46

## 2020-07-18 RX ADMIN — TAMSULOSIN HYDROCHLORIDE 0.4 MILLIGRAM(S): 0.4 CAPSULE ORAL at 21:38

## 2020-07-18 RX ADMIN — INSULIN GLARGINE 10 UNIT(S): 100 INJECTION, SOLUTION SUBCUTANEOUS at 21:44

## 2020-07-18 RX ADMIN — CARVEDILOL PHOSPHATE 12.5 MILLIGRAM(S): 80 CAPSULE, EXTENDED RELEASE ORAL at 05:44

## 2020-07-18 RX ADMIN — Medication 0: at 09:01

## 2020-07-18 RX ADMIN — Medication 650 MILLIGRAM(S): at 09:46

## 2020-07-18 RX ADMIN — Medication 25 MILLIGRAM(S): at 13:24

## 2020-07-18 RX ADMIN — CLOPIDOGREL BISULFATE 75 MILLIGRAM(S): 75 TABLET, FILM COATED ORAL at 11:46

## 2020-07-18 RX ADMIN — ATORVASTATIN CALCIUM 80 MILLIGRAM(S): 80 TABLET, FILM COATED ORAL at 21:38

## 2020-07-18 RX ADMIN — Medication 25 MILLIGRAM(S): at 05:44

## 2020-07-18 RX ADMIN — Medication 0: at 17:37

## 2020-07-18 RX ADMIN — CARVEDILOL PHOSPHATE 12.5 MILLIGRAM(S): 80 CAPSULE, EXTENDED RELEASE ORAL at 17:38

## 2020-07-18 RX ADMIN — Medication 650 MILLIGRAM(S): at 17:45

## 2020-07-18 RX ADMIN — Medication 0: at 12:13

## 2020-07-18 RX ADMIN — Medication 325 MILLIGRAM(S): at 11:45

## 2020-07-18 RX ADMIN — Medication 40 MILLIGRAM(S): at 05:44

## 2020-07-18 RX ADMIN — FINASTERIDE 5 MILLIGRAM(S): 5 TABLET, FILM COATED ORAL at 11:45

## 2020-07-18 RX ADMIN — Medication 25 MILLIGRAM(S): at 21:38

## 2020-07-18 NOTE — PROGRESS NOTE ADULT - ASSESSMENT
69 Male pmhx DM, HTN, HLD, CAD s/p CABG 2014, cath ([facility] 3/19/2018), MICU admission for influenza complicated by respiratory failure, JAKOB, NSTEMI, HF admitted for right hip pain. Pt found to have renal failure and hyperkalemia. Nephrology consulted for renal failure.     JAKOB  Pt admitted with elevated SCr 3.55  Renal function worsened  --possibly sec to diuretics  Improving fluid status -- continue  lasix to 40mg QD PO  Renal sonogram with no hydro   Monitor CBC with diff   Avoid nephrotoxins    Hyperkalemia  check post void bladder scan  improved with medical mgn  low K diet  MOnitor serum K      HTN  BP fluctuating   continue current meds-- increase hydralazine to 50mg TID If needed  Low salt diet  MOnitor BP      Proteinuria  2.3gm proteinuria  Follow up  Vasculitis work up   hep B reactive (possible past infection) , K/L ok, C3, C4 not low,   serum immunofixation suggestive of one kappa and one macarena band-- check spep   Monitor at present

## 2020-07-18 NOTE — PROGRESS NOTE ADULT - ASSESSMENT
Patient is a 69 Male pmhx DM, HTN, HLD, CAD s/p CABG, cath ([facility] 3/19/2018), MICU admission for influenza complicated by respiratory failure, JAKOB, NSTEMI, HF, dementia presents with fall.     Problem/Recommendation - 1:  Problem: Anemia. Recommendation: Unclear baseline. No overt GI bleeding reported. Patient denies hx of prior GI  evaluation. Anemia pattern on iron studies is consistent with anemia of chronic disease, renal insufficiency, as such, there is no strong indication for endoscopic workup, especially in the inpatient setting.  -would advise f/u with his outpatient GI to ensure up to date on recommended cancer screening etc.     Problem/Recommendation - 2:  ·  Problem: Diabetes.      Problem/Recommendation - 3:  ·  Problem: Acute kidney injury.  Recommendation: unknown baseline creatinine  will need to obtain collateral info.      Problem/Recommendation - 4:  ·  Problem: Pubic ramus fracture.      Problem/Recommendation - 5:  ·  Problem: Elevated alk phos, likely bone source in the setting of renal insufficiency.  -can check GGT, or can w/u as an outpt. This is chronic.      Problem/Recommendation - 6:  Problem: Dementia. Recommendation: Unclear baseline mental status.    Attending Attestation:   Differential diagnosis and plan of care discussed with patient after the evaluation  65 Minutes spent on total encounter of which more than fifty percent of the encounter was spent counseling and/or coordinating care by the attending physician.  Advanced care planning was discussed with the patient and/or surrogate decision makers. Advanced care planning forms were discussed. The risks benefits and alternatives to pertinent gastrointestinal procedures and interventions were discussed in detail and all questions were answered. Duration: 30 Minutes.

## 2020-07-18 NOTE — PROGRESS NOTE ADULT - ASSESSMENT
Patient is a 69 Male pmhx DM, HTN, HLD, CAD s/p CABG, cath ([facility] 3/19/2018), MICU admission for influenza complicated by respiratory failure, JAKOB, NSTEMI, HF, A&Ox1 German speaking only, difficult to obtain information from. Collateral history obtained from wife, states patient had a fall on 7/1/2020 and has been complaining of L hip pain and since fall has been crawling to get around. Reports that he has been more confused and weak as well. Denies any chest pain, shortness of breath, fevers or chills.

## 2020-07-19 LAB
ANION GAP SERPL CALC-SCNC: 15 MMOL/L — SIGNIFICANT CHANGE UP (ref 5–17)
BUN SERPL-MCNC: 81 MG/DL — HIGH (ref 7–23)
CALCIUM SERPL-MCNC: 9 MG/DL — SIGNIFICANT CHANGE UP (ref 8.4–10.5)
CHLORIDE SERPL-SCNC: 104 MMOL/L — SIGNIFICANT CHANGE UP (ref 96–108)
CO2 SERPL-SCNC: 19 MMOL/L — LOW (ref 22–31)
CREAT SERPL-MCNC: 3.78 MG/DL — HIGH (ref 0.5–1.3)
GLUCOSE SERPL-MCNC: 77 MG/DL — SIGNIFICANT CHANGE UP (ref 70–99)
HCT VFR BLD CALC: 29.6 % — LOW (ref 39–50)
HGB BLD-MCNC: 9.4 G/DL — LOW (ref 13–17)
MCHC RBC-ENTMCNC: 30.3 PG — SIGNIFICANT CHANGE UP (ref 27–34)
MCHC RBC-ENTMCNC: 31.8 GM/DL — LOW (ref 32–36)
MCV RBC AUTO: 95.5 FL — SIGNIFICANT CHANGE UP (ref 80–100)
NRBC # BLD: 0 /100 WBCS — SIGNIFICANT CHANGE UP (ref 0–0)
PLATELET # BLD AUTO: 168 K/UL — SIGNIFICANT CHANGE UP (ref 150–400)
POTASSIUM SERPL-MCNC: 4.5 MMOL/L — SIGNIFICANT CHANGE UP (ref 3.5–5.3)
POTASSIUM SERPL-SCNC: 4.5 MMOL/L — SIGNIFICANT CHANGE UP (ref 3.5–5.3)
PROT SERPL-MCNC: 6.3 G/DL — SIGNIFICANT CHANGE UP (ref 6–8.3)
PROT SERPL-MCNC: 6.3 G/DL — SIGNIFICANT CHANGE UP (ref 6–8.3)
RBC # BLD: 3.1 M/UL — LOW (ref 4.2–5.8)
RBC # FLD: 17.2 % — HIGH (ref 10.3–14.5)
SODIUM SERPL-SCNC: 138 MMOL/L — SIGNIFICANT CHANGE UP (ref 135–145)
WBC # BLD: 5.08 K/UL — SIGNIFICANT CHANGE UP (ref 3.8–10.5)
WBC # FLD AUTO: 5.08 K/UL — SIGNIFICANT CHANGE UP (ref 3.8–10.5)

## 2020-07-19 RX ORDER — CARVEDILOL PHOSPHATE 80 MG/1
25 CAPSULE, EXTENDED RELEASE ORAL EVERY 12 HOURS
Refills: 0 | Status: DISCONTINUED | OUTPATIENT
Start: 2020-07-19 | End: 2020-08-03

## 2020-07-19 RX ADMIN — Medication 650 MILLIGRAM(S): at 13:30

## 2020-07-19 RX ADMIN — INSULIN GLARGINE 10 UNIT(S): 100 INJECTION, SOLUTION SUBCUTANEOUS at 21:19

## 2020-07-19 RX ADMIN — Medication 25 MILLIGRAM(S): at 21:03

## 2020-07-19 RX ADMIN — CLOPIDOGREL BISULFATE 75 MILLIGRAM(S): 75 TABLET, FILM COATED ORAL at 12:39

## 2020-07-19 RX ADMIN — Medication 650 MILLIGRAM(S): at 12:37

## 2020-07-19 RX ADMIN — ATORVASTATIN CALCIUM 80 MILLIGRAM(S): 80 TABLET, FILM COATED ORAL at 21:03

## 2020-07-19 RX ADMIN — Medication 25 MILLIGRAM(S): at 12:41

## 2020-07-19 RX ADMIN — FINASTERIDE 5 MILLIGRAM(S): 5 TABLET, FILM COATED ORAL at 12:39

## 2020-07-19 RX ADMIN — TAMSULOSIN HYDROCHLORIDE 0.4 MILLIGRAM(S): 0.4 CAPSULE ORAL at 21:03

## 2020-07-19 RX ADMIN — Medication 325 MILLIGRAM(S): at 12:39

## 2020-07-19 RX ADMIN — Medication 81 MILLIGRAM(S): at 12:40

## 2020-07-19 RX ADMIN — CARVEDILOL PHOSPHATE 25 MILLIGRAM(S): 80 CAPSULE, EXTENDED RELEASE ORAL at 19:28

## 2020-07-19 NOTE — PROGRESS NOTE ADULT - ASSESSMENT
Patient is a 69 Male pmhx DM, HTN, HLD, CAD s/p CABG, cath ([facility] 3/19/2018), MICU admission for influenza complicated by respiratory failure, JAKOB, NSTEMI, HF, A&Ox1 German speaking only, difficult to obtain information from. Collateral history obtained from wife, states patient had a fall on 7/1/2020 and has been complaining of L hip pain and since fall has been crawling to get around. Reports that he has been more confused and weak as well. Denies any chest pain, shortness of breath, fevers or chills.

## 2020-07-19 NOTE — PROGRESS NOTE ADULT - ASSESSMENT
69 Male pmhx DM, HTN, HLD, CAD s/p CABG 2014, cath ([facility] 3/19/2018), MICU admission for influenza complicated by respiratory failure, JAKOB, NSTEMI, HF admitted for right hip pain. Pt found to have renal failure and hyperkalemia. Nephrology consulted for renal failure.     JAKOB  Pt admitted with elevated SCr 3.55  Renal function worsened  --possibly sec to diuretics  Improving fluid status -- continue  lasix to 40mg QD PO  Renal sonogram with no hydro   Monitor CBC with diff   Avoid nephrotoxins    Hyperkalemia  check post void bladder scan  improved with medical mgn  low K diet  MOnitor serum K      HTN  BP fluctuating   continue current meds-- increase hydralazine to 50mg TID If needed  Low salt diet  MOnitor BP      Proteinuria  2.3gm proteinuria  Follow up  Vasculitis work up   hep B reactive (possible past infection) , K/L ok, C3, C4 not low, ANCA neg   serum immunofixation suggestive of one kappa and one macarena band-- check spep   Monitor at present

## 2020-07-20 ENCOUNTER — TRANSCRIPTION ENCOUNTER (OUTPATIENT)
Age: 70
End: 2020-07-20

## 2020-07-20 LAB
% ALBUMIN: 44.5 % — SIGNIFICANT CHANGE UP
% ALPHA 1: 6.5 % — SIGNIFICANT CHANGE UP
% ALPHA 2: 13.5 % — SIGNIFICANT CHANGE UP
% BETA: 12.2 % — SIGNIFICANT CHANGE UP
% GAMMA: 23.3 % — SIGNIFICANT CHANGE UP
% M SPIKE: SIGNIFICANT CHANGE UP
ALBUMIN SERPL ELPH-MCNC: 2.7 G/DL — LOW (ref 3.6–5.5)
ALBUMIN/GLOB SERPL ELPH: 0.8 RATIO — SIGNIFICANT CHANGE UP
ALPHA1 GLOB SERPL ELPH-MCNC: 0.4 G/DL — SIGNIFICANT CHANGE UP (ref 0.1–0.4)
ALPHA2 GLOB SERPL ELPH-MCNC: 0.8 G/DL — SIGNIFICANT CHANGE UP (ref 0.5–1)
ANION GAP SERPL CALC-SCNC: 13 MMOL/L — SIGNIFICANT CHANGE UP (ref 5–17)
B-GLOBULIN SERPL ELPH-MCNC: 0.7 G/DL — SIGNIFICANT CHANGE UP (ref 0.5–1)
BUN SERPL-MCNC: 86 MG/DL — HIGH (ref 7–23)
CALCIUM SERPL-MCNC: 8.9 MG/DL — SIGNIFICANT CHANGE UP (ref 8.4–10.5)
CHLORIDE SERPL-SCNC: 104 MMOL/L — SIGNIFICANT CHANGE UP (ref 96–108)
CO2 SERPL-SCNC: 19 MMOL/L — LOW (ref 22–31)
CREAT SERPL-MCNC: 3.93 MG/DL — HIGH (ref 0.5–1.3)
GAMMA GLOBULIN: 1.4 G/DL — SIGNIFICANT CHANGE UP (ref 0.6–1.6)
GLUCOSE SERPL-MCNC: 230 MG/DL — HIGH (ref 70–99)
M-SPIKE: SIGNIFICANT CHANGE UP (ref 0–0)
PHOSPHOLIPASE A2 RECEPTOR ELISA: <2 RU/ML — SIGNIFICANT CHANGE UP
PHOSPHOLIPASE A2 RECEPTOR IFA: NEGATIVE — SIGNIFICANT CHANGE UP
POTASSIUM SERPL-MCNC: 4.7 MMOL/L — SIGNIFICANT CHANGE UP (ref 3.5–5.3)
POTASSIUM SERPL-SCNC: 4.7 MMOL/L — SIGNIFICANT CHANGE UP (ref 3.5–5.3)
PROT PATTERN SERPL ELPH-IMP: SIGNIFICANT CHANGE UP
SODIUM SERPL-SCNC: 136 MMOL/L — SIGNIFICANT CHANGE UP (ref 135–145)

## 2020-07-20 RX ORDER — HYDRALAZINE HCL 50 MG
50 TABLET ORAL THREE TIMES A DAY
Refills: 0 | Status: DISCONTINUED | OUTPATIENT
Start: 2020-07-20 | End: 2020-08-03

## 2020-07-20 RX ADMIN — CARVEDILOL PHOSPHATE 25 MILLIGRAM(S): 80 CAPSULE, EXTENDED RELEASE ORAL at 05:35

## 2020-07-20 RX ADMIN — Medication 25 MILLIGRAM(S): at 22:10

## 2020-07-20 RX ADMIN — Medication 81 MILLIGRAM(S): at 13:50

## 2020-07-20 RX ADMIN — CLOPIDOGREL BISULFATE 75 MILLIGRAM(S): 75 TABLET, FILM COATED ORAL at 13:51

## 2020-07-20 RX ADMIN — CARVEDILOL PHOSPHATE 25 MILLIGRAM(S): 80 CAPSULE, EXTENDED RELEASE ORAL at 17:41

## 2020-07-20 RX ADMIN — FINASTERIDE 5 MILLIGRAM(S): 5 TABLET, FILM COATED ORAL at 13:51

## 2020-07-20 RX ADMIN — Medication 325 MILLIGRAM(S): at 13:51

## 2020-07-20 RX ADMIN — Medication 1: at 13:50

## 2020-07-20 RX ADMIN — Medication 25 MILLIGRAM(S): at 13:51

## 2020-07-20 RX ADMIN — Medication 40 MILLIGRAM(S): at 05:35

## 2020-07-20 RX ADMIN — ATORVASTATIN CALCIUM 80 MILLIGRAM(S): 80 TABLET, FILM COATED ORAL at 22:10

## 2020-07-20 RX ADMIN — Medication 1: at 17:41

## 2020-07-20 RX ADMIN — Medication 1: at 08:37

## 2020-07-20 RX ADMIN — TAMSULOSIN HYDROCHLORIDE 0.4 MILLIGRAM(S): 0.4 CAPSULE ORAL at 22:10

## 2020-07-20 RX ADMIN — INSULIN GLARGINE 10 UNIT(S): 100 INJECTION, SOLUTION SUBCUTANEOUS at 22:10

## 2020-07-20 RX ADMIN — Medication 25 MILLIGRAM(S): at 05:35

## 2020-07-20 NOTE — PROGRESS NOTE ADULT - ASSESSMENT
Patient is a 69 Male pmhx DM, HTN, HLD, CAD s/p CABG, cath ([facility] 3/19/2018), MICU admission for influenza complicated by respiratory failure, JAKOB, NSTEMI, HF, dementia presents with fall.     Problem/Recommendation - 1:  Problem: Anemia. Recommendation: Unclear baseline. No overt GI bleeding reported. Patient denies hx of prior GI  evaluation. Anemia pattern on iron studies is consistent with anemia of chronic disease, renal insufficiency, as such, there is no strong indication for endoscopic workup, especially in the inpatient setting.  -would advise f/u with his outpatient GI to ensure up to date on recommended cancer screening etc.     Problem/Recommendation - 2:  ·  Problem: Diabetes.      Problem/Recommendation - 3:  ·  Problem: Acute kidney injury.  Recommendation: unknown baseline creatinine  will need to obtain collateral info.      Problem/Recommendation - 4:  ·  Problem: Pubic ramus fracture.      Problem/Recommendation - 5:  ·  Problem: Elevated alk phos, likely bone source in the setting of renal insufficiency.  -can check GGT, or can w/u as an outpt. This is chronic.      Problem/Recommendation - 6:  Problem: Dementia. Recommendation: Unclear baseline mental status.    Attending Attestation:   Differential diagnosis and plan of care discussed with patient after the evaluation  65 Minutes spent on total encounter of which more than fifty percent of the encounter was spent counseling and/or coordinating care by the attending physician.  Advanced care planning was discussed with the patient and/or surrogate decision makers. Advanced care planning forms were discussed. The risks benefits and alternatives to pertinent gastrointestinal procedures and interventions were discussed in detail and all questions were answered. Duration: 30 Minutes.

## 2020-07-20 NOTE — DISCHARGE NOTE PROVIDER - CARE PROVIDERS DIRECT ADDRESSES
,DirectAddress_Unknown,DirectAddress_Unknown ,DirectAddress_Unknown,DirectAddress_Unknown,[email or web address]

## 2020-07-20 NOTE — PROGRESS NOTE ADULT - ASSESSMENT
69 Male pmhx DM, HTN, HLD, CAD s/p CABG 2014, cath ([facility] 3/19/2018), MICU admission for influenza complicated by respiratory failure, JAKOB, NSTEMI, HF admitted for right hip pain. Pt found to have renal failure and hyperkalemia. Nephrology consulted for renal failure.     CKD stage IV/V  Pt admitted with elevated SCr 3.55  renal function fluctuating -- 3.4-3.9 possibly his baseline   continue  lasix to 40mg QD PO  Renal sonogram with no hydro   Monitor CBC with diff   Avoid nephrotoxins    Hyperkalemia  check post void bladder scan  improved with medical mgn  low K diet  MOnitor serum K      HTN  BP fluctuating   continue current meds-- increase hydralazine to 50mg TID If needed  Low salt diet  MOnitor BP      Proteinuria  2.3gm proteinuria  Follow up  Vasculitis work up   hep B reactive (possible past infection) , K/L ok, C3, C4 not low, ANCA neg   serum immunofixation suggestive of one kappa and one macarena band-- check spep   Monitor at present

## 2020-07-20 NOTE — DISCHARGE NOTE PROVIDER - HOSPITAL COURSE
69 Male pmhx DM, HTN, HLD, CAD s/p CABG, cath ([facility] 3/19/2018), MICU admission for influenza complicated by respiratory failure, JAKOB, NSTEMI, HF, A&Ox1 Thai speaking. Collateral history obtained from wife, states patient had a fall on 7/1/2020 and has been complaining of L hip pain and since fall has been crawling to get around. Reports that he has been more confused and weak as well. Denies any chest pain, shortness of breath, fevers or chills.        Pubic ramus fracture.  Ortho eval & no surgical intervention     fall precautions & pain control.  Seen and followed by PT        Acute kidney injury.  Monitored BUN/Cr    avoided nephrotoxic meds on home enlapril (held  for admission)        Acute systolic congestive heart failure.  Hx of CABG     ASA and statin & cont. with lasix 40.         Diabetes - sliding scale / consistent carbohydrate diet     Held lantus initially and resumed slowly        Elevated troponin & trended and ekg & cards eval called    Echo followed         Anemia - followed & monitored hgb level         On 7/20 pt. discharged to [facility]  - d/w Dr. Velázquez 69 Male pmhx DM, HTN, HLD, CAD s/p CABG, cath ([facility] 3/19/2018), MICU admission for influenza complicated by respiratory failure, JAKOB, NSTEMI, HF, A&Ox1 Cypriot speaking. Collateral history obtained from wife, states patient had a fall on 7/1/2020 and has been complaining of L hip pain and since fall has been crawling to get around. Reports that he has been more confused and weak as well. Denies any chest pain, shortness of breath, fevers or chills.        Pubic ramus fracture.  Ortho eval & no surgical intervention     fall precautions & pain control.  Seen and followed by PT        Acute kidney injury. CKD stage IV / Monitored BUN/Cr    Admitted w/ elevated SCr 3.55 renal function fluctuating - 3.4-3.9 possibly his baseline     Continue  lasix to 40mg QD PO    Renal sonogram with no hydro  /  monitored CBC with diff     Avoided nephrotoxins - meds on home enlapril (held  for admission)        Acute systolic congestive heart failure.  Hx of CABG     ASA and statin & cont. with lasix 40.         Diabetes - FS ac and hs / consistent carbohydrate diet     Held lantus initially and resumed slowly        Elevated troponin & trended and ekg & cards eval called / Echo          Anemia - followed & monitored hgb level throughout        On 7/20 pt. discharged to [facility]  - d/w Dr. Velázquez 69 Male pmhx DM, HTN, HLD, CAD s/p CABG, cath ([facility] 3/19/2018), MICU admission for influenza complicated by respiratory failure, JAKOB, NSTEMI, HF, A&Ox1 Kosovan speaking. Collateral history obtained from wife, states patient had a fall on 7/1/2020 and has been complaining of L hip pain and since fall has been crawling to get around. Reports that he has been more confused and weak as well. Denied any chest pain, shortness of breath, fevers or chills.        Pubic ramus fracture.  Ortho eval & no surgical intervention     fall precautions & pain control.  Seen and followed by PT        Acute kidney injury. CKD stage IV / Monitored BUN/Cr    Admitted w/ elevated SCr 3.55 renal function fluctuating - 3.4-3.9 possibly his baseline     Continue  lasix to 40mg QD PO    Renal sonogram with no hydro  /  monitored CBC with diff     Avoided nephrotoxins - meds on home enlapril (held  for admission)        Acute systolic congestive heart failure.  Hx of CABG     ASA and statin & cont. with lasix 40.         Diabetes - FS ac and hs / consistent carbohydrate diet     Held lantus initially and resumed slowly        Elevated troponin & trended and ekg & cards eval called / Echo          Anemia - followed & monitored hgb level throughout        On 7/20 pt. discharged to [facility]  - d/w Dr. Velázquez 69 Male pmhx DM, HTN, HLD, CAD s/p CABG, cath ([facility] 3/19/2018), MICU admission for influenza complicated by respiratory failure, JAKOB, NSTEMI, HF, A&Ox1 Malaysian speaking. Collateral history obtained from wife, states patient had a fall on 7/1/2020 and has been complaining of L hip pain and since fall has been crawling to get around. Reports that he has been more confused and weak as well. Denied any chest pain, shortness of breath, fevers or chills.        Pubic ramus fracture.  Ortho eval & no surgical intervention     fall precautions & pain control.  Seen and followed by PT        Acute kidney injury. CKD stage IV / Monitored BUN/Cr    Admitted w/ elevated SCr 3.55 renal function fluctuating - 3.4-3.9 possibly his baseline     Continue  lasix to 40mg QD PO    Renal sonogram with no hydro  /  monitored CBC with diff     Avoided nephrotoxins - meds on home enlapril (held  for admission)        Acute systolic congestive heart failure.  Hx of CABG     ASA and statin & cont. with lasix 40.         Diabetes - FS ac and hs / consistent carbohydrate diet     Held lantus initially and resumed slowly        Elevated troponin & trended and ekg & cards eval called / Echo          Anemia - followed & monitored hgb level throughout - received multiple blood transfusions.    Colonoscopy performed - the perianal and digital rectal examinations were normal; Formed stool was found in the rectum so the procedure was aborted.    Enteroscopy perfomred - - Active bleed in the distal 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 Dieulafoy. Successful hemostasis with argon plasma coagulation.    CBC monitored.        Stable for discharge to [facility] with PMD, Renal, and GI follow up. 69 Male pmhx DM, HTN, HLD, CAD s/p CABG, cath ([facility] 3/19/2018), MICU admission for influenza complicated by respiratory failure, JAKOB, NSTEMI, HF, A&Ox1 Guamanian speaking. Collateral history obtained from wife, states patient had a fall on 7/1/2020 and has been complaining of L hip pain and since fall has been crawling to get around. Reports that he has been more confused and weak as well. Denied any chest pain, shortness of breath, fevers or chills.        Pubic ramus fracture.  Ortho eval & no surgical intervention     fall precautions & pain control.  Seen and followed by PT        Acute kidney injury. CKD stage IV / Monitored BUN/Cr    Admitted w/ elevated SCr 3.55 renal function fluctuating - 3.4-3.9 possibly his baseline     Lasix on hold secondary to elevated creatinine.    Renal sonogram with no hydro  /  monitored CBC with diff     Avoided nephrotoxins - meds on home enlapril (held  for admission)        Acute systolic congestive heart failure.  Hx of CABG     ASA and statin         Diabetes - FS ac and hs / consistent carbohydrate diet     Held lantus initially and resumed slowly        Elevated troponin & trended and ekg & cards eval called / Echo          Anemia - followed & monitored hgb level throughout - received multiple blood transfusions.    Colonoscopy performed - the perianal and digital rectal examinations were normal; Formed stool was found in the rectum so the procedure was aborted.    Enteroscopy perfomred - - Active bleed in the distal 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 Dieulafoy. Successful hemostasis with argon plasma coagulation.    CBC monitored.        Stable for discharge to [facility] with PMD, Renal, and GI follow up. 69 Male pmhx DM, HTN, HLD, CAD s/p CABG, cath ([facility] 3/19/2018), MICU admission for influenza complicated by respiratory failure, JAKOB, NSTEMI, HF, A&Ox1 Ugandan speaking. Collateral history obtained from wife, states patient had a fall on 7/1/2020 and has been complaining of L hip pain and since fall has been crawling to get around. Reports that he has been more confused and weak as well. Denied any chest pain, shortness of breath, fevers or chills.        Pubic ramus fracture.  Ortho eval & no surgical intervention     fall precautions & pain control.  Seen and followed by PT        Acute kidney injury. CKD stage IV / Monitored BUN/Cr    Admitted w/ elevated SCr 3.55 renal function fluctuating - 3.4-3.9 possibly his baseline     Lasix on hold secondary to elevated creatinine.    Renal sonogram with no hydro  /  monitored CBC with diff     Avoided nephrotoxins - meds on home enlapril (held  for admission)        Acute systolic congestive heart failure.  Hx of CABG     ASA and statin         Diabetes - FS ac and hs / consistent carbohydrate diet     Held lantus initially and resumed slowly        Elevated troponin & trended and ekg & cards eval called / Echo          Anemia - followed & monitored hgb level throughout - received multiple blood transfusions.    Colonoscopy performed - the perianal and digital rectal examinations were normal; Formed stool was found in the rectum so the procedure was aborted.    Enteroscopy perfomred - - Active bleed in the distal 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 Dieulafoy. Successful hemostasis with argon plasma coagulation.    CBC monitored.        Stable for discharge to [facility] with PMD, Renal, Hematology, and GI follow up.

## 2020-07-20 NOTE — PROGRESS NOTE ADULT - ASSESSMENT
Patient is a 69 Male pmhx DM, HTN, HLD, CAD s/p CABG, cath ([facility] 3/19/2018), MICU admission for influenza complicated by respiratory failure, JAKOB, NSTEMI, HF, dementia presents with fall.     Problem/Recommendation - 1:  Problem: Anemia. Recommendation: Unclear baseline. No overt GI bleeding reported. Patient denies hx of prior GI  evaluation. Anemia pattern on iron studies is consistent with anemia of chronic disease, renal insufficiency, as such, there is no strong indication for endoscopic workup, especially in the inpatient setting.  -would advise f/u with his outpatient GI to ensure up to date on recommended cancer screening etc.     Problem/Recommendation - 2:  ·  Problem: Diabetes.      Problem/Recommendation - 3:  ·  Problem: Acute kidney injury.  Recommendation: unknown baseline creatinine  will need to obtain collateral info.      Problem/Recommendation - 4:  ·  Problem: Pubic ramus fracture.      Problem/Recommendation - 5:  ·  Problem: Elevated alk phos, likely bone source in the setting of renal insufficiency.  -can check GGT, or can w/u as an outpt. This is chronic.      Problem/Recommendation - 6:  Problem: Dementia. Recommendation: Unclear baseline mental status.    Attending Attestation:   Differential diagnosis and plan of care discussed with patient after the evaluation  65 Minutes spent on total encounter of which more than fifty percent of the encounter was spent counseling and/or coordinating care by the attending physician.  Advanced care planning was discussed with the patient and/or surrogate decision makers. Advanced care planning forms were discussed. The risks benefits and alternatives to pertinent gastrointestinal procedures and interventions were discussed in detail and all questions were answered. Duration: 30 Minutes.

## 2020-07-20 NOTE — DISCHARGE NOTE PROVIDER - NSDCFUADDINST_GEN_ALL_CORE_FT
***You must have a CBC drawn on 8/4/2020 - if your Hemoglobin/Hematocrit are stable (on 8/3/2020, your H/H was 7.7/24.4), you will need to restart your Plavix 75mg oral daily***    You must have a CBC draw on 8/6/2020, and, if stable, weekly after that.

## 2020-07-20 NOTE — PROGRESS NOTE ADULT - ASSESSMENT
Patient is a 69 Male pmhx DM, HTN, HLD, CAD s/p CABG, cath ([facility] 3/19/2018), MICU admission for influenza complicated by respiratory failure, JAKOB, NSTEMI, HF, A&Ox1 [place] speaking only, difficult to obtain information from. Collateral history obtained from wife, states patient had a fall on 7/1/2020 and has been complaining of L hip pain and since fall has been crawling to get around. Reports that he has been more confused and weak as well. Denies any chest pain, shortness of breath, fevers or chills.

## 2020-07-20 NOTE — DISCHARGE NOTE PROVIDER - PROVIDER TOKENS
PROVIDER:[TOKEN:[[id number]]],PROVIDER:[TOKEN:[[id number]]] PROVIDER:[TOKEN:[[id number]]],PROVIDER:[TOKEN:[[id number]]],PROVIDER:[TOKEN:[[id number]]]

## 2020-07-20 NOTE — DISCHARGE NOTE PROVIDER - NSDCMRMEDTOKEN_GEN_ALL_CORE_FT
alendronate weekly: 70 milligram(s) orally once a week  aspirin 81 mg oral tablet: 1 tab(s) orally once a day  atorvastatin 80 mg oral tablet: 1 tab(s) orally once a day  Basaglar KwikPen 100 units/mL subcutaneous solution: 10 unit(s) subcutaneous once a day  carvedilol 25 mg oral tablet: 1 tab(s) orally once a day  clopidogrel 75 mg oral tablet: 1 tab(s) orally once a day  cyproheptadine 2 mg/5 mL oral syrup: 5 milliliter(s) orally once a day  Entresto 24 mg-26 mg oral tablet: 1 tab(s) orally 2 times a day  ferrous sulfate 325 mg (65 mg elemental iron) oral tablet: 1 tab(s) orally once a day  finasteride 5 mg oral tablet: 1 tab(s) orally once a day  furosemide 40 mg oral tablet: 1 tab(s) orally once a day  gabapentin 300 mg oral tablet: 1 tab(s) orally once a day  metoprolol succinate 25 mg oral tablet, extended release: 1 tab(s) orally once a day (last picked up 6/2020)  NIFEdipine 30 mg oral tablet, extended release: 1 tab(s) orally once a day  NovoLOG 100 units/mL injectable solution: 10 unit(s) injectable 3 times a day  tamsulosin 0.4 mg oral capsule: 1 cap(s) orally once a day acetaminophen 325 mg oral tablet: 2 tab(s) orally every 6 hours, As needed, Moderate Pain (4 - 6)  alendronate weekly: 70 milligram(s) orally once a week  aspirin 81 mg oral delayed release tablet: 1 tab(s) orally once a day  aspirin 81 mg oral tablet: 1 tab(s) orally once a day  atorvastatin 80 mg oral tablet: 1 tab(s) orally once a day  atorvastatin 80 mg oral tablet: 1 tab(s) orally once a day (at bedtime)  Basaglar KwikPen 100 units/mL subcutaneous solution: 10 unit(s) subcutaneous once a day  carvedilol 25 mg oral tablet: 1 tab(s) orally once a day  carvedilol 25 mg oral tablet: 1 tab(s) orally every 12 hours  clopidogrel 75 mg oral tablet: 1 tab(s) orally once a day  clopidogrel 75 mg oral tablet: 1 tab(s) orally once a day  cyproheptadine 2 mg/5 mL oral syrup: 5 milliliter(s) orally once a day  Entresto 24 mg-26 mg oral tablet: 1 tab(s) orally 2 times a day  ferrous sulfate 325 mg (65 mg elemental iron) oral tablet: 1 tab(s) orally once a day  ferrous sulfate 325 mg (65 mg elemental iron) oral tablet: 1 tab(s) orally once a day  finasteride 5 mg oral tablet: 1 tab(s) orally once a day  finasteride 5 mg oral tablet: 1 tab(s) orally once a day  furosemide 40 mg oral tablet: 1 tab(s) orally once a day  furosemide 40 mg oral tablet: 1 tab(s) orally once a day  gabapentin 300 mg oral tablet: 1 tab(s) orally once a day  HumaLOG: subcutaneous 3 times a day (before meals)  1 Unit(s) if Glucose 151 - 200  2 Unit(s) if Glucose 201 - 250  3 Unit(s) if Glucose 251 - 300  4 Unit(s) if Glucose 301 - 350  5 Unit(s) if Glucose 351 - 400  6 Unit(s) if Glucose Greater Than 400  HumaLOG: subcutaneous once a day (at bedtime)  0 Unit(s) if Glucose 0 - 250  1 Unit(s) if Glucose 251 - 300  2 Unit(s) if Glucose 301 - 350  3 Unit(s) if Glucose 351 - 400  4 Unit(s) if Glucose Greater Than 400  hydrALAZINE 50 mg oral tablet: 1 tab(s) orally 3 times a day  insulin glargine: 10 unit(s) subcutaneous once a day (at bedtime)  metoprolol succinate 25 mg oral tablet, extended release: 1 tab(s) orally once a day (last picked up 6/2020)  NIFEdipine 30 mg oral tablet, extended release: 1 tab(s) orally once a day  NovoLOG 100 units/mL injectable solution: 10 unit(s) injectable 3 times a day  tamsulosin 0.4 mg oral capsule: 1 cap(s) orally once a day  tamsulosin 0.4 mg oral capsule: 1 cap(s) orally once a day (at bedtime) acetaminophen 325 mg oral tablet: 2 tab(s) orally every 6 hours, As needed, Moderate Pain (4 - 6)  alendronate weekly: 70 milligram(s) orally once a week  aspirin 81 mg oral delayed release tablet: 1 tab(s) orally once a day  aspirin 81 mg oral tablet: 1 tab(s) orally once a day  atorvastatin 80 mg oral tablet: 1 tab(s) orally once a day  atorvastatin 80 mg oral tablet: 1 tab(s) orally once a day (at bedtime)  Basaglar KwikPen 100 units/mL subcutaneous solution: 10 unit(s) subcutaneous once a day  carvedilol 25 mg oral tablet: 1 tab(s) orally once a day  carvedilol 25 mg oral tablet: 1 tab(s) orally every 12 hours  clopidogrel 75 mg oral tablet: 1 tab(s) orally once a day  clopidogrel 75 mg oral tablet: 1 tab(s) orally once a day  cyproheptadine 2 mg/5 mL oral syrup: 5 milliliter(s) orally once a day  Entresto 24 mg-26 mg oral tablet: 1 tab(s) orally 2 times a day  ferrous sulfate 325 mg (65 mg elemental iron) oral tablet: 1 tab(s) orally once a day  ferrous sulfate 325 mg (65 mg elemental iron) oral tablet: 1 tab(s) orally once a day  finasteride 5 mg oral tablet: 1 tab(s) orally once a day  finasteride 5 mg oral tablet: 1 tab(s) orally once a day  furosemide 40 mg oral tablet: 1 tab(s) orally once a day  furosemide 40 mg oral tablet: 1 tab(s) orally once a day  gabapentin 300 mg oral tablet: 1 tab(s) orally once a day  HumaLOG: subcutaneous 3 times a day (before meals)  1 Unit(s) if Glucose 151 - 200  2 Unit(s) if Glucose 201 - 250  3 Unit(s) if Glucose 251 - 300  4 Unit(s) if Glucose 301 - 350  5 Unit(s) if Glucose 351 - 400  6 Unit(s) if Glucose Greater Than 400  HumaLOG: subcutaneous once a day (at bedtime)  0 Unit(s) if Glucose 0 - 250  1 Unit(s) if Glucose 251 - 300  2 Unit(s) if Glucose 301 - 350  3 Unit(s) if Glucose 351 - 400  4 Unit(s) if Glucose Greater Than 400  hydrALAZINE 50 mg oral tablet: 1 tab(s) orally 3 times a day  insulin glargine: 10 unit(s) subcutaneous once a day (at bedtime)  metoprolol succinate 25 mg oral tablet, extended release: 1 tab(s) orally once a day (last picked up 6/2020)  NIFEdipine 30 mg oral tablet, extended release: 1 tab(s) orally once a day  NovoLOG 100 units/mL injectable solution: 10 unit(s) injectable 3 times a day  Rolling Walker:   tamsulosin 0.4 mg oral capsule: 1 cap(s) orally once a day  tamsulosin 0.4 mg oral capsule: 1 cap(s) orally once a day (at bedtime) acetaminophen 325 mg oral tablet: 2 tab(s) orally every 6 hours, As needed, Moderate Pain (4 - 6)  alendronate weekly: 70 milligram(s) orally once a week  aspirin 81 mg oral delayed release tablet: 1 tab(s) orally once a day  atorvastatin 80 mg oral tablet: 1 tab(s) orally once a day (at bedtime)  carvedilol 25 mg oral tablet: 1 tab(s) orally every 12 hours  cyproheptadine 2 mg/5 mL oral syrup: 5 milliliter(s) orally once a day  ferrous sulfate 325 mg (65 mg elemental iron) oral tablet: 1 tab(s) orally once a day  finasteride 5 mg oral tablet: 1 tab(s) orally once a day  gabapentin 300 mg oral tablet: 1 tab(s) orally once a day  HumaLOG: subcutaneous 3 times a day (before meals)  1 Unit(s) if Glucose 151 - 200  2 Unit(s) if Glucose 201 - 250  3 Unit(s) if Glucose 251 - 300  4 Unit(s) if Glucose 301 - 350  5 Unit(s) if Glucose 351 - 400  6 Unit(s) if Glucose Greater Than 400  HumaLOG: subcutaneous once a day (at bedtime)  0 Unit(s) if Glucose 0 - 250  1 Unit(s) if Glucose 251 - 300  2 Unit(s) if Glucose 301 - 350  3 Unit(s) if Glucose 351 - 400  4 Unit(s) if Glucose Greater Than 400  hydrALAZINE 50 mg oral tablet: 1 tab(s) orally 3 times a day  insulin glargine: 10 unit(s) subcutaneous once a day (at bedtime)  NIFEdipine 30 mg oral tablet, extended release: 1 tab(s) orally once a day  Rolling Walker:   tamsulosin 0.4 mg oral capsule: 1 cap(s) orally once a day (at bedtime) acetaminophen 325 mg oral tablet: 2 tab(s) orally every 6 hours, As needed, Moderate Pain (4 - 6)  alendronate weekly: 70 milligram(s) orally once a week  aspirin 81 mg oral delayed release tablet: 1 tab(s) orally once a day  atorvastatin 80 mg oral tablet: 1 tab(s) orally once a day (at bedtime)  bisacodyl 5 mg oral delayed release tablet: 2 tab(s) orally once a day (at bedtime)  carvedilol 25 mg oral tablet: 1 tab(s) orally every 12 hours  cyproheptadine 2 mg/5 mL oral syrup: 5 milliliter(s) orally once a day  ferrous sulfate 325 mg (65 mg elemental iron) oral tablet: 1 tab(s) orally once a day  finasteride 5 mg oral tablet: 1 tab(s) orally once a day  gabapentin 300 mg oral tablet: 1 tab(s) orally once a day  HumaLOG: subcutaneous once a day (at bedtime)  0 Unit(s) if Glucose 0 - 250  1 Unit(s) if Glucose 251 - 300  2 Unit(s) if Glucose 301 - 350  3 Unit(s) if Glucose 351 - 400  4 Unit(s) if Glucose Greater Than 400, and contact MD  HumaLOG: subcutaneous 3 times a day (before meals)  1 Unit(s) if Glucose 151 - 200  2 Unit(s) if Glucose 201 - 250  3 Unit(s) if Glucose 251 - 300  4 Unit(s) if Glucose 301 - 350  5 Unit(s) if Glucose 351 - 400  6 Unit(s) if Glucose Greater Than 400, and contact MD  hydrALAZINE 50 mg oral tablet: 1 tab(s) orally 3 times a day  insulin glargine: 10 unit(s) subcutaneous once a day (at bedtime)  Protonix 40 mg oral delayed release tablet: 1 tab(s) orally 2 times a day  Take through 8/29/2020, and then decrease to Protonix 40mg oral daily  Rolling Walker:   sodium bicarbonate 650 mg oral tablet: 2 tab(s) orally 3 times a day  tamsulosin 0.4 mg oral capsule: 1 cap(s) orally once a day (at bedtime)

## 2020-07-20 NOTE — DISCHARGE NOTE PROVIDER - NSDCCPCAREPLAN_GEN_ALL_CORE_FT
PRINCIPAL DISCHARGE DIAGNOSIS  Diagnosis: Pubic ramus fracture  Assessment and Plan of Treatment: Pubic ramus fracture.    Ortho eval & no surgical intervention   Fall precautions & pain control.    Seen and followed by PT      SECONDARY DISCHARGE DIAGNOSES  Diagnosis: Acute kidney injury  Assessment and Plan of Treatment: Acute kidney injury  Monitored BUN/Cr  Avoided nephrotoxic meds on home enlapril (held  for admission)    Diagnosis: Acute systolic congestive heart failure  Assessment and Plan of Treatment: Acute systolic congestive heart failure  Hx of CABG   ASA and statin & cont. with lasix 40.    Diagnosis: Diabetes  Assessment and Plan of Treatment: Diabetes - sliding scale / consistent carbohydrate diet   Held lantus initially and resumed slowly    Diagnosis: Anemia  Assessment and Plan of Treatment: Anemia - followed & monitored hgb level    Diagnosis: Elevated troponin  Assessment and Plan of Treatment: Elevated troponin & trended and ekg & cards eval called  Echo followed PRINCIPAL DISCHARGE DIAGNOSIS  Diagnosis: Pubic ramus fracture  Assessment and Plan of Treatment: Pubic ramus fracture.    Ortho eval & no surgical intervention   Fall precautions & pain control.    Seen and followed by PT      SECONDARY DISCHARGE DIAGNOSES  Diagnosis: Acute kidney injury  Assessment and Plan of Treatment: Acute kidney injury. CKD stage IV / Monitored BUN/Cr  Admitted w/ elevated SCr 3.55 renal function fluctuating - 3.4-3.9 possibly his baseline   Continue  lasix to 40mg QD PO  Renal sonogram with no hydro  /  monitored CBC with diff   Avoided nephrotoxins - meds on home enlapril (held  for admission)    Diagnosis: Acute systolic congestive heart failure  Assessment and Plan of Treatment: Acute systolic congestive heart failure  Hx of CABG   ASA and statin & cont. with lasix 40.    Diagnosis: Diabetes  Assessment and Plan of Treatment: Diabetes - sliding scale / consistent carbohydrate diet   Held lantus initially and resumed slowly    Diagnosis: Anemia  Assessment and Plan of Treatment: Anemia - followed & monitored hgb level    Diagnosis: Elevated troponin  Assessment and Plan of Treatment: Elevated troponin & trended and ekg   Cards eval called  Echo followed PRINCIPAL DISCHARGE DIAGNOSIS  Diagnosis: Pubic ramus fracture  Assessment and Plan of Treatment: Pubic ramus fracture.    Ortho eval & no surgical intervention   Fall precautions & pain control.    Seen and followed by PT      SECONDARY DISCHARGE DIAGNOSES  Diagnosis: Anemia  Assessment and Plan of Treatment: You must have a CBC drawn tomorrow (8/4/2020) and on 8/6/2020 to monitor.  Monitor closely for black stools or any other signs of bleeding.  There are 2 common types of GI Bleed, Upper GI Bleed and Lower GI Bleed.  Upper GI Bleed affects the esophagus, stomach, and first part of the small intestine. Lower GI Bleed affects the colon and rectum.  Upper GI Bleed signs and symptoms to notify your Health Care Provider are vomiting blood, or coffee ground vomitus, and bowel movements that look like black tar.  Lower GI Bleed signs and symptoms to notify your health care provider are bright red bloody bowel movements.   Take your medications as prescribed by your Gastroenterologist.  If you have had an Endoscopy or Colonoscopy, follow up with your Gastroenterologist for Pathology results.  Avoid NSAIDs unless your Health Care Provider tells you that it is ok (Aspirin, Ibuprofen, Advil, Motrin, Aleve).  Follow up with your Gastroenterologist within 1-2 weeks of discharge.      Diagnosis: Elevated troponin  Assessment and Plan of Treatment: Elevated troponin trended and ekg monitored.  Cards eval called  Echo followed    Diagnosis: Diabetes  Assessment and Plan of Treatment: Make sure you get your HgA1c checked every three months.  If you take oral diabetes medications, check your blood glucose two times a day.  If you take insulin, check your blood glucose before meals and at bedtime.  It's important not to skip any meals.  Keep a log of your blood glucose results and always take it with you to your doctor appointments.  Keep a list of your current medications including injectables and over the counter medications and bring this medication list with you to all your doctor appointments.  If you have not seen your ophthalmologist this year call for appointment.  Check your feet daily for redness, sores, or openings. Do not self treat. If no improvement in two days call your primary care physician for an appointment.  Low blood sugar (hypoglycemia) is a blood sugar below 70mg/dl. Check your blood sugar if you feel signs/symptoms of hypoglycemia. If your blood sugar is below 70 take 15 grams of carbohydrates (ex 4 oz of apple juice, 3-4 glucose tablets, or 4-6 oz of regular soda) wait 15 minutes and repeat blood sugar to make sure it comes up above 70.  If your blood sugar is above 70 and you are due for a meal, have a meal.  If you are not due for a meal have a snack.  This snack helps keeps your blood sugar at a safe range.      Diagnosis: Acute systolic congestive heart failure  Assessment and Plan of Treatment: Weigh yourself daily.  If you gain 3lbs in 3 days, or 5lbs in a week call your Health Care Provider.  Do not eat or drink foods containing more than 2000mg of salt (sodium) in your diet every day.  Call your Health Care Provider if you have any swelling or increased swelling in your feet, ankles, and/or stomach.  Take all of your medication as directed.  If you become dizzy call your Health Care Provider.      Diagnosis: Acute kidney injury  Assessment and Plan of Treatment: Acute kidney injury. CKD stage IV / Monitored BUN/Cr  Admitted w/ elevated SCr 3.55 renal function fluctuating - 3.4-3.9 possibly his baseline   Continue  lasix to 40mg QD PO  Renal sonogram with no hydro  /  monitored CBC with diff   Avoided nephrotoxins - meds on home enlapril (held  for admission) PRINCIPAL DISCHARGE DIAGNOSIS  Diagnosis: Pubic ramus fracture  Assessment and Plan of Treatment: Pubic ramus fracture.    Ortho eval & no surgical intervention   Fall precautions & pain control.    Seen and followed by PT      SECONDARY DISCHARGE DIAGNOSES  Diagnosis: Anemia  Assessment and Plan of Treatment: You must have a CBC drawn tomorrow (8/4/2020) and on 8/6/2020 to monitor.  After than, CBCs should be drawn weekly.  Monitor closely for black stools or any other signs of bleeding.  If your Hgb/Hct is stable on 8/4/2020, please restart Plavix 75mg oral daily.  Your Hgb/Hct on 8/3/2020 was 7.7/24/4.  There are 2 common types of GI Bleed, Upper GI Bleed and Lower GI Bleed.  Upper GI Bleed affects the esophagus, stomach, and first part of the small intestine. Lower GI Bleed affects the colon and rectum.  Upper GI Bleed signs and symptoms to notify your Health Care Provider are vomiting blood, or coffee ground vomitus, and bowel movements that look like black tar.  Lower GI Bleed signs and symptoms to notify your health care provider are bright red bloody bowel movements.   Take your medications as prescribed by your Gastroenterologist.  If you have had an Endoscopy or Colonoscopy, follow up with your Gastroenterologist for Pathology results.  Avoid NSAIDs unless your Health Care Provider tells you that it is ok (Aspirin, Ibuprofen, Advil, Motrin, Aleve).  Follow up with your Gastroenterologist within 1-2 weeks of discharge.      Diagnosis: Elevated troponin  Assessment and Plan of Treatment: Elevated troponin trended and ekg monitored.  Cards eval called  Echo followed    Diagnosis: Diabetes  Assessment and Plan of Treatment: Make sure you get your HgA1c checked every three months.  If you take oral diabetes medications, check your blood glucose two times a day.  If you take insulin, check your blood glucose before meals and at bedtime.  It's important not to skip any meals.  Keep a log of your blood glucose results and always take it with you to your doctor appointments.  Keep a list of your current medications including injectables and over the counter medications and bring this medication list with you to all your doctor appointments.  If you have not seen your ophthalmologist this year call for appointment.  Check your feet daily for redness, sores, or openings. Do not self treat. If no improvement in two days call your primary care physician for an appointment.  Low blood sugar (hypoglycemia) is a blood sugar below 70mg/dl. Check your blood sugar if you feel signs/symptoms of hypoglycemia. If your blood sugar is below 70 take 15 grams of carbohydrates (ex 4 oz of apple juice, 3-4 glucose tablets, or 4-6 oz of regular soda) wait 15 minutes and repeat blood sugar to make sure it comes up above 70.  If your blood sugar is above 70 and you are due for a meal, have a meal.  If you are not due for a meal have a snack.  This snack helps keeps your blood sugar at a safe range.      Diagnosis: Acute systolic congestive heart failure  Assessment and Plan of Treatment: Your Entresto and your lasix are on hold secondary to elevated creatinine.  You must follow up with your primary medical doctor and your cardiologist within one week to determine if it is okay to restart these medications.  Weigh yourself daily.  If you gain 3lbs in 3 days, or 5lbs in a week call your Health Care Provider.  Do not eat or drink foods containing more than 2000mg of salt (sodium) in your diet every day.  Call your Health Care Provider if you have any swelling or increased swelling in your feet, ankles, and/or stomach.  Take all of your medication as directed.  If you become dizzy call your Health Care Provider.      Diagnosis: Acute kidney injury  Assessment and Plan of Treatment: Acute kidney injury. CKD stage IV / Monitored BUN/Cr  Admitted w/ elevated SCr 3.55 renal function fluctuating - 3.4-3.9 possibly his baseline   Continue  lasix to 40mg QD PO  Renal sonogram with no hydro  /  monitored CBC with diff   Avoided nephrotoxins - meds on home enlapril (held  for admission) PRINCIPAL DISCHARGE DIAGNOSIS  Diagnosis: Pubic ramus fracture  Assessment and Plan of Treatment: Pubic ramus fracture.    Ortho eval & no surgical intervention   Fall precautions & pain control.    Seen and followed by PT  Follow up with Dr. Vance (orthopedic MD) as needed.      SECONDARY DISCHARGE DIAGNOSES  Diagnosis: Anemia  Assessment and Plan of Treatment: You must have a CBC drawn tomorrow (8/4/2020) and on 8/6/2020 to monitor.  After than, CBCs should be drawn weekly.  Monitor closely for black stools or any other signs of bleeding.  If your Hgb/Hct is stable on 8/4/2020, please restart Plavix 75mg oral daily.  Your Hgb/Hct on 8/3/2020 was 7.7/24/4.  There are 2 common types of GI Bleed, Upper GI Bleed and Lower GI Bleed.  Upper GI Bleed affects the esophagus, stomach, and first part of the small intestine. Lower GI Bleed affects the colon and rectum.  Upper GI Bleed signs and symptoms to notify your Health Care Provider are vomiting blood, or coffee ground vomitus, and bowel movements that look like black tar.  Lower GI Bleed signs and symptoms to notify your health care provider are bright red bloody bowel movements.   Take your medications as prescribed by your Gastroenterologist.  If you have had an Endoscopy or Colonoscopy, follow up with your Gastroenterologist for Pathology results.  Avoid NSAIDs unless your Health Care Provider tells you that it is ok (Aspirin, Ibuprofen, Advil, Motrin, Aleve).  Follow up with your Gastroenterologist within 1-2 weeks of discharge.      Diagnosis: Elevated troponin  Assessment and Plan of Treatment: Elevated troponin trended and ekg monitored.  Cards eval called  Echo followed    Diagnosis: Diabetes  Assessment and Plan of Treatment: Make sure you get your HgA1c checked every three months.  If you take oral diabetes medications, check your blood glucose two times a day.  If you take insulin, check your blood glucose before meals and at bedtime.  It's important not to skip any meals.  Keep a log of your blood glucose results and always take it with you to your doctor appointments.  Keep a list of your current medications including injectables and over the counter medications and bring this medication list with you to all your doctor appointments.  If you have not seen your ophthalmologist this year call for appointment.  Check your feet daily for redness, sores, or openings. Do not self treat. If no improvement in two days call your primary care physician for an appointment.  Low blood sugar (hypoglycemia) is a blood sugar below 70mg/dl. Check your blood sugar if you feel signs/symptoms of hypoglycemia. If your blood sugar is below 70 take 15 grams of carbohydrates (ex 4 oz of apple juice, 3-4 glucose tablets, or 4-6 oz of regular soda) wait 15 minutes and repeat blood sugar to make sure it comes up above 70.  If your blood sugar is above 70 and you are due for a meal, have a meal.  If you are not due for a meal have a snack.  This snack helps keeps your blood sugar at a safe range.      Diagnosis: Acute systolic congestive heart failure  Assessment and Plan of Treatment: Your Entresto and your lasix are on hold secondary to elevated creatinine.  You must follow up with your primary medical doctor and your cardiologist within one week to determine if it is okay to restart these medications.  Weigh yourself daily.  If you gain 3lbs in 3 days, or 5lbs in a week call your Health Care Provider.  Do not eat or drink foods containing more than 2000mg of salt (sodium) in your diet every day.  Call your Health Care Provider if you have any swelling or increased swelling in your feet, ankles, and/or stomach.  Take all of your medication as directed.  If you become dizzy call your Health Care Provider.      Diagnosis: Acute kidney injury  Assessment and Plan of Treatment: Acute kidney injury. CKD stage IV / Monitored BUN/Cr  Admitted w/ elevated SCr 3.55 renal function fluctuating - 3.4-3.9 possibly his baseline   Continue  lasix to 40mg QD PO  Renal sonogram with no hydro  /  monitored CBC with diff   Avoided nephrotoxins - meds on home enlapril (held  for admission) PRINCIPAL DISCHARGE DIAGNOSIS  Diagnosis: Pubic ramus fracture  Assessment and Plan of Treatment: Pubic ramus fracture.    Ortho eval & no surgical intervention   Fall precautions & pain control.    Seen and followed by PT  Follow up with Dr. Vance (orthopedic MD) as needed.      SECONDARY DISCHARGE DIAGNOSES  Diagnosis: Anemia  Assessment and Plan of Treatment: You must have a CBC drawn tomorrow (8/4/2020) and on 8/6/2020 to monitor.  After than, CBCs should be drawn weekly.  Monitor closely for black stools or any other signs of bleeding.  ***If your Hgb/Hct is stable on 8/4/2020, please restart Plavix 75mg oral daily.  Your Hgb/Hct on 8/3/2020 was 7.7/24/4***  You will need to follow up with Hematology upon discharge - you can call [phone] to make an appointment, or find your own hematologist.  There are 2 common types of GI Bleed, Upper GI Bleed and Lower GI Bleed.  Upper GI Bleed affects the esophagus, stomach, and first part of the small intestine. Lower GI Bleed affects the colon and rectum.  Upper GI Bleed signs and symptoms to notify your Health Care Provider are vomiting blood, or coffee ground vomitus, and bowel movements that look like black tar.  Lower GI Bleed signs and symptoms to notify your health care provider are bright red bloody bowel movements.   Take your medications as prescribed by your Gastroenterologist.  If you have had an Endoscopy or Colonoscopy, follow up with your Gastroenterologist for Pathology results.  Avoid NSAIDs unless your Health Care Provider tells you that it is ok (Aspirin, Ibuprofen, Advil, Motrin, Aleve).  Follow up with your Gastroenterologist within 1-2 weeks of discharge.      Diagnosis: Elevated troponin  Assessment and Plan of Treatment: Elevated troponin trended and ekg monitored.  Cards eval called  Echo followed    Diagnosis: Diabetes  Assessment and Plan of Treatment: Make sure you get your HgA1c checked every three months.  If you take oral diabetes medications, check your blood glucose two times a day.  If you take insulin, check your blood glucose before meals and at bedtime.  It's important not to skip any meals.  Keep a log of your blood glucose results and always take it with you to your doctor appointments.  Keep a list of your current medications including injectables and over the counter medications and bring this medication list with you to all your doctor appointments.  If you have not seen your ophthalmologist this year call for appointment.  Check your feet daily for redness, sores, or openings. Do not self treat. If no improvement in two days call your primary care physician for an appointment.  Low blood sugar (hypoglycemia) is a blood sugar below 70mg/dl. Check your blood sugar if you feel signs/symptoms of hypoglycemia. If your blood sugar is below 70 take 15 grams of carbohydrates (ex 4 oz of apple juice, 3-4 glucose tablets, or 4-6 oz of regular soda) wait 15 minutes and repeat blood sugar to make sure it comes up above 70.  If your blood sugar is above 70 and you are due for a meal, have a meal.  If you are not due for a meal have a snack.  This snack helps keeps your blood sugar at a safe range.      Diagnosis: Acute systolic congestive heart failure  Assessment and Plan of Treatment: Your Entresto and your lasix are on hold secondary to elevated creatinine.  You must follow up with your primary medical doctor and your cardiologist within one week to determine if it is okay to restart these medications.  Weigh yourself daily.  If you gain 3lbs in 3 days, or 5lbs in a week call your Health Care Provider.  Do not eat or drink foods containing more than 2000mg of salt (sodium) in your diet every day.  Call your Health Care Provider if you have any swelling or increased swelling in your feet, ankles, and/or stomach.  Take all of your medication as directed.  If you become dizzy call your Health Care Provider.      Diagnosis: Acute kidney injury  Assessment and Plan of Treatment: Acute kidney injury. CKD stage IV / Monitored BUN/Cr  Admitted w/ elevated SCr 3.55 renal function fluctuating - 3.4-3.9 possibly his baseline   Continue  lasix to 40mg QD PO  Renal sonogram with no hydro  /  monitored CBC with diff   Avoided nephrotoxins   You will need to follow up with a nephrologist upon discharge.

## 2020-07-20 NOTE — DISCHARGE NOTE PROVIDER - CARE PROVIDER_API CALL
Gregorio Vance  ORTHOPAEDIC SURGERY  [address] [place] [address], [address]  [place] [address]  Phone: [phone]  Fax: [phone]  Follow Up Time:     Moiz Meek)  Internal Medicine  [address] [place] [id number]  Phone: [phone]  Fax: [phone]  Follow Up Time: Gregorio Vance  ORTHOPAEDIC SURGERY  [address] [place] [address], [address]  [place] [address]  Phone: [phone]  Fax: [phone]  Follow Up Time:     Moiz Meek)  Internal Medicine  [address] [place] [address]  Phone: [phone]  Fax: [phone]  Follow Up Time:     Toñito Teixeira  INTERNAL MEDICINE  [address] [place] [address]  Phone: [phone]  Fax: [phone]  Follow Up Time:

## 2020-07-20 NOTE — DISCHARGE NOTE PROVIDER - NSDCFUADDAPPT_GEN_ALL_CORE_FT
You must follow up with your primary medical doctor and your cardiologist within on week of discharge - please call to make these appointments.  At these appointments, it will be determined whether you can restart your Entresto and your Lasix. You must follow up with your primary medical doctor and your cardiologist within on week of discharge - please call to make these appointments.  At these appointments, it will be determined whether you can restart your Entresto and your Lasix.    You will need to follow up with your gastroenterologist, Dr. Meek, within 1-2 weeks of discharge - please call to make an appointment.    Follow up with your orthopedic doctor as needed. You must follow up with your primary medical doctor and your cardiologist within one week of discharge - please call to make these appointments.  At these appointments, it will be determined whether you can restart your Entresto and your Lasix.    You will need to follow up with your gastroenterologist, Dr. Meek [phone], within 1-2 weeks of discharge - please call to make an appointment.    You will need to follow up with nephrology and hematology upon discharge - please call to make these appointments.    Follow up with your orthopedic doctor as needed.

## 2020-07-21 LAB — T PALLIDUM AB TITR SER: POSITIVE

## 2020-07-21 RX ORDER — CLOPIDOGREL BISULFATE 75 MG/1
1 TABLET, FILM COATED ORAL
Qty: 0 | Refills: 0 | DISCHARGE
Start: 2020-07-21

## 2020-07-21 RX ORDER — FERROUS SULFATE 325(65) MG
1 TABLET ORAL
Qty: 0 | Refills: 0 | DISCHARGE
Start: 2020-07-21

## 2020-07-21 RX ORDER — ATORVASTATIN CALCIUM 80 MG/1
1 TABLET, FILM COATED ORAL
Qty: 0 | Refills: 0 | DISCHARGE
Start: 2020-07-21

## 2020-07-21 RX ORDER — CARVEDILOL PHOSPHATE 80 MG/1
1 CAPSULE, EXTENDED RELEASE ORAL
Qty: 0 | Refills: 0 | DISCHARGE
Start: 2020-07-21

## 2020-07-21 RX ORDER — INSULIN GLARGINE 100 [IU]/ML
10 INJECTION, SOLUTION SUBCUTANEOUS
Qty: 0 | Refills: 0 | DISCHARGE
Start: 2020-07-21

## 2020-07-21 RX ORDER — HYDRALAZINE HCL 50 MG
1 TABLET ORAL
Qty: 0 | Refills: 0 | DISCHARGE
Start: 2020-07-21

## 2020-07-21 RX ORDER — ASPIRIN/CALCIUM CARB/MAGNESIUM 324 MG
1 TABLET ORAL
Qty: 0 | Refills: 0 | DISCHARGE
Start: 2020-07-21

## 2020-07-21 RX ORDER — TAMSULOSIN HYDROCHLORIDE 0.4 MG/1
1 CAPSULE ORAL
Qty: 0 | Refills: 0 | DISCHARGE
Start: 2020-07-21

## 2020-07-21 RX ORDER — FINASTERIDE 5 MG/1
1 TABLET, FILM COATED ORAL
Qty: 0 | Refills: 0 | DISCHARGE
Start: 2020-07-21

## 2020-07-21 RX ORDER — FUROSEMIDE 40 MG
1 TABLET ORAL
Qty: 0 | Refills: 0 | DISCHARGE
Start: 2020-07-21

## 2020-07-21 RX ORDER — ACETAMINOPHEN 500 MG
2 TABLET ORAL
Qty: 0 | Refills: 0 | DISCHARGE
Start: 2020-07-21

## 2020-07-21 RX ADMIN — ATORVASTATIN CALCIUM 80 MILLIGRAM(S): 80 TABLET, FILM COATED ORAL at 21:28

## 2020-07-21 RX ADMIN — CLOPIDOGREL BISULFATE 75 MILLIGRAM(S): 75 TABLET, FILM COATED ORAL at 12:45

## 2020-07-21 RX ADMIN — Medication 40 MILLIGRAM(S): at 05:40

## 2020-07-21 RX ADMIN — Medication 650 MILLIGRAM(S): at 15:08

## 2020-07-21 RX ADMIN — Medication 50 MILLIGRAM(S): at 21:28

## 2020-07-21 RX ADMIN — CARVEDILOL PHOSPHATE 25 MILLIGRAM(S): 80 CAPSULE, EXTENDED RELEASE ORAL at 17:40

## 2020-07-21 RX ADMIN — TAMSULOSIN HYDROCHLORIDE 0.4 MILLIGRAM(S): 0.4 CAPSULE ORAL at 21:28

## 2020-07-21 RX ADMIN — Medication 650 MILLIGRAM(S): at 22:30

## 2020-07-21 RX ADMIN — INSULIN GLARGINE 10 UNIT(S): 100 INJECTION, SOLUTION SUBCUTANEOUS at 21:44

## 2020-07-21 RX ADMIN — Medication 650 MILLIGRAM(S): at 09:51

## 2020-07-21 RX ADMIN — Medication 1: at 17:40

## 2020-07-21 RX ADMIN — Medication 50 MILLIGRAM(S): at 15:09

## 2020-07-21 RX ADMIN — Medication 650 MILLIGRAM(S): at 15:40

## 2020-07-21 RX ADMIN — Medication 650 MILLIGRAM(S): at 21:28

## 2020-07-21 RX ADMIN — Medication 325 MILLIGRAM(S): at 12:45

## 2020-07-21 RX ADMIN — Medication 650 MILLIGRAM(S): at 09:41

## 2020-07-21 RX ADMIN — Medication 81 MILLIGRAM(S): at 12:45

## 2020-07-21 RX ADMIN — Medication 50 MILLIGRAM(S): at 05:40

## 2020-07-21 RX ADMIN — FINASTERIDE 5 MILLIGRAM(S): 5 TABLET, FILM COATED ORAL at 12:45

## 2020-07-21 RX ADMIN — CARVEDILOL PHOSPHATE 25 MILLIGRAM(S): 80 CAPSULE, EXTENDED RELEASE ORAL at 05:40

## 2020-07-21 NOTE — PROGRESS NOTE ADULT - ASSESSMENT
69 Male pmhx DM, HTN, HLD, CAD s/p CABG 2014, cath ([facility] 3/19/2018), MICU admission for influenza complicated by respiratory failure, JAKOB, NSTEMI, HF admitted for right hip pain. Pt found to have renal failure and hyperkalemia. Nephrology consulted for renal failure.     CKD stage IV/V  Pt admitted with elevated SCr 3.55  renal function fluctuating -- 3.4-3.9 possibly his baseline   continue  lasix to 40mg QD PO  Renal sonogram with no hydro   Monitor CBC with diff   Avoid nephrotoxins    Hyperkalemia  check post void bladder scan  improved with medical mgn  low K diet  MOnitor serum K      HTN  BP fluctuating   continue current meds-  Low salt diet  MOnitor BP      Proteinuria  2.3gm proteinuria  Follow up  Vasculitis work up   hep B reactive (possible past infection) , K/L ok, C3, C4 not low, ANCA neg   serum immunofixation suggestive of one kappa and one macarena band-- check spep   Monitor at present

## 2020-07-21 NOTE — DIETITIAN INITIAL EVALUATION ADULT. - OTHER INFO
Confirmed information with wife. Pt reports good appetite and PO intake at home. Confirms NKFA. Reports taking Ferrous Sulfate and Ensure Original 3xday PTA. Pt reports not following any type of diet or restriction at home. Wife states knowing how to take care of pt's DM through diet. Wife denies pt monitoring BG and states pt not taking any insulin/medications for BG at home; HbA1c (07/16) 6.6% - indicates good BG control. Denies pt obtaining daily weights at home but states weighing him "often".     Confirmed information with RN. Pt reports good appetite and PO intake, states "always feeling hungry" and requests more food. Noted mostly 100% PO intake as per flow sheets. Offered nutritional supplement - pt and wife agreed to Glucerna. Pt denies difficulty chewing/swallowing. Pt denies nausea, vomiting, diarrhea, or constipation, last BM yesterday (07/20).     Pt and wife deny pt with weight changes PTA. Pt reports  pounds, however wife reports pt's  pounds. Weight as per previous RD note (04/05/2018) 151.4 pounds. Weight as per flow sheets (07/15) 155 pounds -> (07/19) 149.6 pounds -> (07/21) 149 pounds -?accuracy of weights, likely stable, will continue to monitor.     Pt and wife deny having questions/concerns about diet and nutrition at this time - made aware RD remains available.

## 2020-07-21 NOTE — PROGRESS NOTE ADULT - ASSESSMENT
Patient is a 69 Male pmhx DM, HTN, HLD, CAD s/p CABG, cath ([facility] 3/19/2018), MICU admission for influenza complicated by respiratory failure, JAKOB, NSTEMI, HF, A&Ox1 Polish speaking only, difficult to obtain information from. Collateral history obtained from wife, states patient had a fall on 7/1/2020 and has been complaining of L hip pain and since fall has been crawling to get around. Reports that he has been more confused and weak as well. Denies any chest pain, shortness of breath, fevers or chills.

## 2020-07-21 NOTE — DIETITIAN INITIAL EVALUATION ADULT. - ENERGY NEEDS
Pertinent information as per chart: Pt 70 y/o M with PMH: DM, HTN, HLD, CAD S/P CABG, cath ([facility] 03/19/2018), MICU admission for influenza complicated by respiratory failure, JAKOB, NSTEMI, HF, admitted with left hip pain S/P fall on (07/01/2020), found with pubic ramus fracture - no surgical intervention per ortho, JAKOB, anemia.

## 2020-07-21 NOTE — DIETITIAN INITIAL EVALUATION ADULT. - ADD RECOMMEND
1. Will continue to monitor PO intake, weight, labs, skin, GI status, diet. 2. Obtained food preferences and reviewed menu order procedures - made aware RD remains available.

## 2020-07-21 NOTE — DIETITIAN INITIAL EVALUATION ADULT. - REASON INDICATOR FOR ASSESSMENT
Pt seen for length of stay initial assessment.   Information obtained from: medical record, previous RD note, RN, pt, and pt's wife Jo Ann Mcdonald (called to [phone]).  Pt confused/disoriented as per documentation - confirmed information with wife.   Pt and wife [place]-Speaking - dietitian fluent in [place].

## 2020-07-21 NOTE — DIETITIAN INITIAL EVALUATION ADULT. - PHYSICAL APPEARANCE
Performed nutrition focused physical exam with pt's consent and RN's supervision and noted no signs of muscle/fat loss in any area./other (specify)/well nourished Ht: 64 inches Wt: 149 pounds BMI: 25.5 kg/m2 IBW: 130 (+/-10%) 114.6 %IBW  No noted edema as per flow sheets.   Skin: no noted pressure injuries as per documentation.

## 2020-07-21 NOTE — DIETITIAN INITIAL EVALUATION ADULT. - DIET TYPE
1. Recommend change to Consistent Carbohydrate with snack + DASH/TLC diet. Will continue to monitor and adjust as needed. 2. Recommend Glucerna Shake 240mls 3x daily (660kcals, 30g protein) to optimize kcal and protein intake per pt's request. Discussed diet and supplement with NP.

## 2020-07-22 LAB
% ALBUMIN: 44.2 % — SIGNIFICANT CHANGE UP
% ALPHA 1: 6.7 % — SIGNIFICANT CHANGE UP
% ALPHA 2: 13.3 % — SIGNIFICANT CHANGE UP
% BETA: 12.4 % — SIGNIFICANT CHANGE UP
% GAMMA: 23.4 % — SIGNIFICANT CHANGE UP
% M SPIKE: SIGNIFICANT CHANGE UP
ALBUMIN SERPL ELPH-MCNC: 2.8 G/DL — LOW (ref 3.6–5.5)
ALBUMIN/GLOB SERPL ELPH: 0.8 RATIO — SIGNIFICANT CHANGE UP
ALPHA1 GLOB SERPL ELPH-MCNC: 0.4 G/DL — SIGNIFICANT CHANGE UP (ref 0.1–0.4)
ALPHA2 GLOB SERPL ELPH-MCNC: 0.8 G/DL — SIGNIFICANT CHANGE UP (ref 0.5–1)
B-GLOBULIN SERPL ELPH-MCNC: 0.8 G/DL — SIGNIFICANT CHANGE UP (ref 0.5–1)
GAMMA GLOBULIN: 1.5 G/DL — SIGNIFICANT CHANGE UP (ref 0.6–1.6)
M-SPIKE: SIGNIFICANT CHANGE UP (ref 0–0)
PROT PATTERN SERPL ELPH-IMP: SIGNIFICANT CHANGE UP

## 2020-07-22 RX ADMIN — TAMSULOSIN HYDROCHLORIDE 0.4 MILLIGRAM(S): 0.4 CAPSULE ORAL at 22:30

## 2020-07-22 RX ADMIN — Medication 50 MILLIGRAM(S): at 05:28

## 2020-07-22 RX ADMIN — Medication 40 MILLIGRAM(S): at 05:28

## 2020-07-22 RX ADMIN — ATORVASTATIN CALCIUM 80 MILLIGRAM(S): 80 TABLET, FILM COATED ORAL at 22:30

## 2020-07-22 RX ADMIN — Medication 81 MILLIGRAM(S): at 12:23

## 2020-07-22 RX ADMIN — Medication 650 MILLIGRAM(S): at 12:22

## 2020-07-22 RX ADMIN — FINASTERIDE 5 MILLIGRAM(S): 5 TABLET, FILM COATED ORAL at 12:23

## 2020-07-22 RX ADMIN — INSULIN GLARGINE 10 UNIT(S): 100 INJECTION, SOLUTION SUBCUTANEOUS at 22:30

## 2020-07-22 RX ADMIN — CARVEDILOL PHOSPHATE 25 MILLIGRAM(S): 80 CAPSULE, EXTENDED RELEASE ORAL at 05:28

## 2020-07-22 RX ADMIN — Medication 50 MILLIGRAM(S): at 12:26

## 2020-07-22 RX ADMIN — Medication 50 MILLIGRAM(S): at 22:30

## 2020-07-22 RX ADMIN — Medication 325 MILLIGRAM(S): at 12:23

## 2020-07-22 RX ADMIN — CLOPIDOGREL BISULFATE 75 MILLIGRAM(S): 75 TABLET, FILM COATED ORAL at 12:25

## 2020-07-22 RX ADMIN — CARVEDILOL PHOSPHATE 25 MILLIGRAM(S): 80 CAPSULE, EXTENDED RELEASE ORAL at 16:41

## 2020-07-22 NOTE — PROGRESS NOTE ADULT - ASSESSMENT
69 Male pmhx DM, HTN, HLD, CAD s/p CABG 2014, cath ([facility] 3/19/2018), MICU admission for influenza complicated by respiratory failure, JAKOB, NSTEMI, HF admitted for right hip pain. Pt found to have renal failure and hyperkalemia. Nephrology consulted for renal failure.     CKD stage IV/V  Pt admitted with elevated SCr 3.55  renal function fluctuating -- 3.4-3.9 possibly his baseline   continue  lasix to 40mg QD PO  Renal sonogram with no hydro   Monitor CBC with diff   Avoid nephrotoxins    Hyperkalemia  check post void bladder scan  improved with medical mgn  low K diet  MOnitor serum K      HTN  BP fluctuating   continue current meds-if elevated increase hydralazine to 75mg TID  Low salt diet  MOnitor BP      Proteinuria  2.3gm proteinuria  Follow up  Vasculitis work up   hep B reactive (possible past infection) , K/L ok, C3, C4 not low, ANCA neg   serum immunofixation suggestive of one kappa and one macarena band-- check spep   Monitor at present

## 2020-07-22 NOTE — PROGRESS NOTE ADULT - ASSESSMENT
Patient is a 69 Male pmhx DM, HTN, HLD, CAD s/p CABG, cath ([facility] 3/19/2018), MICU admission for influenza complicated by respiratory failure, JAKOB, NSTEMI, HF, A&Ox1 Hungarian speaking only, difficult to obtain information from. Collateral history obtained from wife, states patient had a fall on 7/1/2020 and has been complaining of L hip pain and since fall has been crawling to get around. Reports that he has been more confused and weak as well. Denies any chest pain, shortness of breath, fevers or chills.

## 2020-07-22 NOTE — PROGRESS NOTE ADULT - ASSESSMENT
Patient is a 69 Male pmhx DM, HTN, HLD, CAD s/p CABG, cath ([facility] 3/19/2018), MICU admission for influenza complicated by respiratory failure, JAKOB, NSTEMI, HF, dementia presents with fall.     Problem/Recommendation - 1:  Problem: Anemia. Recommendation: Unclear baseline. No overt GI bleeding reported. Patient denies hx of prior GI  evaluation. Anemia pattern on iron studies is consistent with anemia of chronic disease, renal insufficiency, as such, there is no strong indication for endoscopic workup, especially in the inpatient setting.  -would advise f/u with his outpatient GI to ensure up to date on recommended cancer screening etc.     Problem/Recommendation - 2:  ·  Problem: Diabetes.      Problem/Recommendation - 3:  ·  Problem: Acute kidney injury.  Recommendation: unknown baseline creatinine  will need to obtain collateral info.      Problem/Recommendation - 4:  ·  Problem: Pubic ramus fracture.      Problem/Recommendation - 5:  ·  Problem: Elevated alk phos, likely bone source in the setting of renal insufficiency.  -can check GGT, or can w/u as an outpt. This is chronic.      Problem/Recommendation - 6:  Problem: Dementia. Recommendation: Unclear baseline mental status.    Attending Attestation:   Differential diagnosis and plan of care discussed with patient after the evaluation  65 Minutes spent on total encounter of which more than fifty percent of the encounter was spent counseling and/or coordinating care by the attending physician.  Advanced care planning was discussed with the patient and/or surrogate decision makers. Advanced care planning forms were discussed. The risks benefits and alternatives to pertinent gastrointestinal procedures and interventions were discussed in detail and all questions were answered. Duration: 30 Minutes.

## 2020-07-23 LAB — SARS-COV-2 RNA SPEC QL NAA+PROBE: SIGNIFICANT CHANGE UP

## 2020-07-23 PROCEDURE — 99223 1ST HOSP IP/OBS HIGH 75: CPT

## 2020-07-23 RX ADMIN — CARVEDILOL PHOSPHATE 25 MILLIGRAM(S): 80 CAPSULE, EXTENDED RELEASE ORAL at 07:44

## 2020-07-23 RX ADMIN — Medication 325 MILLIGRAM(S): at 12:14

## 2020-07-23 RX ADMIN — Medication 40 MILLIGRAM(S): at 07:44

## 2020-07-23 RX ADMIN — Medication 650 MILLIGRAM(S): at 15:58

## 2020-07-23 RX ADMIN — CLOPIDOGREL BISULFATE 75 MILLIGRAM(S): 75 TABLET, FILM COATED ORAL at 12:14

## 2020-07-23 RX ADMIN — Medication 650 MILLIGRAM(S): at 14:06

## 2020-07-23 RX ADMIN — Medication 50 MILLIGRAM(S): at 07:44

## 2020-07-23 RX ADMIN — FINASTERIDE 5 MILLIGRAM(S): 5 TABLET, FILM COATED ORAL at 12:14

## 2020-07-23 RX ADMIN — Medication 81 MILLIGRAM(S): at 12:14

## 2020-07-23 RX ADMIN — Medication 1: at 12:53

## 2020-07-23 NOTE — PROGRESS NOTE ADULT - ASSESSMENT
69 Male pmhx DM, HTN, HLD, CAD s/p CABG 2014, cath ([facility] 3/19/2018), MICU admission for influenza complicated by respiratory failure, JAKOB, NSTEMI, HF admitted for right hip pain. Pt found to have renal failure and hyperkalemia. Nephrology consulted for renal failure.     CKD stage IV/V  Pt admitted with elevated SCr 3.55  renal function fluctuating -- 3.4-3.9 possibly his baseline   continue  lasix to 40mg QD PO  Renal sonogram with no hydro   Monitor CBC with diff   Avoid nephrotoxins    Hyperkalemia  check post void bladder scan  improved with medical mgn  low K diet  MOnitor serum K      HTN  BP fluctuating   continue current meds-if elevated increase hydralazine to 75mg TID  Low salt diet  MOnitor BP      Proteinuria  2.3gm proteinuria  Follow up  Vasculitis work up   hep B reactive (possible past infection) , K/L ok, C3, C4 not low, ANCA neg   serum immunofixation suggestive of one kappa and one macarena band-- check spep   Monitor at present

## 2020-07-23 NOTE — CONSULT NOTE ADULT - SUBJECTIVE AND OBJECTIVE BOX
HPI:   69 Male pmhx DM, HTN, HLD, CAD s/p CABG, cath ([facility] 3/19/2018), MICU admission for influenza complicated by respiratory failure, JAKOB, NSTEMI, HF,   had a fall on 7/1/2020 and has been complaining of L hip pain and since fall has been crawling to get around. Reports that he has been more confused and weak as well. Denies any chest pain, shortness of breath, fevers or chills. (15 Jul 2020 15:47)      PAST MEDICAL & SURGICAL HISTORY:  CHF (congestive heart failure)  Hyperlipemia  DM (diabetes mellitus)  HTN (hypertension)  History of open heart surgery: bypass      Allergies    No Known Allergies    Intolerances        ANTIMICROBIALS:      OTHER MEDS:  acetaminophen   Tablet .. 650 milliGRAM(s) Oral every 6 hours PRN  aspirin enteric coated 81 milliGRAM(s) Oral daily  atorvastatin 80 milliGRAM(s) Oral at bedtime  carvedilol 25 milliGRAM(s) Oral every 12 hours  clopidogrel Tablet 75 milliGRAM(s) Oral daily  dextrose 40% Gel 15 Gram(s) Oral once PRN  dextrose 5%. 1000 milliLiter(s) IV Continuous <Continuous>  dextrose 50% Injectable 12.5 Gram(s) IV Push once  dextrose 50% Injectable 25 Gram(s) IV Push once  dextrose 50% Injectable 25 Gram(s) IV Push once  ferrous    sulfate 325 milliGRAM(s) Oral daily  finasteride 5 milliGRAM(s) Oral daily  furosemide    Tablet 40 milliGRAM(s) Oral daily  glucagon  Injectable 1 milliGRAM(s) IntraMuscular once PRN  hydrALAZINE 50 milliGRAM(s) Oral three times a day  insulin glargine Injectable (LANTUS) 10 Unit(s) SubCutaneous at bedtime  insulin lispro (HumaLOG) corrective regimen sliding scale   SubCutaneous three times a day before meals  insulin lispro (HumaLOG) corrective regimen sliding scale   SubCutaneous at bedtime  tamsulosin 0.4 milliGRAM(s) Oral at bedtime      SOCIAL HISTORY:  , lives with wife  no smoking, alcohol or drug abuse  no recent travel    FAMILY HISTORY:  No recent febrile illness in family members      ROS:    All other systems negative     Constitutional: no fever, no chills, no weight loss, no night sweats  Eye: no eye pain, no redness, no vision changes  ENT:  no sore throat, no rhinorrhea  Cardiovascular:  no chest pain, no palpitation  Respiratory:  no SOB, no cough  GI:  no abd pain, no vomiting, no diarrhea  urinary: no dysuria, no hematuria, no flank pain  : no penile discharge or bleeding  musculoskeletal:  L hip pain  skin:  no rash  neurology:  no headache, no seizure  psych: no anxiety, no depression     Physical Exam:    General:    NAD, non toxic  Head: atraumatic, normocephalic  Eyes: normal sclera and conjunctiva  ENT:   no oropharyngeal lesions, no LAD, neck supple  Cardio:    regular S1,S2, no murmur  Respiratory:   clear b/l, no wheezing  abd:   soft, BS +, not tender  :     no CVAT, no suprapubic tenderness, no nava  Musculoskeletal : no joint swelling, no edema  Skin:    no rash  vascular: no phlebitis  Neurologic:     no focal deficits  psych: normal affect      Drug Dosing Weight  Height (cm): 162.6 (15 Jul 2020 23:30)  Weight (kg): 70.5 (15 Jul 2020 23:30)  BMI (kg/m2): 26.7 (15 Jul 2020 23:30)  BSA (m2): 1.76 (15 Jul 2020 23:30)    Vital Signs Last 24 Hrs  T(F): 97.7 (07-23-20 @ 12:36), Max: 98.4 (07-20-20 @ 20:37)    Vital Signs Last 24 Hrs  HR: 59 (07-23-20 @ 12:36) (59 - 66)  BP: 121/68 (07-23-20 @ 12:36) (121/68 - 140/75)  RR: 18 (07-23-20 @ 12:36)  SpO2: 96% (07-23-20 @ 12:36) (96% - 100%)  Wt(kg): --                    MICROBIOLOGY:  positive FTA and trep Ab negative RPR        RADIOLOGY:  Images below independently visualized and reviewed personally, findings as below  < from: Xray Femur 2 Views, Left (07.16.20 @ 14:52) >  IMPRESSION:   1. No acute left femur fractures are seen. Longstem gamma nail remains.  2. Acute nondisplaced left inferior pubic ramus fracture is again seen.  3. This may imply nondisplaced superior ramus fracture that is difficult to visualize.      < from: CT Head No Cont (07.15.20 @ 14:49) >  IMPRESSION:    CT brain:  No acute intracranial hemorrhage, brain edema, or mass effect. No displaced calvarial fracture.  Extracalvarial soft tissue swelling in the high mid parietal region.    CT cervical spine:  No acute fracture or traumatic subluxation.  No prevertebral soft tissue swelling.  Degenerative changes.    Thickening of the interlobular septa at the lung apices suggesting the presence of pulmonary edema.

## 2020-07-23 NOTE — CONSULT NOTE ADULT - ASSESSMENT
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

## 2020-07-23 NOTE — PROGRESS NOTE ADULT - ASSESSMENT
Patient is a 69 Male pmhx DM, HTN, HLD, CAD s/p CABG, cath ([facility] 3/19/2018), MICU admission for influenza complicated by respiratory failure, JAKOB, NSTEMI, HF, A&Ox1 Japanese speaking only, difficult to obtain information from. Collateral history obtained from wife, states patient had a fall on 7/1/2020 and has been complaining of L hip pain and since fall has been crawling to get around. Reports that he has been more confused and weak as well. Denies any chest pain, shortness of breath, fevers or chills.

## 2020-07-23 NOTE — PROGRESS NOTE ADULT - ASSESSMENT
Patient is a 69 Male pmhx DM, HTN, HLD, CAD s/p CABG, cath ([facility] 3/19/2018), MICU admission for influenza complicated by respiratory failure, JAKOB, NSTEMI, HF, dementia presents with fall.     Problem/Recommendation - 1:  Problem: Anemia. Recommendation: Unclear baseline. No overt GI bleeding reported. Patient denies hx of prior GI  evaluation. Anemia pattern on iron studies is consistent with anemia of chronic disease, renal insufficiency, as such, there is no strong indication for endoscopic workup, especially in the inpatient setting.  -would advise f/u with his outpatient GI to ensure up to date on recommended cancer screening etc.     Problem/Recommendation - 2:  ·  Problem: Diabetes.      Problem/Recommendation - 3:  ·  Problem: Acute kidney injury.  Recommendation: unknown baseline creatinine  will need to obtain collateral info.      Problem/Recommendation - 4:  ·  Problem: Pubic ramus fracture.   -mgmt per medicine/ortho     Problem/Recommendation - 5:  ·  Problem: Elevated alk phos, likely bone source in the setting of renal insufficiency.  -can w/u as an outpt. This is chronic.      Problem/Recommendation - 6:  Problem: Dementia. Recommendation: Unclear baseline mental status.    Attending Attestation:   Differential diagnosis and plan of care discussed with patient after the evaluation  65 Minutes spent on total encounter of which more than fifty percent of the encounter was spent counseling and/or coordinating care by the attending physician.  Advanced care planning was discussed with the patient and/or surrogate decision makers. Advanced care planning forms were discussed. The risks benefits and alternatives to pertinent gastrointestinal procedures and interventions were discussed in detail and all questions were answered. Duration: 30 Minutes.

## 2020-07-23 NOTE — PROVIDER CONTACT NOTE (OTHER) - SITUATION
69 year old male pt who is refusing all medical treatment. Pt is refusing vitals, finger sticks (blood glucose monitoring), and medications

## 2020-07-24 LAB
BLD GP AB SCN SERPL QL: NEGATIVE — SIGNIFICANT CHANGE UP
HCT VFR BLD CALC: 23.6 % — LOW (ref 39–50)
HGB BLD-MCNC: 7.5 G/DL — LOW (ref 13–17)
MCHC RBC-ENTMCNC: 30.6 PG — SIGNIFICANT CHANGE UP (ref 27–34)
MCHC RBC-ENTMCNC: 31.8 GM/DL — LOW (ref 32–36)
MCV RBC AUTO: 96.3 FL — SIGNIFICANT CHANGE UP (ref 80–100)
NRBC # BLD: 0 /100 WBCS — SIGNIFICANT CHANGE UP (ref 0–0)
OB PNL STL: NEGATIVE — SIGNIFICANT CHANGE UP
PLATELET # BLD AUTO: 152 K/UL — SIGNIFICANT CHANGE UP (ref 150–400)
RBC # BLD: 2.45 M/UL — LOW (ref 4.2–5.8)
RBC # FLD: 17.2 % — HIGH (ref 10.3–14.5)
RH IG SCN BLD-IMP: POSITIVE — SIGNIFICANT CHANGE UP
WBC # BLD: 6.56 K/UL — SIGNIFICANT CHANGE UP (ref 3.8–10.5)
WBC # FLD AUTO: 6.56 K/UL — SIGNIFICANT CHANGE UP (ref 3.8–10.5)

## 2020-07-24 RX ORDER — PANTOPRAZOLE SODIUM 20 MG/1
8 TABLET, DELAYED RELEASE ORAL
Qty: 80 | Refills: 0 | Status: DISCONTINUED | OUTPATIENT
Start: 2020-07-24 | End: 2020-07-25

## 2020-07-24 RX ADMIN — CARVEDILOL PHOSPHATE 25 MILLIGRAM(S): 80 CAPSULE, EXTENDED RELEASE ORAL at 06:51

## 2020-07-24 RX ADMIN — INSULIN GLARGINE 10 UNIT(S): 100 INJECTION, SOLUTION SUBCUTANEOUS at 21:52

## 2020-07-24 RX ADMIN — Medication 81 MILLIGRAM(S): at 11:56

## 2020-07-24 RX ADMIN — ATORVASTATIN CALCIUM 80 MILLIGRAM(S): 80 TABLET, FILM COATED ORAL at 21:52

## 2020-07-24 RX ADMIN — Medication 2: at 08:28

## 2020-07-24 RX ADMIN — CARVEDILOL PHOSPHATE 25 MILLIGRAM(S): 80 CAPSULE, EXTENDED RELEASE ORAL at 17:18

## 2020-07-24 RX ADMIN — Medication 50 MILLIGRAM(S): at 06:51

## 2020-07-24 RX ADMIN — Medication 40 MILLIGRAM(S): at 06:51

## 2020-07-24 RX ADMIN — Medication 650 MILLIGRAM(S): at 11:57

## 2020-07-24 RX ADMIN — CLOPIDOGREL BISULFATE 75 MILLIGRAM(S): 75 TABLET, FILM COATED ORAL at 11:56

## 2020-07-24 RX ADMIN — Medication 1: at 17:18

## 2020-07-24 RX ADMIN — PANTOPRAZOLE SODIUM 10 MG/HR: 20 TABLET, DELAYED RELEASE ORAL at 21:53

## 2020-07-24 RX ADMIN — Medication 650 MILLIGRAM(S): at 12:30

## 2020-07-24 RX ADMIN — TAMSULOSIN HYDROCHLORIDE 0.4 MILLIGRAM(S): 0.4 CAPSULE ORAL at 21:52

## 2020-07-24 RX ADMIN — Medication 50 MILLIGRAM(S): at 13:50

## 2020-07-24 RX ADMIN — PANTOPRAZOLE SODIUM 10 MG/HR: 20 TABLET, DELAYED RELEASE ORAL at 17:47

## 2020-07-24 RX ADMIN — FINASTERIDE 5 MILLIGRAM(S): 5 TABLET, FILM COATED ORAL at 11:58

## 2020-07-24 RX ADMIN — Medication 50 MILLIGRAM(S): at 21:52

## 2020-07-24 RX ADMIN — Medication 2: at 12:50

## 2020-07-24 RX ADMIN — Medication 325 MILLIGRAM(S): at 11:56

## 2020-07-24 NOTE — PROGRESS NOTE ADULT - ASSESSMENT
Patient is a 69 Male pmhx DM, HTN, HLD, CAD s/p CABG, cath ([facility] 3/19/2018), MICU admission for influenza complicated by respiratory failure, JAKOB, NSTEMI, HF, dementia presents with fall.     Problem/Recommendation - 1:  Problem: reported melena and drop in hgb. Pt ate today. Not able to undergo EGD today.  -clear liquid diet  -serial CBC  -high dose PPI  -plan for EGD monday, sooner if clinically warranted  -please call w questions/updates     Problem/Recommendation - 2:  ·  Problem: Diabetes.      Problem/Recommendation - 3:  ·  Problem: Acute kidney injury.  Recommendation: unknown baseline creatinine  will need to obtain collateral info.      Problem/Recommendation - 4:  ·  Problem: Pubic ramus fracture.   -mgmt per medicine/ortho     Problem/Recommendation - 5:  ·  Problem: Elevated alk phos, likely bone source in the setting of renal insufficiency.  -can w/u as an outpt. This is chronic.      Problem/Recommendation - 6:  Problem: Dementia. Recommendation: Unclear baseline mental status.    Attending Attestation:   Differential diagnosis and plan of care discussed with patient after the evaluation  65 Minutes spent on total encounter of which more than fifty percent of the encounter was spent counseling and/or coordinating care by the attending physician.  Advanced care planning was discussed with the patient and/or surrogate decision makers. Advanced care planning forms were discussed. The risks benefits and alternatives to pertinent gastrointestinal procedures and interventions were discussed in detail and all questions were answered. Duration: 30 Minutes.

## 2020-07-24 NOTE — PROGRESS NOTE ADULT - ASSESSMENT
Patient is a 69 Male pmhx DM, HTN, HLD, CAD s/p CABG, cath ( 3/19/2018), MICU admission for influenza complicated by respiratory failure, JAKOB, NSTEMI, HF, A&Ox1 [place] speaking only, difficult to obtain information from. Collateral history obtained from wife, states patient had a fall on 7/1/2020 and has been complaining of L hip pain and since fall has been crawling to get around. Reports that he has been more confused and weak as well. Denies any chest pain, shortness of breath, fevers or chills.

## 2020-07-24 NOTE — PROGRESS NOTE ADULT - ASSESSMENT
69 Male pmhx DM, HTN, HLD, CAD s/p CABG 2014, cath ([facility] 3/19/2018), MICU admission for influenza complicated by respiratory failure, JAKOB, NSTEMI, HF admitted for right hip pain. Pt found to have renal failure and hyperkalemia. Nephrology consulted for renal failure.     CKD stage IV/V  Pt admitted with elevated SCr 3.55  renal function fluctuating -- 3.4-3.9 possibly his baseline   pending bmp today  continue  lasix to 40mg QD PO  Renal sonogram with no hydro   Monitor CBC with diff   Avoid nephrotoxins    Hyperkalemia  check post void bladder scan  improved with medical mgn  low K diet  MOnitor serum K      HTN  BP fluctuating   continue current meds-if elevated increase hydralazine to 75mg TID  Low salt diet  MOnitor BP      Proteinuria  2.3gm proteinuria  Follow up  Vasculitis work up   hep B reactive (possible past infection) , K/L ok, C3, C4 not low, ANCA neg   serum immunofixation suggestive of one kappa and one macarena band-- check spep   Monitor at present

## 2020-07-25 LAB
HCT VFR BLD CALC: 23.9 % — LOW (ref 39–50)
HGB BLD-MCNC: 7.6 G/DL — LOW (ref 13–17)
MCHC RBC-ENTMCNC: 30.5 PG — SIGNIFICANT CHANGE UP (ref 27–34)
MCHC RBC-ENTMCNC: 31.8 GM/DL — LOW (ref 32–36)
MCV RBC AUTO: 96 FL — SIGNIFICANT CHANGE UP (ref 80–100)
NRBC # BLD: 0 /100 WBCS — SIGNIFICANT CHANGE UP (ref 0–0)
PLATELET # BLD AUTO: 161 K/UL — SIGNIFICANT CHANGE UP (ref 150–400)
RBC # BLD: 2.49 M/UL — LOW (ref 4.2–5.8)
RBC # FLD: 16.9 % — HIGH (ref 10.3–14.5)
WBC # BLD: 5.98 K/UL — SIGNIFICANT CHANGE UP (ref 3.8–10.5)
WBC # FLD AUTO: 5.98 K/UL — SIGNIFICANT CHANGE UP (ref 3.8–10.5)

## 2020-07-25 RX ORDER — PANTOPRAZOLE SODIUM 20 MG/1
40 TABLET, DELAYED RELEASE ORAL
Refills: 0 | Status: DISCONTINUED | OUTPATIENT
Start: 2020-07-25 | End: 2020-08-03

## 2020-07-25 RX ADMIN — CARVEDILOL PHOSPHATE 25 MILLIGRAM(S): 80 CAPSULE, EXTENDED RELEASE ORAL at 05:07

## 2020-07-25 RX ADMIN — CARVEDILOL PHOSPHATE 25 MILLIGRAM(S): 80 CAPSULE, EXTENDED RELEASE ORAL at 17:41

## 2020-07-25 RX ADMIN — Medication 325 MILLIGRAM(S): at 12:23

## 2020-07-25 RX ADMIN — Medication 50 MILLIGRAM(S): at 21:51

## 2020-07-25 RX ADMIN — PANTOPRAZOLE SODIUM 40 MILLIGRAM(S): 20 TABLET, DELAYED RELEASE ORAL at 17:40

## 2020-07-25 RX ADMIN — Medication 40 MILLIGRAM(S): at 05:07

## 2020-07-25 RX ADMIN — PANTOPRAZOLE SODIUM 10 MG/HR: 20 TABLET, DELAYED RELEASE ORAL at 12:22

## 2020-07-25 RX ADMIN — Medication 1: at 17:40

## 2020-07-25 RX ADMIN — Medication 81 MILLIGRAM(S): at 12:23

## 2020-07-25 RX ADMIN — TAMSULOSIN HYDROCHLORIDE 0.4 MILLIGRAM(S): 0.4 CAPSULE ORAL at 21:51

## 2020-07-25 RX ADMIN — FINASTERIDE 5 MILLIGRAM(S): 5 TABLET, FILM COATED ORAL at 12:23

## 2020-07-25 RX ADMIN — Medication 3: at 12:22

## 2020-07-25 RX ADMIN — ATORVASTATIN CALCIUM 80 MILLIGRAM(S): 80 TABLET, FILM COATED ORAL at 21:51

## 2020-07-25 RX ADMIN — Medication 50 MILLIGRAM(S): at 05:07

## 2020-07-25 RX ADMIN — Medication 50 MILLIGRAM(S): at 13:25

## 2020-07-25 RX ADMIN — INSULIN GLARGINE 10 UNIT(S): 100 INJECTION, SOLUTION SUBCUTANEOUS at 21:51

## 2020-07-25 NOTE — PROGRESS NOTE ADULT - ASSESSMENT
Patient is a 69 Male pmhx DM, HTN, HLD, CAD s/p CABG, cath ([facility] 3/19/2018), MICU admission for influenza complicated by respiratory failure, JAKOB, NSTEMI, HF, dementia presents with fall.     Problem/Recommendation - 1:  Problem: reported melena and drop in hgb. Rectal shows brown stool. H/H stable  -clear liquid diet  -serial CBC  -PPI IV BID  -plan for possible EGD monday, sooner if clinically warranted  -I discussed plan w patient's wife     Problem/Recommendation - 2:  ·  Problem: Diabetes.      Problem/Recommendation - 3:  ·  Problem: Acute kidney injury.  Recommendation: unknown baseline creatinine  will need to obtain collateral info.      Problem/Recommendation - 4:  ·  Problem: Pubic ramus fracture.   -mgmt per medicine/ortho     Problem/Recommendation - 5:  ·  Problem: Elevated alk phos, likely bone source in the setting of renal insufficiency.  -can w/u as an outpt. This is chronic.      Problem/Recommendation - 6:  Problem: Dementia. Recommendation: Unclear baseline mental status.    Attending Attestation:   Differential diagnosis and plan of care discussed with patient after the evaluation  65 Minutes spent on total encounter of which more than fifty percent of the encounter was spent counseling and/or coordinating care by the attending physician.  Advanced care planning was discussed with the patient and/or surrogate decision makers. Advanced care planning forms were discussed. The risks benefits and alternatives to pertinent gastrointestinal procedures and interventions were discussed in detail and all questions were answered. Duration: 30 Minutes.

## 2020-07-25 NOTE — PROGRESS NOTE ADULT - ASSESSMENT
Patient is a 69 Male pmhx DM, HTN, HLD, CAD s/p CABG, cath ([facility] 3/19/2018), MICU admission for influenza complicated by respiratory failure, JAKOB, NSTEMI, HF, A&Ox1 Irish speaking only, difficult to obtain information from. Collateral history obtained from wife, states patient had a fall on 7/1/2020 and has been complaining of L hip pain and since fall has been crawling to get around. Reports that he has been more confused and weak as well. Denies any chest pain, shortness of breath, fevers or chills.

## 2020-07-25 NOTE — PROGRESS NOTE ADULT - ASSESSMENT
69 Male pmhx DM, HTN, HLD, CAD s/p CABG 2014, cath ([facility] 3/19/2018), MICU admission for influenza complicated by respiratory failure, JAKOB, NSTEMI, HF admitted for right hip pain. Pt found to have renal failure and hyperkalemia. Nephrology consulted for renal failure.     CKD stage IV/V  Pt admitted with elevated SCr 3.55  renal function fluctuating -- 3.4-3.9 possibly his baseline   continue  lasix to 40mg QD PO  Renal sonogram with no hydro   Monitor CBC with diff   Avoid nephrotoxins    Hyperkalemia  improved with medical mgn  low K diet  MOnitor serum K      HTN  BP fluctuating   continue current meds  Low salt diet  MOnitor BP      Proteinuria  2.3gm proteinuria  Follow up  Vasculitis work up   hep B reactive (possible past infection) , K/L ok, C3, C4 not low, ANCA neg   serum immunofixation suggestive of one kappa and one macarena band, spep has 2 weak migrating paraprotein bands  Needs UPEP and urine immunofixation  Monitor at present 69 Male pmhx DM, HTN, HLD, CAD s/p CABG 2014, cath ([facility] 3/19/2018), MICU admission for influenza complicated by respiratory failure, JAKOB, NSTEMI, HF admitted for right hip pain. Pt found to have renal failure and hyperkalemia. Nephrology consulted for renal failure.     CKD stage IV/V  Pt admitted with elevated SCr 3.55  renal function fluctuating -- 3.4-3.9 possibly his baseline   continue  lasix to 40mg QD PO  Renal sonogram with no hydro   Monitor CBC with diff   Avoid nephrotoxins    Hyperkalemia  improved with medical mgn  low K diet  MOnitor serum K      HTN  BP fluctuating   continue current meds  Low salt diet  MOnitor BP      Proteinuria  2.3gm proteinuria  Follow up  Vasculitis work up   hep B reactive (possible past infection) , K/L ok, C3, C4 not low, ANCA/GLYNN neg   serum immunofixation suggestive of one kappa and one macarena band, spep has 2 weak migrating paraprotein bands  Needs UPEP and urine immunofixation  RPR positive, 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

## 2020-07-25 NOTE — PROGRESS NOTE ADULT - PROBLEM SELECTOR PLAN 5
sliding scale  hypoglycemia overnight   hold Lantus for now, resume slowly as needed   diab diet  adjust insulin dose PRN   hold oral meds

## 2020-07-26 LAB
ANION GAP SERPL CALC-SCNC: 12 MMOL/L — SIGNIFICANT CHANGE UP (ref 5–17)
BUN SERPL-MCNC: 98 MG/DL — HIGH (ref 7–23)
CALCIUM SERPL-MCNC: 9.1 MG/DL — SIGNIFICANT CHANGE UP (ref 8.4–10.5)
CHLORIDE SERPL-SCNC: 107 MMOL/L — SIGNIFICANT CHANGE UP (ref 96–108)
CO2 SERPL-SCNC: 19 MMOL/L — LOW (ref 22–31)
CREAT SERPL-MCNC: 3.88 MG/DL — HIGH (ref 0.5–1.3)
GLUCOSE SERPL-MCNC: 117 MG/DL — HIGH (ref 70–99)
HCT VFR BLD CALC: 23.1 % — LOW (ref 39–50)
HCT VFR BLD CALC: 24.6 % — LOW (ref 39–50)
HGB BLD-MCNC: 7.3 G/DL — LOW (ref 13–17)
HGB BLD-MCNC: 7.8 G/DL — LOW (ref 13–17)
MCHC RBC-ENTMCNC: 30.5 PG — SIGNIFICANT CHANGE UP (ref 27–34)
MCHC RBC-ENTMCNC: 30.6 PG — SIGNIFICANT CHANGE UP (ref 27–34)
MCHC RBC-ENTMCNC: 31.6 GM/DL — LOW (ref 32–36)
MCHC RBC-ENTMCNC: 31.7 GM/DL — LOW (ref 32–36)
MCV RBC AUTO: 96.5 FL — SIGNIFICANT CHANGE UP (ref 80–100)
MCV RBC AUTO: 96.7 FL — SIGNIFICANT CHANGE UP (ref 80–100)
NRBC # BLD: 0 /100 WBCS — SIGNIFICANT CHANGE UP (ref 0–0)
NRBC # BLD: 0 /100 WBCS — SIGNIFICANT CHANGE UP (ref 0–0)
PLATELET # BLD AUTO: 161 K/UL — SIGNIFICANT CHANGE UP (ref 150–400)
PLATELET # BLD AUTO: 165 K/UL — SIGNIFICANT CHANGE UP (ref 150–400)
POTASSIUM SERPL-MCNC: 4.6 MMOL/L — SIGNIFICANT CHANGE UP (ref 3.5–5.3)
POTASSIUM SERPL-SCNC: 4.6 MMOL/L — SIGNIFICANT CHANGE UP (ref 3.5–5.3)
RBC # BLD: 2.39 M/UL — LOW (ref 4.2–5.8)
RBC # BLD: 2.55 M/UL — LOW (ref 4.2–5.8)
RBC # FLD: 16.9 % — HIGH (ref 10.3–14.5)
RBC # FLD: 17 % — HIGH (ref 10.3–14.5)
RH IG SCN BLD-IMP: POSITIVE — SIGNIFICANT CHANGE UP
SARS-COV-2 RNA SPEC QL NAA+PROBE: SIGNIFICANT CHANGE UP
SODIUM SERPL-SCNC: 138 MMOL/L — SIGNIFICANT CHANGE UP (ref 135–145)
WBC # BLD: 5.47 K/UL — SIGNIFICANT CHANGE UP (ref 3.8–10.5)
WBC # BLD: 6.18 K/UL — SIGNIFICANT CHANGE UP (ref 3.8–10.5)
WBC # FLD AUTO: 5.47 K/UL — SIGNIFICANT CHANGE UP (ref 3.8–10.5)
WBC # FLD AUTO: 6.18 K/UL — SIGNIFICANT CHANGE UP (ref 3.8–10.5)

## 2020-07-26 RX ORDER — SOD SULF/SODIUM/NAHCO3/KCL/PEG
2000 SOLUTION, RECONSTITUTED, ORAL ORAL
Refills: 0 | Status: COMPLETED | OUTPATIENT
Start: 2020-07-26 | End: 2020-07-26

## 2020-07-26 RX ADMIN — PANTOPRAZOLE SODIUM 40 MILLIGRAM(S): 20 TABLET, DELAYED RELEASE ORAL at 17:09

## 2020-07-26 RX ADMIN — INSULIN GLARGINE 10 UNIT(S): 100 INJECTION, SOLUTION SUBCUTANEOUS at 22:18

## 2020-07-26 RX ADMIN — FINASTERIDE 5 MILLIGRAM(S): 5 TABLET, FILM COATED ORAL at 13:11

## 2020-07-26 RX ADMIN — Medication 50 MILLIGRAM(S): at 22:14

## 2020-07-26 RX ADMIN — Medication 40 MILLIGRAM(S): at 05:33

## 2020-07-26 RX ADMIN — PANTOPRAZOLE SODIUM 40 MILLIGRAM(S): 20 TABLET, DELAYED RELEASE ORAL at 05:33

## 2020-07-26 RX ADMIN — CARVEDILOL PHOSPHATE 25 MILLIGRAM(S): 80 CAPSULE, EXTENDED RELEASE ORAL at 17:09

## 2020-07-26 RX ADMIN — Medication 50 MILLIGRAM(S): at 13:11

## 2020-07-26 RX ADMIN — Medication 2000 MILLILITER(S): at 23:15

## 2020-07-26 RX ADMIN — CARVEDILOL PHOSPHATE 25 MILLIGRAM(S): 80 CAPSULE, EXTENDED RELEASE ORAL at 05:33

## 2020-07-26 RX ADMIN — Medication 3: at 17:31

## 2020-07-26 RX ADMIN — Medication 10 MILLIGRAM(S): at 22:41

## 2020-07-26 RX ADMIN — Medication 650 MILLIGRAM(S): at 14:15

## 2020-07-26 RX ADMIN — Medication 50 MILLIGRAM(S): at 05:33

## 2020-07-26 RX ADMIN — ATORVASTATIN CALCIUM 80 MILLIGRAM(S): 80 TABLET, FILM COATED ORAL at 22:14

## 2020-07-26 RX ADMIN — TAMSULOSIN HYDROCHLORIDE 0.4 MILLIGRAM(S): 0.4 CAPSULE ORAL at 22:14

## 2020-07-26 RX ADMIN — Medication 1: at 22:18

## 2020-07-26 RX ADMIN — Medication 325 MILLIGRAM(S): at 13:11

## 2020-07-26 RX ADMIN — Medication 2000 MILLILITER(S): at 20:29

## 2020-07-26 RX ADMIN — Medication 3: at 13:13

## 2020-07-26 RX ADMIN — Medication 81 MILLIGRAM(S): at 13:11

## 2020-07-26 RX ADMIN — Medication 650 MILLIGRAM(S): at 13:11

## 2020-07-26 NOTE — PROGRESS NOTE ADULT - ASSESSMENT
Patient is a 69 Male pmhx DM, HTN, HLD, CAD s/p CABG, cath ([facility] 3/19/2018), MICU admission for influenza complicated by respiratory failure, JAKOB, NSTEMI, HF, A&Ox1 Czech speaking only, difficult to obtain information from. Collateral history obtained from wife, states patient had a fall on 7/1/2020 and has been complaining of L hip pain and since fall has been crawling to get around. Reports that he has been more confused and weak as well. Denies any chest pain, shortness of breath, fevers or chills.

## 2020-07-26 NOTE — PROGRESS NOTE ADULT - ASSESSMENT
69 Male pmhx DM, HTN, HLD, CAD s/p CABG 2014, cath ([facility] 3/19/2018), MICU admission for influenza complicated by respiratory failure, JAKOB, NSTEMI, HF admitted for right hip pain. Pt found to have renal failure and hyperkalemia. Nephrology consulted for renal failure.     CKD stage IV/V  Pt admitted with elevated SCr 3.55  renal function fluctuating -- 3.4-3.9 possibly his baseline   continue  lasix to 40mg QD PO  Renal sonogram with no hydro   Monitor CBC with diff   Avoid nephrotoxins    Hyperkalemia  improved with medical mgn  low K diet  MOnitor serum K      HTN  BP fluctuating   continue current meds  Low salt diet  MOnitor BP      Proteinuria  2.3gm proteinuria  Follow up  Vasculitis work up   hep B reactive (possible past infection) , K/L ok, C3, C4 not low, ANCA/GLYNN neg   serum immunofixation suggestive of one kappa and one macarena band, spep has 2 weak migrating paraprotein bands  Needs UPEP and urine immunofixation  RPR positive, 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 69 Male pmhx DM, HTN, HLD, CAD s/p CABG 2014, cath ([facility] 3/19/2018), MICU admission for influenza complicated by respiratory failure, JAKOB, NSTEMI, HF admitted for right hip pain. Pt found to have renal failure and hyperkalemia. Nephrology consulted for renal failure.     CKD stage IV/V  Pt admitted with elevated SCr 3.55  renal function fluctuating -- 3.4-3.9 possibly his baseline   continue  lasix to 40mg QD PO  Renal sonogram with no hydro   Monitor CBC with diff   Avoid nephrotoxins    Hyperkalemia  improved with medical mgn  low K diet  MOnitor serum K      HTN  BP fluctuating   continue current meds  Low salt diet  MOnitor BP      Proteinuria  2.3gm proteinuria  Follow up  Vasculitis work up   hep B reactive (possible past infection) , K/L ok, C3, C4 not low, ANCA/GLYNN neg   serum immunofixation suggestive of one kappa and one macarena band, spep has 2 weak migrating paraprotein bands  Needs UPEP and urine immunofixation  If gets discharged needs hematology evaluation  RPR positive, 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

## 2020-07-26 NOTE — PROGRESS NOTE ADULT - ASSESSMENT
Patient is a 69 Male pmhx DM, HTN, HLD, CAD s/p CABG, cath ([facility] 3/19/2018), MICU admission for influenza complicated by respiratory failure, JAKOB, NSTEMI, HF, dementia presents with fall.     Problem/Recommendation - 1:  Problem: reported melena and drop in hgb. Rectal shows brown stool. H/H continues to decline  -EGD/colon +/- capsule tomorrow  -requested cardiology preop     Problem/Recommendation - 2:  ·  Problem: Diabetes.      Problem/Recommendation - 3:  ·  Problem: Acute kidney injury.  Recommendation: unknown baseline creatinine  will need to obtain collateral info.      Problem/Recommendation - 4:  ·  Problem: Pubic ramus fracture.   -mgmt per medicine/ortho     Problem/Recommendation - 5:  ·  Problem: Elevated alk phos, likely bone source in the setting of renal insufficiency.  -can w/u as an outpt. This is chronic.      Problem/Recommendation - 6:  Problem: Dementia. Recommendation: Unclear baseline mental status.    Attending Attestation:   Differential diagnosis and plan of care discussed with patient after the evaluation  65 Minutes spent on total encounter of which more than fifty percent of the encounter was spent counseling and/or coordinating care by the attending physician.  Advanced care planning was discussed with the patient and/or surrogate decision makers. Advanced care planning forms were discussed. The risks benefits and alternatives to pertinent gastrointestinal procedures and interventions were discussed in detail and all questions were answered. Duration: 30 Minutes.

## 2020-07-26 NOTE — PROGRESS NOTE ADULT - PROBLEM SELECTOR PLAN 5
sliding scale  hypoglycemia overnight   hold Lantus for now, resume slowly as needed   diab diet  adjust insulin dose PRN   hold oral meds sliding scale  adjust lantus PRN   diab diet  adjust insulin dose PRN   hold oral meds

## 2020-07-26 NOTE — CHART NOTE - NSCHARTNOTEFT_GEN_A_CORE
Notified by RN about patient refusing to finish drinking Golytely for planned EGD/colonoscopy in AM.  Patient seen and examined at the bedside. Patient is Kazakh speaking, and  phone ( ID: [id number]) was used to communicate with the patient. Patient is A&Ox2. Patient was explained the importance of drinking the full Golytely prep for the planned EGD/colonoscopy in AM. Patient understands, and agrees.   Will continue to re-attempt throughout the night  Discussed with RN  Will continue to monitor  Will endorse to AM team      Mell Marshall PA-C  # Notified by RN about patient refusing to finish drinking Golytely for planned EGD/colonoscopy in AM.  Patient seen and examined at the bedside. Patient is Czech speaking, and  phone ( ID: [id number]) was used to communicate with the patient. Patient is A&Ox2. Patient was explained the importance of drinking the full Golytely prep for the planned EGD/colonoscopy in AM. Patient understands, and agrees.   Will continue to re-attempt throughout the night  Discussed with RN  Will continue to monitor  Will endorse to AM team      Mell Marshall PA-C  #[id number]        **ADDENDUM @ 02:35**  Notified by RN that patient has only finished 2L of Golytely prep, and refuses to drink anymore. Patient's bowel movements are still dark despite drinking the 2L of Golytely prep.   Patient seen and examined at the bedside.  phone ( ID: [id number]) was used to communicate with the patient. Patient was once again explained the importance of drinking the full Golytely prep for the planned EGD/colonoscopy in AM in great detail. Patient understands, and agrees.  Will continue to re-attempt throughout the night.  Discussed with RN  Will continue to monitor  Will endorse to AM team      Mell Marshall PA-C  #[id number]

## 2020-07-27 LAB
ANION GAP SERPL CALC-SCNC: 19 MMOL/L — HIGH (ref 5–17)
BUN SERPL-MCNC: 100 MG/DL — HIGH (ref 7–23)
CALCIUM SERPL-MCNC: 9 MG/DL — SIGNIFICANT CHANGE UP (ref 8.4–10.5)
CHLORIDE SERPL-SCNC: 106 MMOL/L — SIGNIFICANT CHANGE UP (ref 96–108)
CO2 SERPL-SCNC: 16 MMOL/L — LOW (ref 22–31)
CREAT SERPL-MCNC: 3.74 MG/DL — HIGH (ref 0.5–1.3)
GLUCOSE SERPL-MCNC: 115 MG/DL — HIGH (ref 70–99)
HCT VFR BLD CALC: 24.1 % — LOW (ref 39–50)
HCT VFR BLD CALC: 25.2 % — LOW (ref 39–50)
HCT VFR BLD CALC: 25.4 % — LOW (ref 39–50)
HCT VFR BLD CALC: 26.5 % — LOW (ref 39–50)
HGB BLD-MCNC: 7.9 G/DL — LOW (ref 13–17)
HGB BLD-MCNC: 8.1 G/DL — LOW (ref 13–17)
HGB BLD-MCNC: 8.1 G/DL — LOW (ref 13–17)
HGB BLD-MCNC: 8.5 G/DL — LOW (ref 13–17)
MCHC RBC-ENTMCNC: 30.2 PG — SIGNIFICANT CHANGE UP (ref 27–34)
MCHC RBC-ENTMCNC: 30.3 PG — SIGNIFICANT CHANGE UP (ref 27–34)
MCHC RBC-ENTMCNC: 30.5 PG — SIGNIFICANT CHANGE UP (ref 27–34)
MCHC RBC-ENTMCNC: 31.2 PG — SIGNIFICANT CHANGE UP (ref 27–34)
MCHC RBC-ENTMCNC: 31.9 GM/DL — LOW (ref 32–36)
MCHC RBC-ENTMCNC: 32.1 GM/DL — SIGNIFICANT CHANGE UP (ref 32–36)
MCHC RBC-ENTMCNC: 32.1 GM/DL — SIGNIFICANT CHANGE UP (ref 32–36)
MCHC RBC-ENTMCNC: 32.8 GM/DL — SIGNIFICANT CHANGE UP (ref 32–36)
MCV RBC AUTO: 94.4 FL — SIGNIFICANT CHANGE UP (ref 80–100)
MCV RBC AUTO: 94.8 FL — SIGNIFICANT CHANGE UP (ref 80–100)
MCV RBC AUTO: 95 FL — SIGNIFICANT CHANGE UP (ref 80–100)
MCV RBC AUTO: 95.3 FL — SIGNIFICANT CHANGE UP (ref 80–100)
NRBC # BLD: 0 /100 WBCS — SIGNIFICANT CHANGE UP (ref 0–0)
PLATELET # BLD AUTO: 155 K/UL — SIGNIFICANT CHANGE UP (ref 150–400)
PLATELET # BLD AUTO: 156 K/UL — SIGNIFICANT CHANGE UP (ref 150–400)
PLATELET # BLD AUTO: 163 K/UL — SIGNIFICANT CHANGE UP (ref 150–400)
PLATELET # BLD AUTO: 164 K/UL — SIGNIFICANT CHANGE UP (ref 150–400)
POTASSIUM SERPL-MCNC: 4.6 MMOL/L — SIGNIFICANT CHANGE UP (ref 3.5–5.3)
POTASSIUM SERPL-SCNC: 4.6 MMOL/L — SIGNIFICANT CHANGE UP (ref 3.5–5.3)
PROT ?TM UR-MCNC: 87 MG/DL — HIGH (ref 0–12)
RBC # BLD: 2.53 M/UL — LOW (ref 4.2–5.8)
RBC # BLD: 2.67 M/UL — LOW (ref 4.2–5.8)
RBC # BLD: 2.68 M/UL — LOW (ref 4.2–5.8)
RBC # BLD: 2.79 M/UL — LOW (ref 4.2–5.8)
RBC # FLD: 16.4 % — HIGH (ref 10.3–14.5)
RBC # FLD: 16.5 % — HIGH (ref 10.3–14.5)
RBC # FLD: 16.6 % — HIGH (ref 10.3–14.5)
RBC # FLD: 16.7 % — HIGH (ref 10.3–14.5)
SODIUM SERPL-SCNC: 141 MMOL/L — SIGNIFICANT CHANGE UP (ref 135–145)
WBC # BLD: 5.49 K/UL — SIGNIFICANT CHANGE UP (ref 3.8–10.5)
WBC # BLD: 5.59 K/UL — SIGNIFICANT CHANGE UP (ref 3.8–10.5)
WBC # BLD: 5.63 K/UL — SIGNIFICANT CHANGE UP (ref 3.8–10.5)
WBC # BLD: 5.86 K/UL — SIGNIFICANT CHANGE UP (ref 3.8–10.5)
WBC # FLD AUTO: 5.49 K/UL — SIGNIFICANT CHANGE UP (ref 3.8–10.5)
WBC # FLD AUTO: 5.59 K/UL — SIGNIFICANT CHANGE UP (ref 3.8–10.5)
WBC # FLD AUTO: 5.63 K/UL — SIGNIFICANT CHANGE UP (ref 3.8–10.5)
WBC # FLD AUTO: 5.86 K/UL — SIGNIFICANT CHANGE UP (ref 3.8–10.5)

## 2020-07-27 RX ADMIN — Medication 40 MILLIGRAM(S): at 05:20

## 2020-07-27 RX ADMIN — Medication 50 MILLIGRAM(S): at 12:54

## 2020-07-27 RX ADMIN — Medication 325 MILLIGRAM(S): at 12:53

## 2020-07-27 RX ADMIN — Medication 50 MILLIGRAM(S): at 22:28

## 2020-07-27 RX ADMIN — FINASTERIDE 5 MILLIGRAM(S): 5 TABLET, FILM COATED ORAL at 12:53

## 2020-07-27 RX ADMIN — TAMSULOSIN HYDROCHLORIDE 0.4 MILLIGRAM(S): 0.4 CAPSULE ORAL at 22:30

## 2020-07-27 RX ADMIN — CARVEDILOL PHOSPHATE 25 MILLIGRAM(S): 80 CAPSULE, EXTENDED RELEASE ORAL at 05:20

## 2020-07-27 RX ADMIN — Medication 50 MILLIGRAM(S): at 05:20

## 2020-07-27 RX ADMIN — PANTOPRAZOLE SODIUM 40 MILLIGRAM(S): 20 TABLET, DELAYED RELEASE ORAL at 17:19

## 2020-07-27 RX ADMIN — PANTOPRAZOLE SODIUM 40 MILLIGRAM(S): 20 TABLET, DELAYED RELEASE ORAL at 05:19

## 2020-07-27 RX ADMIN — ATORVASTATIN CALCIUM 80 MILLIGRAM(S): 80 TABLET, FILM COATED ORAL at 22:28

## 2020-07-27 RX ADMIN — CARVEDILOL PHOSPHATE 25 MILLIGRAM(S): 80 CAPSULE, EXTENDED RELEASE ORAL at 17:19

## 2020-07-27 RX ADMIN — INSULIN GLARGINE 10 UNIT(S): 100 INJECTION, SOLUTION SUBCUTANEOUS at 22:29

## 2020-07-27 RX ADMIN — Medication 81 MILLIGRAM(S): at 12:53

## 2020-07-27 RX ADMIN — Medication 10 MILLIGRAM(S): at 22:28

## 2020-07-27 NOTE — PROGRESS NOTE ADULT - ASSESSMENT
Patient is a 69 Male pmhx DM, HTN, HLD, CAD s/p CABG, cath ([facility] 3/19/2018), MICU admission for influenza complicated by respiratory failure, JAKOB, NSTEMI, HF, A&Ox1 Estonian speaking only, difficult to obtain information from. Collateral history obtained from wife, states patient had a fall on 7/1/2020 and has been complaining of L hip pain and since fall has been crawling to get around. Reports that he has been more confused and weak as well. Denies any chest pain, shortness of breath, fevers or chills.

## 2020-07-27 NOTE — CHART NOTE - NSCHARTNOTEFT_GEN_A_CORE
Nutrition follow up     Hospital course as per chart: Pt 68 y/o M with PMH: DM, HTN, HLD, CAD S/P CABG, cath ([facility] 03/19/2018), MICU admission for influenza complicated by respiratory failure, JAKOB, NSTEMI, HF, admitted with left hip pain S/P fall on (07/01/2020), found with pubic ramus fracture - no surgical intervention per ortho, JAKOB, anemia, started clear liquids (07/24) in the afternoon due to melena, S/P EGD/colonoscopy today (07/27); pt on clear liquid diet x approximately 2-3 days - diet advanced to full liquids today.     Source: Patient [ ]    Family [ ]     other [x]; Medical record, RN     Pt confused/disoriented as per documentation, unwilling to participate in interview due to "being hungry". Pt Amharic-Speaking, dietitian fluent in Amharic. Pt on full liquid today after EGD. RN reports pt previously with very good appetite and PO intake, states pt "loves to eat" and always asks for more food. Noted 100% PO intake as per flow sheets. Reports pt drinking Glucerna 2xday and drinking Ensure Clear 2xday while on clear liquid diet. Denies pt with difficulty chewing or swallowing. Denies pt with nausea or vomiting. Reports multiple BM overnight (07/27) in preparation for colonoscopy.     Diet: Full liquid + Consistent Carbohydrate no snack + Glucerna Shake 240mls 2x daily (440kcals, 20g protein)     Enteral /Parenteral Nutrition: n/a    Weight as per flow sheets: (07/15) 155 pounds -> (07/19) 149.6 pounds -> (07/24) 143.3 pounds -> (07/27) 137.3 pounds -?accuracy of weight fluctuations likely due to fluid shifts as pt with HF and taking Lasix.   In previous RD assessment pt reported  pounds and wife 158 pounds. Will continue to monitor.   % Weight Change: n/a    Pertinent Medications: MEDICATIONS  (STANDING):  aspirin enteric coated 81 milliGRAM(s) Oral daily  atorvastatin 80 milliGRAM(s) Oral at bedtime  bisacodyl 10 milliGRAM(s) Oral at bedtime  carvedilol 25 milliGRAM(s) Oral every 12 hours  dextrose 5%. 1000 milliLiter(s) (50 mL/Hr) IV Continuous <Continuous>  dextrose 50% Injectable 12.5 Gram(s) IV Push once  dextrose 50% Injectable 25 Gram(s) IV Push once  dextrose 50% Injectable 25 Gram(s) IV Push once  ferrous    sulfate 325 milliGRAM(s) Oral daily  finasteride 5 milliGRAM(s) Oral daily  furosemide    Tablet 40 milliGRAM(s) Oral daily  hydrALAZINE 50 milliGRAM(s) Oral three times a day  insulin glargine Injectable (LANTUS) 10 Unit(s) SubCutaneous at bedtime  insulin lispro (HumaLOG) corrective regimen sliding scale   SubCutaneous three times a day before meals  insulin lispro (HumaLOG) corrective regimen sliding scale   SubCutaneous at bedtime  pantoprazole  Injectable 40 milliGRAM(s) IV Push two times a day  tamsulosin 0.4 milliGRAM(s) Oral at bedtime    MEDICATIONS  (PRN):  acetaminophen   Tablet .. 650 milliGRAM(s) Oral every 6 hours PRN Moderate Pain (4 - 6)  dextrose 40% Gel 15 Gram(s) Oral once PRN Blood Glucose LESS THAN 70 milliGRAM(s)/deciliter  glucagon  Injectable 1 milliGRAM(s) IntraMuscular once PRN Glucose LESS THAN 70 milligrams/deciliter    Pertinent Labs: (07/27) Glu 115 mg/dL<H> Cr  3.74 mg/dL<H>  mg/dL<H>   Finger sticks: (07/27) 113 - 129 (07/26) 121 - 299   (07/16) HbA1c 6.6%     Skin: no noted pressure injuries as per documentation.   No noted edema as per flow sheets.     Estimated Needs:   [x] no change since previous assessment  [ ] recalculated:     Previous Nutrition Diagnosis: [x] no previous nutrition diagnosis      Nutrition Diagnosis is [x] not applicable     New Nutrition Diagnosis: [x] Inadequate oral intake related to melena as evidenced by pt on clear liquid diet x 2-3 days   New Nutrition Goal: Pt to meet >75% of estimated nutritional needs during hospital stay.     Interventions:     1. Recommend advance to Consistent Carbohydrate with snack + DASH/TLC diet as medically feasible. Will continue to monitor and adjust as needed.   2. Continue Glucerna Shake 240mls 2x daily (440kcals, 20g protein) to optimize kcal and protein intake.   3. Gently encourage PO intake and obtain food preferences (unable to obtain at this time).   4. Continue to obtain weights to identify changes.     Monitoring and Evaluation:     [x] PO intake [x] Tolerance to diet prescription [x] weights [x] follow up per protocol    RD remains available.  Angela Morse MS RDN CDN #[id number]. Nutrition follow up     Hospital course as per chart: Pt 68 y/o M with PMH: DM, HTN, HLD, CAD S/P CABG, cath ([facility] 03/19/2018), MICU admission for influenza complicated by respiratory failure, JAKOB, NSTEMI, HF, admitted with left hip pain S/P fall on (07/01/2020), found with pubic ramus fracture - no surgical intervention per ortho, JAKOB, anemia; started clear liquids (07/24) in the afternoon due to melena, advanced to Consistent Carbohydrate yesterday (07/26) but started NPO after midnight, S/P EGD/colonoscopy today (07/27); diet advanced to full liquids today.     Source: Patient [ ]    Family [ ]     other [x]; Medical record, RN     Pt confused/disoriented as per documentation, unwilling to participate in interview due to "being hungry". Pt Togolese-Speaking, dietitian fluent in Togolese. Pt on full liquid today after EGD. RN reports pt previously with very good appetite and PO intake, states pt "loves to eat" and always asks for more food. Noted 100% PO intake as per flow sheets. Reports pt drinking Glucerna 2xday and drinking Ensure Clear 2xday while on clear liquid diet. Denies pt with difficulty chewing or swallowing. Denies pt with nausea or vomiting. Reports multiple BM overnight (07/27) in preparation for colonoscopy.     Diet: Full liquid + Consistent Carbohydrate no snack + Glucerna Shake 240mls 2x daily (440kcals, 20g protein)     Enteral /Parenteral Nutrition: n/a    Weight as per flow sheets: (07/15) 155 pounds -> (07/19) 149.6 pounds -> (07/24) 143.3 pounds -> (07/27) 137.3 pounds -?accuracy of weight fluctuations likely due to fluid shifts as pt with HF and taking Lasix.   In previous RD assessment pt reported  pounds and wife 158 pounds. Will continue to monitor.   % Weight Change: n/a    Pertinent Medications: MEDICATIONS  (STANDING):  aspirin enteric coated 81 milliGRAM(s) Oral daily  atorvastatin 80 milliGRAM(s) Oral at bedtime  bisacodyl 10 milliGRAM(s) Oral at bedtime  carvedilol 25 milliGRAM(s) Oral every 12 hours  dextrose 5%. 1000 milliLiter(s) (50 mL/Hr) IV Continuous <Continuous>  dextrose 50% Injectable 12.5 Gram(s) IV Push once  dextrose 50% Injectable 25 Gram(s) IV Push once  dextrose 50% Injectable 25 Gram(s) IV Push once  ferrous    sulfate 325 milliGRAM(s) Oral daily  finasteride 5 milliGRAM(s) Oral daily  furosemide    Tablet 40 milliGRAM(s) Oral daily  hydrALAZINE 50 milliGRAM(s) Oral three times a day  insulin glargine Injectable (LANTUS) 10 Unit(s) SubCutaneous at bedtime  insulin lispro (HumaLOG) corrective regimen sliding scale   SubCutaneous three times a day before meals  insulin lispro (HumaLOG) corrective regimen sliding scale   SubCutaneous at bedtime  pantoprazole  Injectable 40 milliGRAM(s) IV Push two times a day  tamsulosin 0.4 milliGRAM(s) Oral at bedtime    MEDICATIONS  (PRN):  acetaminophen   Tablet .. 650 milliGRAM(s) Oral every 6 hours PRN Moderate Pain (4 - 6)  dextrose 40% Gel 15 Gram(s) Oral once PRN Blood Glucose LESS THAN 70 milliGRAM(s)/deciliter  glucagon  Injectable 1 milliGRAM(s) IntraMuscular once PRN Glucose LESS THAN 70 milligrams/deciliter    Pertinent Labs: (07/27) Glu 115 mg/dL<H> Cr  3.74 mg/dL<H>  mg/dL<H>   Finger sticks: (07/27) 113 - 129 (07/26) 121 - 299   (07/16) HbA1c 6.6%     Skin: no noted pressure injuries as per documentation.   No noted edema as per flow sheets.     Estimated Needs:   [x] no change since previous assessment  [ ] recalculated:     Previous Nutrition Diagnosis: [x] no previous nutrition diagnosis      Nutrition Diagnosis is [x] not applicable     New Nutrition Diagnosis: [x] Inadequate oral intake related to melena as evidenced by pt previously on clear liquid diet x 2 days  New Nutrition Goal: Pt to meet >75% of estimated nutritional needs during hospital stay.     Interventions:     1. Recommend advance to Consistent Carbohydrate with snack + DASH/TLC diet as medically feasible. Will continue to monitor and adjust as needed.   2. Continue Glucerna Shake 240mls 2x daily (440kcals, 20g protein) to optimize kcal and protein intake.   3. Gently encourage PO intake and obtain food preferences (unable to obtain at this time).   4. Continue to obtain weights to identify changes.     Monitoring and Evaluation:     [x] PO intake [x] Tolerance to diet prescription [x] weights [x] follow up per protocol    RD remains available.  Angela Morse MS RDN CDN #[id number].

## 2020-07-27 NOTE — PROGRESS NOTE ADULT - ASSESSMENT
69 Male pmhx DM, HTN, HLD, CAD s/p CABG 2014, cath ([facility] 3/19/2018), MICU admission for influenza complicated by respiratory failure, JAKOB, NSTEMI, HF admitted for right hip pain. Pt found to have renal failure and hyperkalemia. Nephrology consulted for renal failure.     CKD stage IV/V  Pt admitted with elevated SCr 3.55  renal function fluctuating -- 3.4-3.9 possibly his baseline   continue  lasix to 40mg QD PO  Renal sonogram with no hydro   Monitor CBC with diff   Avoid nephrotoxins    Hyperkalemia  improved with medical mgn  low K diet  MOnitor serum K      HTN  BP fluctuating   continue current meds  Low salt diet  MOnitor BP      Proteinuria  2.3gm proteinuria  Follow up  Vasculitis work up   hep B reactive (possible past infection) , K/L ok, C3, C4 not low, ANCA/GLYNN neg   serum immunofixation suggestive of one kappa and one macarena band, spep has 2 weak migrating paraprotein bands  Needs UPEP and urine immunofixation  If gets discharged needs hematology evaluation  RPR positive, 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

## 2020-07-28 LAB
ANION GAP SERPL CALC-SCNC: 19 MMOL/L — HIGH (ref 5–17)
BUN SERPL-MCNC: 91 MG/DL — HIGH (ref 7–23)
CALCIUM SERPL-MCNC: 9 MG/DL — SIGNIFICANT CHANGE UP (ref 8.4–10.5)
CHLORIDE SERPL-SCNC: 107 MMOL/L — SIGNIFICANT CHANGE UP (ref 96–108)
CO2 SERPL-SCNC: 15 MMOL/L — LOW (ref 22–31)
CREAT SERPL-MCNC: 3.44 MG/DL — HIGH (ref 0.5–1.3)
GLUCOSE SERPL-MCNC: 87 MG/DL — SIGNIFICANT CHANGE UP (ref 70–99)
HCT VFR BLD CALC: 24.8 % — LOW (ref 39–50)
HCT VFR BLD CALC: 25.4 % — LOW (ref 39–50)
HGB BLD-MCNC: 8.1 G/DL — LOW (ref 13–17)
HGB BLD-MCNC: 8.2 G/DL — LOW (ref 13–17)
MCHC RBC-ENTMCNC: 30.1 PG — SIGNIFICANT CHANGE UP (ref 27–34)
MCHC RBC-ENTMCNC: 31.3 PG — SIGNIFICANT CHANGE UP (ref 27–34)
MCHC RBC-ENTMCNC: 31.9 GM/DL — LOW (ref 32–36)
MCHC RBC-ENTMCNC: 33.1 GM/DL — SIGNIFICANT CHANGE UP (ref 32–36)
MCV RBC AUTO: 94.4 FL — SIGNIFICANT CHANGE UP (ref 80–100)
MCV RBC AUTO: 94.7 FL — SIGNIFICANT CHANGE UP (ref 80–100)
NRBC # BLD: 0 /100 WBCS — SIGNIFICANT CHANGE UP (ref 0–0)
NRBC # BLD: 0 /100 WBCS — SIGNIFICANT CHANGE UP (ref 0–0)
PLATELET # BLD AUTO: 170 K/UL — SIGNIFICANT CHANGE UP (ref 150–400)
PLATELET # BLD AUTO: 174 K/UL — SIGNIFICANT CHANGE UP (ref 150–400)
POTASSIUM SERPL-MCNC: 4.5 MMOL/L — SIGNIFICANT CHANGE UP (ref 3.5–5.3)
POTASSIUM SERPL-SCNC: 4.5 MMOL/L — SIGNIFICANT CHANGE UP (ref 3.5–5.3)
RBC # BLD: 2.62 M/UL — LOW (ref 4.2–5.8)
RBC # BLD: 2.69 M/UL — LOW (ref 4.2–5.8)
RBC # FLD: 16.5 % — HIGH (ref 10.3–14.5)
RBC # FLD: 16.5 % — HIGH (ref 10.3–14.5)
SODIUM SERPL-SCNC: 141 MMOL/L — SIGNIFICANT CHANGE UP (ref 135–145)
WBC # BLD: 5.57 K/UL — SIGNIFICANT CHANGE UP (ref 3.8–10.5)
WBC # BLD: 5.64 K/UL — SIGNIFICANT CHANGE UP (ref 3.8–10.5)
WBC # FLD AUTO: 5.57 K/UL — SIGNIFICANT CHANGE UP (ref 3.8–10.5)
WBC # FLD AUTO: 5.64 K/UL — SIGNIFICANT CHANGE UP (ref 3.8–10.5)

## 2020-07-28 RX ORDER — SODIUM BICARBONATE 1 MEQ/ML
1300 SYRINGE (ML) INTRAVENOUS THREE TIMES A DAY
Refills: 0 | Status: DISCONTINUED | OUTPATIENT
Start: 2020-07-28 | End: 2020-08-03

## 2020-07-28 RX ADMIN — Medication 50 MILLIGRAM(S): at 23:23

## 2020-07-28 RX ADMIN — Medication 1300 MILLIGRAM(S): at 23:23

## 2020-07-28 RX ADMIN — PANTOPRAZOLE SODIUM 40 MILLIGRAM(S): 20 TABLET, DELAYED RELEASE ORAL at 05:23

## 2020-07-28 RX ADMIN — Medication 325 MILLIGRAM(S): at 11:53

## 2020-07-28 RX ADMIN — Medication 10 MILLIGRAM(S): at 23:22

## 2020-07-28 RX ADMIN — TAMSULOSIN HYDROCHLORIDE 0.4 MILLIGRAM(S): 0.4 CAPSULE ORAL at 23:22

## 2020-07-28 RX ADMIN — CARVEDILOL PHOSPHATE 25 MILLIGRAM(S): 80 CAPSULE, EXTENDED RELEASE ORAL at 17:45

## 2020-07-28 RX ADMIN — ATORVASTATIN CALCIUM 80 MILLIGRAM(S): 80 TABLET, FILM COATED ORAL at 23:23

## 2020-07-28 RX ADMIN — PANTOPRAZOLE SODIUM 40 MILLIGRAM(S): 20 TABLET, DELAYED RELEASE ORAL at 17:45

## 2020-07-28 RX ADMIN — CARVEDILOL PHOSPHATE 25 MILLIGRAM(S): 80 CAPSULE, EXTENDED RELEASE ORAL at 05:22

## 2020-07-28 RX ADMIN — FINASTERIDE 5 MILLIGRAM(S): 5 TABLET, FILM COATED ORAL at 11:53

## 2020-07-28 RX ADMIN — Medication 6: at 17:45

## 2020-07-28 RX ADMIN — Medication 50 MILLIGRAM(S): at 11:53

## 2020-07-28 RX ADMIN — Medication 50 MILLIGRAM(S): at 05:22

## 2020-07-28 RX ADMIN — INSULIN GLARGINE 10 UNIT(S): 100 INJECTION, SOLUTION SUBCUTANEOUS at 22:08

## 2020-07-28 RX ADMIN — Medication 1300 MILLIGRAM(S): at 11:53

## 2020-07-28 RX ADMIN — Medication 40 MILLIGRAM(S): at 05:22

## 2020-07-28 RX ADMIN — Medication 81 MILLIGRAM(S): at 11:53

## 2020-07-28 NOTE — PROGRESS NOTE ADULT - ASSESSMENT
69 Male pmhx DM, HTN, HLD, CAD s/p CABG 2014, cath ([facility] 3/19/2018), MICU admission for influenza complicated by respiratory failure, JAKOB, NSTEMI, HF admitted for right hip pain. Pt found to have renal failure and hyperkalemia. Nephrology consulted for renal failure.     CKD stage IV/V  Pt admitted with elevated SCr 3.55  renal function fluctuating -- 3.4-3.9 possibly his baseline   continue  lasix to 40mg QD PO  Renal sonogram with no hydro   Monitor CBC with diff   Avoid nephrotoxins    Hyperkalemia  improved with medical mgn  low K diet  MOnitor serum K      HTN  BP fluctuating   continue current meds  Low salt diet  MOnitor BP      Proteinuria  2.3gm proteinuria  Follow up  Vasculitis work up   hep B reactive (possible past infection) , K/L ok, C3, C4 not low, ANCA/GLYNN neg   serum immunofixation suggestive of one kappa and one macarena band, spep has 2 weak migrating paraprotein bands  Needs UPEP and urine immunofixation  If gets discharged needs hematology evaluation  RPR positive, 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

## 2020-07-28 NOTE — PROVIDER CONTACT NOTE (OTHER) - ACTION/TREATMENT ORDERED:
HUNTER Moya made aware.
will continue to monitor
As per provider, continue to monitor pt
Humalog administered

## 2020-07-28 NOTE — PROGRESS NOTE ADULT - ASSESSMENT
Patient is a 69 Male pmhx DM, HTN, HLD, CAD s/p CABG, cath ([facility] 3/19/2018), MICU admission for influenza complicated by respiratory failure, JAKOB, NSTEMI, HF, A&Ox1 Luxembourgish speaking only, difficult to obtain information from. Collateral history obtained from wife, states patient had a fall on 7/1/2020 and has been complaining of L hip pain and since fall has been crawling to get around. Reports that he has been more confused and weak as well. Denies any chest pain, shortness of breath, fevers or chills.

## 2020-07-28 NOTE — PROGRESS NOTE ADULT - ASSESSMENT
Patient is a 69 Male pmhx DM, HTN, HLD, CAD s/p CABG, cath ([facility] 3/19/2018), MICU admission for influenza complicated by respiratory failure, JAKOB, NSTEMI, HF, dementia presents with fall.     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 Now w stable hgb  -hold plavix if not needed  -ok with aspirin  -trend hgb  -regular diet  -if hgb stable tomorrow, ok for d/c form GI standpoint  -outpt f/u for routine colonoscopy     Problem/Recommendation - 2:  ·  Problem: Diabetes.      Problem/Recommendation - 3:  ·  Problem: Acute kidney injury.  Recommendation: unknown baseline creatinine  will need to obtain collateral info.      Problem/Recommendation - 4:  ·  Problem: Pubic ramus fracture.   -mgmt per medicine/ortho     Problem/Recommendation - 5:  ·  Problem: Elevated alk phos, likely bone source in the setting of renal insufficiency.  -can w/u as an outpt. This is chronic.      Problem/Recommendation - 6:  Problem: Dementia. Recommendation: Unclear baseline mental status.    Attending Attestation:   Differential diagnosis and plan of care discussed with patient after the evaluation  65 Minutes spent on total encounter of which more than fifty percent of the encounter was spent counseling and/or coordinating care by the attending physician.  Advanced care planning was discussed with the patient and/or surrogate decision makers. Advanced care planning forms were discussed. The risks benefits and alternatives to pertinent gastrointestinal procedures and interventions were discussed in detail and all questions were answered. Duration: 30 Minutes.

## 2020-07-28 NOTE — PROVIDER CONTACT NOTE (OTHER) - ASSESSMENT
Pt eating now.
Pt shows no signs of acute pain or distress. Pt is laying down in bed
Vital signs stable, no acute distress noted.
patient has been combative this afternoon

## 2020-07-28 NOTE — PROVIDER CONTACT NOTE (OTHER) - RECOMMENDATIONS
Will treat per order and recheck FS in 15 min.
Administer insulin according to sliding scale.
I recommend to continue to monitor this pt

## 2020-07-29 ENCOUNTER — TRANSCRIPTION ENCOUNTER (OUTPATIENT)
Age: 70
End: 2020-07-29

## 2020-07-29 LAB
ANION GAP SERPL CALC-SCNC: 18 MMOL/L — HIGH (ref 5–17)
BLD GP AB SCN SERPL QL: NEGATIVE — SIGNIFICANT CHANGE UP
BUN SERPL-MCNC: 90 MG/DL — HIGH (ref 7–23)
CALCIUM SERPL-MCNC: 8.8 MG/DL — SIGNIFICANT CHANGE UP (ref 8.4–10.5)
CHLORIDE SERPL-SCNC: 107 MMOL/L — SIGNIFICANT CHANGE UP (ref 96–108)
CO2 SERPL-SCNC: 18 MMOL/L — LOW (ref 22–31)
CREAT SERPL-MCNC: 3.78 MG/DL — HIGH (ref 0.5–1.3)
GLUCOSE SERPL-MCNC: 91 MG/DL — SIGNIFICANT CHANGE UP (ref 70–99)
HCT VFR BLD CALC: 24.2 % — LOW (ref 39–50)
HGB BLD-MCNC: 7.9 G/DL — LOW (ref 13–17)
MCHC RBC-ENTMCNC: 31.2 PG — SIGNIFICANT CHANGE UP (ref 27–34)
MCHC RBC-ENTMCNC: 32.6 GM/DL — SIGNIFICANT CHANGE UP (ref 32–36)
MCV RBC AUTO: 95.7 FL — SIGNIFICANT CHANGE UP (ref 80–100)
NRBC # BLD: 0 /100 WBCS — SIGNIFICANT CHANGE UP (ref 0–0)
PLATELET # BLD AUTO: 177 K/UL — SIGNIFICANT CHANGE UP (ref 150–400)
POTASSIUM SERPL-MCNC: 4.6 MMOL/L — SIGNIFICANT CHANGE UP (ref 3.5–5.3)
POTASSIUM SERPL-SCNC: 4.6 MMOL/L — SIGNIFICANT CHANGE UP (ref 3.5–5.3)
RBC # BLD: 2.53 M/UL — LOW (ref 4.2–5.8)
RBC # FLD: 16.6 % — HIGH (ref 10.3–14.5)
RH IG SCN BLD-IMP: POSITIVE — SIGNIFICANT CHANGE UP
SODIUM SERPL-SCNC: 143 MMOL/L — SIGNIFICANT CHANGE UP (ref 135–145)
WBC # BLD: 5.8 K/UL — SIGNIFICANT CHANGE UP (ref 3.8–10.5)
WBC # FLD AUTO: 5.8 K/UL — SIGNIFICANT CHANGE UP (ref 3.8–10.5)

## 2020-07-29 PROCEDURE — 73560 X-RAY EXAM OF KNEE 1 OR 2: CPT | Mod: 26,LT

## 2020-07-29 RX ORDER — FUROSEMIDE 40 MG
40 TABLET ORAL ONCE
Refills: 0 | Status: COMPLETED | OUTPATIENT
Start: 2020-07-29 | End: 2020-07-29

## 2020-07-29 RX ADMIN — ATORVASTATIN CALCIUM 80 MILLIGRAM(S): 80 TABLET, FILM COATED ORAL at 21:51

## 2020-07-29 RX ADMIN — Medication 50 MILLIGRAM(S): at 21:51

## 2020-07-29 RX ADMIN — Medication 50 MILLIGRAM(S): at 06:30

## 2020-07-29 RX ADMIN — Medication 40 MILLIGRAM(S): at 06:30

## 2020-07-29 RX ADMIN — Medication 1: at 12:28

## 2020-07-29 RX ADMIN — Medication 40 MILLIGRAM(S): at 15:36

## 2020-07-29 RX ADMIN — FINASTERIDE 5 MILLIGRAM(S): 5 TABLET, FILM COATED ORAL at 11:18

## 2020-07-29 RX ADMIN — Medication 81 MILLIGRAM(S): at 11:18

## 2020-07-29 RX ADMIN — INSULIN GLARGINE 10 UNIT(S): 100 INJECTION, SOLUTION SUBCUTANEOUS at 21:52

## 2020-07-29 RX ADMIN — TAMSULOSIN HYDROCHLORIDE 0.4 MILLIGRAM(S): 0.4 CAPSULE ORAL at 21:51

## 2020-07-29 RX ADMIN — Medication 1300 MILLIGRAM(S): at 11:18

## 2020-07-29 RX ADMIN — Medication 2: at 17:26

## 2020-07-29 RX ADMIN — Medication 1300 MILLIGRAM(S): at 08:34

## 2020-07-29 RX ADMIN — PANTOPRAZOLE SODIUM 40 MILLIGRAM(S): 20 TABLET, DELAYED RELEASE ORAL at 17:26

## 2020-07-29 RX ADMIN — Medication 10 MILLIGRAM(S): at 21:52

## 2020-07-29 RX ADMIN — Medication 325 MILLIGRAM(S): at 11:18

## 2020-07-29 RX ADMIN — PANTOPRAZOLE SODIUM 40 MILLIGRAM(S): 20 TABLET, DELAYED RELEASE ORAL at 06:29

## 2020-07-29 RX ADMIN — CARVEDILOL PHOSPHATE 25 MILLIGRAM(S): 80 CAPSULE, EXTENDED RELEASE ORAL at 17:25

## 2020-07-29 RX ADMIN — Medication 50 MILLIGRAM(S): at 11:18

## 2020-07-29 RX ADMIN — Medication 1300 MILLIGRAM(S): at 21:51

## 2020-07-29 RX ADMIN — CARVEDILOL PHOSPHATE 25 MILLIGRAM(S): 80 CAPSULE, EXTENDED RELEASE ORAL at 06:30

## 2020-07-29 NOTE — PROGRESS NOTE ADULT - ASSESSMENT
69 Male pmhx DM, HTN, HLD, CAD s/p CABG 2014, cath ([facility] 3/19/2018), MICU admission for influenza complicated by respiratory failure, JAKOB, NSTEMI, HF admitted for right hip pain. Pt found to have renal failure and hyperkalemia. Nephrology consulted for renal failure.     CKD stage IV/V  Pt admitted with elevated SCr 3.55  renal function fluctuating -- 3.4-3.9 possibly his baseline   continue  lasix to 40mg QD PO  Renal sonogram with no hydro   Monitor CBC with diff   Avoid nephrotoxins    Hyperkalemia  improved with medical mgn  low K diet  MOnitor serum K      HTN  BP fluctuating   continue current meds  Low salt diet  MOnitor BP      Proteinuria  2.3gm proteinuria  Follow up  Vasculitis work up   hep B reactive (possible past infection) , K/L ok, C3, C4 not low, ANCA/GLYNN neg   serum immunofixation suggestive of one kappa and one macarena band, spep has 2 weak migrating paraprotein bands  Needs UPEP and urine immunofixation  If gets discharged needs hematology evaluation  RPR positive, 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

## 2020-07-29 NOTE — PROGRESS NOTE ADULT - ASSESSMENT
Patient is a 69 Male pmhx DM, HTN, HLD, CAD s/p CABG, cath ([facility] 3/19/2018), MICU admission for influenza complicated by respiratory failure, JAKOB, NSTEMI, HF, dementia presents with fall.     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 Now w stable hgb  -hold plavix if not needed  -ok with aspirin  -trend hgb  -regular diet  -if hgb stable tomorrow, ok for d/c form GI standpoint  -outpt f/u for routine colonoscopy     Problem/Recommendation - 2:  ·  Problem: Diabetes.      Problem/Recommendation - 3:  ·  Problem: Acute kidney injury.  Recommendation: unknown baseline creatinine  will need to obtain collateral info.      Problem/Recommendation - 4:  ·  Problem: Pubic ramus fracture.   -mgmt per medicine/ortho     Problem/Recommendation - 5:  ·  Problem: Elevated alk phos, likely bone source in the setting of renal insufficiency.  -can w/u as an outpt. This is chronic.      Problem/Recommendation - 6:  Problem: Dementia. Recommendation: Unclear baseline mental status.    Attending Attestation:   Differential diagnosis and plan of care discussed with patient after the evaluation  65 Minutes spent on total encounter of which more than fifty percent of the encounter was spent counseling and/or coordinating care by the attending physician.  Advanced care planning was discussed with the patient and/or surrogate decision makers. Advanced care planning forms were discussed. The risks benefits and alternatives to pertinent gastrointestinal procedures and interventions were discussed in detail and all questions were answered. Duration: 30 Minutes.

## 2020-07-29 NOTE — DISCHARGE NOTE NURSING/CASE MANAGEMENT/SOCIAL WORK - PATIENT PORTAL LINK FT
You can access the FollowMyHealth Patient Portal offered by [facility] by registering at the following website: [email or web address]. By joining [facility]’s FollowMyHealth portal, you will also be able to view your health information using other applications (apps) compatible with our system.

## 2020-07-29 NOTE — PROGRESS NOTE ADULT - ASSESSMENT
Patient is a 69 Male pmhx DM, HTN, HLD, CAD s/p CABG, cath ([facility] 3/19/2018), MICU admission for influenza complicated by respiratory failure, JAKOB, NSTEMI, HF, A&Ox1 Hebrew speaking only, difficult to obtain information from. Collateral history obtained from wife, states patient had a fall on 7/1/2020 and has been complaining of L hip pain and since fall has been crawling to get around. Reports that he has been more confused and weak as well. Denies any chest pain, shortness of breath, fevers or chills.

## 2020-07-29 NOTE — DISCHARGE NOTE NURSING/CASE MANAGEMENT/SOCIAL WORK - NSDCFUADDAPPT_GEN_ALL_CORE_FT
You must follow up with your primary medical doctor and your cardiologist within one week of discharge - please call to make these appointments.  At these appointments, it will be determined whether you can restart your Entresto and your Lasix.    You will need to follow up with your gastroenterologist, Dr. Meek [phone], within 1-2 weeks of discharge - please call to make an appointment.    You will need to follow up with nephrology and hematology upon discharge - please call to make these appointments.    Follow up with your orthopedic doctor as needed.

## 2020-07-30 LAB
ANION GAP SERPL CALC-SCNC: 16 MMOL/L — SIGNIFICANT CHANGE UP (ref 5–17)
BUN SERPL-MCNC: 93 MG/DL — HIGH (ref 7–23)
CALCIUM SERPL-MCNC: 8.9 MG/DL — SIGNIFICANT CHANGE UP (ref 8.4–10.5)
CHLORIDE SERPL-SCNC: 107 MMOL/L — SIGNIFICANT CHANGE UP (ref 96–108)
CO2 SERPL-SCNC: 21 MMOL/L — LOW (ref 22–31)
CREAT SERPL-MCNC: 4 MG/DL — HIGH (ref 0.5–1.3)
GLUCOSE SERPL-MCNC: 206 MG/DL — HIGH (ref 70–99)
HCT VFR BLD CALC: 26.3 % — LOW (ref 39–50)
HGB BLD-MCNC: 8.5 G/DL — LOW (ref 13–17)
INTERPRETATION 24H UR IFE-IMP: SIGNIFICANT CHANGE UP
INTERPRETATION 24H UR IFE-IMP: SIGNIFICANT CHANGE UP
MCHC RBC-ENTMCNC: 30.7 PG — SIGNIFICANT CHANGE UP (ref 27–34)
MCHC RBC-ENTMCNC: 32.3 GM/DL — SIGNIFICANT CHANGE UP (ref 32–36)
MCV RBC AUTO: 94.9 FL — SIGNIFICANT CHANGE UP (ref 80–100)
NRBC # BLD: 0 /100 WBCS — SIGNIFICANT CHANGE UP (ref 0–0)
PLATELET # BLD AUTO: 153 K/UL — SIGNIFICANT CHANGE UP (ref 150–400)
POTASSIUM SERPL-MCNC: 4.6 MMOL/L — SIGNIFICANT CHANGE UP (ref 3.5–5.3)
POTASSIUM SERPL-SCNC: 4.6 MMOL/L — SIGNIFICANT CHANGE UP (ref 3.5–5.3)
RBC # BLD: 2.77 M/UL — LOW (ref 4.2–5.8)
RBC # FLD: 16.5 % — HIGH (ref 10.3–14.5)
SODIUM SERPL-SCNC: 144 MMOL/L — SIGNIFICANT CHANGE UP (ref 135–145)
WBC # BLD: 5.68 K/UL — SIGNIFICANT CHANGE UP (ref 3.8–10.5)
WBC # FLD AUTO: 5.68 K/UL — SIGNIFICANT CHANGE UP (ref 3.8–10.5)

## 2020-07-30 RX ADMIN — INSULIN GLARGINE 10 UNIT(S): 100 INJECTION, SOLUTION SUBCUTANEOUS at 22:25

## 2020-07-30 RX ADMIN — ATORVASTATIN CALCIUM 80 MILLIGRAM(S): 80 TABLET, FILM COATED ORAL at 22:25

## 2020-07-30 RX ADMIN — Medication 50 MILLIGRAM(S): at 05:17

## 2020-07-30 RX ADMIN — Medication 1: at 17:06

## 2020-07-30 RX ADMIN — PANTOPRAZOLE SODIUM 40 MILLIGRAM(S): 20 TABLET, DELAYED RELEASE ORAL at 05:18

## 2020-07-30 RX ADMIN — FINASTERIDE 5 MILLIGRAM(S): 5 TABLET, FILM COATED ORAL at 11:27

## 2020-07-30 RX ADMIN — Medication 1300 MILLIGRAM(S): at 22:25

## 2020-07-30 RX ADMIN — CARVEDILOL PHOSPHATE 25 MILLIGRAM(S): 80 CAPSULE, EXTENDED RELEASE ORAL at 17:06

## 2020-07-30 RX ADMIN — CARVEDILOL PHOSPHATE 25 MILLIGRAM(S): 80 CAPSULE, EXTENDED RELEASE ORAL at 05:17

## 2020-07-30 RX ADMIN — TAMSULOSIN HYDROCHLORIDE 0.4 MILLIGRAM(S): 0.4 CAPSULE ORAL at 22:25

## 2020-07-30 RX ADMIN — Medication 325 MILLIGRAM(S): at 11:27

## 2020-07-30 RX ADMIN — Medication 81 MILLIGRAM(S): at 11:27

## 2020-07-30 RX ADMIN — PANTOPRAZOLE SODIUM 40 MILLIGRAM(S): 20 TABLET, DELAYED RELEASE ORAL at 17:05

## 2020-07-30 RX ADMIN — Medication 1300 MILLIGRAM(S): at 11:27

## 2020-07-30 RX ADMIN — Medication 40 MILLIGRAM(S): at 05:17

## 2020-07-30 RX ADMIN — Medication 10 MILLIGRAM(S): at 22:25

## 2020-07-30 RX ADMIN — Medication 2: at 12:31

## 2020-07-30 RX ADMIN — Medication 50 MILLIGRAM(S): at 22:25

## 2020-07-30 RX ADMIN — Medication 50 MILLIGRAM(S): at 11:27

## 2020-07-30 RX ADMIN — Medication 1: at 08:08

## 2020-07-30 RX ADMIN — Medication 1300 MILLIGRAM(S): at 05:18

## 2020-07-30 NOTE — PROGRESS NOTE ADULT - ASSESSMENT
Patient is a 69 Male pmhx DM, HTN, HLD, CAD s/p CABG, cath ([facility] 3/19/2018), MICU admission for influenza complicated by respiratory failure, JAKOB, NSTEMI, HF, dementia presents with fall.     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 Now w stable hgb  -hold plavix if not needed  -ok with aspirin  -trend hgb  -regular diet  -if hgb stable tomorrow, ok for d/c form GI standpoint  -outpt f/u for routine colonoscopy     Problem/Recommendation - 2:  ·  Problem: Diabetes.      Problem/Recommendation - 3:  ·  Problem: Acute kidney injury.  Recommendation: unknown baseline creatinine  will need to obtain collateral info.      Problem/Recommendation - 4:  ·  Problem: Pubic ramus fracture.   -mgmt per medicine/ortho     Problem/Recommendation - 5:  ·  Problem: Elevated alk phos, likely bone source in the setting of renal insufficiency.  -can w/u as an outpt. This is chronic.      Problem/Recommendation - 6:  Problem: Dementia. Recommendation: Unclear baseline mental status.    Attending Attestation:   Differential diagnosis and plan of care discussed with patient after the evaluation  65 Minutes spent on total encounter of which more than fifty percent of the encounter was spent counseling and/or coordinating care by the attending physician.  Advanced care planning was discussed with the patient and/or surrogate decision makers. Advanced care planning forms were discussed. The risks benefits and alternatives to pertinent gastrointestinal procedures and interventions were discussed in detail and all questions were answered. Duration: 30 Minutes.

## 2020-07-30 NOTE — PROGRESS NOTE ADULT - ASSESSMENT
69 Male pmhx DM, HTN, HLD, CAD s/p CABG 2014, cath ([facility] 3/19/2018), MICU admission for influenza complicated by respiratory failure, JAKOB, NSTEMI, HF admitted for right hip pain. Pt found to have renal failure and hyperkalemia. Nephrology consulted for renal failure.     CKD stage IV/V  renal function fluctuating -- 3.4-3.9 possibly his baseline   mildly worsening today -- check urine lytes if continues to worsen  continue  lasix to 40mg QD PO  Renal sonogram with no hydro   Monitor CBC with diff   Avoid nephrotoxins    Hyperkalemia  improved with medical mgn  low K diet  MOnitor serum K      HTN  BP fluctuating   continue current meds  Low salt diet  MOnitor BP      Proteinuria  2.3gm proteinuria  Follow up  Vasculitis work up   hep B reactive (possible past infection) , K/L ok, C3, C4 not low, ANCA/GLYNN neg   serum immunofixation suggestive of one kappa and one macarena band, spep has 2 weak migrating paraprotein bands  Needs UPEP and urine immunofixation  If gets discharged needs hematology evaluation  RPR positive, 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

## 2020-07-30 NOTE — PROGRESS NOTE ADULT - ASSESSMENT
Patient is a 69 Male pmhx DM, HTN, HLD, CAD s/p CABG, cath ([facility] 3/19/2018), MICU admission for influenza complicated by respiratory failure, JAKOB, NSTEMI, HF, A&Ox1 Danish speaking only, difficult to obtain information from. Collateral history obtained from wife, states patient had a fall on 7/1/2020 and has been complaining of L hip pain and since fall has been crawling to get around. Reports that he has been more confused and weak as well. Denies any chest pain, shortness of breath, fevers or chills.

## 2020-07-31 LAB
ANION GAP SERPL CALC-SCNC: 19 MMOL/L — HIGH (ref 5–17)
BUN SERPL-MCNC: 100 MG/DL — HIGH (ref 7–23)
CALCIUM SERPL-MCNC: 8.7 MG/DL — SIGNIFICANT CHANGE UP (ref 8.4–10.5)
CHLORIDE SERPL-SCNC: 106 MMOL/L — SIGNIFICANT CHANGE UP (ref 96–108)
CO2 SERPL-SCNC: 18 MMOL/L — LOW (ref 22–31)
CREAT SERPL-MCNC: 4.57 MG/DL — HIGH (ref 0.5–1.3)
GLUCOSE SERPL-MCNC: 131 MG/DL — HIGH (ref 70–99)
HCT VFR BLD CALC: 24.4 % — LOW (ref 39–50)
HCT VFR BLD CALC: 24.6 % — LOW (ref 39–50)
HGB BLD-MCNC: 7.9 G/DL — LOW (ref 13–17)
HGB BLD-MCNC: 7.9 G/DL — LOW (ref 13–17)
MCHC RBC-ENTMCNC: 30.7 PG — SIGNIFICANT CHANGE UP (ref 27–34)
MCHC RBC-ENTMCNC: 30.9 PG — SIGNIFICANT CHANGE UP (ref 27–34)
MCHC RBC-ENTMCNC: 32.1 GM/DL — SIGNIFICANT CHANGE UP (ref 32–36)
MCHC RBC-ENTMCNC: 32.4 GM/DL — SIGNIFICANT CHANGE UP (ref 32–36)
MCV RBC AUTO: 94.9 FL — SIGNIFICANT CHANGE UP (ref 80–100)
MCV RBC AUTO: 96.1 FL — SIGNIFICANT CHANGE UP (ref 80–100)
NRBC # BLD: 0 /100 WBCS — SIGNIFICANT CHANGE UP (ref 0–0)
NRBC # BLD: 0 /100 WBCS — SIGNIFICANT CHANGE UP (ref 0–0)
PLATELET # BLD AUTO: 129 K/UL — LOW (ref 150–400)
PLATELET # BLD AUTO: 151 K/UL — SIGNIFICANT CHANGE UP (ref 150–400)
POTASSIUM SERPL-MCNC: 5.1 MMOL/L — SIGNIFICANT CHANGE UP (ref 3.5–5.3)
POTASSIUM SERPL-SCNC: 5.1 MMOL/L — SIGNIFICANT CHANGE UP (ref 3.5–5.3)
RBC # BLD: 2.56 M/UL — LOW (ref 4.2–5.8)
RBC # BLD: 2.57 M/UL — LOW (ref 4.2–5.8)
RBC # FLD: 16.6 % — HIGH (ref 10.3–14.5)
RBC # FLD: 16.7 % — HIGH (ref 10.3–14.5)
SODIUM SERPL-SCNC: 143 MMOL/L — SIGNIFICANT CHANGE UP (ref 135–145)
WBC # BLD: 6.28 K/UL — SIGNIFICANT CHANGE UP (ref 3.8–10.5)
WBC # BLD: 6.45 K/UL — SIGNIFICANT CHANGE UP (ref 3.8–10.5)
WBC # FLD AUTO: 6.28 K/UL — SIGNIFICANT CHANGE UP (ref 3.8–10.5)
WBC # FLD AUTO: 6.45 K/UL — SIGNIFICANT CHANGE UP (ref 3.8–10.5)

## 2020-07-31 PROCEDURE — 78278 ACUTE GI BLOOD LOSS IMAGING: CPT | Mod: 26

## 2020-07-31 RX ADMIN — Medication 325 MILLIGRAM(S): at 08:06

## 2020-07-31 RX ADMIN — TAMSULOSIN HYDROCHLORIDE 0.4 MILLIGRAM(S): 0.4 CAPSULE ORAL at 21:40

## 2020-07-31 RX ADMIN — Medication 1300 MILLIGRAM(S): at 12:56

## 2020-07-31 RX ADMIN — Medication 50 MILLIGRAM(S): at 21:38

## 2020-07-31 RX ADMIN — Medication 2: at 08:05

## 2020-07-31 RX ADMIN — Medication 50 MILLIGRAM(S): at 05:16

## 2020-07-31 RX ADMIN — Medication 1300 MILLIGRAM(S): at 21:38

## 2020-07-31 RX ADMIN — PANTOPRAZOLE SODIUM 40 MILLIGRAM(S): 20 TABLET, DELAYED RELEASE ORAL at 05:16

## 2020-07-31 RX ADMIN — FINASTERIDE 5 MILLIGRAM(S): 5 TABLET, FILM COATED ORAL at 08:06

## 2020-07-31 RX ADMIN — PANTOPRAZOLE SODIUM 40 MILLIGRAM(S): 20 TABLET, DELAYED RELEASE ORAL at 21:37

## 2020-07-31 RX ADMIN — Medication 81 MILLIGRAM(S): at 08:06

## 2020-07-31 RX ADMIN — INSULIN GLARGINE 10 UNIT(S): 100 INJECTION, SOLUTION SUBCUTANEOUS at 21:39

## 2020-07-31 RX ADMIN — Medication 10 MILLIGRAM(S): at 21:38

## 2020-07-31 RX ADMIN — Medication 50 MILLIGRAM(S): at 12:55

## 2020-07-31 RX ADMIN — Medication 40 MILLIGRAM(S): at 05:16

## 2020-07-31 RX ADMIN — CARVEDILOL PHOSPHATE 25 MILLIGRAM(S): 80 CAPSULE, EXTENDED RELEASE ORAL at 05:16

## 2020-07-31 RX ADMIN — Medication 1300 MILLIGRAM(S): at 05:16

## 2020-07-31 RX ADMIN — Medication 1: at 12:55

## 2020-07-31 RX ADMIN — ATORVASTATIN CALCIUM 80 MILLIGRAM(S): 80 TABLET, FILM COATED ORAL at 21:38

## 2020-07-31 RX ADMIN — CARVEDILOL PHOSPHATE 25 MILLIGRAM(S): 80 CAPSULE, EXTENDED RELEASE ORAL at 21:38

## 2020-07-31 NOTE — PROGRESS NOTE ADULT - ASSESSMENT
69 Male pmhx DM, HTN, HLD, CAD s/p CABG 2014, cath ([facility] 3/19/2018), MICU admission for influenza complicated by respiratory failure, JAKOB, NSTEMI, HF admitted for right hip pain. Pt found to have renal failure and hyperkalemia. Nephrology consulted for renal failure.     CKD stage IV/V  renal function fluctuating -- 3.4-3.9 possibly his baseline   renal function worsening today    Get UA, urine urea nitrogen, Cr  continue  lasix to 40mg QD PO at present  Renal sonogram with no hydro   Monitor CBC with diff   Avoid nephrotoxins    Hyperkalemia  improved with medical mgn  low K diet  MOnitor serum K      HTN  BP fluctuating   continue current meds  Low salt diet  MOnitor BP      Proteinuria  2.3gm proteinuria  Follow up  Vasculitis work up   hep B reactive (possible past infection) , K/L ok, C3, C4 not low, ANCA/GLYNN neg   serum immunofixation suggestive of one kappa and one macarena band, spep has 2 weak migrating paraprotein bands  Needs UPEP and urine immunofixation  If gets discharged needs hematology evaluation  RPR positive, 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

## 2020-07-31 NOTE — PROGRESS NOTE ADULT - ASSESSMENT
Patient is a 69 Male pmhx DM, HTN, HLD, CAD s/p CABG, cath ([facility] 3/19/2018), MICU admission for influenza complicated by respiratory failure, JAKOB, NSTEMI, HF, dementia presents with fall.     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 Now w stable hgb, but Dr. Velázquez astutely points out inappropriate response to recent transfusion.  -hold plavix if not needed  -ok with aspirin  -trend hgb  -regular diet  -will obtain a bleeding scan to assess for other bleeding lesions. If needed can perform colonoscopy or other enteroscopy      Problem/Recommendation - 2:  ·  Problem: Diabetes.      Problem/Recommendation - 3:  ·  Problem: Acute kidney injury.  Recommendation: unknown baseline creatinine  will need to obtain collateral info.      Problem/Recommendation - 4:  ·  Problem: Pubic ramus fracture.   -mgmt per medicine/ortho     Problem/Recommendation - 5:  ·  Problem: Elevated alk phos, likely bone source in the setting of renal insufficiency.  -can w/u as an outpt. This is chronic.      Problem/Recommendation - 6:  Problem: Dementia. Recommendation: Unclear baseline mental status.    Attending Attestation:   Differential diagnosis and plan of care discussed with patient after the evaluation  65 Minutes spent on total encounter of which more than fifty percent of the encounter was spent counseling and/or coordinating care by the attending physician.  Advanced care planning was discussed with the patient and/or surrogate decision makers. Advanced care planning forms were discussed. The risks benefits and alternatives to pertinent gastrointestinal procedures and interventions were discussed in detail and all questions were answered. Duration: 30 Minutes.

## 2020-07-31 NOTE — PROGRESS NOTE ADULT - ASSESSMENT
Patient is a 69 Male pmhx DM, HTN, HLD, CAD s/p CABG, cath ([facility] 3/19/2018), MICU admission for influenza complicated by respiratory failure, JAKOB, NSTEMI, HF, A&Ox1 Maltese speaking only, difficult to obtain information from. Collateral history obtained from wife, states patient had a fall on 7/1/2020 and has been complaining of L hip pain and since fall has been crawling to get around. Reports that he has been more confused and weak as well. Denies any chest pain, shortness of breath, fevers or chills.

## 2020-08-01 LAB
ANION GAP SERPL CALC-SCNC: 18 MMOL/L — HIGH (ref 5–17)
APPEARANCE UR: CLEAR — SIGNIFICANT CHANGE UP
BILIRUB UR-MCNC: NEGATIVE — SIGNIFICANT CHANGE UP
BUN SERPL-MCNC: 103 MG/DL — HIGH (ref 7–23)
CALCIUM SERPL-MCNC: 9 MG/DL — SIGNIFICANT CHANGE UP (ref 8.4–10.5)
CHLORIDE SERPL-SCNC: 106 MMOL/L — SIGNIFICANT CHANGE UP (ref 96–108)
CO2 SERPL-SCNC: 19 MMOL/L — LOW (ref 22–31)
COLOR SPEC: SIGNIFICANT CHANGE UP
CREAT ?TM UR-MCNC: 58 MG/DL — SIGNIFICANT CHANGE UP
CREAT SERPL-MCNC: 4.65 MG/DL — HIGH (ref 0.5–1.3)
DIFF PNL FLD: NEGATIVE — SIGNIFICANT CHANGE UP
GLUCOSE SERPL-MCNC: 131 MG/DL — HIGH (ref 70–99)
GLUCOSE UR QL: NEGATIVE — SIGNIFICANT CHANGE UP
HCT VFR BLD CALC: 25.3 % — LOW (ref 39–50)
HGB BLD-MCNC: 8.2 G/DL — LOW (ref 13–17)
KETONES UR-MCNC: NEGATIVE — SIGNIFICANT CHANGE UP
LEUKOCYTE ESTERASE UR-ACNC: ABNORMAL
MCHC RBC-ENTMCNC: 31.1 PG — SIGNIFICANT CHANGE UP (ref 27–34)
MCHC RBC-ENTMCNC: 32.4 GM/DL — SIGNIFICANT CHANGE UP (ref 32–36)
MCV RBC AUTO: 95.8 FL — SIGNIFICANT CHANGE UP (ref 80–100)
NITRITE UR-MCNC: NEGATIVE — SIGNIFICANT CHANGE UP
NRBC # BLD: 0 /100 WBCS — SIGNIFICANT CHANGE UP (ref 0–0)
PH UR: 5.5 — SIGNIFICANT CHANGE UP (ref 5–8)
PLATELET # BLD AUTO: 138 K/UL — LOW (ref 150–400)
POTASSIUM SERPL-MCNC: 5.1 MMOL/L — SIGNIFICANT CHANGE UP (ref 3.5–5.3)
POTASSIUM SERPL-SCNC: 5.1 MMOL/L — SIGNIFICANT CHANGE UP (ref 3.5–5.3)
PROT ?TM UR-MCNC: 80 MG/DL — HIGH (ref 0–12)
PROT UR-MCNC: ABNORMAL
RBC # BLD: 2.64 M/UL — LOW (ref 4.2–5.8)
RBC # FLD: 16.5 % — HIGH (ref 10.3–14.5)
SODIUM SERPL-SCNC: 143 MMOL/L — SIGNIFICANT CHANGE UP (ref 135–145)
SP GR SPEC: 1.01 — SIGNIFICANT CHANGE UP (ref 1.01–1.02)
UROBILINOGEN FLD QL: NEGATIVE — SIGNIFICANT CHANGE UP
UUN UR-MCNC: 616 MG/DL — SIGNIFICANT CHANGE UP
WBC # BLD: 6.8 K/UL — SIGNIFICANT CHANGE UP (ref 3.8–10.5)
WBC # FLD AUTO: 6.8 K/UL — SIGNIFICANT CHANGE UP (ref 3.8–10.5)

## 2020-08-01 RX ADMIN — FINASTERIDE 5 MILLIGRAM(S): 5 TABLET, FILM COATED ORAL at 11:27

## 2020-08-01 RX ADMIN — TAMSULOSIN HYDROCHLORIDE 0.4 MILLIGRAM(S): 0.4 CAPSULE ORAL at 21:59

## 2020-08-01 RX ADMIN — Medication 81 MILLIGRAM(S): at 11:27

## 2020-08-01 RX ADMIN — ATORVASTATIN CALCIUM 80 MILLIGRAM(S): 80 TABLET, FILM COATED ORAL at 21:59

## 2020-08-01 RX ADMIN — Medication 50 MILLIGRAM(S): at 21:59

## 2020-08-01 RX ADMIN — Medication 1: at 17:24

## 2020-08-01 RX ADMIN — Medication 10 MILLIGRAM(S): at 21:59

## 2020-08-01 RX ADMIN — Medication 1300 MILLIGRAM(S): at 05:45

## 2020-08-01 RX ADMIN — PANTOPRAZOLE SODIUM 40 MILLIGRAM(S): 20 TABLET, DELAYED RELEASE ORAL at 17:25

## 2020-08-01 RX ADMIN — CARVEDILOL PHOSPHATE 25 MILLIGRAM(S): 80 CAPSULE, EXTENDED RELEASE ORAL at 17:25

## 2020-08-01 RX ADMIN — Medication 1: at 12:25

## 2020-08-01 RX ADMIN — INSULIN GLARGINE 10 UNIT(S): 100 INJECTION, SOLUTION SUBCUTANEOUS at 21:59

## 2020-08-01 RX ADMIN — Medication 1300 MILLIGRAM(S): at 21:59

## 2020-08-01 RX ADMIN — PANTOPRAZOLE SODIUM 40 MILLIGRAM(S): 20 TABLET, DELAYED RELEASE ORAL at 05:46

## 2020-08-01 RX ADMIN — Medication 50 MILLIGRAM(S): at 13:05

## 2020-08-01 RX ADMIN — Medication 1300 MILLIGRAM(S): at 13:06

## 2020-08-01 RX ADMIN — Medication 50 MILLIGRAM(S): at 05:46

## 2020-08-01 RX ADMIN — Medication 325 MILLIGRAM(S): at 11:27

## 2020-08-01 RX ADMIN — CARVEDILOL PHOSPHATE 25 MILLIGRAM(S): 80 CAPSULE, EXTENDED RELEASE ORAL at 05:45

## 2020-08-01 NOTE — PROGRESS NOTE ADULT - ASSESSMENT
Patient is a 69 Male pmhx DM, HTN, HLD, CAD s/p CABG, cath ([facility] 3/19/2018), MICU admission for influenza complicated by respiratory failure, JAKOB, NSTEMI, HF, A&Ox1 Indonesian speaking only, difficult to obtain information from. Collateral history obtained from wife, states patient had a fall on 7/1/2020 and has been complaining of L hip pain and since fall has been crawling to get around. Reports that he has been more confused and weak as well. Denies any chest pain, shortness of breath, fevers or chills.

## 2020-08-01 NOTE — PROGRESS NOTE ADULT - ASSESSMENT
69 Male pmhx DM, HTN, HLD, CAD s/p CABG 2014, cath ([facility] 3/19/2018), MICU admission for influenza complicated by respiratory failure, JAKOB, NSTEMI, HF admitted for right hip pain. Pt found to have renal failure and hyperkalemia. Nephrology consulted for renal failure.     CKD stage IV/V  renal function fluctuating -- 3.4-3.9 possibly his baseline   renal function worsening today    Lasix held today  Check post void bladder scan today. CBC with diff in AM  No episodes of hypotension noted.   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

## 2020-08-01 NOTE — PROGRESS NOTE ADULT - ASSESSMENT
Patient is a 69 Male pmhx DM, HTN, HLD, CAD s/p CABG, cath ([facility] 3/19/2018), MICU admission for influenza complicated by respiratory failure, JAKOB, NSTEMI, HF, dementia presents with fall.     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 Now w stable hgb today. Bleeding scan negative.  -hold plavix if not needed  -ok with aspirin  -trend hgb  -regular diet  -if further decline in hgb can consider capsule endoscopy or colonoscopy. Will reassess tomorrow am. Please send am CBC as a STAT lab to avoid lab variation.     Problem/Recommendation - 2:  ·  Problem: Diabetes.      Problem/Recommendation - 3:  ·  Problem: Acute kidney injury.  Recommendation: unknown baseline creatinine  will need to obtain collateral info.      Problem/Recommendation - 4:  ·  Problem: Pubic ramus fracture.   -mgmt per medicine/ortho     Problem/Recommendation - 5:  ·  Problem: Elevated alk phos, likely bone source in the setting of renal insufficiency.  -can w/u as an outpt. This is chronic.      Problem/Recommendation - 6:  Problem: Dementia. Recommendation: Unclear baseline mental status.    Attending Attestation:   Differential diagnosis and plan of care discussed with patient after the evaluation  65 Minutes spent on total encounter of which more than fifty percent of the encounter was spent counseling and/or coordinating care by the attending physician.  Advanced care planning was discussed with the patient and/or surrogate decision makers. Advanced care planning forms were discussed. The risks benefits and alternatives to pertinent gastrointestinal procedures and interventions were discussed in detail and all questions were answered. Duration: 30 Minutes.

## 2020-08-02 LAB
ANION GAP SERPL CALC-SCNC: 17 MMOL/L — SIGNIFICANT CHANGE UP (ref 5–17)
BASOPHILS # BLD AUTO: 0.04 K/UL — SIGNIFICANT CHANGE UP (ref 0–0.2)
BASOPHILS NFR BLD AUTO: 0.6 % — SIGNIFICANT CHANGE UP (ref 0–2)
BUN SERPL-MCNC: 99 MG/DL — HIGH (ref 7–23)
CALCIUM SERPL-MCNC: 8.7 MG/DL — SIGNIFICANT CHANGE UP (ref 8.4–10.5)
CHLORIDE SERPL-SCNC: 106 MMOL/L — SIGNIFICANT CHANGE UP (ref 96–108)
CO2 SERPL-SCNC: 19 MMOL/L — LOW (ref 22–31)
CREAT SERPL-MCNC: 4.27 MG/DL — HIGH (ref 0.5–1.3)
CREATININE, URINE RESULT: 62 MG/DL — SIGNIFICANT CHANGE UP
EOSINOPHIL # BLD AUTO: 0.26 K/UL — SIGNIFICANT CHANGE UP (ref 0–0.5)
EOSINOPHIL NFR BLD AUTO: 4.1 % — SIGNIFICANT CHANGE UP (ref 0–6)
GLUCOSE SERPL-MCNC: 104 MG/DL — HIGH (ref 70–99)
HCT VFR BLD CALC: 24.9 % — LOW (ref 39–50)
HGB BLD-MCNC: 8 G/DL — LOW (ref 13–17)
IMM GRANULOCYTES NFR BLD AUTO: 0.2 % — SIGNIFICANT CHANGE UP (ref 0–1.5)
LYMPHOCYTES # BLD AUTO: 0.91 K/UL — LOW (ref 1–3.3)
LYMPHOCYTES # BLD AUTO: 14.4 % — SIGNIFICANT CHANGE UP (ref 13–44)
MCHC RBC-ENTMCNC: 30.7 PG — SIGNIFICANT CHANGE UP (ref 27–34)
MCHC RBC-ENTMCNC: 32.1 GM/DL — SIGNIFICANT CHANGE UP (ref 32–36)
MCV RBC AUTO: 95.4 FL — SIGNIFICANT CHANGE UP (ref 80–100)
MONOCYTES # BLD AUTO: 0.46 K/UL — SIGNIFICANT CHANGE UP (ref 0–0.9)
MONOCYTES NFR BLD AUTO: 7.3 % — SIGNIFICANT CHANGE UP (ref 2–14)
NEUTROPHILS # BLD AUTO: 4.64 K/UL — SIGNIFICANT CHANGE UP (ref 1.8–7.4)
NEUTROPHILS NFR BLD AUTO: 73.4 % — SIGNIFICANT CHANGE UP (ref 43–77)
NRBC # BLD: 0 /100 WBCS — SIGNIFICANT CHANGE UP (ref 0–0)
PLATELET # BLD AUTO: 127 K/UL — LOW (ref 150–400)
POTASSIUM SERPL-MCNC: 5.1 MMOL/L — SIGNIFICANT CHANGE UP (ref 3.5–5.3)
POTASSIUM SERPL-SCNC: 5.1 MMOL/L — SIGNIFICANT CHANGE UP (ref 3.5–5.3)
PROT ?TM UR-MCNC: 93 MG/DL — HIGH (ref 0–12)
RBC # BLD: 2.61 M/UL — LOW (ref 4.2–5.8)
RBC # FLD: 16.1 % — HIGH (ref 10.3–14.5)
SODIUM SERPL-SCNC: 142 MMOL/L — SIGNIFICANT CHANGE UP (ref 135–145)
WBC # BLD: 6.32 K/UL — SIGNIFICANT CHANGE UP (ref 3.8–10.5)
WBC # FLD AUTO: 6.32 K/UL — SIGNIFICANT CHANGE UP (ref 3.8–10.5)

## 2020-08-02 RX ADMIN — Medication 50 MILLIGRAM(S): at 05:13

## 2020-08-02 RX ADMIN — INSULIN GLARGINE 10 UNIT(S): 100 INJECTION, SOLUTION SUBCUTANEOUS at 22:30

## 2020-08-02 RX ADMIN — Medication 50 MILLIGRAM(S): at 22:28

## 2020-08-02 RX ADMIN — Medication 325 MILLIGRAM(S): at 12:03

## 2020-08-02 RX ADMIN — TAMSULOSIN HYDROCHLORIDE 0.4 MILLIGRAM(S): 0.4 CAPSULE ORAL at 22:28

## 2020-08-02 RX ADMIN — Medication 3: at 22:30

## 2020-08-02 RX ADMIN — Medication 1300 MILLIGRAM(S): at 05:12

## 2020-08-02 RX ADMIN — Medication 1300 MILLIGRAM(S): at 15:44

## 2020-08-02 RX ADMIN — Medication 1300 MILLIGRAM(S): at 22:28

## 2020-08-02 RX ADMIN — CARVEDILOL PHOSPHATE 25 MILLIGRAM(S): 80 CAPSULE, EXTENDED RELEASE ORAL at 17:29

## 2020-08-02 RX ADMIN — Medication 81 MILLIGRAM(S): at 12:03

## 2020-08-02 RX ADMIN — PANTOPRAZOLE SODIUM 40 MILLIGRAM(S): 20 TABLET, DELAYED RELEASE ORAL at 05:13

## 2020-08-02 RX ADMIN — Medication 650 MILLIGRAM(S): at 18:00

## 2020-08-02 RX ADMIN — ATORVASTATIN CALCIUM 80 MILLIGRAM(S): 80 TABLET, FILM COATED ORAL at 22:27

## 2020-08-02 RX ADMIN — CARVEDILOL PHOSPHATE 25 MILLIGRAM(S): 80 CAPSULE, EXTENDED RELEASE ORAL at 05:13

## 2020-08-02 RX ADMIN — Medication 650 MILLIGRAM(S): at 17:28

## 2020-08-02 RX ADMIN — Medication 50 MILLIGRAM(S): at 15:43

## 2020-08-02 RX ADMIN — Medication 1: at 12:03

## 2020-08-02 RX ADMIN — PANTOPRAZOLE SODIUM 40 MILLIGRAM(S): 20 TABLET, DELAYED RELEASE ORAL at 17:28

## 2020-08-02 RX ADMIN — FINASTERIDE 5 MILLIGRAM(S): 5 TABLET, FILM COATED ORAL at 12:02

## 2020-08-02 RX ADMIN — Medication 4: at 17:29

## 2020-08-02 RX ADMIN — Medication 10 MILLIGRAM(S): at 22:27

## 2020-08-02 NOTE — PROGRESS NOTE ADULT - ASSESSMENT
Patient is a 69 Male pmhx DM, HTN, HLD, CAD s/p CABG, cath ([facility] 3/19/2018), MICU admission for influenza complicated by respiratory failure, JAOKB, NSTEMI, HF, dementia presents with fall.     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 Now w stable hgb today. Bleeding scan negative. H/H stable.  -hold plavix if not needed  -ok with aspirin  -regular diet       Problem/Recommendation - 2:  ·  Problem: Diabetes.      Problem/Recommendation - 3:  ·  Problem: Acute kidney injury.  Recommendation: unknown baseline creatinine  will need to obtain collateral info.      Problem/Recommendation - 4:  ·  Problem: Pubic ramus fracture.   -mgmt per medicine/ortho     Problem/Recommendation - 5:  ·  Problem: Elevated alk phos, likely bone source in the setting of renal insufficiency.  -can w/u as an outpt. This is chronic.      Problem/Recommendation - 6:  Problem: Dementia. Recommendation: Unclear baseline mental status.    Attending Attestation:   Differential diagnosis and plan of care discussed with patient after the evaluation  65 Minutes spent on total encounter of which more than fifty percent of the encounter was spent counseling and/or coordinating care by the attending physician.  Advanced care planning was discussed with the patient and/or surrogate decision makers. Advanced care planning forms were discussed. The risks benefits and alternatives to pertinent gastrointestinal procedures and interventions were discussed in detail and all questions were answered. Duration: 30 Minutes.

## 2020-08-02 NOTE — PROGRESS NOTE ADULT - ASSESSMENT
69 Male pmhx DM, HTN, HLD, CAD s/p CABG 2014, cath ([facility] 3/19/2018), MICU admission for influenza complicated by respiratory failure, JAKOB, NSTEMI, HF admitted for right hip pain. Pt found to have renal failure and hyperkalemia. Nephrology consulted for renal failure.     CKD stage IV/V  renal function fluctuating -- 3.4-3.9 possibly his baseline   Scr with slight improvement today   Lasix remains held  Renal sonogram with no hydro   Monitor CBC with diff   Avoid nephrotoxins    Hyperkalemia  improved with medical mgn  low K diet  MOnitor serum K      HTN  BP fluctuating   continue current meds  Low salt diet  MOnitor BP      Proteinuria  2.3gm proteinuria  Follow up  Vasculitis work up   hep B reactive (possible past infection) , K/L ok, C3, C4 not low, ANCA/GLYNN neg   serum immunofixation suggestive of one kappa and one macarena band, spep has 2 weak migrating paraprotein bands  Needs UPEP and urine immunofixation  If gets discharged needs hematology evaluation  RPR positive, 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

## 2020-08-02 NOTE — PROGRESS NOTE ADULT - ATTENDING COMMENTS
active T&S  hold plavix for now, resume 7 days after scope
D/C planing
active T&S  hold plavix for now, resume 7 days after scope
D/c planing
D/c planing to rehab
D/c planing to rehab
active T&S  hold plavix for now
active T&S  hold plavix for now
active T&S  hold plavix for now, resume 7 days after scope
active T7S  hold plavix for now
active T7S  hold plavix for now
21

## 2020-08-02 NOTE — PROGRESS NOTE ADULT - ASSESSMENT
Patient is a 69 Male pmhx DM, HTN, HLD, CAD s/p CABG, cath ([facility] 3/19/2018), MICU admission for influenza complicated by respiratory failure, JAKOB, NSTEMI, HF, A&Ox1 Chinese speaking only, difficult to obtain information from. Collateral history obtained from wife, states patient had a fall on 7/1/2020 and has been complaining of L hip pain and since fall has been crawling to get around. Reports that he has been more confused and weak as well. Denies any chest pain, shortness of breath, fevers or chills.

## 2020-08-03 VITALS — HEART RATE: 64 BPM | SYSTOLIC BLOOD PRESSURE: 159 MMHG | DIASTOLIC BLOOD PRESSURE: 67 MMHG

## 2020-08-03 LAB
ANION GAP SERPL CALC-SCNC: 15 MMOL/L — SIGNIFICANT CHANGE UP (ref 5–17)
BUN SERPL-MCNC: 103 MG/DL — HIGH (ref 7–23)
CALCIUM SERPL-MCNC: 8.7 MG/DL — SIGNIFICANT CHANGE UP (ref 8.4–10.5)
CHLORIDE SERPL-SCNC: 107 MMOL/L — SIGNIFICANT CHANGE UP (ref 96–108)
CO2 SERPL-SCNC: 22 MMOL/L — SIGNIFICANT CHANGE UP (ref 22–31)
CREAT SERPL-MCNC: 3.89 MG/DL — HIGH (ref 0.5–1.3)
GLUCOSE SERPL-MCNC: 141 MG/DL — HIGH (ref 70–99)
HCT VFR BLD CALC: 24.4 % — LOW (ref 39–50)
HGB BLD-MCNC: 7.7 G/DL — LOW (ref 13–17)
INTERPRETATION 24H UR IFE-IMP: SIGNIFICANT CHANGE UP
INTERPRETATION 24H UR IFE-IMP: SIGNIFICANT CHANGE UP
MCHC RBC-ENTMCNC: 30.6 PG — SIGNIFICANT CHANGE UP (ref 27–34)
MCHC RBC-ENTMCNC: 31.6 GM/DL — LOW (ref 32–36)
MCV RBC AUTO: 96.8 FL — SIGNIFICANT CHANGE UP (ref 80–100)
NRBC # BLD: 0 /100 WBCS — SIGNIFICANT CHANGE UP (ref 0–0)
PLATELET # BLD AUTO: 113 K/UL — LOW (ref 150–400)
POTASSIUM SERPL-MCNC: 4.7 MMOL/L — SIGNIFICANT CHANGE UP (ref 3.5–5.3)
POTASSIUM SERPL-SCNC: 4.7 MMOL/L — SIGNIFICANT CHANGE UP (ref 3.5–5.3)
RBC # BLD: 2.52 M/UL — LOW (ref 4.2–5.8)
RBC # FLD: 16.5 % — HIGH (ref 10.3–14.5)
SARS-COV-2 RNA SPEC QL NAA+PROBE: SIGNIFICANT CHANGE UP
SODIUM SERPL-SCNC: 144 MMOL/L — SIGNIFICANT CHANGE UP (ref 135–145)
WBC # BLD: 5.5 K/UL — SIGNIFICANT CHANGE UP (ref 3.8–10.5)
WBC # FLD AUTO: 5.5 K/UL — SIGNIFICANT CHANGE UP (ref 3.8–10.5)

## 2020-08-03 PROCEDURE — 80048 BASIC METABOLIC PNL TOTAL CA: CPT

## 2020-08-03 PROCEDURE — 83605 ASSAY OF LACTIC ACID: CPT

## 2020-08-03 PROCEDURE — 86334 IMMUNOFIX E-PHORESIS SERUM: CPT

## 2020-08-03 PROCEDURE — 86706 HEP B SURFACE ANTIBODY: CPT

## 2020-08-03 PROCEDURE — 86900 BLOOD TYPING SEROLOGIC ABO: CPT

## 2020-08-03 PROCEDURE — 82272 OCCULT BLD FECES 1-3 TESTS: CPT

## 2020-08-03 PROCEDURE — 84484 ASSAY OF TROPONIN QUANT: CPT

## 2020-08-03 PROCEDURE — 97110 THERAPEUTIC EXERCISES: CPT

## 2020-08-03 PROCEDURE — 82570 ASSAY OF URINE CREATININE: CPT

## 2020-08-03 PROCEDURE — 80061 LIPID PANEL: CPT

## 2020-08-03 PROCEDURE — 82550 ASSAY OF CK (CPK): CPT

## 2020-08-03 PROCEDURE — 82607 VITAMIN B-12: CPT

## 2020-08-03 PROCEDURE — 84540 ASSAY OF URINE/UREA-N: CPT

## 2020-08-03 PROCEDURE — 84443 ASSAY THYROID STIM HORMONE: CPT

## 2020-08-03 PROCEDURE — 97530 THERAPEUTIC ACTIVITIES: CPT

## 2020-08-03 PROCEDURE — A9560: CPT

## 2020-08-03 PROCEDURE — 86160 COMPLEMENT ANTIGEN: CPT

## 2020-08-03 PROCEDURE — 83521 IG LIGHT CHAINS FREE EACH: CPT

## 2020-08-03 PROCEDURE — 82728 ASSAY OF FERRITIN: CPT

## 2020-08-03 PROCEDURE — 86803 HEPATITIS C AB TEST: CPT

## 2020-08-03 PROCEDURE — 86593 SYPHILIS TEST NON-TREP QUANT: CPT

## 2020-08-03 PROCEDURE — 86036 ANCA SCREEN EACH ANTIBODY: CPT

## 2020-08-03 PROCEDURE — 82435 ASSAY OF BLOOD CHLORIDE: CPT

## 2020-08-03 PROCEDURE — 85730 THROMBOPLASTIN TIME PARTIAL: CPT

## 2020-08-03 PROCEDURE — 83036 HEMOGLOBIN GLYCOSYLATED A1C: CPT

## 2020-08-03 PROCEDURE — 86704 HEP B CORE ANTIBODY TOTAL: CPT

## 2020-08-03 PROCEDURE — 84132 ASSAY OF SERUM POTASSIUM: CPT

## 2020-08-03 PROCEDURE — 83550 IRON BINDING TEST: CPT

## 2020-08-03 PROCEDURE — 87340 HEPATITIS B SURFACE AG IA: CPT

## 2020-08-03 PROCEDURE — 86255 FLUORESCENT ANTIBODY SCREEN: CPT

## 2020-08-03 PROCEDURE — 85610 PROTHROMBIN TIME: CPT

## 2020-08-03 PROCEDURE — 73560 X-RAY EXAM OF KNEE 1 OR 2: CPT

## 2020-08-03 PROCEDURE — 71045 X-RAY EXAM CHEST 1 VIEW: CPT

## 2020-08-03 PROCEDURE — 82248 BILIRUBIN DIRECT: CPT

## 2020-08-03 PROCEDURE — 82746 ASSAY OF FOLIC ACID SERUM: CPT

## 2020-08-03 PROCEDURE — 84155 ASSAY OF PROTEIN SERUM: CPT

## 2020-08-03 PROCEDURE — 76770 US EXAM ABDO BACK WALL COMP: CPT

## 2020-08-03 PROCEDURE — 82553 CREATINE MB FRACTION: CPT

## 2020-08-03 PROCEDURE — U0003: CPT

## 2020-08-03 PROCEDURE — 83516 IMMUNOASSAY NONANTIBODY: CPT

## 2020-08-03 PROCEDURE — 82947 ASSAY GLUCOSE BLOOD QUANT: CPT

## 2020-08-03 PROCEDURE — 85014 HEMATOCRIT: CPT

## 2020-08-03 PROCEDURE — 36430 TRANSFUSION BLD/BLD COMPNT: CPT

## 2020-08-03 PROCEDURE — 84166 PROTEIN E-PHORESIS/URINE/CSF: CPT

## 2020-08-03 PROCEDURE — 84300 ASSAY OF URINE SODIUM: CPT

## 2020-08-03 PROCEDURE — 82436 ASSAY OF URINE CHLORIDE: CPT

## 2020-08-03 PROCEDURE — 73502 X-RAY EXAM HIP UNI 2-3 VIEWS: CPT

## 2020-08-03 PROCEDURE — 84156 ASSAY OF PROTEIN URINE: CPT

## 2020-08-03 PROCEDURE — 82803 BLOOD GASES ANY COMBINATION: CPT

## 2020-08-03 PROCEDURE — 86038 ANTINUCLEAR ANTIBODIES: CPT

## 2020-08-03 PROCEDURE — 97162 PT EVAL MOD COMPLEX 30 MIN: CPT

## 2020-08-03 PROCEDURE — P9011: CPT

## 2020-08-03 PROCEDURE — 84295 ASSAY OF SERUM SODIUM: CPT

## 2020-08-03 PROCEDURE — 72125 CT NECK SPINE W/O DYE: CPT

## 2020-08-03 PROCEDURE — 73552 X-RAY EXAM OF FEMUR 2/>: CPT

## 2020-08-03 PROCEDURE — 81001 URINALYSIS AUTO W/SCOPE: CPT

## 2020-08-03 PROCEDURE — 82962 GLUCOSE BLOOD TEST: CPT

## 2020-08-03 PROCEDURE — 86923 COMPATIBILITY TEST ELECTRIC: CPT

## 2020-08-03 PROCEDURE — 70450 CT HEAD/BRAIN W/O DYE: CPT

## 2020-08-03 PROCEDURE — 83880 ASSAY OF NATRIURETIC PEPTIDE: CPT

## 2020-08-03 PROCEDURE — 80053 COMPREHEN METABOLIC PANEL: CPT

## 2020-08-03 PROCEDURE — 93306 TTE W/DOPPLER COMPLETE: CPT

## 2020-08-03 PROCEDURE — 86592 SYPHILIS TEST NON-TREP QUAL: CPT

## 2020-08-03 PROCEDURE — 83540 ASSAY OF IRON: CPT

## 2020-08-03 PROCEDURE — 86850 RBC ANTIBODY SCREEN: CPT

## 2020-08-03 PROCEDURE — 99285 EMERGENCY DEPT VISIT HI MDM: CPT | Mod: 25

## 2020-08-03 PROCEDURE — 86901 BLOOD TYPING SEROLOGIC RH(D): CPT

## 2020-08-03 PROCEDURE — P9016: CPT

## 2020-08-03 PROCEDURE — 82330 ASSAY OF CALCIUM: CPT

## 2020-08-03 PROCEDURE — 93005 ELECTROCARDIOGRAM TRACING: CPT

## 2020-08-03 PROCEDURE — 97116 GAIT TRAINING THERAPY: CPT

## 2020-08-03 PROCEDURE — 84165 PROTEIN E-PHORESIS SERUM: CPT

## 2020-08-03 PROCEDURE — 86335 IMMUNFIX E-PHORSIS/URINE/CSF: CPT

## 2020-08-03 PROCEDURE — 78278 ACUTE GI BLOOD LOSS IMAGING: CPT

## 2020-08-03 PROCEDURE — 85027 COMPLETE CBC AUTOMATED: CPT

## 2020-08-03 PROCEDURE — 86780 TREPONEMA PALLIDUM: CPT

## 2020-08-03 PROCEDURE — 86769 SARS-COV-2 COVID-19 ANTIBODY: CPT

## 2020-08-03 RX ORDER — ATORVASTATIN CALCIUM 80 MG/1
1 TABLET, FILM COATED ORAL
Qty: 0 | Refills: 0 | DISCHARGE

## 2020-08-03 RX ORDER — INSULIN LISPRO 100/ML
0 VIAL (ML) SUBCUTANEOUS
Qty: 0 | Refills: 0 | DISCHARGE

## 2020-08-03 RX ORDER — SACUBITRIL AND VALSARTAN 24; 26 MG/1; MG/1
1 TABLET, FILM COATED ORAL
Qty: 0 | Refills: 0 | DISCHARGE

## 2020-08-03 RX ORDER — FERROUS SULFATE 325(65) MG
1 TABLET ORAL
Qty: 0 | Refills: 0 | DISCHARGE

## 2020-08-03 RX ORDER — CARVEDILOL PHOSPHATE 80 MG/1
1 CAPSULE, EXTENDED RELEASE ORAL
Qty: 0 | Refills: 0 | DISCHARGE

## 2020-08-03 RX ORDER — INSULIN ASPART 100 [IU]/ML
10 INJECTION, SOLUTION SUBCUTANEOUS
Qty: 0 | Refills: 0 | DISCHARGE

## 2020-08-03 RX ORDER — INSULIN GLARGINE 100 [IU]/ML
10 INJECTION, SOLUTION SUBCUTANEOUS
Qty: 0 | Refills: 0 | DISCHARGE

## 2020-08-03 RX ORDER — TAMSULOSIN HYDROCHLORIDE 0.4 MG/1
1 CAPSULE ORAL
Qty: 0 | Refills: 0 | DISCHARGE

## 2020-08-03 RX ORDER — CLOPIDOGREL BISULFATE 75 MG/1
1 TABLET, FILM COATED ORAL
Qty: 0 | Refills: 0 | DISCHARGE

## 2020-08-03 RX ORDER — FUROSEMIDE 40 MG
1 TABLET ORAL
Qty: 0 | Refills: 0 | DISCHARGE

## 2020-08-03 RX ORDER — NIFEDIPINE 30 MG
1 TABLET, EXTENDED RELEASE 24 HR ORAL
Qty: 0 | Refills: 0 | DISCHARGE

## 2020-08-03 RX ORDER — SODIUM BICARBONATE 1 MEQ/ML
2 SYRINGE (ML) INTRAVENOUS
Qty: 0 | Refills: 0 | DISCHARGE
Start: 2020-08-03

## 2020-08-03 RX ORDER — FINASTERIDE 5 MG/1
1 TABLET, FILM COATED ORAL
Qty: 0 | Refills: 0 | DISCHARGE

## 2020-08-03 RX ORDER — ERYTHROPOIETIN 10000 [IU]/ML
10000 INJECTION, SOLUTION INTRAVENOUS; SUBCUTANEOUS ONCE
Refills: 0 | Status: COMPLETED | OUTPATIENT
Start: 2020-08-03 | End: 2020-08-03

## 2020-08-03 RX ADMIN — Medication 50 MILLIGRAM(S): at 13:39

## 2020-08-03 RX ADMIN — Medication 1300 MILLIGRAM(S): at 13:38

## 2020-08-03 RX ADMIN — FINASTERIDE 5 MILLIGRAM(S): 5 TABLET, FILM COATED ORAL at 12:18

## 2020-08-03 RX ADMIN — CARVEDILOL PHOSPHATE 25 MILLIGRAM(S): 80 CAPSULE, EXTENDED RELEASE ORAL at 17:21

## 2020-08-03 RX ADMIN — ERYTHROPOIETIN 10000 UNIT(S): 10000 INJECTION, SOLUTION INTRAVENOUS; SUBCUTANEOUS at 13:58

## 2020-08-03 RX ADMIN — Medication 325 MILLIGRAM(S): at 12:18

## 2020-08-03 RX ADMIN — Medication 1: at 12:52

## 2020-08-03 RX ADMIN — Medication 81 MILLIGRAM(S): at 12:18

## 2020-08-03 RX ADMIN — PANTOPRAZOLE SODIUM 40 MILLIGRAM(S): 20 TABLET, DELAYED RELEASE ORAL at 17:21

## 2020-08-03 NOTE — CHART NOTE - NSCHARTNOTEFT_GEN_A_CORE
Request from Dr. Velázquez to facilitate patient discharge.  Medication reconciliation reviewed, revised, and resolved with Dr. Velázquez, who has medically cleared patient for discharge with follow up as advised.  Please refer to discharge note for detailed hospital course.

## 2020-08-03 NOTE — PROGRESS NOTE ADULT - PROBLEM SELECTOR PLAN 7
DVT and gI PPX

## 2020-08-03 NOTE — PROGRESS NOTE ADULT - PROBLEM SELECTOR PLAN 4
Hx of CABG   ASA and statin  hold lasix 40 oral  ID eval appreciated for RPR
sliding scale  hypoglycemia overnight   hold Lantus for now, resume slowly as needed   diab diet  adjust insulin dose PRN   hold oral meds
Hx of CABG   ASA and statin  hold lasix 40 oral  ID eval appreciated for RPR
sliding scale  hypoglycemia overnight   hold lantus for now, resume slowly as needed   diab diet  adjust insulin dose PRN   hold oral meds
Hx of CABG   ASA and statin  cont lasix 40 oral  ID eval appreciated for RPR
Hx of CABG   ASA and statin  hold lasix 40 oral  ID eval appreciated for RPR
Hx of CABG   ASA and statin  hold lasix 40 oral  ID eval appreciated for RPR
sliding scale  hypoglycemia overnight   hold Lantus for now, resume slowly as needed   diab diet  adjust insulin dose PRN   hold oral meds

## 2020-08-03 NOTE — PROGRESS NOTE ADULT - PROBLEM SELECTOR PROBLEM 3
Acute kidney injury
Acute systolic congestive heart failure
Acute kidney injury
Acute systolic congestive heart failure
Acute kidney injury
Acute systolic congestive heart failure

## 2020-08-03 NOTE — PROGRESS NOTE ADULT - PROBLEM SELECTOR PROBLEM 6
Elevated troponin
Anemia
Elevated troponin
Anemia
Elevated troponin

## 2020-08-03 NOTE — PHARMACOTHERAPY INTERVENTION NOTE - COMMENTS
68 yo M with DM A1C 6.6 on basaglar 10 units HS and novolog 10 units SQ TID, however at the time patient was on steroids. Currently on lantus 10 units SQ HS and humalog low dose correctional scale. BG in past 24 hours 121, 366, 328, 182, 97. Would recommend adding humalog 3 units SQ TID.    Adan Little, PharmD, BCPS  [phone]

## 2020-08-03 NOTE — PROGRESS NOTE ADULT - PROBLEM SELECTOR PLAN 1
Anemia W/U  GI eval appreciated   monitor hgb level   dark stool  occult positive   advanced diet   PPI  Gi F/U   S/P EGD/ Colon , duodenal bleeding, treated   diet resume afterwards per GI   S/P PRBC transfusion  bleeding scan negative
Ortho eval appreciated   no surgical intervention   fall precautions   pain control
Anemia W/U  GI eval appreciated   monitor hgb level   dark stool  occult positive   advanced diet   PPI  Gi F/U   S/P EGD/ Colon , duodenal bleeding, treated   diet resume afterwards per GI   S/P PRBC transfusion  bleeding scan negative
Anemia W/U  GI eval appreciated   monitor hgb level   dark stool  occult positive   advanced diet   PPI  Gi F/U   S/P EGD/ Colon , duodenal bleeding, treated   diet resume afterwards per GI   S/P PRBC transfusion  bleeding scan negative  D/C planing  meds reviewed with NP   follow up with PMD and cardiology   repeat CBC tomorrow at rehab
Anemia W/U  GI eval appreciated   monitor hgb level   dark stool  occult positive   advanced diet   PPI  Gi F/U   S/P EGD/ Colon , duodenal bleeding, treated   diet resume afterwards per GI   S/P PRBC trasnfusion
Anemia W/U  GI eval appreciated   monitor hgb level   dark stool  occult positive   liquid diet  PPI  Gi F/U   S/P EGD/ Colon   diet resume afterwards per GI   S/P 1 unit
Anemia W/U  GI eval appreciated   monitor hgb level   dark stool  occult positive   liquid diet  PPI  Gi F/U   S/P EGD/ Colon , duodenal bleeding, treated   diet resume afterwards per GI   will give one more unit PRBC today
Anemia W/U  GI eval appreciated   monitor hgb level   dark stool  occult positive   liquid diet  PPI  Gi F/U   egd tomorrow  transfuse 1 unit PRBC today
Anemia W/U  GI eval appreciated   monitor hgb level   dark stool  occult positive   liquid diet  PPI  Gi F/U   egd/colon today   diet resume afterwards per GI   S/P 1 unit
Anemia W/U  GI eval appreciated   monitor hgb level   dark stool  occult positive   liquid diet  PPI  Gi F/U   plan for scope if recurrent bleeding
Ortho eval appreciated   no surgical intervention   fall precautions   pain control
Anemia W/U  GI eval appreciated   monitor hgb level   dark stool  occult positive   advanced diet   PPI  Gi F/U   S/P EGD/ Colon , duodenal bleeding, treated   diet resume afterwards per GI   S/P PRBC transfusion  check bleeding scan
Ortho eval appreciated   no surgical intervention   fall precautions   pain control

## 2020-08-03 NOTE — PROGRESS NOTE ADULT - PROVIDER SPECIALTY LIST ADULT
Cardiology
Gastroenterology
Internal Medicine
Nephrology
Cardiology
Gastroenterology
Nephrology
Internal Medicine

## 2020-08-03 NOTE — PROGRESS NOTE ADULT - REASON FOR ADMISSION
fall

## 2020-08-03 NOTE — PROGRESS NOTE ADULT - ASSESSMENT
Patient is a 69 Male pmhx DM, HTN, HLD, CAD s/p CABG, cath ([facility] 3/19/2018), MICU admission for influenza complicated by respiratory failure, JAKOB, NSTEMI, HF, A&Ox1 Greenlandic speaking only, difficult to obtain information from. Collateral history obtained from wife, states patient had a fall on 7/1/2020 and has been complaining of L hip pain and since fall has been crawling to get around. Reports that he has been more confused and weak as well. Denies any chest pain, shortness of breath, fevers or chills.

## 2020-08-03 NOTE — PROGRESS NOTE ADULT - PROBLEM SELECTOR PROBLEM 1
Pubic ramus fracture
Anemia
Pubic ramus fracture
Anemia
Pubic ramus fracture
Anemia

## 2020-08-03 NOTE — PROGRESS NOTE ADULT - SUBJECTIVE AND OBJECTIVE BOX
[facility]   MD MISHA SÁNCHEZ MD RUORU WONG, [place]    TEL:  OFFICE: [phone]  DR PARKER CELL: [phone]  BETH SMITH CELL: [phone]  DR. CHAKRABORTY CELL: [phone]  DR. REYNA CELL: [phone]    FROM 5 PM - 7 AM PLEASE CALL ANSWERING SERVICE: [phone]    RENAL FOLLOW UP NOTE  --------------------------------------------------------------------------------  HPI:      Pt seen and examined at bedside.   sitting having lunch  Denies SOB, chest pain     PAST HISTORY  --------------------------------------------------------------------------------  No significant changes to PMH, PSH, FHx, SHx, unless otherwise noted    ALLERGIES & MEDICATIONS  --------------------------------------------------------------------------------  Allergies    No Known Allergies    Intolerances      Standing Inpatient Medications  dextrose 5%. 1000 milliLiter(s) IV Continuous <Continuous>  dextrose 50% Injectable 12.5 Gram(s) IV Push once  dextrose 50% Injectable 25 Gram(s) IV Push once  dextrose 50% Injectable 25 Gram(s) IV Push once  furosemide   Injectable 40 milliGRAM(s) IV Push daily  insulin lispro (HumaLOG) corrective regimen sliding scale   SubCutaneous three times a day before meals  insulin lispro (HumaLOG) corrective regimen sliding scale   SubCutaneous at bedtime  sodium zirconium cyclosilicate 10 Gram(s) Oral three times a day    PRN Inpatient Medications  dextrose 40% Gel 15 Gram(s) Oral once PRN  glucagon  Injectable 1 milliGRAM(s) IntraMuscular once PRN      REVIEW OF SYSTEMS  --------------------------------------------------------------------------------  General: no fever  CVS: no chest pain  MSK: + edema     VITALS/PHYSICAL EXAM  --------------------------------------------------------------------------------  T(C): 36.3 (07-16-20 @ 04:37), Max: 36.5 (07-15-20 @ 14:28)  HR: 62 (07-16-20 @ 04:37) (62 - 66)  BP: 148/79 (07-16-20 @ 04:37) (148/79 - 167/99)  RR: 16 (07-16-20 @ 04:37) (14 - 18)  SpO2: 99% (07-16-20 @ 04:37) (99% - 100%)  Wt(kg): --  Height (cm): 162.6 (07-15-20 @ 23:30)  Weight (kg): 70.5 (07-15-20 @ 23:30)  BMI (kg/m2): 26.7 (07-15-20 @ 23:30)  BSA (m2): 1.76 (07-15-20 @ 23:30)      07-15-20 @ 07:01  -  07-16-20 @ 07:00  --------------------------------------------------------  IN: 480 mL / OUT: 1380 mL / NET: -900 mL    07-16-20 @ 07:01  -  07-16-20 @ 12:49  --------------------------------------------------------  IN: 0 mL / OUT: 500 mL / NET: -500 mL      Physical Exam:  	Gen: NAD  	HEENT: MMM  	Pulm: CTA B/L  	CV: S1S2  	Abd: Soft, +BS  	Ext: + LE edema B/L                      Neuro: Awake alert  	Skin: Warm and Dry   	Vascular access: no hd catheter            no  brandy  LABS/STUDIES  --------------------------------------------------------------------------------              9.0    4.68  >-----------<  160      [07-16-20 @ 07:42]              28.6     138  |  104  |  77  ----------------------------<  58      [07-16-20 @ 07:42]  5.2   |  21  |  3.49        Ca     9.2     [07-16-20 @ 07:42]    TPro  6.6  /  Alb  2.9  /  TBili  0.3  /  DBili  x   /  AST  19  /  ALT  11  /  AlkPhos  271  [07-16-20 @ 07:42]    PT/INR: PT 13.2 , INR 1.12       [07-15-20 @ 12:57]  PTT: 39.7       [07-15-20 @ 12:57]          [07-15-20 @ 21:38]    Creatinine Trend:  SCr 3.49 [07-16 @ 07:42]  SCr 3.40 [07-15 @ 21:38]  SCr 3.55 [07-15 @ 12:57]    Urinalysis - [07-16-20 @ 08:58]      Color Colorless / Appearance Clear / SG 1.012 / pH 6.5      Gluc Negative / Ketone Negative  / Bili Negative / Urobili <2 mg/dL       Blood Negative / Protein 30 mg/dL / Leuk Est Negative / Nitrite Negative      RBC 1 / WBC 1 / Hyaline 0 / Gran  / Sq Epi  / Non Sq Epi 0 / Bacteria Negative    Urine Creatinine 38      [07-16-20 @ 07:07]  Urine Protein 88      [07-16-20 @ 07:07]  Urine Sodium 98      [07-16-20 @ 07:07]  Urine Chloride 77      [07-16-20 @ 07:07]    Iron 40, TIBC 162, %sat 25      [07-16-20 @ 09:30]  Ferritin 334      [07-16-20 @ 09:29]  HbA1c 11.4      [03-04-18 @ 11:53]  TSH 3.57      [07-16-20 @ 09:29]  Lipid: chol 122, TG 47, HDL 39, LDL 73      [07-16-20 @ 09:30]
Patient is a 69y old  Male who presents with a chief complaint of fall (24 Jul 2020 15:27)      INTERVAL HISTORY: feels ok     MEDICATIONS:  carvedilol 25 milliGRAM(s) Oral every 12 hours  furosemide    Tablet 40 milliGRAM(s) Oral daily  hydrALAZINE 50 milliGRAM(s) Oral three times a day  tamsulosin 0.4 milliGRAM(s) Oral at bedtime        PHYSICAL EXAM:  T(C): 36.8 (07-24-20 @ 12:37), Max: 36.8 (07-24-20 @ 12:37)  HR: 66 (07-24-20 @ 13:49) (62 - 66)  BP: 136/66 (07-24-20 @ 13:49) (131/63 - 138/68)  RR: 18 (07-24-20 @ 12:37) (18 - 18)  SpO2: 97% (07-24-20 @ 12:37) (97% - 98%)  Wt(kg): --  I&O's Summary    23 Jul 2020 07:01  -  24 Jul 2020 07:00  --------------------------------------------------------  IN: 0 mL / OUT: 1000 mL / NET: -1000 mL    24 Jul 2020 07:01  -  24 Jul 2020 17:17  --------------------------------------------------------  IN: 1080 mL / OUT: 300 mL / NET: 780 mL          Appearance: In no distress	  HEENT:    PERRL, EOMI	  Cardiovascular:  S1 S2, No JVD  Respiratory: Lungs clear to auscultation	  Gastrointestinal:  Soft, Non-tender, + BS	  Vascularature:  No edema of LE  Psychiatric: Appropriate affect   Neuro: no acute focal deficits                               7.5    6.56  )-----------( 152      ( 24 Jul 2020 15:23 )             23.6            Labs personally reviewed      Assessment and Plan:   Assessment:  · Assessment		  Patient is a 69 Male pmhx DM, HTN, HLD, CAD s/p CABG, cath ([facility] 3/19/2018), MICU admission for influenza complicated by respiratory failure, JAKOB, NSTEMI, HF, A&Ox1 [place] speaking only, difficult to obtain information from. Collateral history obtained from wife, states patient had a fall on 7/1/2020 and has been complaining of L hip pain and since fall has been crawling to get around. Reports that he has been more confused and weak as well. Denies any chest pain, shortness of breath, fevers or chills.    Problem/Plan - 1:  ·  Problem: CAD s/p CABG Plan: med records from [facility] obtained and reviewed. h/o CABG with cath in 12/2018 patent graft to LIMA to LAD and SVG to RPDA, stent to 80% ramus  - ASA 81mg, statin, Coreg,     Problem/Plan - 2: Systolic congestive heart failure.  Plan: Hx of CABG   ASA, statin, coreg, hold ACE given JAKOB, increase hydral dose   cont lasix 40.   TTE with EF 40-45% consistent with pt h/o of CAD MI    Problem/Plan - 3:  ·  Problem: Elevated troponin.  Plan: Likely 2/2 decompensated HF  Hx of CABG and stent     Problem/Plan - 4  ·  Problem: Prophylactic measure.  Plan: DVT and gI PPX         Ebenezer Jones DO [facility]  Cardiovascular Medicine  [address] [place] [address], [address]  Office: [phone]  Cell: [phone]
Subjective: Patient seen and examined. No new events except as noted.   doing okay     REVIEW OF SYSTEMS:    CONSTITUTIONAL: No weakness, fevers or chills  EYES/ENT: No visual changes;  No vertigo or throat pain   NECK: No pain or stiffness  RESPIRATORY: No cough, wheezing, hemoptysis; No shortness of breath  CARDIOVASCULAR: No chest pain or palpitations  GASTROINTESTINAL: No abdominal or epigastric pain.   GENITOURINARY: No dysuria, frequency or hematuria  NEUROLOGICAL: No numbness or weakness  SKIN: No itching, burning, rashes, or lesions   All other review of systems is negative unless indicated above.    MEDICATIONS:  MEDICATIONS  (STANDING):  dextrose 5%. 1000 milliLiter(s) (50 mL/Hr) IV Continuous <Continuous>  dextrose 50% Injectable 12.5 Gram(s) IV Push once  dextrose 50% Injectable 25 Gram(s) IV Push once  dextrose 50% Injectable 25 Gram(s) IV Push once  furosemide   Injectable 40 milliGRAM(s) IV Push daily  insulin lispro (HumaLOG) corrective regimen sliding scale   SubCutaneous three times a day before meals  insulin lispro (HumaLOG) corrective regimen sliding scale   SubCutaneous at bedtime      PHYSICAL EXAM:  T(C): 36.3 (20 @ 04:37), Max: 36.3 (07-15-20 @ 19:04)  HR: 62 (20 @ 04:37) (62 - 65)  BP: 148/79 (20 @ 04:37) (148/79 - 167/99)  RR: 16 (20 @ 04:37) (16 - 16)  SpO2: 99% (20 @ 04:37) (99% - 99%)  Wt(kg): --  I&O's Summary    15 Jul 2020 07:  -  2020 07:00  --------------------------------------------------------  IN: 480 mL / OUT: 1380 mL / NET: -900 mL    2020 07:  -  2020 16:31  --------------------------------------------------------  IN: 480 mL / OUT: 800 mL / NET: -320 mL      Height (cm): 162.6 (07-15 @ 23:30)  Weight (kg): 70.5 (07-15 @ 23:30)  BMI (kg/m2): 26.7 (07-15 @ 23:30)  BSA (m2): 1.76 (07-15 @ 23:30)    Appearance: Normal, demented 	  HEENT:  PERRLA   Lymphatic: No lymphadenopathy   Cardiovascular: Normal S1 S2, no JVD  Respiratory: normal effort , clear  Gastrointestinal:  Soft  Skin: No rashes,  warm to touch  Psychiatry:  Mood & affect appropriate  Musculuskeletal: No edema      All labs, Imaging and EKGs personally reviewed                           9.0    4.68  )-----------( 160      ( 2020 07:42 )             28.6                   138  |  104  |  77<H>  ----------------------------<  58<L>  5.2   |  21<L>  |  3.49<H>    Ca    9.2      2020 07:42    TPro  6.6  /  Alb  2.9<L>  /  TBili  0.3  /  DBili  x   /  AST  19  /  ALT  11  /  AlkPhos  271<H>  07-16    PT/INR - ( 15 Jul 2020 12:57 )   PT: 13.2 sec;   INR: 1.12 ratio         PTT - ( 15 Jul 2020 12:57 )  PTT:39.7 sec       CARDIAC MARKERS ( 15 Jul 2020 21:38 )  x     / x     / 105 U/L / x     / 6.8 ng/mL              Urinalysis Basic - ( 2020 08:58 )    Color: Colorless / Appearance: Clear / S.012 / pH: x  Gluc: x / Ketone: Negative  / Bili: Negative / Urobili: <2 mg/dL   Blood: x / Protein: 30 mg/dL / Nitrite: Negative   Leuk Esterase: Negative / RBC: 1 /HPF / WBC 1 /HPF   Sq Epi: x / Non Sq Epi: 0 /HPF / Bacteria: Negative
[facility]   MD MISHA SÁNCHEZ MD RUORU WONG, [place]    TEL:  OFFICE: [phone]  DR PARKER CELL: [phone]  BETH SMITH CELL: [phone]  DR. CHAKRABORTY CELL: [phone]  DR. REYNA CELL: [phone]    FROM 5 PM - 7 AM PLEASE CALL ANSWERING SERVICE: [phone]    RENAL FOLLOW UP NOTE  --------------------------------------------------------------------------------  HPI:      Pt seen and examined at bedside.       PAST HISTORY  --------------------------------------------------------------------------------  No significant changes to PMH, PSH, FHx, SHx, unless otherwise noted    ALLERGIES & MEDICATIONS  --------------------------------------------------------------------------------  Allergies    No Known Allergies    Intolerances      Standing Inpatient Medications  aspirin enteric coated 81 milliGRAM(s) Oral daily  atorvastatin 80 milliGRAM(s) Oral at bedtime  carvedilol 25 milliGRAM(s) Oral every 12 hours  clopidogrel Tablet 75 milliGRAM(s) Oral daily  dextrose 5%. 1000 milliLiter(s) IV Continuous <Continuous>  dextrose 50% Injectable 12.5 Gram(s) IV Push once  dextrose 50% Injectable 25 Gram(s) IV Push once  dextrose 50% Injectable 25 Gram(s) IV Push once  ferrous    sulfate 325 milliGRAM(s) Oral daily  finasteride 5 milliGRAM(s) Oral daily  furosemide    Tablet 40 milliGRAM(s) Oral daily  hydrALAZINE 50 milliGRAM(s) Oral three times a day  insulin glargine Injectable (LANTUS) 10 Unit(s) SubCutaneous at bedtime  insulin lispro (HumaLOG) corrective regimen sliding scale   SubCutaneous three times a day before meals  insulin lispro (HumaLOG) corrective regimen sliding scale   SubCutaneous at bedtime  tamsulosin 0.4 milliGRAM(s) Oral at bedtime    PRN Inpatient Medications  acetaminophen   Tablet .. 650 milliGRAM(s) Oral every 6 hours PRN  dextrose 40% Gel 15 Gram(s) Oral once PRN  glucagon  Injectable 1 milliGRAM(s) IntraMuscular once PRN      REVIEW OF SYSTEMS  --------------------------------------------------------------------------------  General: no fever  MSK: no edema     VITALS/PHYSICAL EXAM  --------------------------------------------------------------------------------  T(C): 36.7 (07-21-20 @ 05:03), Max: 36.9 (07-20-20 @ 20:37)  HR: 72 (07-21-20 @ 05:03) (60 - 72)  BP: 144/72 (07-21-20 @ 05:03) (144/72 - 157/76)  RR: 17 (07-21-20 @ 05:03) (17 - 18)  SpO2: 98% (07-21-20 @ 05:03) (97% - 98%)  Wt(kg): --        07-20-20 @ 07:01  -  07-21-20 @ 07:00  --------------------------------------------------------  IN: 1000 mL / OUT: 1600 mL / NET: -600 mL      Physical Exam:  	Gen: NAD  	HEENT: MMM  	Pulm: CTA B/L  	CV: S1S2  	Abd: Soft, +BS  	Ext: No LE edema B/L                      Neuro: Awake alert  	Skin: Warm and Dry   	Vascular access: no hd catheter           no  nava  LABS/STUDIES  --------------------------------------------------------------------------------              9.4    5.08  >-----------<  168      [07-19-20 @ 08:59]              29.6     136  |  104  |  86  ----------------------------<  230      [07-20-20 @ 07:23]  4.7   |  19  |  3.93        Ca     8.9     [07-20-20 @ 07:23]    TPro  6.3  /  Alb  x   /  TBili  x   /  DBili  x   /  AST  x   /  ALT  x   /  AlkPhos  x   [07-19-20 @ 14:08]          Creatinine Trend:  SCr 3.93 [07-20 @ 07:23]  SCr 3.78 [07-19 @ 08:58]  SCr 3.85 [07-18 @ 07:10]  SCr 3.90 [07-17 @ 07:13]  SCr 3.49 [07-16 @ 07:42]    Urinalysis - [07-16-20 @ 08:58]      Color Colorless / Appearance Clear / SG 1.012 / pH 6.5      Gluc Negative / Ketone Negative  / Bili Negative / Urobili <2 mg/dL       Blood Negative / Protein 30 mg/dL / Leuk Est Negative / Nitrite Negative      RBC 1 / WBC 1 / Hyaline 0 / Gran  / Sq Epi  / Non Sq Epi 0 / Bacteria Negative    Urine Creatinine 38      [07-16-20 @ 07:07]  Urine Protein 88      [07-16-20 @ 07:07]  Urine Sodium 98      [07-16-20 @ 07:07]  Urine Chloride 77      [07-16-20 @ 07:07]    Iron 40, TIBC 162, %sat 25      [07-16-20 @ 09:30]  Ferritin 334      [07-16-20 @ 09:29]  HbA1c 11.4      [03-04-18 @ 11:53]  TSH 3.57      [07-16-20 @ 09:29]  Lipid: chol 122, TG 47, HDL 39, LDL 73      [07-16-20 @ 09:30]    HBsAb Reactive      [07-17-20 @ 10:47]  HBsAg Nonreact      [07-17-20 @ 10:47]  HBcAb Reactive      [07-17-20 @ 10:47]  HCV 0.34, Nonreact      [07-16-20 @ 09:30]    GLYNN: titer Negative, pattern --      [07-17-20 @ 10:44]  C3 Complement 108      [07-17-20 @ 08:31]  C4 Complement 24      [07-17-20 @ 08:31]  ANCA: cANCA Negative, pANCA Negative, atypical ANCA Indeterminate      [07-17-20 @ 10:43]  PLA2R: NICKO <2, IFA Negative      [07-17-20 @ 13:04]  Free Light Chains: kappa 12.10, lambda 11.77, ratio = 1.03      [07-17 @ 08:31]  Immunofixation Serum:   One IgG Kappa and One Weak IgG Lambda Bands Identified    Reference Range: None Detected      [07-18-20 @ 10:00]  SPEP Interpretation: Two Gamma-Migrating Paraproteins Identified      [07-18-20 @ 10:00]
[facility]ACT[facility]   MD MISHA SÁNCHEZ MD RUORU WONG, [place]    TEL:  OFFICE: [phone]  DR PARKER CELL: [phone]  BETH SMITH CELL: [phone]  DR. CHAKRABORTY CELL: [phone]  DR. REYNA CELL: [phone]    FROM 5 PM - 7 AM PLEASE CALL ANSWERING SERVICE: [phone]    RENAL FOLLOW UP NOTE  --------------------------------------------------------------------------------  HPI:      Pt seen and examined at bedside.       PAST HISTORY  --------------------------------------------------------------------------------  No significant changes to PMH, PSH, FHx, SHx, unless otherwise noted    ALLERGIES & MEDICATIONS  --------------------------------------------------------------------------------  Allergies    No Known Allergies    Intolerances      Standing Inpatient Medications  aspirin enteric coated 81 milliGRAM(s) Oral daily  atorvastatin 80 milliGRAM(s) Oral at bedtime  bisacodyl 10 milliGRAM(s) Oral at bedtime  carvedilol 25 milliGRAM(s) Oral every 12 hours  dextrose 5%. 1000 milliLiter(s) IV Continuous <Continuous>  dextrose 50% Injectable 12.5 Gram(s) IV Push once  dextrose 50% Injectable 25 Gram(s) IV Push once  dextrose 50% Injectable 25 Gram(s) IV Push once  ferrous    sulfate 325 milliGRAM(s) Oral daily  finasteride 5 milliGRAM(s) Oral daily  furosemide    Tablet 40 milliGRAM(s) Oral daily  hydrALAZINE 50 milliGRAM(s) Oral three times a day  insulin glargine Injectable (LANTUS) 10 Unit(s) SubCutaneous at bedtime  insulin lispro (HumaLOG) corrective regimen sliding scale   SubCutaneous three times a day before meals  insulin lispro (HumaLOG) corrective regimen sliding scale   SubCutaneous at bedtime  pantoprazole  Injectable 40 milliGRAM(s) IV Push two times a day  sodium bicarbonate 1300 milliGRAM(s) Oral three times a day  tamsulosin 0.4 milliGRAM(s) Oral at bedtime    PRN Inpatient Medications  acetaminophen   Tablet .. 650 milliGRAM(s) Oral every 6 hours PRN  dextrose 40% Gel 15 Gram(s) Oral once PRN  glucagon  Injectable 1 milliGRAM(s) IntraMuscular once PRN      REVIEW OF SYSTEMS  --------------------------------------------------------------------------------  General: no fever  CVS: no chest pain  RESP: no sob, no cough    : no dysuria,  MSK: no edema     VITALS/PHYSICAL EXAM  --------------------------------------------------------------------------------  T(C): 36.6 (07-28-20 @ 04:04), Max: 36.6 (07-27-20 @ 20:37)  HR: 65 (07-28-20 @ 04:04) (61 - 68)  BP: 150/76 (07-28-20 @ 04:04) (139/66 - 154/74)  RR: 18 (07-28-20 @ 04:04) (18 - 18)  SpO2: 99% (07-28-20 @ 04:04) (99% - 100%)  Wt(kg): --        07-27-20 @ 07:01  -  07-28-20 @ 07:00  --------------------------------------------------------  IN: 120 mL / OUT: 700 mL / NET: -580 mL      Physical Exam:  	Gen: NAD  	HEENT: MMM  	Pulm: CTA B/L  	CV: S1S2  	Abd: Soft, +BS  	Ext: No LE edema B/L                      Neuro: Awake non focal  	Skin: Warm and Dry   	Vascular access: no hd catheter           no   brandy  LABS/STUDIES  --------------------------------------------------------------------------------              8.1    5.57  >-----------<  170      [07-28-20 @ 06:30]              25.4     141  |  107  |  91  ----------------------------<  87      [07-28-20 @ 06:30]  4.5   |  15  |  3.44        Ca     9.0     [07-28-20 @ 06:30]            Creatinine Trend:  SCr 3.44 [07-28 @ 06:30]  SCr 3.74 [07-27 @ 07:40]  SCr 3.88 [07-26 @ 07:27]  SCr 3.93 [07-20 @ 07:23]  SCr 3.78 [07-19 @ 08:58]    Urinalysis - [07-16-20 @ 08:58]      Color Colorless / Appearance Clear / SG 1.012 / pH 6.5      Gluc Negative / Ketone Negative  / Bili Negative / Urobili <2 mg/dL       Blood Negative / Protein 30 mg/dL / Leuk Est Negative / Nitrite Negative      RBC 1 / WBC 1 / Hyaline 0 / Gran  / Sq Epi  / Non Sq Epi 0 / Bacteria Negative    Urine Protein 87      [07-26-20 @ 23:48]    Iron 40, TIBC 162, %sat 25      [07-16-20 @ 09:30]  Ferritin 334      [07-16-20 @ 09:29]  HbA1c 11.4      [03-04-18 @ 11:53]  TSH 3.57      [07-16-20 @ 09:29]  Lipid: chol 122, TG 47, HDL 39, LDL 73      [07-16-20 @ 09:30]    HBsAb Reactive      [07-17-20 @ 10:47]  HBsAg Nonreact      [07-17-20 @ 10:47]  HBcAb Reactive      [07-17-20 @ 10:47]  HCV 0.34, Nonreact      [07-16-20 @ 09:30]    GLYNN: titer Negative, pattern --      [07-17-20 @ 10:44]  C3 Complement 108      [07-17-20 @ 08:31]  C4 Complement 24      [07-17-20 @ 08:31]  ANCA: cANCA Negative, pANCA Negative, atypical ANCA Indeterminate      [07-17-20 @ 10:43]  Syphilis Screen (Treponema Pallidum Ab) Positive      [07-17-20 @ 10:44]  Syphilis Screen (RPR Titer) <1:1      [07-17-20 @ 10:44]  PLA2R: NICKO <2, IFA Negative      [07-17-20 @ 13:04]  Free Light Chains: kappa 12.10, lambda 11.77, ratio = 1.03      [07-17 @ 08:31]  Immunofixation Serum:     IgG Kappa Band Identified and a weak IgG Lambda Band Identified    Reference Range: None Detected      [07-19-20 @ 14:08]  SPEP Interpretation: Two weak Gamma-Migrating Paraproteins Identified      [07-19-20 @ 14:08]
Chief Complaint:  Patient is a 69y old  Male who presents with a chief complaint of fall (01 Aug 2020 10:58)      Interval Events:   no GI bleeding reported  Allergies:  No Known Allergies      Hospital Medications:  acetaminophen   Tablet .. 650 milliGRAM(s) Oral every 6 hours PRN  aspirin enteric coated 81 milliGRAM(s) Oral daily  atorvastatin 80 milliGRAM(s) Oral at bedtime  bisacodyl 10 milliGRAM(s) Oral at bedtime  carvedilol 25 milliGRAM(s) Oral every 12 hours  dextrose 40% Gel 15 Gram(s) Oral once PRN  dextrose 5%. 1000 milliLiter(s) IV Continuous <Continuous>  dextrose 50% Injectable 12.5 Gram(s) IV Push once  dextrose 50% Injectable 25 Gram(s) IV Push once  dextrose 50% Injectable 25 Gram(s) IV Push once  ferrous    sulfate 325 milliGRAM(s) Oral daily  finasteride 5 milliGRAM(s) Oral daily  glucagon  Injectable 1 milliGRAM(s) IntraMuscular once PRN  hydrALAZINE 50 milliGRAM(s) Oral three times a day  insulin glargine Injectable (LANTUS) 10 Unit(s) SubCutaneous at bedtime  insulin lispro (HumaLOG) corrective regimen sliding scale   SubCutaneous three times a day before meals  insulin lispro (HumaLOG) corrective regimen sliding scale   SubCutaneous at bedtime  pantoprazole  Injectable 40 milliGRAM(s) IV Push two times a day  sodium bicarbonate 1300 milliGRAM(s) Oral three times a day  tamsulosin 0.4 milliGRAM(s) Oral at bedtime      PMHX/PSHX:  CHF (congestive heart failure)  Hyperlipemia  DM (diabetes mellitus)  HTN (hypertension)  No pertinent past medical history  History of open heart surgery      Family history:  No pertinent family history in first degree relatives      ROS:     General:  No wt loss, fevers, chills, night sweats, fatigue,   Eyes:  Good vision, no reported pain  ENT:  No sore throat, pain, runny nose, dysphagia  CV:  No pain, palpitations, hypo/hypertension  Resp:  No dyspnea, cough, tachypnea, wheezing  GI:  See HPI  :  No pain, bleeding, incontinence, nocturia  Muscle:  No pain, weakness  Neuro:  No weakness, tingling, memory problems  Psych:  No fatigue, insomnia, mood problems, depression  Endocrine:  No polyuria, polydipsia, cold/heat intolerance  Heme:  No petechiae, ecchymosis, easy bruisability  Skin:  No rash, edema      PHYSICAL EXAM:     GENERAL:  Appears stated age, well-groomed, well-nourished, no distress  HEENT:  NC/AT,  conjunctivae clear, sclera-anicteric  NECK: Trachea midline, supple  CHEST:  Full & symmetric excursion, no increased effort, breath sounds clear  HEART:  Regular rhythm, no anton/heave  ABDOMEN:  Soft, non-tender, non-distended, normoactive bowel sounds,  no masses ,no hepato-splenomegaly,   EXTREMITIES:  no cyanosis,clubbing or edema  SKIN:  No rash/erythema/petechiae, no jaundice  NEURO:  Alert , no asterixis  RECTAL: Deferred    Vital Signs:  Vital Signs Last 24 Hrs  T(C): 36.7 (01 Aug 2020 12:06), Max: 36.8 (2020 21:17)  T(F): 98 (01 Aug 2020 12:06), Max: 98.3 (2020 21:17)  HR: 65 (01 Aug 2020 17:13) (60 - 72)  BP: 159/80 (01 Aug 2020 17:13) (135/64 - 159/80)  BP(mean): --  RR: 17 (01 Aug 2020 12:06) (17 - 18)  SpO2: 95% (01 Aug 2020 12:06) (95% - 99%)  Daily     Daily     LABS:                        8.2    6.80  )-----------( 138      ( 01 Aug 2020 07:23 )             25.3     08-01    143  |  106  |  103<H>  ----------------------------<  131<H>  5.1   |  19<L>  |  4.65<H>    Ca    9.0      01 Aug 2020 07:21          Urinalysis Basic - ( 01 Aug 2020 03:28 )    Color: Light Yellow / Appearance: Clear / S.014 / pH: x  Gluc: x / Ketone: Negative  / Bili: Negative / Urobili: Negative   Blood: x / Protein: 30 mg/dL / Nitrite: Negative   Leuk Esterase: Moderate / RBC: 2 /hpf / WBC 6 /HPF   Sq Epi: x / Non Sq Epi: 0 /hpf / Bacteria: Negative          Imaging:
Chief Complaint:  Patient is a 69y old  Male who presents with a chief complaint of fall (01 Aug 2020 10:58)      Interval Events:   no GI bleeding reported  Allergies:  No Known Allergies      Hospital Medications:  acetaminophen   Tablet .. 650 milliGRAM(s) Oral every 6 hours PRN  aspirin enteric coated 81 milliGRAM(s) Oral daily  atorvastatin 80 milliGRAM(s) Oral at bedtime  bisacodyl 10 milliGRAM(s) Oral at bedtime  carvedilol 25 milliGRAM(s) Oral every 12 hours  dextrose 40% Gel 15 Gram(s) Oral once PRN  dextrose 5%. 1000 milliLiter(s) IV Continuous <Continuous>  dextrose 50% Injectable 12.5 Gram(s) IV Push once  dextrose 50% Injectable 25 Gram(s) IV Push once  dextrose 50% Injectable 25 Gram(s) IV Push once  ferrous    sulfate 325 milliGRAM(s) Oral daily  finasteride 5 milliGRAM(s) Oral daily  glucagon  Injectable 1 milliGRAM(s) IntraMuscular once PRN  hydrALAZINE 50 milliGRAM(s) Oral three times a day  insulin glargine Injectable (LANTUS) 10 Unit(s) SubCutaneous at bedtime  insulin lispro (HumaLOG) corrective regimen sliding scale   SubCutaneous three times a day before meals  insulin lispro (HumaLOG) corrective regimen sliding scale   SubCutaneous at bedtime  pantoprazole  Injectable 40 milliGRAM(s) IV Push two times a day  sodium bicarbonate 1300 milliGRAM(s) Oral three times a day  tamsulosin 0.4 milliGRAM(s) Oral at bedtime      PMHX/PSHX:  CHF (congestive heart failure)  Hyperlipemia  DM (diabetes mellitus)  HTN (hypertension)  No pertinent past medical history  History of open heart surgery      Family history:  No pertinent family history in first degree relatives      ROS:     General:  No wt loss, fevers, chills, night sweats, fatigue,   Eyes:  Good vision, no reported pain  ENT:  No sore throat, pain, runny nose, dysphagia  CV:  No pain, palpitations, hypo/hypertension  Resp:  No dyspnea, cough, tachypnea, wheezing  GI:  See HPI  :  No pain, bleeding, incontinence, nocturia  Muscle:  No pain, weakness  Neuro:  No weakness, tingling, memory problems  Psych:  No fatigue, insomnia, mood problems, depression  Endocrine:  No polyuria, polydipsia, cold/heat intolerance  Heme:  No petechiae, ecchymosis, easy bruisability  Skin:  No rash, edema      PHYSICAL EXAM:     GENERAL:  Appears stated age, well-groomed, well-nourished, no distress  HEENT:  NC/AT,  conjunctivae clear, sclera-anicteric  NECK: Trachea midline, supple  CHEST:  Full & symmetric excursion, no increased effort, breath sounds clear  HEART:  Regular rhythm, no anton/heave  ABDOMEN:  Soft, non-tender, non-distended, normoactive bowel sounds,  no masses ,no hepato-splenomegaly,   EXTREMITIES:  no cyanosis,clubbing or edema  SKIN:  No rash/erythema/petechiae, no jaundice  NEURO:  Alert , no asterixis  RECTAL: Deferred    Vital Signs:  Vital Signs Last 24 Hrs  T(C): 36.7 (01 Aug 2020 12:06), Max: 36.8 (2020 21:17)  T(F): 98 (01 Aug 2020 12:06), Max: 98.3 (2020 21:17)  HR: 65 (01 Aug 2020 17:13) (60 - 72)  BP: 159/80 (01 Aug 2020 17:13) (135/64 - 159/80)  BP(mean): --  RR: 17 (01 Aug 2020 12:06) (17 - 18)  SpO2: 95% (01 Aug 2020 12:06) (95% - 99%)  Daily     Daily     LABS:                        8.2    6.80  )-----------( 138      ( 01 Aug 2020 07:23 )             25.3     08-01    143  |  106  |  103<H>  ----------------------------<  131<H>  5.1   |  19<L>  |  4.65<H>    Ca    9.0      01 Aug 2020 07:21          Urinalysis Basic - ( 01 Aug 2020 03:28 )    Color: Light Yellow / Appearance: Clear / S.014 / pH: x  Gluc: x / Ketone: Negative  / Bili: Negative / Urobili: Negative   Blood: x / Protein: 30 mg/dL / Nitrite: Negative   Leuk Esterase: Moderate / RBC: 2 /hpf / WBC 6 /HPF   Sq Epi: x / Non Sq Epi: 0 /hpf / Bacteria: Negative          Imaging:
Chief Complaint:  Patient is a 69y old  Male who presents with a chief complaint of fall (2020 08:25)      Interval Events:   no GI bleeding/abd pain    Allergies:  No Known Allergies      Hospital Medications:  acetaminophen   Tablet .. 650 milliGRAM(s) Oral every 6 hours PRN  aspirin enteric coated 81 milliGRAM(s) Oral daily  atorvastatin 80 milliGRAM(s) Oral at bedtime  carvedilol 25 milliGRAM(s) Oral every 12 hours  clopidogrel Tablet 75 milliGRAM(s) Oral daily  dextrose 40% Gel 15 Gram(s) Oral once PRN  dextrose 5%. 1000 milliLiter(s) IV Continuous <Continuous>  dextrose 50% Injectable 12.5 Gram(s) IV Push once  dextrose 50% Injectable 25 Gram(s) IV Push once  dextrose 50% Injectable 25 Gram(s) IV Push once  ferrous    sulfate 325 milliGRAM(s) Oral daily  finasteride 5 milliGRAM(s) Oral daily  furosemide    Tablet 40 milliGRAM(s) Oral daily  glucagon  Injectable 1 milliGRAM(s) IntraMuscular once PRN  hydrALAZINE 50 milliGRAM(s) Oral three times a day  insulin glargine Injectable (LANTUS) 10 Unit(s) SubCutaneous at bedtime  insulin lispro (HumaLOG) corrective regimen sliding scale   SubCutaneous three times a day before meals  insulin lispro (HumaLOG) corrective regimen sliding scale   SubCutaneous at bedtime  tamsulosin 0.4 milliGRAM(s) Oral at bedtime      PMHX/PSHX:  CHF (congestive heart failure)  Hyperlipemia  DM (diabetes mellitus)  HTN (hypertension)  No pertinent past medical history  History of open heart surgery      Family history:  No pertinent family history in first degree relatives      ROS:     General:  No wt loss, fevers, chills, night sweats, fatigue,   Eyes:  Good vision, no reported pain  ENT:  No sore throat, pain, runny nose, dysphagia  CV:  No pain, palpitations, hypo/hypertension  Resp:  No dyspnea, cough, tachypnea, wheezing  GI:  See HPI  :  No pain, bleeding, incontinence, nocturia  Muscle:  No pain, weakness  Neuro:  No weakness, tingling, memory problems  Psych:  No fatigue, insomnia, mood problems, depression  Endocrine:  No polyuria, polydipsia, cold/heat intolerance  Heme:  No petechiae, ecchymosis, easy bruisability  Skin:  No rash, edema      PHYSICAL EXAM:     GENERAL:  Appears stated age, well-groomed, well-nourished, no distress  HEENT:  NC/AT,  conjunctivae clear, sclera-anicteric  NECK: Trachea midline, supple  CHEST:  Full & symmetric excursion, no increased effort, breath sounds clear  HEART:  Regular rhythm, no anton/heave  ABDOMEN:  Soft, non-tender, non-distended, normoactive bowel sounds,  no masses ,no hepato-splenomegaly,   EXTREMITIES:  no cyanosis,clubbing or edema  SKIN:  No rash/erythema/petechiae, no jaundice  NEURO:  Alert, oriented, no asterixis  RECTAL: Deferred    Vital Signs:  Vital Signs Last 24 Hrs  T(C): 36.4 (2020 12:08), Max: 36.4 (2020 12:08)  T(F): 97.5 (2020 12:08), Max: 97.5 (2020 12:08)  HR: 56 (2020 12:08) (56 - 68)  BP: 132/56 (2020 12:08) (132/56 - 159/68)  BP(mean): --  RR: 18 (2020 12:08) (17 - 18)  SpO2: 98% (2020 12:08) (98% - 99%)  Daily     Daily Weight in k.2 (2020 04:15)    LABS:                    Imaging:
Chief Complaint:  Patient is a 69y old  Male who presents with a chief complaint of fall (2020 08:38)      Interval Events:   no melena/abd pain  Allergies:  No Known Allergies      Hospital Medications:  acetaminophen   Tablet .. 650 milliGRAM(s) Oral every 6 hours PRN  aspirin enteric coated 81 milliGRAM(s) Oral daily  atorvastatin 80 milliGRAM(s) Oral at bedtime  carvedilol 12.5 milliGRAM(s) Oral every 12 hours  clopidogrel Tablet 75 milliGRAM(s) Oral daily  dextrose 40% Gel 15 Gram(s) Oral once PRN  dextrose 5%. 1000 milliLiter(s) IV Continuous <Continuous>  dextrose 50% Injectable 12.5 Gram(s) IV Push once  dextrose 50% Injectable 25 Gram(s) IV Push once  dextrose 50% Injectable 25 Gram(s) IV Push once  ferrous    sulfate 325 milliGRAM(s) Oral daily  finasteride 5 milliGRAM(s) Oral daily  furosemide    Tablet 40 milliGRAM(s) Oral daily  glucagon  Injectable 1 milliGRAM(s) IntraMuscular once PRN  hydrALAZINE 25 milliGRAM(s) Oral three times a day  insulin glargine Injectable (LANTUS) 10 Unit(s) SubCutaneous at bedtime  insulin lispro (HumaLOG) corrective regimen sliding scale   SubCutaneous three times a day before meals  insulin lispro (HumaLOG) corrective regimen sliding scale   SubCutaneous at bedtime  tamsulosin 0.4 milliGRAM(s) Oral at bedtime      PMHX/PSHX:  CHF (congestive heart failure)  Hyperlipemia  DM (diabetes mellitus)  HTN (hypertension)  No pertinent past medical history  History of open heart surgery      Family history:  No pertinent family history in first degree relatives      ROS:     General:  No wt loss, fevers, chills, night sweats, fatigue,   Eyes:  Good vision, no reported pain  ENT:  No sore throat, pain, runny nose, dysphagia  CV:  No pain, palpitations, hypo/hypertension  Resp:  No dyspnea, cough, tachypnea, wheezing  GI:  See HPI  :  No pain, bleeding, incontinence, nocturia  Muscle:  No pain, weakness  Neuro:  No weakness, tingling, memory problems  Psych:  No fatigue, insomnia, mood problems, depression  Endocrine:  No polyuria, polydipsia, cold/heat intolerance  Heme:  No petechiae, ecchymosis, easy bruisability  Skin:  No rash, edema      PHYSICAL EXAM:     GENERAL:  Appears stated age, well-groomed, well-nourished, no distress  HEENT:  NC/AT,  conjunctivae clear, sclera-anicteric  NECK: Trachea midline, supple  CHEST:  Full & symmetric excursion, no increased effort, breath sounds clear  HEART:  Regular rhythm, no anton/heave  ABDOMEN:  Soft, non-tender, non-distended, normoactive bowel sounds,  no masses ,no hepato-splenomegaly,   EXTREMITIES:  no cyanosis,clubbing or edema  SKIN:  No rash/erythema/petechiae, no jaundice  NEURO:  Alert, oriented, no asterixis  RECTAL: Deferred    Vital Signs:  Vital Signs Last 24 Hrs  T(C): 36.3 (2020 13:26), Max: 36.7 (2020 04:46)  T(F): 97.4 (2020 13:26), Max: 98 (2020 04:46)  HR: 72 (2020 13:) (65 - 77)  BP: 159/76 (2020 13:26) (142/79 - 162/78)  BP(mean): --  RR: 18 (2020 13:26) (17 - 18)  SpO2: 95% (2020 13:26) (94% - 100%)  Daily     Daily Weight in k.5 (2020 04:46)    LABS:                        8.8    5.01  )-----------( 163      ( 2020 07:10 )             27.8     07-18    139  |  103  |  87<H>  ----------------------------<  164<H>  4.4   |  21<L>  |  3.85<H>    Ca    8.6      2020 07:10    TPro  6.0  /  Alb  x   /  TBili  x   /  DBili  x   /  AST  x   /  ALT  x   /  AlkPhos  x   -18    LIVER FUNCTIONS - ( 2020 10:00 )  Alb: x     / Pro: 6.0 g/dL / ALK PHOS: x     / ALT: x     / AST: x     / GGT: x                   Imaging:
Chief Complaint:  Patient is a 69y old  Male who presents with a chief complaint of fall (2020 12:19)      Interval Events:   no GI bleeding  Allergies:  No Known Allergies      Hospital Medications:  acetaminophen   Tablet .. 650 milliGRAM(s) Oral every 6 hours PRN  aspirin enteric coated 81 milliGRAM(s) Oral daily  atorvastatin 80 milliGRAM(s) Oral at bedtime  bisacodyl 10 milliGRAM(s) Oral at bedtime  carvedilol 25 milliGRAM(s) Oral every 12 hours  dextrose 40% Gel 15 Gram(s) Oral once PRN  dextrose 5%. 1000 milliLiter(s) IV Continuous <Continuous>  dextrose 50% Injectable 12.5 Gram(s) IV Push once  dextrose 50% Injectable 25 Gram(s) IV Push once  dextrose 50% Injectable 25 Gram(s) IV Push once  ferrous    sulfate 325 milliGRAM(s) Oral daily  finasteride 5 milliGRAM(s) Oral daily  furosemide    Tablet 40 milliGRAM(s) Oral daily  glucagon  Injectable 1 milliGRAM(s) IntraMuscular once PRN  hydrALAZINE 50 milliGRAM(s) Oral three times a day  insulin glargine Injectable (LANTUS) 10 Unit(s) SubCutaneous at bedtime  insulin lispro (HumaLOG) corrective regimen sliding scale   SubCutaneous three times a day before meals  insulin lispro (HumaLOG) corrective regimen sliding scale   SubCutaneous at bedtime  pantoprazole  Injectable 40 milliGRAM(s) IV Push two times a day  sodium bicarbonate 1300 milliGRAM(s) Oral three times a day  tamsulosin 0.4 milliGRAM(s) Oral at bedtime      PMHX/PSHX:  CHF (congestive heart failure)  Hyperlipemia  DM (diabetes mellitus)  HTN (hypertension)  No pertinent past medical history  History of open heart surgery      Family history:  No pertinent family history in first degree relatives      ROS:     General:  No wt loss, fevers, chills, night sweats, fatigue,   Eyes:  Good vision, no reported pain  ENT:  No sore throat, pain, runny nose, dysphagia  CV:  No pain, palpitations, hypo/hypertension  Resp:  No dyspnea, cough, tachypnea, wheezing  GI:  See HPI  :  No pain, bleeding, incontinence, nocturia  Muscle:  No pain, weakness  Neuro:  No weakness, tingling, memory problems  Psych:  No fatigue, insomnia, mood problems, depression  Endocrine:  No polyuria, polydipsia, cold/heat intolerance  Heme:  No petechiae, ecchymosis, easy bruisability  Skin:  No rash, edema      PHYSICAL EXAM:     GENERAL:  Appears stated age, well-groomed, well-nourished, no distress  HEENT:  NC/AT,  conjunctivae clear, sclera-anicteric  NECK: Trachea midline, supple  CHEST:  Full & symmetric excursion, no increased effort, breath sounds clear  HEART:  Regular rhythm, no anton/heave  ABDOMEN:  Soft, non-tender, non-distended, normoactive bowel sounds,  no masses ,no hepato-splenomegaly,   EXTREMITIES:  no cyanosis,clubbing or edema  SKIN:  No rash/erythema/petechiae, no jaundice  NEURO:  Alert, no asterixis  RECTAL: Deferred    Vital Signs:  Vital Signs Last 24 Hrs  T(C): 36.3 (2020 21:36), Max: 36.6 (2020 04:04)  T(F): 97.4 (2020 21:36), Max: 97.9 (2020 04:04)  HR: 67 (2020 21:36) (65 - 67)  BP: 100/56 (2020 21:36) (100/56 - 150/76)  BP(mean): --  RR: 18 (2020 21:36) (18 - 18)  SpO2: 100% (2020 21:36) (99% - 100%)  Daily     Daily Weight in k.5 (2020 08:05)    LABS:                        8.2    5.64  )-----------( 174      ( 2020 12:39 )             24.8     -    141  |  107  |  91<H>  ----------------------------<  87  4.5   |  15<L>  |  3.44<H>    Ca    9.0      2020 06:30                Imaging:
Chief Complaint:  Patient is a 69y old  Male who presents with a chief complaint of fall (2020 12:49)      Interval Events:     Allergies:  No Known Allergies      Hospital Medications:  dextrose 40% Gel 15 Gram(s) Oral once PRN  dextrose 5%. 1000 milliLiter(s) IV Continuous <Continuous>  dextrose 50% Injectable 12.5 Gram(s) IV Push once  dextrose 50% Injectable 25 Gram(s) IV Push once  dextrose 50% Injectable 25 Gram(s) IV Push once  furosemide   Injectable 40 milliGRAM(s) IV Push daily  glucagon  Injectable 1 milliGRAM(s) IntraMuscular once PRN  insulin lispro (HumaLOG) corrective regimen sliding scale   SubCutaneous three times a day before meals  insulin lispro (HumaLOG) corrective regimen sliding scale   SubCutaneous at bedtime  sodium zirconium cyclosilicate 10 Gram(s) Oral three times a day      PMHX/PSHX:  CHF (congestive heart failure)  Hyperlipemia  DM (diabetes mellitus)  HTN (hypertension)  No pertinent past medical history  History of open heart surgery      Family history:  No pertinent family history in first degree relatives      ROS:     General:  No wt loss, fevers, chills, night sweats, fatigue,   Eyes:  Good vision, no reported pain  ENT:  No sore throat, pain, runny nose, dysphagia  CV:  No pain, palpitations, hypo/hypertension  Resp:  No dyspnea, cough, tachypnea, wheezing  GI:  See HPI  :  No pain, bleeding, incontinence, nocturia  Muscle:  No pain, weakness  Neuro:  No weakness, tingling, memory problems  Psych:  No fatigue, insomnia, mood problems, depression  Endocrine:  No polyuria, polydipsia, cold/heat intolerance  Heme:  No petechiae, ecchymosis, easy bruisability  Skin:  No rash, edema      PHYSICAL EXAM:     GENERAL:  Appears stated age, well-groomed, well-nourished, no distress  HEENT:  NC/AT,  conjunctivae clear, sclera-anicteric  NECK: Trachea midline, supple  CHEST:  Full & symmetric excursion, no increased effort, breath sounds clear  HEART:  Regular rhythm, no anton/heave  ABDOMEN:  Soft, non-tender, non-distended, normoactive bowel sounds,  no masses ,no hepato-splenomegaly,   EXTREMITIES:  no cyanosis,clubbing or edema  SKIN:  No rash/erythema/petechiae, no jaundice  NEURO:  Alert, oriented, no asterixis  RECTAL: Deferred    Vital Signs:  Vital Signs Last 24 Hrs  T(C): 36.3 (2020 04:37), Max: 36.5 (15 Jul 2020 14:28)  T(F): 97.4 (2020 04:37), Max: 97.7 (15 Jul 2020 14:28)  HR: 62 (2020 04:37) (62 - 66)  BP: 148/79 (2020 04:37) (148/79 - 167/99)  BP(mean): --  RR: 16 (2020 04:37) (14 - 18)  SpO2: 99% (2020 04:37) (99% - 100%)  Daily Height in cm: 162.56 (15 Jul 2020 23:30)    Daily Weight in k.5 (2020 07:13)    LABS:                        9.0    4.68  )-----------( 160      ( 2020 07:42 )             28.6     07-16    138  |  104  |  77<H>  ----------------------------<  58<L>  5.2   |  21<L>  |  3.49<H>    Ca    9.2      2020 07:42    TPro  6.6  /  Alb  2.9<L>  /  TBili  0.3  /  DBili  x   /  AST  19  /  ALT  11  /  AlkPhos  271<H>  07-16    LIVER FUNCTIONS - ( 2020 07:42 )  Alb: 2.9 g/dL / Pro: 6.6 g/dL / ALK PHOS: 271 U/L / ALT: 11 U/L / AST: 19 U/L / GGT: x           PT/INR - ( 15 Jul 2020 12:57 )   PT: 13.2 sec;   INR: 1.12 ratio         PTT - ( 15 Jul 2020 12:57 )  PTT:39.7 sec  Urinalysis Basic - ( 2020 08:58 )    Color: Colorless / Appearance: Clear / S.012 / pH: x  Gluc: x / Ketone: Negative  / Bili: Negative / Urobili: <2 mg/dL   Blood: x / Protein: 30 mg/dL / Nitrite: Negative   Leuk Esterase: Negative / RBC: 1 /HPF / WBC 1 /HPF   Sq Epi: x / Non Sq Epi: 0 /HPF / Bacteria: Negative          Imaging:
Chief Complaint:  Patient is a 69y old  Male who presents with a chief complaint of fall (2020 13:41)      Interval Events:     Allergies:  No Known Allergies      Hospital Medications:  acetaminophen   Tablet .. 650 milliGRAM(s) Oral every 6 hours PRN  aspirin enteric coated 81 milliGRAM(s) Oral daily  atorvastatin 80 milliGRAM(s) Oral at bedtime  carvedilol 25 milliGRAM(s) Oral every 12 hours  clopidogrel Tablet 75 milliGRAM(s) Oral daily  dextrose 40% Gel 15 Gram(s) Oral once PRN  dextrose 5%. 1000 milliLiter(s) IV Continuous <Continuous>  dextrose 50% Injectable 12.5 Gram(s) IV Push once  dextrose 50% Injectable 25 Gram(s) IV Push once  dextrose 50% Injectable 25 Gram(s) IV Push once  ferrous    sulfate 325 milliGRAM(s) Oral daily  finasteride 5 milliGRAM(s) Oral daily  furosemide    Tablet 40 milliGRAM(s) Oral daily  glucagon  Injectable 1 milliGRAM(s) IntraMuscular once PRN  hydrALAZINE 50 milliGRAM(s) Oral three times a day  insulin glargine Injectable (LANTUS) 10 Unit(s) SubCutaneous at bedtime  insulin lispro (HumaLOG) corrective regimen sliding scale   SubCutaneous three times a day before meals  insulin lispro (HumaLOG) corrective regimen sliding scale   SubCutaneous at bedtime  tamsulosin 0.4 milliGRAM(s) Oral at bedtime      PMHX/PSHX:  CHF (congestive heart failure)  Hyperlipemia  DM (diabetes mellitus)  HTN (hypertension)  No pertinent past medical history  History of open heart surgery      Family history:  No pertinent family history in first degree relatives      ROS:     General:  No wt loss, fevers, chills, night sweats, fatigue,   Eyes:  Good vision, no reported pain  ENT:  No sore throat, pain, runny nose, dysphagia  CV:  No pain, palpitations, hypo/hypertension  Resp:  No dyspnea, cough, tachypnea, wheezing  GI:  See HPI  :  No pain, bleeding, incontinence, nocturia  Muscle:  No pain, weakness  Neuro:  No weakness, tingling, memory problems  Psych:  No fatigue, insomnia, mood problems, depression  Endocrine:  No polyuria, polydipsia, cold/heat intolerance  Heme:  No petechiae, ecchymosis, easy bruisability  Skin:  No rash, edema      PHYSICAL EXAM:     GENERAL:  Appears stated age, well-groomed, well-nourished, no distress  HEENT:  NC/AT,  conjunctivae clear, sclera-anicteric  NECK: Trachea midline, supple  CHEST:  Full & symmetric excursion, no increased effort, breath sounds clear  HEART:  Regular rhythm, no anton/heave  ABDOMEN:  Soft, non-tender, non-distended, normoactive bowel sounds,  no masses ,no hepato-splenomegaly,   EXTREMITIES:  no cyanosis,clubbing or edema  SKIN:  No rash/erythema/petechiae, no jaundice  NEURO:  Alert, , no asterixis  RECTAL: Deferred    Vital Signs:  Vital Signs Last 24 Hrs  T(C): 36.5 (2020 12:36), Max: 36.5 (2020 12:36)  T(F): 97.7 (2020 12:36), Max: 97.7 (2020 12:36)  HR: 59 (2020 12:36) (59 - 66)  BP: 121/68 (2020 12:36) (121/68 - 140/75)  BP(mean): --  RR: 18 (2020 12:36) (18 - 18)  SpO2: 96% (2020 12:36) (96% - 98%)  Daily     Daily Weight in k.1 (2020 08:02)    LABS:                    Imaging:
Chief Complaint:  Patient is a 69y old  Male who presents with a chief complaint of fall (2020 14:36)      Interval Events:   no melena/hematochezia  Allergies:  No Known Allergies      Hospital Medications:  acetaminophen   Tablet .. 650 milliGRAM(s) Oral every 6 hours PRN  aspirin enteric coated 81 milliGRAM(s) Oral daily  atorvastatin 80 milliGRAM(s) Oral at bedtime  carvedilol 25 milliGRAM(s) Oral every 12 hours  clopidogrel Tablet 75 milliGRAM(s) Oral daily  dextrose 40% Gel 15 Gram(s) Oral once PRN  dextrose 5%. 1000 milliLiter(s) IV Continuous <Continuous>  dextrose 50% Injectable 12.5 Gram(s) IV Push once  dextrose 50% Injectable 25 Gram(s) IV Push once  dextrose 50% Injectable 25 Gram(s) IV Push once  ferrous    sulfate 325 milliGRAM(s) Oral daily  finasteride 5 milliGRAM(s) Oral daily  furosemide    Tablet 40 milliGRAM(s) Oral daily  glucagon  Injectable 1 milliGRAM(s) IntraMuscular once PRN  hydrALAZINE 25 milliGRAM(s) Oral three times a day  insulin glargine Injectable (LANTUS) 10 Unit(s) SubCutaneous at bedtime  insulin lispro (HumaLOG) corrective regimen sliding scale   SubCutaneous three times a day before meals  insulin lispro (HumaLOG) corrective regimen sliding scale   SubCutaneous at bedtime  tamsulosin 0.4 milliGRAM(s) Oral at bedtime      PMHX/PSHX:  CHF (congestive heart failure)  Hyperlipemia  DM (diabetes mellitus)  HTN (hypertension)  No pertinent past medical history  History of open heart surgery      Family history:  No pertinent family history in first degree relatives      ROS:     General:  No wt loss, fevers, chills, night sweats, fatigue,   Eyes:  Good vision, no reported pain  ENT:  No sore throat, pain, runny nose, dysphagia  CV:  No pain, palpitations, hypo/hypertension  Resp:  No dyspnea, cough, tachypnea, wheezing  GI:  See HPI  :  No pain, bleeding, incontinence, nocturia  Muscle:  No pain, weakness  Neuro:  No weakness, tingling, memory problems  Psych:  No fatigue, insomnia, mood problems, depression  Endocrine:  No polyuria, polydipsia, cold/heat intolerance  Heme:  No petechiae, ecchymosis, easy bruisability  Skin:  No rash, edema      PHYSICAL EXAM:     GENERAL:  Appears stated age, well-groomed, well-nourished, no distress  HEENT:  NC/AT,  conjunctivae clear, sclera-anicteric  NECK: Trachea midline, supple  CHEST:  Full & symmetric excursion, no increased effort, breath sounds clear  HEART:  Regular rhythm, no anton/heave  ABDOMEN:  Soft, non-tender, non-distended, normoactive bowel sounds,  no masses ,no hepato-splenomegaly,   EXTREMITIES:  no cyanosis,clubbing or edema  SKIN:  No rash/erythema/petechiae, no jaundice  NEURO:  Alert, no asterixis  RECTAL: Deferred    Vital Signs:  Vital Signs Last 24 Hrs  T(C): 36.8 (2020 12:02), Max: 36.8 (2020 12:02)  T(F): 98.2 (2020 12:02), Max: 98.2 (2020 12:02)  HR: 64 (2020 12:02) (64 - 75)  BP: 150/72 (2020 12:02) (147/77 - 165/89)  BP(mean): --  RR: 18 (2020 12:02) (18 - 18)  SpO2: 97% (2020 12:02) (95% - 97%)  Daily     Daily Weight in k.4 (2020 08:05)    LABS:                        9.4    5.08  )-----------( 168      ( 2020 08:59 )             29.6     07-20    136  |  104  |  86<H>  ----------------------------<  230<H>  4.7   |  19<L>  |  3.93<H>    Ca    8.9      2020 07:23    TPro  6.3  /  Alb  x   /  TBili  x   /  DBili  x   /  AST  x   /  ALT  x   /  AlkPhos  x   07-19    LIVER FUNCTIONS - ( 2020 14:08 )  Alb: x     / Pro: 6.3 g/dL / ALK PHOS: x     / ALT: x     / AST: x     / GGT: x                   Imaging:
Chief Complaint:  Patient is a 69y old  Male who presents with a chief complaint of fall (2020 14:36)      Interval Events:   no melena/hematochezia  Allergies:  No Known Allergies      Hospital Medications:  acetaminophen   Tablet .. 650 milliGRAM(s) Oral every 6 hours PRN  aspirin enteric coated 81 milliGRAM(s) Oral daily  atorvastatin 80 milliGRAM(s) Oral at bedtime  carvedilol 25 milliGRAM(s) Oral every 12 hours  clopidogrel Tablet 75 milliGRAM(s) Oral daily  dextrose 40% Gel 15 Gram(s) Oral once PRN  dextrose 5%. 1000 milliLiter(s) IV Continuous <Continuous>  dextrose 50% Injectable 12.5 Gram(s) IV Push once  dextrose 50% Injectable 25 Gram(s) IV Push once  dextrose 50% Injectable 25 Gram(s) IV Push once  ferrous    sulfate 325 milliGRAM(s) Oral daily  finasteride 5 milliGRAM(s) Oral daily  furosemide    Tablet 40 milliGRAM(s) Oral daily  glucagon  Injectable 1 milliGRAM(s) IntraMuscular once PRN  hydrALAZINE 25 milliGRAM(s) Oral three times a day  insulin glargine Injectable (LANTUS) 10 Unit(s) SubCutaneous at bedtime  insulin lispro (HumaLOG) corrective regimen sliding scale   SubCutaneous three times a day before meals  insulin lispro (HumaLOG) corrective regimen sliding scale   SubCutaneous at bedtime  tamsulosin 0.4 milliGRAM(s) Oral at bedtime      PMHX/PSHX:  CHF (congestive heart failure)  Hyperlipemia  DM (diabetes mellitus)  HTN (hypertension)  No pertinent past medical history  History of open heart surgery      Family history:  No pertinent family history in first degree relatives      ROS:     General:  No wt loss, fevers, chills, night sweats, fatigue,   Eyes:  Good vision, no reported pain  ENT:  No sore throat, pain, runny nose, dysphagia  CV:  No pain, palpitations, hypo/hypertension  Resp:  No dyspnea, cough, tachypnea, wheezing  GI:  See HPI  :  No pain, bleeding, incontinence, nocturia  Muscle:  No pain, weakness  Neuro:  No weakness, tingling, memory problems  Psych:  No fatigue, insomnia, mood problems, depression  Endocrine:  No polyuria, polydipsia, cold/heat intolerance  Heme:  No petechiae, ecchymosis, easy bruisability  Skin:  No rash, edema      PHYSICAL EXAM:     GENERAL:  Appears stated age, well-groomed, well-nourished, no distress  HEENT:  NC/AT,  conjunctivae clear, sclera-anicteric  NECK: Trachea midline, supple  CHEST:  Full & symmetric excursion, no increased effort, breath sounds clear  HEART:  Regular rhythm, no anton/heave  ABDOMEN:  Soft, non-tender, non-distended, normoactive bowel sounds,  no masses ,no hepato-splenomegaly,   EXTREMITIES:  no cyanosis,clubbing or edema  SKIN:  No rash/erythema/petechiae, no jaundice  NEURO:  Alert, no asterixis  RECTAL: Deferred    Vital Signs:  Vital Signs Last 24 Hrs  T(C): 36.8 (2020 12:02), Max: 36.8 (2020 12:02)  T(F): 98.2 (2020 12:02), Max: 98.2 (2020 12:02)  HR: 64 (2020 12:02) (64 - 75)  BP: 150/72 (2020 12:02) (147/77 - 165/89)  BP(mean): --  RR: 18 (2020 12:02) (18 - 18)  SpO2: 97% (2020 12:02) (95% - 97%)  Daily     Daily Weight in k.4 (2020 08:05)    LABS:                        9.4    5.08  )-----------( 168      ( 2020 08:59 )             29.6     07-20    136  |  104  |  86<H>  ----------------------------<  230<H>  4.7   |  19<L>  |  3.93<H>    Ca    8.9      2020 07:23    TPro  6.3  /  Alb  x   /  TBili  x   /  DBili  x   /  AST  x   /  ALT  x   /  AlkPhos  x   07-19    LIVER FUNCTIONS - ( 2020 14:08 )  Alb: x     / Pro: 6.3 g/dL / ALK PHOS: x     / ALT: x     / AST: x     / GGT: x                   Imaging:
Chief Complaint:  Patient is a 69y old  Male who presents with a chief complaint of fall (2020 16:31)      Interval Events:   no acute events  Allergies:  No Known Allergies      Hospital Medications:  acetaminophen   Tablet .. 650 milliGRAM(s) Oral every 6 hours PRN  aspirin enteric coated 81 milliGRAM(s) Oral daily  atorvastatin 40 milliGRAM(s) Oral at bedtime  carvedilol 12.5 milliGRAM(s) Oral every 12 hours  dextrose 40% Gel 15 Gram(s) Oral once PRN  dextrose 5%. 1000 milliLiter(s) IV Continuous <Continuous>  dextrose 50% Injectable 12.5 Gram(s) IV Push once  dextrose 50% Injectable 25 Gram(s) IV Push once  dextrose 50% Injectable 25 Gram(s) IV Push once  furosemide   Injectable 40 milliGRAM(s) IV Push daily  glucagon  Injectable 1 milliGRAM(s) IntraMuscular once PRN  insulin lispro (HumaLOG) corrective regimen sliding scale   SubCutaneous three times a day before meals  insulin lispro (HumaLOG) corrective regimen sliding scale   SubCutaneous at bedtime      PMHX/PSHX:  CHF (congestive heart failure)  Hyperlipemia  DM (diabetes mellitus)  HTN (hypertension)  No pertinent past medical history  History of open heart surgery      Family history:  No pertinent family history in first degree relatives      ROS:     General:  No wt loss, fevers, chills, night sweats, fatigue,   Eyes:  Good vision, no reported pain  ENT:  No sore throat, pain, runny nose, dysphagia  CV:  No pain, palpitations, hypo/hypertension  Resp:  No dyspnea, cough, tachypnea, wheezing  GI:  See HPI  :  No pain, bleeding, incontinence, nocturia  Muscle:  No pain, weakness  Neuro:  No weakness, tingling, memory problems  Psych:  No fatigue, insomnia, mood problems, depression  Endocrine:  No polyuria, polydipsia, cold/heat intolerance  Heme:  No petechiae, ecchymosis, easy bruisability  Skin:  No rash, edema      PHYSICAL EXAM:     GENERAL:  Appears stated age, well-groomed, well-nourished, no distress  HEENT:  NC/AT,  conjunctivae clear, sclera-anicteric  NECK: Trachea midline, supple  CHEST:  Full & symmetric excursion, no increased effort, breath sounds clear  HEART:  Regular rhythm, no anton/heave  ABDOMEN:  Soft, non-tender, non-distended, normoactive bowel sounds,  no masses ,no hepato-splenomegaly,   EXTREMITIES:  no cyanosis,clubbing or edema  SKIN:  No rash/erythema/petechiae, no jaundice  NEURO:  Alert,  no asterixis  RECTAL: Deferred    Vital Signs:  Vital Signs Last 24 Hrs  T(C): 36.6 (2020 05:17), Max: 36.6 (2020 05:17)  T(F): 97.9 (2020 05:17), Max: 97.9 (2020 05:17)  HR: 65 (2020 05:17) (65 - 72)  BP: 155/80 (2020 05:17) (155/80 - 158/79)  BP(mean): --  RR: 17 (2020 05:17) (17 - 17)  SpO2: 99% (2020 05:17) (96% - 99%)  Daily     Daily Weight in k.9 (2020 08:13)    LABS:                        9.1    5.00  )-----------( 174      ( 2020 07:18 )             28.9     07-    138  |  103  |  84<H>  ----------------------------<  61<L>  4.7   |  22  |  3.90<H>    Ca    8.6      2020 07:13    TPro  6.6  /  Alb  2.9<L>  /  TBili  0.3  /  DBili  x   /  AST  19  /  ALT  11  /  AlkPhos  271<H>  07-16    LIVER FUNCTIONS - ( 2020 07:42 )  Alb: 2.9 g/dL / Pro: 6.6 g/dL / ALK PHOS: 271 U/L / ALT: 11 U/L / AST: 19 U/L / GGT: x           PT/INR - ( 15 Jul 2020 12:57 )   PT: 13.2 sec;   INR: 1.12 ratio         PTT - ( 15 Jul 2020 12:57 )  PTT:39.7 sec  Urinalysis Basic - ( 2020 08:58 )    Color: Colorless / Appearance: Clear / S.012 / pH: x  Gluc: x / Ketone: Negative  / Bili: Negative / Urobili: <2 mg/dL   Blood: x / Protein: 30 mg/dL / Nitrite: Negative   Leuk Esterase: Negative / RBC: 1 /HPF / WBC 1 /HPF   Sq Epi: x / Non Sq Epi: 0 /HPF / Bacteria: Negative          Imaging:
Chief Complaint:  Patient is a 69y old  Male who presents with a chief complaint of fall (2020 17:16)      Interval Events:   reported dark stools    Allergies:  No Known Allergies      Hospital Medications:  acetaminophen   Tablet .. 650 milliGRAM(s) Oral every 6 hours PRN  aspirin enteric coated 81 milliGRAM(s) Oral daily  atorvastatin 80 milliGRAM(s) Oral at bedtime  carvedilol 25 milliGRAM(s) Oral every 12 hours  dextrose 40% Gel 15 Gram(s) Oral once PRN  dextrose 5%. 1000 milliLiter(s) IV Continuous <Continuous>  dextrose 50% Injectable 12.5 Gram(s) IV Push once  dextrose 50% Injectable 25 Gram(s) IV Push once  dextrose 50% Injectable 25 Gram(s) IV Push once  ferrous    sulfate 325 milliGRAM(s) Oral daily  finasteride 5 milliGRAM(s) Oral daily  furosemide    Tablet 40 milliGRAM(s) Oral daily  glucagon  Injectable 1 milliGRAM(s) IntraMuscular once PRN  hydrALAZINE 50 milliGRAM(s) Oral three times a day  insulin glargine Injectable (LANTUS) 10 Unit(s) SubCutaneous at bedtime  insulin lispro (HumaLOG) corrective regimen sliding scale   SubCutaneous three times a day before meals  insulin lispro (HumaLOG) corrective regimen sliding scale   SubCutaneous at bedtime  pantoprazole Infusion 8 mG/Hr IV Continuous <Continuous>  tamsulosin 0.4 milliGRAM(s) Oral at bedtime      PMHX/PSHX:  CHF (congestive heart failure)  Hyperlipemia  DM (diabetes mellitus)  HTN (hypertension)  No pertinent past medical history  History of open heart surgery      Family history:  No pertinent family history in first degree relatives      ROS:     General:  No wt loss, fevers, chills, night sweats, fatigue,   Eyes:  Good vision, no reported pain  ENT:  No sore throat, pain, runny nose, dysphagia  CV:  No pain, palpitations, hypo/hypertension  Resp:  No dyspnea, cough, tachypnea, wheezing  GI:  See HPI  :  No pain, bleeding, incontinence, nocturia  Muscle:  No pain, weakness  Neuro:  No weakness, tingling, memory problems  Psych:  No fatigue, insomnia, mood problems, depression  Endocrine:  No polyuria, polydipsia, cold/heat intolerance  Heme:  No petechiae, ecchymosis, easy bruisability  Skin:  No rash, edema      PHYSICAL EXAM:     GENERAL:  Appears stated age, well-groomed, well-nourished, no distress  HEENT:  NC/AT,  conjunctivae clear, sclera-anicteric  NECK: Trachea midline, supple  CHEST:  Full & symmetric excursion, no increased effort, breath sounds clear  HEART:  Regular rhythm, no anton/heave  ABDOMEN:  Soft, non-tender, non-distended, normoactive bowel sounds,  no masses ,no hepato-splenomegaly,   EXTREMITIES:  no cyanosis,clubbing or edema  SKIN:  No rash/erythema/petechiae, no jaundice  NEURO:  Alert, no asterixis  RECTAL: Deferred    Vital Signs:  Vital Signs Last 24 Hrs  T(C): 36.8 (2020 12:37), Max: 36.8 (2020 12:37)  T(F): 98.3 (2020 12:37), Max: 98.3 (2020 12:37)  HR: 64 (2020 17:00) (62 - 66)  BP: 128/66 (2020 17:00) (128/66 - 138/68)  BP(mean): --  RR: 18 (2020 12:37) (18 - 18)  SpO2: 97% (2020 12:37) (97% - 98%)  Daily     Daily Weight in k (2020 08:01)    LABS:                        7.5    6.56  )-----------( 152      ( 2020 15:23 )             23.6                     Imaging:
Chief Complaint:  Patient is a 69y old  Male who presents with a chief complaint of fall (2020 18:54)      Interval Events:   reported dark stool  Allergies:  No Known Allergies      Hospital Medications:  acetaminophen   Tablet .. 650 milliGRAM(s) Oral every 6 hours PRN  aspirin enteric coated 81 milliGRAM(s) Oral daily  atorvastatin 80 milliGRAM(s) Oral at bedtime  carvedilol 25 milliGRAM(s) Oral every 12 hours  dextrose 40% Gel 15 Gram(s) Oral once PRN  dextrose 5%. 1000 milliLiter(s) IV Continuous <Continuous>  dextrose 50% Injectable 12.5 Gram(s) IV Push once  dextrose 50% Injectable 25 Gram(s) IV Push once  dextrose 50% Injectable 25 Gram(s) IV Push once  ferrous    sulfate 325 milliGRAM(s) Oral daily  finasteride 5 milliGRAM(s) Oral daily  furosemide    Tablet 40 milliGRAM(s) Oral daily  glucagon  Injectable 1 milliGRAM(s) IntraMuscular once PRN  hydrALAZINE 50 milliGRAM(s) Oral three times a day  insulin glargine Injectable (LANTUS) 10 Unit(s) SubCutaneous at bedtime  insulin lispro (HumaLOG) corrective regimen sliding scale   SubCutaneous three times a day before meals  insulin lispro (HumaLOG) corrective regimen sliding scale   SubCutaneous at bedtime  pantoprazole  Injectable 40 milliGRAM(s) IV Push two times a day  tamsulosin 0.4 milliGRAM(s) Oral at bedtime      PMHX/PSHX:  CHF (congestive heart failure)  Hyperlipemia  DM (diabetes mellitus)  HTN (hypertension)  No pertinent past medical history  History of open heart surgery      Family history:  No pertinent family history in first degree relatives      ROS:     General:  No wt loss, fevers, chills, night sweats, fatigue,   Eyes:  Good vision, no reported pain  ENT:  No sore throat, pain, runny nose, dysphagia  CV:  No pain, palpitations, hypo/hypertension  Resp:  No dyspnea, cough, tachypnea, wheezing  GI:  See HPI  :  No pain, bleeding, incontinence, nocturia  Muscle:  No pain, weakness  Neuro:  No weakness, tingling, memory problems  Psych:  No fatigue, insomnia, mood problems, depression  Endocrine:  No polyuria, polydipsia, cold/heat intolerance  Heme:  No petechiae, ecchymosis, easy bruisability  Skin:  No rash, edema      PHYSICAL EXAM:     GENERAL:  Appears stated age, well-groomed, well-nourished, no distress  HEENT:  NC/AT,  conjunctivae clear, sclera-anicteric  NECK: Trachea midline, supple  CHEST:  Full & symmetric excursion, no increased effort, breath sounds clear  HEART:  Regular rhythm, no anton/heave  ABDOMEN:  Soft, non-tender, non-distended, normoactive bowel sounds,  no masses ,no hepato-splenomegaly,   EXTREMITIES:  no cyanosis,clubbing or edema  SKIN:  No rash/erythema/petechiae, no jaundice  NEURO:  Alert, oriented, no asterixis  RECTAL: Deferred    Vital Signs:  Vital Signs Last 24 Hrs  T(C): 36.7 (2020 18:01), Max: 36.9 (2020 14:39)  T(F): 98.1 (2020 18:01), Max: 98.4 (2020 14:39)  HR: 71 (2020 18:01) (62 - 79)  BP: 133/66 (2020 18:01) (110/58 - 156/68)  BP(mean): --  RR: 18 (2020 18:01) (18 - 19)  SpO2: 98% (2020 18:01) (98% - 99%)  Daily     Daily Weight in k.4 (2020 05:29)    LABS:                        7.3    6.18  )-----------( 165      ( 2020 17:03 )             23.1     07-26    138  |  107  |  98<H>  ----------------------------<  117<H>  4.6   |  19<L>  |  3.88<H>    Ca    9.1      2020 07:27                Imaging:
Chief Complaint:  Patient is a 69y old  Male who presents with a chief complaint of fall (2020 19:23)      Interval Events:   no reported melena    Allergies:  No Known Allergies      Hospital Medications:  acetaminophen   Tablet .. 650 milliGRAM(s) Oral every 6 hours PRN  aspirin enteric coated 81 milliGRAM(s) Oral daily  atorvastatin 80 milliGRAM(s) Oral at bedtime  bisacodyl 10 milliGRAM(s) Oral at bedtime  carvedilol 25 milliGRAM(s) Oral every 12 hours  dextrose 40% Gel 15 Gram(s) Oral once PRN  dextrose 5%. 1000 milliLiter(s) IV Continuous <Continuous>  dextrose 50% Injectable 12.5 Gram(s) IV Push once  dextrose 50% Injectable 25 Gram(s) IV Push once  dextrose 50% Injectable 25 Gram(s) IV Push once  ferrous    sulfate 325 milliGRAM(s) Oral daily  finasteride 5 milliGRAM(s) Oral daily  glucagon  Injectable 1 milliGRAM(s) IntraMuscular once PRN  hydrALAZINE 50 milliGRAM(s) Oral three times a day  insulin glargine Injectable (LANTUS) 10 Unit(s) SubCutaneous at bedtime  insulin lispro (HumaLOG) corrective regimen sliding scale   SubCutaneous three times a day before meals  insulin lispro (HumaLOG) corrective regimen sliding scale   SubCutaneous at bedtime  pantoprazole  Injectable 40 milliGRAM(s) IV Push two times a day  sodium bicarbonate 1300 milliGRAM(s) Oral three times a day  tamsulosin 0.4 milliGRAM(s) Oral at bedtime      PMHX/PSHX:  CHF (congestive heart failure)  Hyperlipemia  DM (diabetes mellitus)  HTN (hypertension)  No pertinent past medical history  History of open heart surgery      Family history:  No pertinent family history in first degree relatives      ROS:     General:  No wt loss, fevers, chills, night sweats, fatigue,   Eyes:  Good vision, no reported pain  ENT:  No sore throat, pain, runny nose, dysphagia  CV:  No pain, palpitations, hypo/hypertension  Resp:  No dyspnea, cough, tachypnea, wheezing  GI:  See HPI  :  No pain, bleeding, incontinence, nocturia  Muscle:  No pain, weakness  Neuro:  No weakness, tingling, memory problems  Psych:  No fatigue, insomnia, mood problems, depression  Endocrine:  No polyuria, polydipsia, cold/heat intolerance  Heme:  No petechiae, ecchymosis, easy bruisability  Skin:  No rash, edema      PHYSICAL EXAM:     GENERAL:  Appears stated age, well-groomed, well-nourished, no distress  HEENT:  NC/AT,  conjunctivae clear, sclera-anicteric  NECK: Trachea midline, supple  CHEST:  Full & symmetric excursion, no increased effort, breath sounds clear  HEART:  Regular rhythm, no anton/heave  ABDOMEN:  Soft, non-tender, non-distended, normoactive bowel sounds,  no masses ,no hepato-splenomegaly,   EXTREMITIES:  no cyanosis,clubbing or edema  SKIN:  No rash/erythema/petechiae, no jaundice  NEURO:  Alert, disoriented, no asterixis  RECTAL: Deferred    Vital Signs:  Vital Signs Last 24 Hrs  T(C): 36.8 (2020 21:17), Max: 37.1 (2020 04:15)  T(F): 98.3 (2020 21:17), Max: 98.7 (2020 04:15)  HR: 72 (2020 21:17) (64 - 72)  BP: 153/65 (2020 21:17) (126/64 - 153/65)  BP(mean): --  RR: 18 (2020 21:17) (18 - 18)  SpO2: 98% (2020 21:17) (96% - 100%)  Daily     Daily Weight in k.1 (2020 05:55)    LABS:                        7.9    6.28  )-----------( 129      ( 2020 12:10 )             24.4         143  |  106  |  100<H>  ----------------------------<  131<H>  5.1   |  18<L>  |  4.57<H>    Ca    8.7      2020 06:52                Imaging:
Chief Complaint:  Patient is a 69y old  Male who presents with a chief complaint of fall (2020 20:06)      Interval Events:   no melena/hematochezia  Allergies:  No Known Allergies      Hospital Medications:  acetaminophen   Tablet .. 650 milliGRAM(s) Oral every 6 hours PRN  aspirin enteric coated 81 milliGRAM(s) Oral daily  atorvastatin 80 milliGRAM(s) Oral at bedtime  carvedilol 25 milliGRAM(s) Oral every 12 hours  dextrose 40% Gel 15 Gram(s) Oral once PRN  dextrose 5%. 1000 milliLiter(s) IV Continuous <Continuous>  dextrose 50% Injectable 12.5 Gram(s) IV Push once  dextrose 50% Injectable 25 Gram(s) IV Push once  dextrose 50% Injectable 25 Gram(s) IV Push once  ferrous    sulfate 325 milliGRAM(s) Oral daily  finasteride 5 milliGRAM(s) Oral daily  furosemide    Tablet 40 milliGRAM(s) Oral daily  glucagon  Injectable 1 milliGRAM(s) IntraMuscular once PRN  hydrALAZINE 50 milliGRAM(s) Oral three times a day  insulin glargine Injectable (LANTUS) 10 Unit(s) SubCutaneous at bedtime  insulin lispro (HumaLOG) corrective regimen sliding scale   SubCutaneous three times a day before meals  insulin lispro (HumaLOG) corrective regimen sliding scale   SubCutaneous at bedtime  pantoprazole Infusion 8 mG/Hr IV Continuous <Continuous>  tamsulosin 0.4 milliGRAM(s) Oral at bedtime      PMHX/PSHX:  CHF (congestive heart failure)  Hyperlipemia  DM (diabetes mellitus)  HTN (hypertension)  No pertinent past medical history  History of open heart surgery      Family history:  No pertinent family history in first degree relatives      ROS:     General:  No wt loss, fevers, chills, night sweats, fatigue,   Eyes:  Good vision, no reported pain  ENT:  No sore throat, pain, runny nose, dysphagia  CV:  No pain, palpitations, hypo/hypertension  Resp:  No dyspnea, cough, tachypnea, wheezing  GI:  See HPI  :  No pain, bleeding, incontinence, nocturia  Muscle:  No pain, weakness  Neuro:  No weakness, tingling, memory problems  Psych:  No fatigue, insomnia, mood problems, depression  Endocrine:  No polyuria, polydipsia, cold/heat intolerance  Heme:  No petechiae, ecchymosis, easy bruisability  Skin:  No rash, edema      PHYSICAL EXAM:     GENERAL:  Appears stated age, well-groomed, well-nourished, no distress  HEENT:  NC/AT,  conjunctivae clear, sclera-anicteric  NECK: Trachea midline, supple  CHEST:  Full & symmetric excursion, no increased effort, breath sounds clear  HEART:  Regular rhythm, no anton/heave  ABDOMEN:  Soft, non-tender, non-distended, normoactive bowel sounds,  no masses ,no hepato-splenomegaly,   EXTREMITIES:  no cyanosis,clubbing or edema  SKIN:  No rash/erythema/petechiae, no jaundice  NEURO:  Alert, oriented, no asterixis  RECTAL: Dark brown stool    Vital Signs:  Vital Signs Last 24 Hrs  T(C): 36.7 (2020 13:23), Max: 36.9 (2020 21:12)  T(F): 98.1 (2020 13:23), Max: 98.5 (2020 21:12)  HR: 70 (2020 13:23) (64 - 70)  BP: 132/66 (2020 13:23) (128/66 - 149/70)  BP(mean): --  RR: 18 (2020 13:23) (18 - 18)  SpO2: 98% (2020 13:23) (97% - 98%)  Daily     Daily Weight in k.1 (2020 04:54)    LABS:                        7.6    5.98  )-----------( 161      ( 2020 06:30 )             23.9                     Imaging:
[facility] [place] [facility]   MD MISHA SÁNCHEZ MD RUORU WONG, PA    TEL:  OFFICE: [phone]  DR PARKER CELL: [phone]  BETH SMITH CELL: [phone]  DR. CHAKRABORTY CELL: [phone]  DR. REYNA CELL: [phone]    FROM 5 PM - 7 AM PLEASE CALL ANSWERING SERVICE: [phone]    RENAL FOLLOW UP NOTE  --------------------------------------------------------------------------------  HPI:      Pt seen and examined at bedside.       PAST HISTORY  --------------------------------------------------------------------------------  No significant changes to PMH, PSH, FHx, SHx, unless otherwise noted    ALLERGIES & MEDICATIONS  --------------------------------------------------------------------------------  Allergies    No Known Allergies    Intolerances      Standing Inpatient Medications  aspirin enteric coated 81 milliGRAM(s) Oral daily  atorvastatin 80 milliGRAM(s) Oral at bedtime  carvedilol 25 milliGRAM(s) Oral every 12 hours  clopidogrel Tablet 75 milliGRAM(s) Oral daily  dextrose 5%. 1000 milliLiter(s) IV Continuous <Continuous>  dextrose 50% Injectable 12.5 Gram(s) IV Push once  dextrose 50% Injectable 25 Gram(s) IV Push once  dextrose 50% Injectable 25 Gram(s) IV Push once  ferrous    sulfate 325 milliGRAM(s) Oral daily  finasteride 5 milliGRAM(s) Oral daily  furosemide    Tablet 40 milliGRAM(s) Oral daily  hydrALAZINE 50 milliGRAM(s) Oral three times a day  insulin glargine Injectable (LANTUS) 10 Unit(s) SubCutaneous at bedtime  insulin lispro (HumaLOG) corrective regimen sliding scale   SubCutaneous three times a day before meals  insulin lispro (HumaLOG) corrective regimen sliding scale   SubCutaneous at bedtime  tamsulosin 0.4 milliGRAM(s) Oral at bedtime    PRN Inpatient Medications  acetaminophen   Tablet .. 650 milliGRAM(s) Oral every 6 hours PRN  dextrose 40% Gel 15 Gram(s) Oral once PRN  glucagon  Injectable 1 milliGRAM(s) IntraMuscular once PRN      REVIEW OF SYSTEMS  --------------------------------------------------------------------------------  General: no fever  MSK: no edema     VITALS/PHYSICAL EXAM  --------------------------------------------------------------------------------  T(C): 36.3 (07-22-20 @ 04:15), Max: 36.8 (07-21-20 @ 11:55)  HR: 62 (07-22-20 @ 04:15) (62 - 68)  BP: 159/68 (07-22-20 @ 04:15) (111/57 - 159/68)  RR: 18 (07-22-20 @ 04:15) (17 - 18)  SpO2: 99% (07-22-20 @ 04:15) (96% - 99%)  Wt(kg): --        07-21-20 @ 07:01  -  07-22-20 @ 07:00  --------------------------------------------------------  IN: 990 mL / OUT: 2050 mL / NET: -1060 mL      Physical Exam:  	Gen: NAD  	HEENT: MMM  	Pulm: CTA B/L  	CV: S1S2  	Abd: Soft, +BS  	Ext: No LE edema B/L                      Neuro: Awake alert  	Skin: Warm and Dry   	Vascular access: no hd catheter           no  nava  LABS/STUDIES  --------------------------------------------------------------------------------                Creatinine Trend:  SCr 3.93 [07-20 @ 07:23]  SCr 3.78 [07-19 @ 08:58]  SCr 3.85 [07-18 @ 07:10]  SCr 3.90 [07-17 @ 07:13]  SCr 3.49 [07-16 @ 07:42]    Urinalysis - [07-16-20 @ 08:58]      Color Colorless / Appearance Clear / SG 1.012 / pH 6.5      Gluc Negative / Ketone Negative  / Bili Negative / Urobili <2 mg/dL       Blood Negative / Protein 30 mg/dL / Leuk Est Negative / Nitrite Negative      RBC 1 / WBC 1 / Hyaline 0 / Gran  / Sq Epi  / Non Sq Epi 0 / Bacteria Negative    Urine Creatinine 38      [07-16-20 @ 07:07]  Urine Protein 88      [07-16-20 @ 07:07]  Urine Sodium 98      [07-16-20 @ 07:07]  Urine Chloride 77      [07-16-20 @ 07:07]    Iron 40, TIBC 162, %sat 25      [07-16-20 @ 09:30]  Ferritin 334      [07-16-20 @ 09:29]  HbA1c 11.4      [03-04-18 @ 11:53]  TSH 3.57      [07-16-20 @ 09:29]  Lipid: chol 122, TG 47, HDL 39, LDL 73      [07-16-20 @ 09:30]    HBsAb Reactive      [07-17-20 @ 10:47]  HBsAg Nonreact      [07-17-20 @ 10:47]  HBcAb Reactive      [07-17-20 @ 10:47]  HCV 0.34, Nonreact      [07-16-20 @ 09:30]    GLYNN: titer Negative, pattern --      [07-17-20 @ 10:44]  C3 Complement 108      [07-17-20 @ 08:31]  C4 Complement 24      [07-17-20 @ 08:31]  ANCA: cANCA Negative, pANCA Negative, atypical ANCA Indeterminate      [07-17-20 @ 10:43]  Syphilis Screen (Treponema Pallidum Ab) Positive      [07-17-20 @ 10:44]  Syphilis Screen (RPR Titer) <1:1      [07-17-20 @ 10:44]  PLA2R: NICKO <2, IFA Negative      [07-17-20 @ 13:04]  Free Light Chains: kappa 12.10, lambda 11.77, ratio = 1.03      [07-17 @ 08:31]  Immunofixation Serum:   One IgG Kappa and One Weak IgG Lambda Bands Identified    Reference Range: None Detected      [07-18-20 @ 10:00]  SPEP Interpretation: Two Gamma-Migrating Paraproteins Identified      [07-18-20 @ 10:00]
Dr. Malcolm  Office [phone]  Cell [phone]  Eliana MILES  Cell [phone]      Patient is a 69y old  Male who presents with a chief complaint of fall (25 Jul 2020 22:15)      Patient seen and examined at bedside. No chest pain/sob    VITALS:  T(F): 98.1 (07-26-20 @ 05:29), Max: 98.3 (07-25-20 @ 22:05)  HR: 66 (07-26-20 @ 05:29)  BP: 156/68 (07-26-20 @ 05:29)  RR: 19 (07-26-20 @ 05:29)  SpO2: 99% (07-26-20 @ 05:29)  Wt(kg): --    07-25 @ 07:01  -  07-26 @ 07:00  --------------------------------------------------------  IN: 970 mL / OUT: 1100 mL / NET: -130 mL          PHYSICAL EXAM:  Constitutional: NAD  Neck: No JVD  Respiratory: CTAB, no wheezes, rales or rhonchi  Cardiovascular: S1, S2, RRR  Gastrointestinal: BS+, soft, NT/ND  Extremities: No peripheral edema    Hospital Medications:   MEDICATIONS  (STANDING):  aspirin enteric coated 81 milliGRAM(s) Oral daily  atorvastatin 80 milliGRAM(s) Oral at bedtime  carvedilol 25 milliGRAM(s) Oral every 12 hours  dextrose 5%. 1000 milliLiter(s) (50 mL/Hr) IV Continuous <Continuous>  dextrose 50% Injectable 12.5 Gram(s) IV Push once  dextrose 50% Injectable 25 Gram(s) IV Push once  dextrose 50% Injectable 25 Gram(s) IV Push once  ferrous    sulfate 325 milliGRAM(s) Oral daily  finasteride 5 milliGRAM(s) Oral daily  furosemide    Tablet 40 milliGRAM(s) Oral daily  hydrALAZINE 50 milliGRAM(s) Oral three times a day  insulin glargine Injectable (LANTUS) 10 Unit(s) SubCutaneous at bedtime  insulin lispro (HumaLOG) corrective regimen sliding scale   SubCutaneous three times a day before meals  insulin lispro (HumaLOG) corrective regimen sliding scale   SubCutaneous at bedtime  pantoprazole  Injectable 40 milliGRAM(s) IV Push two times a day  tamsulosin 0.4 milliGRAM(s) Oral at bedtime      LABS:  07-26    138  |  107  |  98<H>  ----------------------------<  117<H>  4.6   |  19<L>  |  3.88<H>    Ca    9.1      26 Jul 2020 07:27      Creatinine Trend: 3.88 <--, 3.93 <--                                7.8    5.47  )-----------( 161      ( 26 Jul 2020 07:27 )             24.6     Urine Studies:  Urinalysis - [07-16-20 @ 08:58]      Color Colorless / Appearance Clear / SG 1.012 / pH 6.5      Gluc Negative / Ketone Negative  / Bili Negative / Urobili <2 mg/dL       Blood Negative / Protein 30 mg/dL / Leuk Est Negative / Nitrite Negative      RBC 1 / WBC 1 / Hyaline 0 / Gran  / Sq Epi  / Non Sq Epi 0 / Bacteria Negative      Iron 40, TIBC 162, %sat 25      [07-16-20 @ 09:30]  Ferritin 334      [07-16-20 @ 09:29]  HbA1c 11.4      [03-04-18 @ 11:53]  TSH 3.57      [07-16-20 @ 09:29]  Lipid: chol 122, TG 47, HDL 39, LDL 73      [07-16-20 @ 09:30]    HBsAb Reactive      [07-17-20 @ 10:47]  HBsAg Nonreact      [07-17-20 @ 10:47]  HBcAb Reactive      [07-17-20 @ 10:47]  HCV 0.34, Nonreact      [07-16-20 @ 09:30]    GLYNN: titer Negative, pattern --      [07-17-20 @ 10:44]  C3 Complement 108      [07-17-20 @ 08:31]  C4 Complement 24      [07-17-20 @ 08:31]  ANCA: cANCA Negative, pANCA Negative, atypical ANCA Indeterminate      [07-17-20 @ 10:43]  Syphilis Screen (Treponema Pallidum Ab) Positive      [07-17-20 @ 10:44]  Syphilis Screen (RPR Titer) <1:1      [07-17-20 @ 10:44]  PLA2R: NICKO <2, IFA Negative      [07-17-20 @ 13:04]  Free Light Chains: kappa 12.10, lambda 11.77, ratio = 1.03      [07-17 @ 08:31]  Immunofixation Serum:     IgG Kappa Band Identified and a weak IgG Lambda Band Identified    Reference Range: None Detected      [07-19-20 @ 14:08]  SPEP Interpretation: Two weak Gamma-Migrating Paraproteins Identified      [07-19-20 @ 14:08]    RADIOLOGY & ADDITIONAL STUDIES:
Dr. Malcolm  Office [phone]  Cell [phone]  Eliana MILES  Cell [phone]      Patient is a 69y old  Male who presents with a chief complaint of fall (30 Jul 2020 22:28)      Patient seen and examined at bedside. No chest pain/sob    VITALS:  T(F): 97.9 (07-31-20 @ 12:46), Max: 98.7 (07-31-20 @ 04:15)  HR: 67 (07-31-20 @ 12:46)  BP: 134/63 (07-31-20 @ 12:46)  RR: 18 (07-31-20 @ 12:46)  SpO2: 96% (07-31-20 @ 12:46)  Wt(kg): --    07-30 @ 07:01  -  07-31 @ 07:00  --------------------------------------------------------  IN: 1630 mL / OUT: 975 mL / NET: 655 mL    07-31 @ 07:01  -  07-31 @ 15:03  --------------------------------------------------------  IN: 840 mL / OUT: 600 mL / NET: 240 mL          PHYSICAL EXAM:  Constitutional: NAD  Neck: No JVD  Respiratory: CTAB, no wheezes, rales or rhonchi  Cardiovascular: S1, S2, RRR  Gastrointestinal: BS+, soft, NT/ND  Extremities: No peripheral edema    Hospital Medications:   MEDICATIONS  (STANDING):  aspirin enteric coated 81 milliGRAM(s) Oral daily  atorvastatin 80 milliGRAM(s) Oral at bedtime  bisacodyl 10 milliGRAM(s) Oral at bedtime  carvedilol 25 milliGRAM(s) Oral every 12 hours  dextrose 5%. 1000 milliLiter(s) (50 mL/Hr) IV Continuous <Continuous>  dextrose 50% Injectable 12.5 Gram(s) IV Push once  dextrose 50% Injectable 25 Gram(s) IV Push once  dextrose 50% Injectable 25 Gram(s) IV Push once  ferrous    sulfate 325 milliGRAM(s) Oral daily  finasteride 5 milliGRAM(s) Oral daily  furosemide    Tablet 40 milliGRAM(s) Oral daily  hydrALAZINE 50 milliGRAM(s) Oral three times a day  insulin glargine Injectable (LANTUS) 10 Unit(s) SubCutaneous at bedtime  insulin lispro (HumaLOG) corrective regimen sliding scale   SubCutaneous three times a day before meals  insulin lispro (HumaLOG) corrective regimen sliding scale   SubCutaneous at bedtime  pantoprazole  Injectable 40 milliGRAM(s) IV Push two times a day  sodium bicarbonate 1300 milliGRAM(s) Oral three times a day  tamsulosin 0.4 milliGRAM(s) Oral at bedtime      LABS:  07-31    143  |  106  |  100<H>  ----------------------------<  131<H>  5.1   |  18<L>  |  4.57<H>    Ca    8.7      31 Jul 2020 06:52      Creatinine Trend: 4.57 <--, 4.00 <--, 3.78 <--, 3.44 <--, 3.74 <--, 3.88 <--                                7.9    6.28  )-----------( 129      ( 31 Jul 2020 12:10 )             24.4     Urine Studies:  Urinalysis - [07-16-20 @ 08:58]      Color Colorless / Appearance Clear / SG 1.012 / pH 6.5      Gluc Negative / Ketone Negative  / Bili Negative / Urobili <2 mg/dL       Blood Negative / Protein 30 mg/dL / Leuk Est Negative / Nitrite Negative      RBC 1 / WBC 1 / Hyaline 0 / Gran  / Sq Epi  / Non Sq Epi 0 / Bacteria Negative    Urine Protein 87      [07-26-20 @ 23:48]    Iron 40, TIBC 162, %sat 25      [07-16-20 @ 09:30]  Ferritin 334      [07-16-20 @ 09:29]  HbA1c 11.4      [03-04-18 @ 11:53]  TSH 3.57      [07-16-20 @ 09:29]  Lipid: chol 122, TG 47, HDL 39, LDL 73      [07-16-20 @ 09:30]    HBsAb Reactive      [07-17-20 @ 10:47]  HBsAg Nonreact      [07-17-20 @ 10:47]  HBcAb Reactive      [07-17-20 @ 10:47]  HCV 0.34, Nonreact      [07-16-20 @ 09:30]    GLYNN: titer Negative, pattern --      [07-17-20 @ 10:44]  C3 Complement 108      [07-17-20 @ 08:31]  C4 Complement 24      [07-17-20 @ 08:31]  ANCA: cANCA Negative, pANCA Negative, atypical ANCA Indeterminate      [07-17-20 @ 10:43]  Syphilis Screen (Treponema Pallidum Ab) Positive      [07-17-20 @ 10:44]  Syphilis Screen (RPR Titer) <1:1      [07-17-20 @ 10:44]  PLA2R: NICKO <2, IFA Negative      [07-17-20 @ 13:04]  Free Light Chains: kappa 12.10, lambda 11.77, ratio = 1.03      [07-17 @ 08:31]  Immunofixation Serum:     IgG Kappa Band Identified and a weak IgG Lambda Band Identified    Reference Range: None Detected      [07-19-20 @ 14:08]  SPEP Interpretation: Two weak Gamma-Migrating Paraproteins Identified      [07-19-20 @ 14:08]  Immunofixation Urine: Reference Range: None Detected      [07-26-20 @ 23:48]    RADIOLOGY & ADDITIONAL STUDIES:
Dr. Malcolm  Office [phone]  Cell [phone]  Eliana MILES  Cell [phone]      Patient is a 69y old  Male who presents with a chief complaint of fall (25 Jul 2020 15:47)      Patient seen and examined at bedside. No chest pain/sob    VITALS:  T(F): 98.1 (07-25-20 @ 13:23), Max: 98.5 (07-24-20 @ 21:12)  HR: 70 (07-25-20 @ 13:23)  BP: 132/66 (07-25-20 @ 13:23)  RR: 18 (07-25-20 @ 13:23)  SpO2: 98% (07-25-20 @ 13:23)  Wt(kg): --    07-24 @ 07:01  -  07-25 @ 07:00  --------------------------------------------------------  IN: 1630 mL / OUT: 1200 mL / NET: 430 mL    07-25 @ 07:01  -  07-25 @ 16:47  --------------------------------------------------------  IN: 480 mL / OUT: 800 mL / NET: -320 mL          PHYSICAL EXAM:  Constitutional: NAD  Neck: No JVD  Respiratory: CTAB, no wheezes, rales or rhonchi  Cardiovascular: S1, S2, RRR  Gastrointestinal: BS+, soft, NT/ND  Extremities: No peripheral edema    Hospital Medications:   MEDICATIONS  (STANDING):  aspirin enteric coated 81 milliGRAM(s) Oral daily  atorvastatin 80 milliGRAM(s) Oral at bedtime  carvedilol 25 milliGRAM(s) Oral every 12 hours  dextrose 5%. 1000 milliLiter(s) (50 mL/Hr) IV Continuous <Continuous>  dextrose 50% Injectable 12.5 Gram(s) IV Push once  dextrose 50% Injectable 25 Gram(s) IV Push once  dextrose 50% Injectable 25 Gram(s) IV Push once  ferrous    sulfate 325 milliGRAM(s) Oral daily  finasteride 5 milliGRAM(s) Oral daily  furosemide    Tablet 40 milliGRAM(s) Oral daily  hydrALAZINE 50 milliGRAM(s) Oral three times a day  insulin glargine Injectable (LANTUS) 10 Unit(s) SubCutaneous at bedtime  insulin lispro (HumaLOG) corrective regimen sliding scale   SubCutaneous three times a day before meals  insulin lispro (HumaLOG) corrective regimen sliding scale   SubCutaneous at bedtime  pantoprazole  Injectable 40 milliGRAM(s) IV Push two times a day  tamsulosin 0.4 milliGRAM(s) Oral at bedtime      LABS:        Creatinine Trend: 3.93 <--, 3.78 <--                                7.6    5.98  )-----------( 161      ( 25 Jul 2020 06:30 )             23.9     Urine Studies:  Urinalysis - [07-16-20 @ 08:58]      Color Colorless / Appearance Clear / SG 1.012 / pH 6.5      Gluc Negative / Ketone Negative  / Bili Negative / Urobili <2 mg/dL       Blood Negative / Protein 30 mg/dL / Leuk Est Negative / Nitrite Negative      RBC 1 / WBC 1 / Hyaline 0 / Gran  / Sq Epi  / Non Sq Epi 0 / Bacteria Negative      Iron 40, TIBC 162, %sat 25      [07-16-20 @ 09:30]  Ferritin 334      [07-16-20 @ 09:29]  HbA1c 11.4      [03-04-18 @ 11:53]  TSH 3.57      [07-16-20 @ 09:29]  Lipid: chol 122, TG 47, HDL 39, LDL 73      [07-16-20 @ 09:30]    HBsAb Reactive      [07-17-20 @ 10:47]  HBsAg Nonreact      [07-17-20 @ 10:47]  HBcAb Reactive      [07-17-20 @ 10:47]  HCV 0.34, Nonreact      [07-16-20 @ 09:30]    GLYNN: titer Negative, pattern --      [07-17-20 @ 10:44]  C3 Complement 108      [07-17-20 @ 08:31]  C4 Complement 24      [07-17-20 @ 08:31]  ANCA: cANCA Negative, pANCA Negative, atypical ANCA Indeterminate      [07-17-20 @ 10:43]  Syphilis Screen (Treponema Pallidum Ab) Positive      [07-17-20 @ 10:44]  Syphilis Screen (RPR Titer) <1:1      [07-17-20 @ 10:44]  PLA2R: NICKO <2, IFA Negative      [07-17-20 @ 13:04]  Free Light Chains: kappa 12.10, lambda 11.77, ratio = 1.03      [07-17 @ 08:31]  Immunofixation Serum:     IgG Kappa Band Identified and a weak IgG Lambda Band Identified    Reference Range: None Detected      [07-19-20 @ 14:08]  SPEP Interpretation: Two weak Gamma-Migrating Paraproteins Identified      [07-19-20 @ 14:08]    RADIOLOGY & ADDITIONAL STUDIES:
[facility] [place] [facility]   MD MISHA SÁNCHEZ MD RUORU WONG, PA    TEL:  OFFICE: [phone]  DR PARKER CELL: [phone]  BETH SMITH CELL: [phone]  DR. CHAKRABORTY CELL: [phone]  DR. REYNA CELL: [phone]    FROM 5 PM - 7 AM PLEASE CALL ANSWERING SERVICE: [phone]    RENAL FOLLOW UP NOTE  --------------------------------------------------------------------------------  HPI:      Pt seen and examined at bedside.       PAST HISTORY  --------------------------------------------------------------------------------  No significant changes to PMH, PSH, FHx, SHx, unless otherwise noted    ALLERGIES & MEDICATIONS  --------------------------------------------------------------------------------  Allergies    No Known Allergies    Intolerances      Standing Inpatient Medications  aspirin enteric coated 81 milliGRAM(s) Oral daily  atorvastatin 80 milliGRAM(s) Oral at bedtime  bisacodyl 10 milliGRAM(s) Oral at bedtime  carvedilol 25 milliGRAM(s) Oral every 12 hours  dextrose 5%. 1000 milliLiter(s) IV Continuous <Continuous>  dextrose 50% Injectable 12.5 Gram(s) IV Push once  dextrose 50% Injectable 25 Gram(s) IV Push once  dextrose 50% Injectable 25 Gram(s) IV Push once  ferrous    sulfate 325 milliGRAM(s) Oral daily  finasteride 5 milliGRAM(s) Oral daily  hydrALAZINE 50 milliGRAM(s) Oral three times a day  insulin glargine Injectable (LANTUS) 10 Unit(s) SubCutaneous at bedtime  insulin lispro (HumaLOG) corrective regimen sliding scale   SubCutaneous three times a day before meals  insulin lispro (HumaLOG) corrective regimen sliding scale   SubCutaneous at bedtime  pantoprazole  Injectable 40 milliGRAM(s) IV Push two times a day  sodium bicarbonate 1300 milliGRAM(s) Oral three times a day  tamsulosin 0.4 milliGRAM(s) Oral at bedtime    PRN Inpatient Medications  acetaminophen   Tablet .. 650 milliGRAM(s) Oral every 6 hours PRN  dextrose 40% Gel 15 Gram(s) Oral once PRN  glucagon  Injectable 1 milliGRAM(s) IntraMuscular once PRN      REVIEW OF SYSTEMS  --------------------------------------------------------------------------------  General: no fever  MSK: no edema     VITALS/PHYSICAL EXAM  --------------------------------------------------------------------------------  T(C): 36.4 (08-03-20 @ 04:50), Max: 36.7 (08-02-20 @ 21:06)  HR: 61 (08-03-20 @ 04:50) (60 - 72)  BP: 137/55 (08-03-20 @ 04:50) (115/61 - 160/73)  RR: 18 (08-03-20 @ 04:50) (18 - 18)  SpO2: 97% (08-03-20 @ 04:50) (95% - 97%)  Wt(kg): --        08-02-20 @ 07:01  -  08-03-20 @ 07:00  --------------------------------------------------------  IN: 960 mL / OUT: 1400 mL / NET: -440 mL      Physical Exam:  	Gen: NAD  	HEENT: MMM  	Pulm: CTA B/L  	CV: S1S2  	Abd: Soft, +BS  	Ext: No LE edema B/L                      Neuro: Awake   	Skin: Warm and Dry   	Vascular access: no hd catheter           no brandy  LABS/STUDIES  --------------------------------------------------------------------------------              7.7    5.50  >-----------<  113      [08-03-20 @ 06:45]              24.4     144  |  107  |  103  ----------------------------<  141      [08-03-20 @ 06:44]  4.7   |  22  |  3.89        Ca     8.7     [08-03-20 @ 06:44]            Creatinine Trend:  SCr 3.89 [08-03 @ 06:44]  SCr 4.27 [08-02 @ 07:07]  SCr 4.65 [08-01 @ 07:21]  SCr 4.57 [07-31 @ 06:52]  SCr 4.00 [07-30 @ 07:19]    Urinalysis - [08-01-20 @ 03:28]      Color Light Yellow / Appearance Clear / SG 1.014 / pH 5.5      Gluc Negative / Ketone Negative  / Bili Negative / Urobili Negative       Blood Negative / Protein 30 mg/dL / Leuk Est Moderate / Nitrite Negative      RBC 2 / WBC 6 / Hyaline 0 / Gran  / Sq Epi  / Non Sq Epi 0 / Bacteria Negative    Urine Creatinine 58      [08-01-20 @ 03:28]  Urine Protein 93      [08-02-20 @ 11:01]  Urine Urea Nitrogen 616      [08-01-20 @ 08:11]    Iron 40, TIBC 162, %sat 25      [07-16-20 @ 09:30]  Ferritin 334      [07-16-20 @ 09:29]  HbA1c 11.4      [03-04-18 @ 11:53]  TSH 3.57      [07-16-20 @ 09:29]  Lipid: chol 122, TG 47, HDL 39, LDL 73      [07-16-20 @ 09:30]    HBsAb Reactive      [07-17-20 @ 10:47]  HBsAg Nonreact      [07-17-20 @ 10:47]  HBcAb Reactive      [07-17-20 @ 10:47]  HCV 0.34, Nonreact      [07-16-20 @ 09:30]    GLYNN: titer Negative, pattern --      [07-17-20 @ 10:44]  C3 Complement 108      [07-17-20 @ 08:31]  C4 Complement 24      [07-17-20 @ 08:31]  ANCA: cANCA Negative, pANCA Negative, atypical ANCA Indeterminate      [07-17-20 @ 10:43]  Syphilis Screen (Treponema Pallidum Ab) Positive      [07-17-20 @ 10:44]  Syphilis Screen (RPR Titer) <1:1      [07-17-20 @ 10:44]  PLA2R: NICKO <2, IFA Negative      [07-17-20 @ 13:04]  Free Light Chains: kappa 12.10, lambda 11.77, ratio = 1.03      [07-17 @ 08:31]  Immunofixation Serum:     IgG Kappa Band Identified and a weak IgG Lambda Band Identified    Reference Range: None Detected      [07-19-20 @ 14:08]  SPEP Interpretation: Two weak Gamma-Migrating Paraproteins Identified      [07-19-20 @ 14:08]  Immunofixation Urine: Reference Range: None Detected      [07-26-20 @ 23:48]
[facility] [place] [facility]   MD MISHA SÁNCHEZ MD RUORU WONG, PA    TEL:  OFFICE: [phone]  DR PARKER CELL: [phone]  BETH SMITH CELL: [phone]  DR. CHAKRABORTY CELL: [phone]  DR. REYNA CELL: [phone]    FROM 5 PM - 7 AM PLEASE CALL ANSWERING SERVICE: [phone]    RENAL FOLLOW UP NOTE  --------------------------------------------------------------------------------  HPI:      Pt seen and examined at bedside.   Denies SOB, chest pain     PAST HISTORY  --------------------------------------------------------------------------------  No significant changes to PMH, PSH, FHx, SHx, unless otherwise noted    ALLERGIES & MEDICATIONS  --------------------------------------------------------------------------------  Allergies    No Known Allergies    Intolerances      Standing Inpatient Medications  aspirin enteric coated 81 milliGRAM(s) Oral daily  atorvastatin 80 milliGRAM(s) Oral at bedtime  bisacodyl 10 milliGRAM(s) Oral at bedtime  carvedilol 25 milliGRAM(s) Oral every 12 hours  dextrose 5%. 1000 milliLiter(s) IV Continuous <Continuous>  dextrose 50% Injectable 12.5 Gram(s) IV Push once  dextrose 50% Injectable 25 Gram(s) IV Push once  dextrose 50% Injectable 25 Gram(s) IV Push once  ferrous    sulfate 325 milliGRAM(s) Oral daily  finasteride 5 milliGRAM(s) Oral daily  furosemide    Tablet 40 milliGRAM(s) Oral daily  furosemide    Tablet 40 milliGRAM(s) Oral once  hydrALAZINE 50 milliGRAM(s) Oral three times a day  insulin glargine Injectable (LANTUS) 10 Unit(s) SubCutaneous at bedtime  insulin lispro (HumaLOG) corrective regimen sliding scale   SubCutaneous three times a day before meals  insulin lispro (HumaLOG) corrective regimen sliding scale   SubCutaneous at bedtime  pantoprazole  Injectable 40 milliGRAM(s) IV Push two times a day  sodium bicarbonate 1300 milliGRAM(s) Oral three times a day  tamsulosin 0.4 milliGRAM(s) Oral at bedtime    PRN Inpatient Medications  acetaminophen   Tablet .. 650 milliGRAM(s) Oral every 6 hours PRN  dextrose 40% Gel 15 Gram(s) Oral once PRN  glucagon  Injectable 1 milliGRAM(s) IntraMuscular once PRN      REVIEW OF SYSTEMS  --------------------------------------------------------------------------------  General: no fever  CVS: no chest pain  RESP: no sob, no cough  ABD: no abdominal pain  : no dysuria,  MSK: no edema     VITALS/PHYSICAL EXAM  --------------------------------------------------------------------------------  T(C): 36.4 (07-29-20 @ 04:40), Max: 36.4 (07-29-20 @ 04:40)  HR: 68 (07-29-20 @ 04:40) (67 - 68)  BP: 126/68 (07-29-20 @ 04:40) (100/56 - 126/68)  RR: 18 (07-29-20 @ 04:40) (18 - 18)  SpO2: 99% (07-29-20 @ 04:40) (99% - 100%)  Wt(kg): --        07-28-20 @ 07:01  -  07-29-20 @ 07:00  --------------------------------------------------------  IN: 240 mL / OUT: 600 mL / NET: -360 mL    07-29-20 @ 07:01  -  07-29-20 @ 12:58  --------------------------------------------------------  IN: 0 mL / OUT: 350 mL / NET: -350 mL      Physical Exam:  	Gen: NAD  	HEENT: MMM  	Pulm: CTA B/L  	CV: S1S2  	Abd: Soft, +BS  	Ext: No LE edema B/L                      Neuro: Awake   	Skin: Warm and Dry   	Vascular access: no hd catheter          IMANI no  brandy  LABS/STUDIES  --------------------------------------------------------------------------------              7.9    5.80  >-----------<  177      [07-29-20 @ 06:19]              24.2     143  |  107  |  90  ----------------------------<  91      [07-29-20 @ 06:19]  4.6   |  18  |  3.78        Ca     8.8     [07-29-20 @ 06:19]            Creatinine Trend:  SCr 3.78 [07-29 @ 06:19]  SCr 3.44 [07-28 @ 06:30]  SCr 3.74 [07-27 @ 07:40]  SCr 3.88 [07-26 @ 07:27]  SCr 3.93 [07-20 @ 07:23]    Urinalysis - [07-16-20 @ 08:58]      Color Colorless / Appearance Clear / SG 1.012 / pH 6.5      Gluc Negative / Ketone Negative  / Bili Negative / Urobili <2 mg/dL       Blood Negative / Protein 30 mg/dL / Leuk Est Negative / Nitrite Negative      RBC 1 / WBC 1 / Hyaline 0 / Gran  / Sq Epi  / Non Sq Epi 0 / Bacteria Negative    Urine Protein 87      [07-26-20 @ 23:48]    Iron 40, TIBC 162, %sat 25      [07-16-20 @ 09:30]  Ferritin 334      [07-16-20 @ 09:29]  HbA1c 11.4      [03-04-18 @ 11:53]  TSH 3.57      [07-16-20 @ 09:29]  Lipid: chol 122, TG 47, HDL 39, LDL 73      [07-16-20 @ 09:30]    HBsAb Reactive      [07-17-20 @ 10:47]  HBsAg Nonreact      [07-17-20 @ 10:47]  HBcAb Reactive      [07-17-20 @ 10:47]  HCV 0.34, Nonreact      [07-16-20 @ 09:30]    GLYNN: titer Negative, pattern --      [07-17-20 @ 10:44]  C3 Complement 108      [07-17-20 @ 08:31]  C4 Complement 24      [07-17-20 @ 08:31]  ANCA: cANCA Negative, pANCA Negative, atypical ANCA Indeterminate      [07-17-20 @ 10:43]  Syphilis Screen (Treponema Pallidum Ab) Positive      [07-17-20 @ 10:44]  Syphilis Screen (RPR Titer) <1:1      [07-17-20 @ 10:44]  PLA2R: NICKO <2, IFA Negative      [07-17-20 @ 13:04]  Free Light Chains: kappa 12.10, lambda 11.77, ratio = 1.03      [07-17 @ 08:31]  Immunofixation Serum:     IgG Kappa Band Identified and a weak IgG Lambda Band Identified    Reference Range: None Detected      [07-19-20 @ 14:08]  SPEP Interpretation: Two weak Gamma-Migrating Paraproteins Identified      [07-19-20 @ 14:08]
[facility] [place] [facility]   MD MISHA SÁNCHEZ MD RUORU WONG, PA    TEL:  OFFICE: [phone]  DR PARKER CELL: [phone]  BETH SMITH CELL: [phone]  DR. CHAKRABORTY CELL: [phone]  DR. REYNA CELL: [phone]    FROM 5 PM - 7 AM PLEASE CALL ANSWERING SERVICE: [phone]    RENAL FOLLOW UP NOTE  --------------------------------------------------------------------------------  HPI:      Pt seen and examined at bedside.   Denies SOB, chest pain     PAST HISTORY  --------------------------------------------------------------------------------  No significant changes to PMH, PSH, FHx, SHx, unless otherwise noted    ALLERGIES & MEDICATIONS  --------------------------------------------------------------------------------  Allergies    No Known Allergies    Intolerances      Standing Inpatient Medications  aspirin enteric coated 81 milliGRAM(s) Oral daily  atorvastatin 80 milliGRAM(s) Oral at bedtime  carvedilol 25 milliGRAM(s) Oral every 12 hours  clopidogrel Tablet 75 milliGRAM(s) Oral daily  dextrose 5%. 1000 milliLiter(s) IV Continuous <Continuous>  dextrose 50% Injectable 12.5 Gram(s) IV Push once  dextrose 50% Injectable 25 Gram(s) IV Push once  dextrose 50% Injectable 25 Gram(s) IV Push once  ferrous    sulfate 325 milliGRAM(s) Oral daily  finasteride 5 milliGRAM(s) Oral daily  furosemide    Tablet 40 milliGRAM(s) Oral daily  hydrALAZINE 50 milliGRAM(s) Oral three times a day  insulin glargine Injectable (LANTUS) 10 Unit(s) SubCutaneous at bedtime  insulin lispro (HumaLOG) corrective regimen sliding scale   SubCutaneous three times a day before meals  insulin lispro (HumaLOG) corrective regimen sliding scale   SubCutaneous at bedtime  tamsulosin 0.4 milliGRAM(s) Oral at bedtime    PRN Inpatient Medications  acetaminophen   Tablet .. 650 milliGRAM(s) Oral every 6 hours PRN  dextrose 40% Gel 15 Gram(s) Oral once PRN  glucagon  Injectable 1 milliGRAM(s) IntraMuscular once PRN      REVIEW OF SYSTEMS  --------------------------------------------------------------------------------  General: no fever  CVS: no chest pain  RESP: no sob, no cough  ABD: no abdominal pain  : no dysuria,  MSK: no edema     VITALS/PHYSICAL EXAM  --------------------------------------------------------------------------------  T(C): 36.4 (07-23-20 @ 04:28), Max: 36.4 (07-22-20 @ 12:08)  HR: 66 (07-23-20 @ 07:41) (56 - 66)  BP: 138/69 (07-23-20 @ 07:41) (132/56 - 140/75)  RR: 18 (07-23-20 @ 07:41) (18 - 18)  SpO2: 97% (07-23-20 @ 07:41) (97% - 100%)  Wt(kg): --        07-22-20 @ 07:01  -  07-23-20 @ 07:00  --------------------------------------------------------  IN: 1200 mL / OUT: 1150 mL / NET: 50 mL    07-23-20 @ 07:01  -  07-23-20 @ 09:49  --------------------------------------------------------  IN: 0 mL / OUT: 800 mL / NET: -800 mL      Physical Exam:  	Gen: NAD  	HEENT: MMM  	Pulm: CTA B/L  	CV: S1S2  	Abd: Soft, +BS  	Ext: No LE edema B/L                      Neuro: Awake alert  	Skin: Warm and Dry   	Vascular access: no hd catheter            no  nava  LABS/STUDIES  --------------------------------------------------------------------------------                Creatinine Trend:  SCr 3.93 [07-20 @ 07:23]  SCr 3.78 [07-19 @ 08:58]  SCr 3.85 [07-18 @ 07:10]  SCr 3.90 [07-17 @ 07:13]  SCr 3.49 [07-16 @ 07:42]    Urinalysis - [07-16-20 @ 08:58]      Color Colorless / Appearance Clear / SG 1.012 / pH 6.5      Gluc Negative / Ketone Negative  / Bili Negative / Urobili <2 mg/dL       Blood Negative / Protein 30 mg/dL / Leuk Est Negative / Nitrite Negative      RBC 1 / WBC 1 / Hyaline 0 / Gran  / Sq Epi  / Non Sq Epi 0 / Bacteria Negative      Iron 40, TIBC 162, %sat 25      [07-16-20 @ 09:30]  Ferritin 334      [07-16-20 @ 09:29]  HbA1c 11.4      [03-04-18 @ 11:53]  TSH 3.57      [07-16-20 @ 09:29]  Lipid: chol 122, TG 47, HDL 39, LDL 73      [07-16-20 @ 09:30]    HBsAb Reactive      [07-17-20 @ 10:47]  HBsAg Nonreact      [07-17-20 @ 10:47]  HBcAb Reactive      [07-17-20 @ 10:47]  HCV 0.34, Nonreact      [07-16-20 @ 09:30]    GLYNN: titer Negative, pattern --      [07-17-20 @ 10:44]  C3 Complement 108      [07-17-20 @ 08:31]  C4 Complement 24      [07-17-20 @ 08:31]  ANCA: cANCA Negative, pANCA Negative, atypical ANCA Indeterminate      [07-17-20 @ 10:43]  Syphilis Screen (Treponema Pallidum Ab) Positive      [07-17-20 @ 10:44]  Syphilis Screen (RPR Titer) <1:1      [07-17-20 @ 10:44]  PLA2R: NICKO <2, IFA Negative      [07-17-20 @ 13:04]  Free Light Chains: kappa 12.10, lambda 11.77, ratio = 1.03      [07-17 @ 08:31]  Immunofixation Serum:     IgG Kappa Band Identified and a weak IgG Lambda Band Identified    Reference Range: None Detected      [07-19-20 @ 14:08]  SPEP Interpretation: Two weak Gamma-Migrating Paraproteins Identified      [07-19-20 @ 14:08]
[facility]   MD MISHA SÁNCHEZ MD RUORU WONG, PA    TEL:  OFFICE: [phone]  DR PARKER CELL: [phone]  BETH SMITH CELL: [phone]  DR. CHAKRABORTY CELL: [phone]  DR. REYNA CELL: [phone]    FROM 5 PM - 7 AM PLEASE CALL ANSWERING SERVICE: [phone]    RENAL FOLLOW UP NOTE  --------------------------------------------------------------------------------  HPI:      Pt seen and examined at bedside.   Denies SOB, chest pain     PAST HISTORY  --------------------------------------------------------------------------------  No significant changes to PMH, PSH, FHx, SHx, unless otherwise noted    ALLERGIES & MEDICATIONS  --------------------------------------------------------------------------------  Allergies    No Known Allergies    Intolerances      Standing Inpatient Medications  aspirin enteric coated 81 milliGRAM(s) Oral daily  atorvastatin 80 milliGRAM(s) Oral at bedtime  carvedilol 25 milliGRAM(s) Oral every 12 hours  clopidogrel Tablet 75 milliGRAM(s) Oral daily  dextrose 5%. 1000 milliLiter(s) IV Continuous <Continuous>  dextrose 50% Injectable 12.5 Gram(s) IV Push once  dextrose 50% Injectable 25 Gram(s) IV Push once  dextrose 50% Injectable 25 Gram(s) IV Push once  ferrous    sulfate 325 milliGRAM(s) Oral daily  finasteride 5 milliGRAM(s) Oral daily  furosemide    Tablet 40 milliGRAM(s) Oral daily  hydrALAZINE 25 milliGRAM(s) Oral three times a day  insulin glargine Injectable (LANTUS) 10 Unit(s) SubCutaneous at bedtime  insulin lispro (HumaLOG) corrective regimen sliding scale   SubCutaneous three times a day before meals  insulin lispro (HumaLOG) corrective regimen sliding scale   SubCutaneous at bedtime  tamsulosin 0.4 milliGRAM(s) Oral at bedtime    PRN Inpatient Medications  acetaminophen   Tablet .. 650 milliGRAM(s) Oral every 6 hours PRN  dextrose 40% Gel 15 Gram(s) Oral once PRN  glucagon  Injectable 1 milliGRAM(s) IntraMuscular once PRN      REVIEW OF SYSTEMS  --------------------------------------------------------------------------------  General: no fever  CVS: no chest pain  RESP: no sob, no cough  ABD: no abdominal pain  : no dysuria,  MSK: no edema     VITALS/PHYSICAL EXAM  --------------------------------------------------------------------------------  T(C): 36.5 (07-18-20 @ 21:12), Max: 36.6 (07-18-20 @ 17:40)  HR: 69 (07-18-20 @ 21:12) (69 - 72)  BP: 164/79 (07-18-20 @ 21:12) (137/77 - 164/79)  RR: 18 (07-18-20 @ 21:12) (18 - 18)  SpO2: 98% (07-18-20 @ 21:12) (95% - 98%)  Wt(kg): --        07-18-20 @ 07:01  -  07-19-20 @ 07:00  --------------------------------------------------------  IN: 200 mL / OUT: 601 mL / NET: -401 mL      Physical Exam:  	Gen: NAD  	HEENT: MMM  	Pulm: CTA B/L  	CV: S1S2  	Abd: Soft, +BS  	Ext: No LE edema B/L                      Neuro: Awake   	Skin: Warm and Dry   	Vascular access: no hd catheter           no brandy  LABS/STUDIES  --------------------------------------------------------------------------------              9.4    5.08  >-----------<  168      [07-19-20 @ 08:59]              29.6     138  |  104  |  81  ----------------------------<  77      [07-19-20 @ 08:58]  4.5   |  19  |  3.78        Ca     9.0     [07-19-20 @ 08:58]    TPro  6.0  /  Alb  x   /  TBili  x   /  DBili  x   /  AST  x   /  ALT  x   /  AlkPhos  x   [07-18-20 @ 10:00]          Creatinine Trend:  SCr 3.78 [07-19 @ 08:58]  SCr 3.85 [07-18 @ 07:10]  SCr 3.90 [07-17 @ 07:13]  SCr 3.49 [07-16 @ 07:42]  SCr 3.40 [07-15 @ 21:38]    Urinalysis - [07-16-20 @ 08:58]      Color Colorless / Appearance Clear / SG 1.012 / pH 6.5      Gluc Negative / Ketone Negative  / Bili Negative / Urobili <2 mg/dL       Blood Negative / Protein 30 mg/dL / Leuk Est Negative / Nitrite Negative      RBC 1 / WBC 1 / Hyaline 0 / Gran  / Sq Epi  / Non Sq Epi 0 / Bacteria Negative    Urine Creatinine 38      [07-16-20 @ 07:07]  Urine Protein 88      [07-16-20 @ 07:07]  Urine Sodium 98      [07-16-20 @ 07:07]  Urine Chloride 77      [07-16-20 @ 07:07]    Iron 40, TIBC 162, %sat 25      [07-16-20 @ 09:30]  Ferritin 334      [07-16-20 @ 09:29]  HbA1c 11.4      [03-04-18 @ 11:53]  TSH 3.57      [07-16-20 @ 09:29]  Lipid: chol 122, TG 47, HDL 39, LDL 73      [07-16-20 @ 09:30]    HBsAb Reactive      [07-17-20 @ 10:47]  HBsAg Nonreact      [07-17-20 @ 10:47]  HBcAb Reactive      [07-17-20 @ 10:47]  HCV 0.34, Nonreact      [07-16-20 @ 09:30]    GLYNN: titer Negative, pattern --      [07-17-20 @ 10:44]  C3 Complement 108      [07-17-20 @ 08:31]  C4 Complement 24      [07-17-20 @ 08:31]  ANCA: cANCA Negative, pANCA Negative, atypical ANCA Indeterminate      [07-17-20 @ 10:43]  Free Light Chains: kappa 12.10, lambda 11.77, ratio = 1.03      [07-17 @ 08:31]  Immunofixation Serum:   One IgG Kappa and One IgG Lambda Bands Identified    Reference Range: None Detected      [07-17-20 @ 08:31]
Patient is a 69y old  Male who presents with a chief complaint of fall (01 Aug 2020 18:52)      INTERVAL HISTORY: feels ok    	  MEDICATIONS:  carvedilol 25 milliGRAM(s) Oral every 12 hours  hydrALAZINE 50 milliGRAM(s) Oral three times a day  tamsulosin 0.4 milliGRAM(s) Oral at bedtime        PHYSICAL EXAM:  T(C): 36.4 (08-01-20 @ 21:06), Max: 36.7 (08-01-20 @ 12:06)  HR: 60 (08-01-20 @ 21:06) (60 - 69)  BP: 144/73 (08-01-20 @ 21:06) (135/64 - 159/80)  RR: 17 (08-01-20 @ 21:06) (17 - 18)  SpO2: 98% (08-01-20 @ 21:06) (95% - 99%)  Wt(kg): --  I&O's Summary    31 Jul 2020 07:01  -  01 Aug 2020 07:00  --------------------------------------------------------  IN: 1560 mL / OUT: 2450 mL / NET: -890 mL    01 Aug 2020 07:01  -  02 Aug 2020 00:10  --------------------------------------------------------  IN: 480 mL / OUT: 350 mL / NET: 130 mL          Appearance: In no distress	  HEENT:    PERRL, EOMI	  Cardiovascular:  S1 S2, No JVD  Respiratory: Lungs clear to auscultation	  Gastrointestinal:  Soft, Non-tender, + BS	  Vascularature:  No edema of LE  Psychiatric: Appropriate affect   Neuro: no acute focal deficits                               8.2    6.80  )-----------( 138      ( 01 Aug 2020 07:23 )             25.3     08-01    143  |  106  |  103<H>  ----------------------------<  131<H>  5.1   |  19<L>  |  4.65<H>    Ca    9.0      01 Aug 2020 07:21          Labs personally reviewed      Assessment and Plan:   Assessment:  · Assessment		  Patient is a 69 Male pmhx DM, HTN, HLD, CAD s/p CABG, cath ([facility] 3/19/2018), MICU admission for influenza complicated by respiratory failure, JAKOB, NSTEMI, HF, A&Ox1 Singaporean speaking only, difficult to obtain information from. Collateral history obtained from wife, states patient had a fall on 7/1/2020 and has been complaining of L hip pain and since fall has been crawling to get around. Reports that he has been more confused and weak as well. Denies any chest pain, shortness of breath, fevers or chills.    Problem/Plan - 1:  ·  Problem: CAD s/p CABG Plan: med records from [facility] obtained and reviewed. h/o CABG with cath in 12/2018 patent graft to LIMA to LAD and SVG to RPDA, stent to 80% ramus  - ASA 81mg, statin, Coreg,   - d/c Plavix     Problem/Plan - 2: Systolic congestive heart failure.  Plan: Hx of CABG   ASA, statin, coreg, hold ACE given JAKOB, increase hydral dose   cont lasix 40.   TTE with EF 40-45% consistent with pt h/o of CAD MI    Problem/Plan - 3:  ·  Problem: Elevated troponin.  Plan: Likely 2/2 decompensated HF  Hx of CABG and stent             Ebenezer Jones DO [facility]  Cardiovascular Medicine  [address], [address]  Office: [phone]  Cell: [phone]
Patient is a 69y old  Male who presents with a chief complaint of fall (02 Aug 2020 19:58)      INTERVAL HISTORY: feels ok    	  MEDICATIONS:  carvedilol 25 milliGRAM(s) Oral every 12 hours  hydrALAZINE 50 milliGRAM(s) Oral three times a day  tamsulosin 0.4 milliGRAM(s) Oral at bedtime        PHYSICAL EXAM:  T(C): 36.7 (08-02-20 @ 21:06), Max: 37.2 (08-02-20 @ 04:01)  HR: 72 (08-02-20 @ 21:06) (60 - 72)  BP: 115/61 (08-02-20 @ 21:06) (109/51 - 160/73)  RR: 18 (08-02-20 @ 21:06) (16 - 18)  SpO2: 95% (08-02-20 @ 21:06) (95% - 98%)  Wt(kg): --  I&O's Summary    01 Aug 2020 07:01  -  02 Aug 2020 07:00  --------------------------------------------------------  IN: 600 mL / OUT: 1600 mL / NET: -1000 mL    02 Aug 2020 07:01  -  02 Aug 2020 21:26  --------------------------------------------------------  IN: 960 mL / OUT: 750 mL / NET: 210 mL          Appearance: In no distress	  HEENT:    PERRL, EOMI	  Cardiovascular:  S1 S2, No JVD  Respiratory: Lungs clear to auscultation	  Gastrointestinal:  Soft, Non-tender, + BS	  Vascularature:  No edema of LE  Psychiatric: Appropriate affect   Neuro: no acute focal deficits                               8.0    6.32  )-----------( 127      ( 02 Aug 2020 07:07 )             24.9     08-02    142  |  106  |  99<H>  ----------------------------<  104<H>  5.1   |  19<L>  |  4.27<H>    Ca    8.7      02 Aug 2020 07:07          Labs personally reviewed      Assessment and Plan:   Assessment:  · Assessment		  Patient is a 69 Male pmhx DM, HTN, HLD, CAD s/p CABG, cath ([facility] 3/19/2018), MICU admission for influenza complicated by respiratory failure, JAKOB, NSTEMI, HF, A&Ox1 Croatian speaking only, difficult to obtain information from. Collateral history obtained from wife, states patient had a fall on 7/1/2020 and has been complaining of L hip pain and since fall has been crawling to get around. Reports that he has been more confused and weak as well. Denies any chest pain, shortness of breath, fevers or chills.    Problem/Plan - 1:  ·  Problem: CAD s/p CABG Plan: med records from [facility] obtained and reviewed. h/o CABG with cath in 12/2018 patent graft to LIMA to LAD and SVG to RPDA, stent to 80% ramus  - ASA 81mg, statin, Coreg,   - d/c Plavix     Problem/Plan - 2: Systolic congestive heart failure.  Plan: Hx of CABG   ASA, statin, coreg, hold ACE given JAKOB, increase hydral dose   cont lasix 40.   TTE with EF 40-45% consistent with pt h/o of CAD MI    Problem/Plan - 3:  ·  Problem: Elevated troponin.  Plan: Likely 2/2 decompensated HF  Hx of CABG and stent           Ebenezer Jones DO [facility]  Cardiovascular Medicine  [address]  Office: [phone]  Cell: [phone]
Patient is a 69y old  Male who presents with a chief complaint of fall (03 Aug 2020 12:45)      INTERVAL HISTORY: feels ok     	  MEDICATIONS:  carvedilol 25 milliGRAM(s) Oral every 12 hours  hydrALAZINE 50 milliGRAM(s) Oral three times a day  tamsulosin 0.4 milliGRAM(s) Oral at bedtime        PHYSICAL EXAM:  T(C): 36.8 (08-03-20 @ 12:06), Max: 36.8 (08-03-20 @ 12:06)  HR: 64 (08-03-20 @ 17:00) (61 - 64)  BP: 159/67 (08-03-20 @ 17:00) (137/55 - 159/67)  RR: 18 (08-03-20 @ 12:06) (18 - 18)  SpO2: 98% (08-03-20 @ 12:06) (97% - 98%)  Wt(kg): --  I&O's Summary    02 Aug 2020 07:01  -  03 Aug 2020 07:00  --------------------------------------------------------  IN: 960 mL / OUT: 1400 mL / NET: -440 mL    03 Aug 2020 07:01  -  04 Aug 2020 00:39  --------------------------------------------------------  IN: 960 mL / OUT: 550 mL / NET: 410 mL          Appearance: In no distress	  HEENT:    PERRL, EOMI	  Cardiovascular:  S1 S2, No JVD  Respiratory: Lungs clear to auscultation	  Gastrointestinal:  Soft, Non-tender, + BS	  Vascularature:  No edema of LE  Psychiatric: Appropriate affect   Neuro: no acute focal deficits                               7.7    5.50  )-----------( 113      ( 03 Aug 2020 06:45 )             24.4     08-03    144  |  107  |  103<H>  ----------------------------<  141<H>  4.7   |  22  |  3.89<H>    Ca    8.7      03 Aug 2020 06:44          Labs personally reviewed      Assessment and Plan:   Assessment:  · Assessment		  Patient is a 69 Male pmhx DM, HTN, HLD, CAD s/p CABG, cath ([facility] 3/19/2018), MICU admission for influenza complicated by respiratory failure, JAKOB, NSTEMI, HF, A&Ox1 Faroese speaking only, difficult to obtain information from. Collateral history obtained from wife, states patient had a fall on 7/1/2020 and has been complaining of L hip pain and since fall has been crawling to get around. Reports that he has been more confused and weak as well. Denies any chest pain, shortness of breath, fevers or chills.    Problem/Plan - 1:  ·  Problem: CAD s/p CABG Plan: med records from [facility] obtained and reviewed. h/o CABG with cath in 12/2018 patent graft to LIMA to LAD and SVG to RPDA, stent to 80% ramus  - ASA 81mg, statin, Coreg,   - d/c Plavix     Problem/Plan - 2: Systolic congestive heart failure.  Plan: Hx of CABG   ASA, statin, coreg, hold ACE given JAKOB, increase hydral dose   cont lasix 40.   TTE with EF 40-45% consistent with pt h/o of CAD MI    Problem/Plan - 3:  ·  Problem: Elevated troponin.  Plan: Likely 2/2 decompensated HF  Hx of CABG and stent            Ebenezer Jones DO [facility]  Cardiovascular Medicine  [address], [address]  Office: [phone]  Cell: [phone]
Patient is a 69y old  Male who presents with a chief complaint of fall (16 Jul 2020 16:31)      INTERVAL HISTORY: feels ok    TELEMETRY Personally reviewed: no events  	  MEDICATIONS:  furosemide   Injectable 40 milliGRAM(s) IV Push daily        PHYSICAL EXAM:  T(C): 36.4 (07-16-20 @ 20:10), Max: 36.4 (07-16-20 @ 20:10)  HR: 72 (07-16-20 @ 20:10) (62 - 72)  BP: 158/79 (07-16-20 @ 20:10) (148/79 - 158/79)  RR: 17 (07-16-20 @ 20:10) (16 - 17)  SpO2: 96% (07-16-20 @ 20:10) (96% - 99%)  Wt(kg): --  I&O's Summary    15 Jul 2020 07:01  -  16 Jul 2020 07:00  --------------------------------------------------------  IN: 480 mL / OUT: 1380 mL / NET: -900 mL    16 Jul 2020 07:01  -  17 Jul 2020 00:58  --------------------------------------------------------  IN: 840 mL / OUT: 1050 mL / NET: -210 mL          Appearance: In no distress	  HEENT:    PERRL, EOMI	  Cardiovascular:  S1 S2, No JVD  Respiratory: Lungs clear to auscultation	  Gastrointestinal:  Soft, Non-tender, + BS	  Vascularature:  No edema of LE  Psychiatric: Appropriate affect   Neuro: no acute focal deficits                               9.0    4.68  )-----------( 160      ( 16 Jul 2020 07:42 )             28.6     07-16    138  |  104  |  77<H>  ----------------------------<  58<L>  5.2   |  21<L>  |  3.49<H>    Ca    9.2      16 Jul 2020 07:42    TPro  6.6  /  Alb  2.9<L>  /  TBili  0.3  /  DBili  x   /  AST  19  /  ALT  11  /  AlkPhos  271<H>  07-16        Labs personally reviewed      Assessment and Plan:   Assessment:  · Assessment		  Patient is a 69 Male pmhx DM, HTN, HLD, CAD s/p CABG, cath ([facility] 3/19/2018), MICU admission for influenza complicated by respiratory failure, JAKOB, NSTEMI, HF, A&Ox1 Kenyan speaking only, difficult to obtain information from. Collateral history obtained from wife, states patient had a fall on 7/1/2020 and has been complaining of L hip pain and since fall has been crawling to get around. Reports that he has been more confused and weak as well. Denies any chest pain, shortness of breath, fevers or chills.    Problem/Plan - 1:  ·  Problem: CAD s/p CABG Plan: med records from [facility] obtained and reviewed. h/o CABG with cath in 12/2018 patent graft to LIMA to LAD and SVG to RPDA, stent to 80% ramus  - Resume ASA 81mg, statin, Coreg,     Problem/Plan - 2:  ·  Problem: Acute kidney injury.  Plan: Monitor BUN/Cr  avoid nephrotoxic meds  on enlapril, hold for now.     Problem/Plan - 3:  ·  Problem: Acute systolic congestive heart failure.  Plan: Hx of CABG   ASA, statin, coreg, hold ACE given JAKOB  cont lasix 40.   TTE    Problem/Plan - 4:  ·  Problem: Diabetes.  Plan: sliding scale  lantus 25 units at night   diab diet  adjust insulin dose PRN   hold oral meds.     Problem/Plan - 5:  ·  Problem: Elevated troponin.  Plan: Likely 2/2 decompensated HF  Echo   Hx of CABG and stent     Problem/Plan - 6:  Problem: Anemia. Plan: Anemia W?U  GI eval called  check occult stool.    Problem/Plan - 7:  ·  Problem: Prophylactic measure.  Plan: DVT and gI PPX.             Ebenezer Jones DO [facility]  Cardiovascular Medicine  [address], [address]  Office: [phone]  Cell: [phone]
Patient is a 69y old  Male who presents with a chief complaint of fall (17 Jul 2020 11:20)      INTERVAL HISTORY: feels ok  	  MEDICATIONS:  carvedilol 12.5 milliGRAM(s) Oral every 12 hours  hydrALAZINE 25 milliGRAM(s) Oral three times a day  tamsulosin 0.4 milliGRAM(s) Oral at bedtime        PHYSICAL EXAM:  T(C): 36.5 (07-17-20 @ 17:54), Max: 36.6 (07-17-20 @ 05:17)  HR: 77 (07-17-20 @ 17:54) (63 - 77)  BP: 162/78 (07-17-20 @ 17:54) (129/67 - 162/78)  RR: 17 (07-17-20 @ 17:54) (17 - 17)  SpO2: 94% (07-17-20 @ 17:54) (94% - 100%)  Wt(kg): --  I&O's Summary    16 Jul 2020 07:01  -  17 Jul 2020 07:00  --------------------------------------------------------  IN: 840 mL / OUT: 1350 mL / NET: -510 mL    17 Jul 2020 07:01  -  17 Jul 2020 20:20  --------------------------------------------------------  IN: 820 mL / OUT: 1480 mL / NET: -660 mL          Appearance: In no distress	  HEENT:    PERRL, EOMI	  Cardiovascular:  S1 S2, No JVD  Respiratory: Lungs clear to auscultation	  Gastrointestinal:  Soft, Non-tender, + BS	  Vascularature:  No edema of LE  Psychiatric: Appropriate affect   Neuro: no acute focal deficits                               9.1    5.00  )-----------( 174      ( 17 Jul 2020 07:18 )             28.9     07-17    138  |  103  |  84<H>  ----------------------------<  61<L>  4.7   |  22  |  3.90<H>    Ca    8.6      17 Jul 2020 07:13    TPro  6.6  /  Alb  2.9<L>  /  TBili  0.3  /  DBili  x   /  AST  19  /  ALT  11  /  AlkPhos  271<H>  07-16        Labs personally reviewed      EKG: Personally reviewed by me - nsr, lvh with repol changes, asymmetric TWI  Radiology: Personally reviewed by me -   < from: Xray Chest 1 View- PORTABLE-Urgent (07.15.20 @ 13:19) >  Impression: Significant increased lung markings bilaterally, likely representing marked pulmonary edema, and less likely viral pneumonia. Clinical correlation recommended.          Assessment /Plan:   Assessment and Plan:   Assessment:  · Assessment		  Patient is a 69 Male pmhx DM, HTN, HLD, CAD s/p CABG, cath ([facility] 3/19/2018), MICU admission for influenza complicated by respiratory failure, JAKOB, NSTEMI, HF, A&Ox1 Turkish speaking only, difficult to obtain information from. Collateral history obtained from wife, states patient had a fall on 7/1/2020 and has been complaining of L hip pain and since fall has been crawling to get around. Reports that he has been more confused and weak as well. Denies any chest pain, shortness of breath, fevers or chills.    Problem/Plan - 1:  ·  Problem: CAD s/p CABG Plan: med records from [facility] obtained and reviewed. h/o CABG with cath in 12/2018 patent graft to LIMA to LAD and SVG to RPDA, stent to 80% ramus  - Resume ASA 81mg, statin, Coreg,     Problem/Plan - 2:  ·  Problem: Acute kidney injury.  Plan: Monitor BUN/Cr  avoid nephrotoxic meds  on enlapril, hold for now.     Problem/Plan - 3:  ·  Problem: Acute systolic congestive heart failure.  Plan: Hx of CABG   ASA, statin, coreg, hold ACE given JAKOB  cont lasix 40.   TTE    Problem/Plan - 4:  ·  Problem: Diabetes.  Plan: sliding scale  lantus 25 units at night   diab diet  adjust insulin dose PRN   hold oral meds.     Problem/Plan - 5:  ·  Problem: Elevated troponin.  Plan: Likely 2/2 decompensated HF  Echo   Hx of CABG and stent     Problem/Plan - 6:  Problem: Anemia. Plan: Anemia W?U  GI eval called  check occult stool.    Problem/Plan - 7:  ·  Problem: Prophylactic measure.  Plan: DVT and gI PPX.         Ebenezer Jones DO [facility]  Cardiovascular Medicine  [address] [place] [address], [address]  Office: [phone]  Cell: [phone]
Patient is a 69y old  Male who presents with a chief complaint of fall (18 Jul 2020 20:10)      INTERVAL HISTORY: feels ok    	  MEDICATIONS:  carvedilol 12.5 milliGRAM(s) Oral every 12 hours  furosemide    Tablet 40 milliGRAM(s) Oral daily  hydrALAZINE 25 milliGRAM(s) Oral three times a day  tamsulosin 0.4 milliGRAM(s) Oral at bedtime        PHYSICAL EXAM:  T(C): 36.5 (07-18-20 @ 21:12), Max: 36.7 (07-18-20 @ 04:46)  HR: 69 (07-18-20 @ 21:12) (67 - 72)  BP: 164/79 (07-18-20 @ 21:12) (137/77 - 164/79)  RR: 18 (07-18-20 @ 21:12) (18 - 18)  SpO2: 98% (07-18-20 @ 21:12) (95% - 98%)  Wt(kg): --  I&O's Summary    17 Jul 2020 07:01  -  18 Jul 2020 07:00  --------------------------------------------------------  IN: 1060 mL / OUT: 1980 mL / NET: -920 mL    18 Jul 2020 07:01  -  19 Jul 2020 00:12  --------------------------------------------------------  IN: 200 mL / OUT: 601 mL / NET: -401 mL          Appearance: In no distress	  HEENT:    PERRL, EOMI	  Cardiovascular:  S1 S2, No JVD  Respiratory: Lungs clear to auscultation	  Gastrointestinal:  Soft, Non-tender, + BS	  Vascularature:  No edema of LE  Psychiatric: Appropriate affect   Neuro: no acute focal deficits                               8.8    5.01  )-----------( 163      ( 18 Jul 2020 07:10 )             27.8     07-18    139  |  103  |  87<H>  ----------------------------<  164<H>  4.4   |  21<L>  |  3.85<H>    Ca    8.6      18 Jul 2020 07:10    TPro  6.0  /  Alb  x   /  TBili  x   /  DBili  x   /  AST  x   /  ALT  x   /  AlkPhos  x   07-18        Labs personally reviewed      ASSESSMENT/PLAN: 	  EKG: Personally reviewed by me - nsr, lvh with repol changes, asymmetric TWI  Radiology: Personally reviewed by me -   < from: Xray Chest 1 View- PORTABLE-Urgent (07.15.20 @ 13:19) >  Impression: Significant increased lung markings bilaterally, likely representing marked pulmonary edema, and less likely viral pneumonia. Clinical correlation recommended.          Assessment /Plan:   Assessment and Plan:   Assessment:  · Assessment		  Patient is a 69 Male pmhx DM, HTN, HLD, CAD s/p CABG, cath ([facility] 3/19/2018), MICU admission for influenza complicated by respiratory failure, JAKOB, NSTEMI, HF, A&Ox1 Yoruba speaking only, difficult to obtain information from. Collateral history obtained from wife, states patient had a fall on 7/1/2020 and has been complaining of L hip pain and since fall has been crawling to get around. Reports that he has been more confused and weak as well. Denies any chest pain, shortness of breath, fevers or chills.    Problem/Plan - 1:  ·  Problem: CAD s/p CABG Plan: med records from [facility] obtained and reviewed. h/o CABG with cath in 12/2018 patent graft to LIMA to LAD and SVG to RPDA, stent to 80% ramus  - Resume ASA 81mg, statin, Coreg,     Problem/Plan - 2: Systolic congestive heart failure.  Plan: Hx of CABG   ASA, statin, coreg, hold ACE given JAKOB  cont lasix 40.   TTE with EF 40-45% consistent with pt h/o of CAD MI    Problem/Plan - 3:  ·  Problem: Elevated troponin.  Plan: Likely 2/2 decompensated HF  Hx of CABG and stent     Problem/Plan - 4  ·  Problem: Prophylactic measure.  Plan: DVT and gI PPX.           Ebenezer Jones DO [facility]  Cardiovascular Medicine  [address] [facility] [address], [address]  Office: [phone]  Cell: [phone]
Patient is a 69y old  Male who presents with a chief complaint of fall (19 Jul 2020 13:48)      INTERVAL HISTORY: feels ok    TELEMETRY Personally reviewed: feels ok  	  MEDICATIONS:  carvedilol 25 milliGRAM(s) Oral every 12 hours  furosemide    Tablet 40 milliGRAM(s) Oral daily  hydrALAZINE 25 milliGRAM(s) Oral three times a day  tamsulosin 0.4 milliGRAM(s) Oral at bedtime        PHYSICAL EXAM:  T(C): 36.6 (07-19-20 @ 21:16), Max: 36.6 (07-19-20 @ 13:00)  HR: 75 (07-19-20 @ 21:16) (70 - 75)  BP: 165/89 (07-19-20 @ 21:16) (165/89 - 170/88)  RR: 18 (07-19-20 @ 21:16) (18 - 18)  SpO2: 95% (07-19-20 @ 21:16) (95% - 97%)  Wt(kg): --  I&O's Summary    18 Jul 2020 07:01  -  19 Jul 2020 07:00  --------------------------------------------------------  IN: 200 mL / OUT: 601 mL / NET: -401 mL          Appearance: In no distress	  HEENT:    PERRL, EOMI	  Cardiovascular:  S1 S2, No JVD  Respiratory: Lungs clear to auscultation	  Gastrointestinal:  Soft, Non-tender, + BS	  Vascularature:  No edema of LE  Psychiatric: Appropriate affect   Neuro: no acute focal deficits                               9.4    5.08  )-----------( 168      ( 19 Jul 2020 08:59 )             29.6     07-19    138  |  104  |  81<H>  ----------------------------<  77  4.5   |  19<L>  |  3.78<H>    Ca    9.0      19 Jul 2020 08:58    TPro  6.3  /  Alb  x   /  TBili  x   /  DBili  x   /  AST  x   /  ALT  x   /  AlkPhos  x   07-19        Labs personally reviewed      Assessment and Plan:   Assessment:  · Assessment		  Patient is a 69 Male pmhx DM, HTN, HLD, CAD s/p CABG, cath ([facility] 3/19/2018), MICU admission for influenza complicated by respiratory failure, JAKOB, NSTEMI, HF, A&Ox1 Greenlandic speaking only, difficult to obtain information from. Collateral history obtained from wife, states patient had a fall on 7/1/2020 and has been complaining of L hip pain and since fall has been crawling to get around. Reports that he has been more confused and weak as well. Denies any chest pain, shortness of breath, fevers or chills.    Problem/Plan - 1:  ·  Problem: CAD s/p CABG Plan: med records from [facility] obtained and reviewed. h/o CABG with cath in 12/2018 patent graft to LIMA to LAD and SVG to RPDA, stent to 80% ramus  - Resume ASA 81mg, statin, Coreg,     Problem/Plan - 2: Systolic congestive heart failure.  Plan: Hx of CABG   ASA, statin, coreg, hold ACE given JAKOB, add hydral for now   cont lasix 40.   TTE with EF 40-45% consistent with pt h/o of CAD MI    Problem/Plan - 3:  ·  Problem: Elevated troponin.  Plan: Likely 2/2 decompensated HF  Hx of CABG and stent     Problem/Plan - 4  ·  Problem: Prophylactic measure.  Plan: DVT and gI PPX.             Ebenezer Jones DO [facility]  Cardiovascular Medicine  [address] [place] [address], [address]  Office: [phone]  Cell: [phone]
Patient is a 69y old  Male who presents with a chief complaint of fall (20 Jul 2020 18:45)      INTERVAL HISTORY: feels ok  	  MEDICATIONS:  carvedilol 25 milliGRAM(s) Oral every 12 hours  furosemide    Tablet 40 milliGRAM(s) Oral daily  hydrALAZINE 50 milliGRAM(s) Oral three times a day  tamsulosin 0.4 milliGRAM(s) Oral at bedtime        PHYSICAL EXAM:  T(C): 36.9 (07-20-20 @ 20:37), Max: 36.9 (07-20-20 @ 20:37)  HR: 60 (07-20-20 @ 20:37) (60 - 70)  BP: 157/76 (07-20-20 @ 20:37) (147/77 - 157/76)  RR: 18 (07-20-20 @ 20:37) (18 - 18)  SpO2: 97% (07-20-20 @ 20:37) (97% - 97%)  Wt(kg): --  I&O's Summary    19 Jul 2020 07:01  -  20 Jul 2020 07:00  --------------------------------------------------------  IN: 400 mL / OUT: 0 mL / NET: 400 mL    20 Jul 2020 07:01  -  20 Jul 2020 23:38  --------------------------------------------------------  IN: 840 mL / OUT: 1300 mL / NET: -460 mL          Appearance: In no distress	  HEENT:    PERRL, EOMI	  Cardiovascular:  S1 S2, No JVD  Respiratory: Lungs clear to auscultation	  Gastrointestinal:  Soft, Non-tender, + BS	  Vascularature:  No edema of LE  Psychiatric: Appropriate affect   Neuro: no acute focal deficits                               9.4    5.08  )-----------( 168      ( 19 Jul 2020 08:59 )             29.6     07-20    136  |  104  |  86<H>  ----------------------------<  230<H>  4.7   |  19<L>  |  3.93<H>    Ca    8.9      20 Jul 2020 07:23    TPro  6.3  /  Alb  x   /  TBili  x   /  DBili  x   /  AST  x   /  ALT  x   /  AlkPhos  x   07-19        Labs personally reviewed      Assessment and Plan:   Assessment:  · Assessment		  Patient is a 69 Male pmhx DM, HTN, HLD, CAD s/p CABG, cath ([facility] 3/19/2018), MICU admission for influenza complicated by respiratory failure, JAKOB, NSTEMI, HF, A&Ox1 Sierra Leonean speaking only, difficult to obtain information from. Collateral history obtained from wife, states patient had a fall on 7/1/2020 and has been complaining of L hip pain and since fall has been crawling to get around. Reports that he has been more confused and weak as well. Denies any chest pain, shortness of breath, fevers or chills.    Problem/Plan - 1:  ·  Problem: CAD s/p CABG Plan: med records from [facility] obtained and reviewed. h/o CABG with cath in 12/2018 patent graft to LIMA to LAD and SVG to RPDA, stent to 80% ramus  - Resume ASA 81mg, statin, Coreg,     Problem/Plan - 2: Systolic congestive heart failure.  Plan: Hx of CABG   ASA, statin, coreg, hold ACE given JAKOB, increase hydral dose   cont lasix 40.   TTE with EF 40-45% consistent with pt h/o of CAD MI    Problem/Plan - 3:  ·  Problem: Elevated troponin.  Plan: Likely 2/2 decompensated HF  Hx of CABG and stent     Problem/Plan - 4  ·  Problem: Prophylactic measure.  Plan: DVT and gI PPX.           Ebenezer Jones DO [facility]  Cardiovascular Medicine  [address], [address]  Office: [phone]  Cell: [phone]
Patient is a 69y old  Male who presents with a chief complaint of fall (21 Jul 2020 15:25)      INTERVAL HISTORY: feels ok    	  MEDICATIONS:  carvedilol 25 milliGRAM(s) Oral every 12 hours  furosemide    Tablet 40 milliGRAM(s) Oral daily  hydrALAZINE 50 milliGRAM(s) Oral three times a day  tamsulosin 0.4 milliGRAM(s) Oral at bedtime        PHYSICAL EXAM:  T(C): 36.3 (07-21-20 @ 20:52), Max: 36.8 (07-21-20 @ 11:55)  HR: 68 (07-21-20 @ 20:52) (63 - 72)  BP: 147/74 (07-21-20 @ 20:52) (111/57 - 147/74)  RR: 17 (07-21-20 @ 20:52) (17 - 17)  SpO2: 98% (07-21-20 @ 20:52) (96% - 98%)  Wt(kg): --  I&O's Summary    20 Jul 2020 07:01  -  21 Jul 2020 07:00  --------------------------------------------------------  IN: 1000 mL / OUT: 1600 mL / NET: -600 mL    21 Jul 2020 07:01  -  21 Jul 2020 22:47  --------------------------------------------------------  IN: 840 mL / OUT: 1150 mL / NET: -310 mL          Appearance: In no distress	  HEENT:    PERRL, EOMI	  Cardiovascular:  S1 S2, No JVD  Respiratory: Lungs clear to auscultation	  Gastrointestinal:  Soft, Non-tender, + BS	  Vascularature:  No edema of LE  Psychiatric: Appropriate affect   Neuro: no acute focal deficits           07-20    136  |  104  |  86<H>  ----------------------------<  230<H>  4.7   |  19<L>  |  3.93<H>    Ca    8.9      20 Jul 2020 07:23          Labs personally reviewed      Assessment and Plan:   Assessment:  · Assessment		  Patient is a 69 Male pmhx DM, HTN, HLD, CAD s/p CABG, cath ([facility] 3/19/2018), MICU admission for influenza complicated by respiratory failure, JAKOB, NSTEMI, HF, A&Ox1 [place] Islander speaking only, difficult to obtain information from. Collateral history obtained from wife, states patient had a fall on 7/1/2020 and has been complaining of L hip pain and since fall has been crawling to get around. Reports that he has been more confused and weak as well. Denies any chest pain, shortness of breath, fevers or chills.    Problem/Plan - 1:  ·  Problem: CAD s/p CABG Plan: med records from [facility] obtained and reviewed. h/o CABG with cath in 12/2018 patent graft to LIMA to LAD and SVG to RPDA, stent to 80% ramus  - ASA 81mg, statin, Coreg,     Problem/Plan - 2: Systolic congestive heart failure.  Plan: Hx of CABG   ASA, statin, coreg, hold ACE given JAKOB, increase hydral dose   cont lasix 40.   TTE with EF 40-45% consistent with pt h/o of CAD MI    Problem/Plan - 3:  ·  Problem: Elevated troponin.  Plan: Likely 2/2 decompensated HF  Hx of CABG and stent     Problem/Plan - 4  ·  Problem: Prophylactic measure.  Plan: DVT and gI PPX.             Ebenezer Jones DO [facility]  Cardiovascular Medicine  [address] [place] [address], [address]  Office: [phone]  Cell: [phone]
Patient is a 69y old  Male who presents with a chief complaint of fall (23 Jul 2020 22:47)      INTERVAL HISTORY: feels ok    MEDICATIONS:  carvedilol 25 milliGRAM(s) Oral every 12 hours  furosemide    Tablet 40 milliGRAM(s) Oral daily  hydrALAZINE 50 milliGRAM(s) Oral three times a day  tamsulosin 0.4 milliGRAM(s) Oral at bedtime        PHYSICAL EXAM:  T(C): 36.5 (07-23-20 @ 12:36), Max: 36.5 (07-23-20 @ 12:36)  HR: 59 (07-23-20 @ 12:36) (59 - 66)  BP: 121/68 (07-23-20 @ 12:36) (121/68 - 140/75)  RR: 18 (07-23-20 @ 12:36) (18 - 18)  SpO2: 96% (07-23-20 @ 12:36) (96% - 98%)  Wt(kg): --  I&O's Summary    22 Jul 2020 07:01  -  23 Jul 2020 07:00  --------------------------------------------------------  IN: 1200 mL / OUT: 1150 mL / NET: 50 mL    23 Jul 2020 07:01  -  24 Jul 2020 00:07  --------------------------------------------------------  IN: 0 mL / OUT: 1000 mL / NET: -1000 mL          Appearance: In no distress	  HEENT:    PERRL, EOMI	  Cardiovascular:  S1 S2, No JVD  Respiratory: Lungs clear to auscultation	  Gastrointestinal:  Soft, Non-tender, + BS	  Vascularature:  No edema of LE  Psychiatric: Appropriate affect   Neuro: no acute focal deficits       Labs personally reviewed      Assessment and Plan:   Assessment:  · Assessment		  Patient is a 69 Male pmhx DM, HTN, HLD, CAD s/p CABG, cath ([facility] 3/19/2018), MICU admission for influenza complicated by respiratory failure, JAKOB, NSTEMI, HF, A&Ox1 Azerbaijani speaking only, difficult to obtain information from. Collateral history obtained from wife, states patient had a fall on 7/1/2020 and has been complaining of L hip pain and since fall has been crawling to get around. Reports that he has been more confused and weak as well. Denies any chest pain, shortness of breath, fevers or chills.    Problem/Plan - 1:  ·  Problem: CAD s/p CABG Plan: med records from [facility] obtained and reviewed. h/o CABG with cath in 12/2018 patent graft to LIMA to LAD and SVG to RPDA, stent to 80% ramus  - ASA 81mg, statin, Coreg,     Problem/Plan - 2: Systolic congestive heart failure.  Plan: Hx of CABG   ASA, statin, coreg, hold ACE given JAKOB, increase hydral dose   cont lasix 40.   TTE with EF 40-45% consistent with pt h/o of CAD MI    Problem/Plan - 3:  ·  Problem: Elevated troponin.  Plan: Likely 2/2 decompensated HF  Hx of CABG and stent     Problem/Plan - 4  ·  Problem: Prophylactic measure.  Plan: DVT and gI PPX        Ebenezer Jones DO [facility]  Cardiovascular Medicine  [address] [facility], [address]  Office: [phone]  Cell: [phone]
Patient is a 69y old  Male who presents with a chief complaint of fall (24 Jul 2020 17:16)      INTERVAL HISTORY: feels ok       	  MEDICATIONS:  carvedilol 25 milliGRAM(s) Oral every 12 hours  furosemide    Tablet 40 milliGRAM(s) Oral daily  hydrALAZINE 50 milliGRAM(s) Oral three times a day  tamsulosin 0.4 milliGRAM(s) Oral at bedtime        PHYSICAL EXAM:  T(C): 36.8 ( , Max: 36.8    HR: 64  (62 - 66)  BP: 128/66 (128/66 - 138/68)  RR: 18   (18 - 18)  SpO2: 97%  (97% - 98%)              Appearance: In no distress	  HEENT:    PERRL, EOMI	  Cardiovascular:  S1 S2, No JVD  Respiratory: Lungs clear to auscultation	  Gastrointestinal:  Soft, Non-tender, + BS	  Vascularature:  No edema of LE  Psychiatric: Appropriate affect   Neuro: no acute focal deficits                   Labs personally reviewed      Assessment and Plan:   Assessment:  · Assessment		  Patient is a 69 Male pmhx DM, HTN, HLD, CAD s/p CABG, cath ([facility] 3/19/2018), MICU admission for influenza complicated by respiratory failure, JAKOB, NSTEMI, HF, A&Ox1 Japanese speaking only, difficult to obtain information from. Collateral history obtained from wife, states patient had a fall on 7/1/2020 and has been complaining of L hip pain and since fall has been crawling to get around. Reports that he has been more confused and weak as well. Denies any chest pain, shortness of breath, fevers or chills.    Problem/Plan - 1:  ·  Problem: CAD s/p CABG Plan: med records from [facility] obtained and reviewed. h/o CABG with cath in 12/2018 patent graft to LIMA to LAD and SVG to RPDA, stent to 80% ramus  - ASA 81mg, statin, Coreg,     Problem/Plan - 2: Systolic congestive heart failure.  Plan: Hx of CABG   ASA, statin, coreg, hold ACE given JAKOB, increase hydral dose   cont lasix 40.   TTE with EF 40-45% consistent with pt h/o of CAD MI    Problem/Plan - 3:  ·  Problem: Elevated troponin.  Plan: Likely 2/2 decompensated HF  Hx of CABG and stent     Problem/Plan - 4  ·  Problem: Prophylactic measure.  Plan: DVT and gI PPX           Ebenezer Jones DO [facility]  Cardiovascular Medicine  [address] [place] [address], [address]  Office: [phone]  Cell: [phone]
Patient is a 69y old  Male who presents with a chief complaint of fall (25 Jul 2020 15:48)      INTERVAL HISTORY: Melena    MEDICATIONS:  carvedilol 25 milliGRAM(s) Oral every 12 hours  furosemide    Tablet 40 milliGRAM(s) Oral daily  hydrALAZINE 50 milliGRAM(s) Oral three times a day  tamsulosin 0.4 milliGRAM(s) Oral at bedtime        PHYSICAL EXAM:  T(C): 36.8 (07-25-20 @ 22:05), Max: 36.8 (07-25-20 @ 22:05)  HR: 62 (07-25-20 @ 21:48) (62 - 86)  BP: 110/58 (07-25-20 @ 21:48) (110/58 - 140/80)  RR: 18 (07-25-20 @ 22:05) (18 - 18)  SpO2: 98% (07-25-20 @ 22:05) (97% - 98%)  Wt(kg): --  I&O's Summary    24 Jul 2020 07:01  -  25 Jul 2020 07:00  --------------------------------------------------------  IN: 1630 mL / OUT: 1200 mL / NET: 430 mL    25 Jul 2020 07:01  -  26 Jul 2020 00:15  --------------------------------------------------------  IN: 820 mL / OUT: 1100 mL / NET: -280 mL          Appearance: In no distress	  HEENT:    PERRL, EOMI	  Cardiovascular:  S1 S2, No JVD  Respiratory: Lungs clear to auscultation	  Gastrointestinal:  Soft, Non-tender, + BS	  Vascularature:  No edema of LE  Psychiatric: Appropriate affect   Neuro: no acute focal deficits                               7.6    5.98  )-----------( 161      ( 25 Jul 2020 06:30 )             23.9               Labs personally reviewed      Assessment and Plan:   Assessment:  · Assessment		  Patient is a 69 Male pmhx DM, HTN, HLD, CAD s/p CABG, cath ([facility] 3/19/2018), MICU admission for influenza complicated by respiratory failure, JAKOB, NSTEMI, HF, A&Ox1 Kyrgyz speaking only, difficult to obtain information from. Collateral history obtained from wife, states patient had a fall on 7/1/2020 and has been complaining of L hip pain and since fall has been crawling to get around. Reports that he has been more confused and weak as well. Denies any chest pain, shortness of breath, fevers or chills.    Problem/Plan - 1:  ·  Problem: CAD s/p CABG Plan: med records from [facility] obtained and reviewed. h/o CABG with cath in 12/2018 patent graft to LIMA to LAD and SVG to RPDA, stent to 80% ramus  - ASA 81mg, statin, Coreg,   - d/c Plavix given melena    Problem/Plan - 2: Systolic congestive heart failure.  Plan: Hx of CABG   ASA, statin, coreg, hold ACE given JAKOB, increase hydral dose   cont lasix 40.   TTE with EF 40-45% consistent with pt h/o of CAD MI    Problem/Plan - 3:  ·  Problem: Elevated troponin.  Plan: Likely 2/2 decompensated HF  Hx of CABG and stent     Problem/Plan - 4  ·  Problem: Prophylactic measure.  Plan: DVT and gI PPX         Ebenezer Jones DO [facility]  Cardiovascular Medicine  [address], [address]  Office: [phone]  Cell: [phone]        Ebenezer Jones DO [facility]  Cardiovascular Medicine  [address], [address]  Office: [phone]  Cell: [phone]
Patient is a 69y old  Male who presents with a chief complaint of fall (26 Jul 2020 19:59)      INTERVAL HISTORY: feels ok    MEDICATIONS:  carvedilol 25 milliGRAM(s) Oral every 12 hours  furosemide    Tablet 40 milliGRAM(s) Oral daily  hydrALAZINE 50 milliGRAM(s) Oral three times a day  tamsulosin 0.4 milliGRAM(s) Oral at bedtime        PHYSICAL EXAM:  T(C): 36.4 (07-26-20 @ 20:52), Max: 36.9 (07-26-20 @ 14:39)  HR: 62 (07-26-20 @ 20:52) (62 - 79)  BP: 146/72 (07-26-20 @ 20:52) (114/64 - 156/68)  RR: 19 (07-26-20 @ 20:52) (18 - 19)  SpO2: 99% (07-26-20 @ 20:52) (98% - 99%)  Wt(kg): --  I&O's Summary    25 Jul 2020 07:01  -  26 Jul 2020 07:00  --------------------------------------------------------  IN: 970 mL / OUT: 1100 mL / NET: -130 mL    26 Jul 2020 07:01  -  26 Jul 2020 23:44  --------------------------------------------------------  IN: 1900 mL / OUT: 400 mL / NET: 1500 mL          Appearance: In no distress	  HEENT:    PERRL, EOMI	  Cardiovascular:  S1 S2, No JVD  Respiratory: Lungs clear to auscultation	  Gastrointestinal:  Soft, Non-tender, + BS	  Vascularature:  No edema of LE  Psychiatric: Appropriate affect   Neuro: no acute focal deficits                               7.3    6.18  )-----------( 165      ( 26 Jul 2020 17:03 )             23.1     07-26    138  |  107  |  98<H>  ----------------------------<  117<H>  4.6   |  19<L>  |  3.88<H>    Ca    9.1      26 Jul 2020 07:27          Labs personally reviewed      Assessment and Plan:   Assessment:  · Assessment		  Patient is a 69 Male pmhx DM, HTN, HLD, CAD s/p CABG, cath ([facility] 3/19/2018), MICU admission for influenza complicated by respiratory failure, JAKOB, NSTEMI, HF, A&Ox1 Sierra Leonean speaking only, difficult to obtain information from. Collateral history obtained from wife, states patient had a fall on 7/1/2020 and has been complaining of L hip pain and since fall has been crawling to get around. Reports that he has been more confused and weak as well. Denies any chest pain, shortness of breath, fevers or chills.    Problem/Plan - 1:  ·  Problem: CAD s/p CABG Plan: med records from [facility] obtained and reviewed. h/o CABG with cath in 12/2018 patent graft to LIMA to LAD and SVG to RPDA, stent to 80% ramus  - ASA 81mg, statin, Coreg,   - d/c Plavix given melena    Problem/Plan - 2: Systolic congestive heart failure.  Plan: Hx of CABG   ASA, statin, coreg, hold ACE given JAKOB, increase hydral dose   cont lasix 40.   TTE with EF 40-45% consistent with pt h/o of CAD MI    Problem/Plan - 3:  ·  Problem: Elevated troponin.  Plan: Likely 2/2 decompensated HF  Hx of CABG and stent     Problem/Plan - 4  ·  Problem: Preop RIsk stratification  Plan: Mod risk for low-mod risk procedure, at acceptable risk to proceed     Pr      Ebenezer Jones DO [facility]  Cardiovascular Medicine  [address], [address]  Office: [phone]  Cell: [phone]
Patient is a 69y old  Male who presents with a chief complaint of fall (27 Jul 2020 13:51)      INTERVAL HISTORY: feels ok, s/p EGD  	  MEDICATIONS:  carvedilol 25 milliGRAM(s) Oral every 12 hours  furosemide    Tablet 40 milliGRAM(s) Oral daily  hydrALAZINE 50 milliGRAM(s) Oral three times a day  tamsulosin 0.4 milliGRAM(s) Oral at bedtime        PHYSICAL EXAM:  T(C): 36.6 (07-27-20 @ 20:37), Max: 36.6 (07-27-20 @ 20:37)  HR: 65 (07-27-20 @ 20:37) (61 - 68)  BP: 154/74 (07-27-20 @ 20:37) (139/66 - 163/81)  RR: 18 (07-27-20 @ 20:37) (18 - 19)  SpO2: 100% (07-27-20 @ 20:37) (98% - 100%)  Wt(kg): --  I&O's Summary    26 Jul 2020 07:01  -  27 Jul 2020 07:00  --------------------------------------------------------  IN: 5240 mL / OUT: 1300 mL / NET: 3940 mL    27 Jul 2020 07:01  -  27 Jul 2020 22:57  --------------------------------------------------------  IN: 0 mL / OUT: 700 mL / NET: -700 mL          Appearance: In no distress	  HEENT:    PERRL, EOMI	  Cardiovascular:  S1 S2, No JVD  Respiratory: Lungs clear to auscultation	  Gastrointestinal:  Soft, Non-tender, + BS	  Vascularature:  No edema of LE  Psychiatric: Appropriate affect   Neuro: no acute focal deficits                               8.1    5.86  )-----------( 163      ( 27 Jul 2020 22:23 )             25.2     07-27    141  |  106  |  100<H>  ----------------------------<  115<H>  4.6   |  16<L>  |  3.74<H>    Ca    9.0      27 Jul 2020 07:40          Labs personally reviewed    Assessment and Plan:   Assessment:  · Assessment		  Patient is a 69 Male pmhx DM, HTN, HLD, CAD s/p CABG, cath ( 3/19/2018), MICU admission for influenza complicated by respiratory failure, JAKOB, NSTEMI, HF, A&Ox1 Romanian speaking only, difficult to obtain information from. Collateral history obtained from wife, states patient had a fall on 7/1/2020 and has been complaining of L hip pain and since fall has been crawling to get around. Reports that he has been more confused and weak as well. Denies any chest pain, shortness of breath, fevers or chills.    Problem/Plan - 1:  ·  Problem: CAD s/p CABG Plan: med records from [facility] obtained and reviewed. h/o CABG with cath in 12/2018 patent graft to LIMA to LAD and SVG to RPDA, stent to 80% ramus  - ASA 81mg, statin, Coreg,   - d/c Plavix given melena    Problem/Plan - 2: Systolic congestive heart failure.  Plan: Hx of CABG   ASA, statin, coreg, hold ACE given JAKOB, increase hydral dose   cont lasix 40.   TTE with EF 40-45% consistent with pt h/o of CAD MI    Problem/Plan - 3:  ·  Problem: Elevated troponin.  Plan: Likely 2/2 decompensated HF  Hx of CABG and stent           Ebenezer Jnoes DO [facility]  Cardiovascular Medicine  [address], [address]  Office: [phone]  Cell: [phone]            Ebenezer Jones DO [facility]  Cardiovascular Medicine  [address], [address]  Office: [phone]  Cell: [phone]
Patient is a 69y old  Male who presents with a chief complaint of fall (28 Jul 2020 21:58)      INTERVAL HISTORY: feels ok  	  MEDICATIONS:  carvedilol 25 milliGRAM(s) Oral every 12 hours  furosemide    Tablet 40 milliGRAM(s) Oral daily  hydrALAZINE 50 milliGRAM(s) Oral three times a day  tamsulosin 0.4 milliGRAM(s) Oral at bedtime        PHYSICAL EXAM:  T(C): 36.3 (07-28-20 @ 21:36), Max: 36.6 (07-28-20 @ 04:04)  HR: 67 (07-28-20 @ 21:36) (65 - 67)  BP: 111/56 (07-28-20 @ 22:05) (100/56 - 150/76)  RR: 18 (07-28-20 @ 21:36) (18 - 18)  SpO2: 100% (07-28-20 @ 21:36) (99% - 100%)  Wt(kg): --  I&O's Summary    27 Jul 2020 07:01  -  28 Jul 2020 07:00  --------------------------------------------------------  IN: 120 mL / OUT: 700 mL / NET: -580 mL    28 Jul 2020 07:01  -  28 Jul 2020 22:37  --------------------------------------------------------  IN: 240 mL / OUT: 600 mL / NET: -360 mL          Appearance: In no distress	  HEENT:    PERRL, EOMI	  Cardiovascular:  S1 S2, No JVD  Respiratory: Lungs clear to auscultation	  Gastrointestinal:  Soft, Non-tender, + BS	  Vascularature:  No edema of LE  Psychiatric: Appropriate affect   Neuro: no acute focal deficits                               8.2    5.64  )-----------( 174      ( 28 Jul 2020 12:39 )             24.8     07-28    141  |  107  |  91<H>  ----------------------------<  87  4.5   |  15<L>  |  3.44<H>    Ca    9.0      28 Jul 2020 06:30          Labs personally reviewed      Assessment and Plan:   Assessment:  · Assessment		  Patient is a 69 Male pmhx DM, HTN, HLD, CAD s/p CABG, cath ([facility] 3/19/2018), MICU admission for influenza complicated by respiratory failure, JAKOB, NSTEMI, HF, A&Ox1 [place] speaking only, difficult to obtain information from. Collateral history obtained from wife, states patient had a fall on 7/1/2020 and has been complaining of L hip pain and since fall has been crawling to get around. Reports that he has been more confused and weak as well. Denies any chest pain, shortness of breath, fevers or chills.    Problem/Plan - 1:  ·  Problem: CAD s/p CABG Plan: med records from [facility] [place] obtained and reviewed. h/o CABG with cath in 12/2018 patent graft to LIMA to LAD and SVG to RPDA, stent to 80% ramus  - ASA 81mg, statin, Coreg,   - d/c Plavix given melena    Problem/Plan - 2: Systolic congestive heart failure.  Plan: Hx of CABG   ASA, statin, coreg, hold ACE given JAKOB, increase hydral dose   cont lasix 40.   TTE with EF 40-45% consistent with pt h/o of CAD MI    Problem/Plan - 3:  ·  Problem: Elevated troponin.  Plan: Likely 2/2 decompensated HF  Hx of CABG and stent               Ebenezer Jones DO [facility]  Cardiovascular Medicine  [address] [place] [address], [address]  Office: [phone]  Cell: [phone]
Patient is a 69y old  Male who presents with a chief complaint of fall (29 Jul 2020 22:55)      INTERVAL HISTORY: feels ok    MEDICATIONS:  carvedilol 25 milliGRAM(s) Oral every 12 hours  furosemide    Tablet 40 milliGRAM(s) Oral daily  hydrALAZINE 50 milliGRAM(s) Oral three times a day  tamsulosin 0.4 milliGRAM(s) Oral at bedtime        PHYSICAL EXAM:  T(C): 36.7 (07-29-20 @ 23:00), Max: 36.8 (07-29-20 @ 20:08)  HR: 68 (07-29-20 @ 23:00) (68 - 76)  BP: 122/70 (07-29-20 @ 23:00) (102/61 - 126/88)  RR: 19 (07-29-20 @ 23:00) (18 - 19)  SpO2: 100% (07-29-20 @ 23:00) (99% - 100%)  Wt(kg): --  I&O's Summary    28 Jul 2020 07:01  -  29 Jul 2020 07:00  --------------------------------------------------------  IN: 240 mL / OUT: 600 mL / NET: -360 mL    29 Jul 2020 07:01  -  30 Jul 2020 00:57  --------------------------------------------------------  IN: 840 mL / OUT: 850 mL / NET: -10 mL          Appearance: In no distress	  HEENT:    PERRL, EOMI	  Cardiovascular:  S1 S2, No JVD  Respiratory: Lungs clear to auscultation	  Gastrointestinal:  Soft, Non-tender, + BS	  Vascularature:  No edema of LE  Psychiatric: Appropriate affect   Neuro: no acute focal deficits                               7.9    5.80  )-----------( 177      ( 29 Jul 2020 06:19 )             24.2     07-29    143  |  107  |  90<H>  ----------------------------<  91  4.6   |  18<L>  |  3.78<H>    Ca    8.8      29 Jul 2020 06:19          Labs personally reviewed      Assessment and Plan:   Assessment:  · Assessment		  Patient is a 69 Male pmhx DM, HTN, HLD, CAD s/p CABG, cath ([facility] 3/19/2018), MICU admission for influenza complicated by respiratory failure, JAKOB, NSTEMI, HF, A&Ox1 [place] speaking only, difficult to obtain information from. Collateral history obtained from wife, states patient had a fall on 7/1/2020 and has been complaining of L hip pain and since fall has been crawling to get around. Reports that he has been more confused and weak as well. Denies any chest pain, shortness of breath, fevers or chills.    Problem/Plan - 1:  ·  Problem: CAD s/p CABG Plan: med records from [facility] obtained and reviewed. h/o CABG with cath in 12/2018 patent graft to LIMA to LAD and SVG to RPDA, stent to 80% ramus  - ASA 81mg, statin, Coreg,   - d/c Plavix given melena    Problem/Plan - 2: Systolic congestive heart failure.  Plan: Hx of CABG   ASA, statin, coreg, hold ACE given JAKOB, increase hydral dose   cont lasix 40.   TTE with EF 40-45% consistent with pt h/o of CAD MI    Problem/Plan - 3:  ·  Problem: Elevated troponin.  Plan: Likely 2/2 decompensated HF  Hx of CABG and stent               Ebenezer Jones DO [facility]  Cardiovascular Medicine  [address], [address]  Office: [phone]  Cell: [phone]          Ebenezer Jones DO [facility]  Cardiovascular Medicine  [address] [place] [address], [address]  Office: [phone]  Cell: [phone]
Patient is a 69y old  Male who presents with a chief complaint of fall (30 Jul 2020 17:10)      INTERVAL HISTORY: feels ok    MEDICATIONS:  carvedilol 25 milliGRAM(s) Oral every 12 hours  furosemide    Tablet 40 milliGRAM(s) Oral daily  hydrALAZINE 50 milliGRAM(s) Oral three times a day  tamsulosin 0.4 milliGRAM(s) Oral at bedtime        PHYSICAL EXAM:  T(C): 36.7 (07-30-20 @ 20:52), Max: 36.7 (07-29-20 @ 23:00)  HR: 68 (07-30-20 @ 20:52) (64 - 76)  BP: 137/66 (07-30-20 @ 20:52) (122/70 - 159/73)  RR: 18 (07-30-20 @ 20:52) (18 - 19)  SpO2: 99% (07-30-20 @ 20:52) (99% - 100%)  Wt(kg): --  I&O's Summary    29 Jul 2020 07:01  -  30 Jul 2020 07:00  --------------------------------------------------------  IN: 1140 mL / OUT: 1350 mL / NET: -210 mL    30 Jul 2020 07:01  -  30 Jul 2020 21:47  --------------------------------------------------------  IN: 1380 mL / OUT: 575 mL / NET: 805 mL          Appearance: In no distress	  HEENT:    PERRL, EOMI	  Cardiovascular:  S1 S2, No JVD  Respiratory: Lungs clear to auscultation	  Gastrointestinal:  Soft, Non-tender, + BS	  Vascularature:  No edema of LE  Psychiatric: Appropriate affect   Neuro: no acute focal deficits                               8.5    5.68  )-----------( 153      ( 30 Jul 2020 07:19 )             26.3     07-30    144  |  107  |  93<H>  ----------------------------<  206<H>  4.6   |  21<L>  |  4.00<H>    Ca    8.9      30 Jul 2020 07:19          Labs personally reviewed    Assessment and Plan:   Assessment:  · Assessment		  Patient is a 69 Male pmhx DM, HTN, HLD, CAD s/p CABG, cath ([facility] 3/19/2018), MICU admission for influenza complicated by respiratory failure, JAKOB, NSTEMI, HF, A&Ox1 [place] speaking only, difficult to obtain information from. Collateral history obtained from wife, states patient had a fall on 7/1/2020 and has been complaining of L hip pain and since fall has been crawling to get around. Reports that he has been more confused and weak as well. Denies any chest pain, shortness of breath, fevers or chills.    Problem/Plan - 1:  ·  Problem: CAD s/p CABG Plan: med records from [facility] obtained and reviewed. h/o CABG with cath in 12/2018 patent graft to LIMA to LAD and SVG to RPDA, stent to 80% ramus  - ASA 81mg, statin, Coreg,   - d/c Plavix     Problem/Plan - 2: Systolic congestive heart failure.  Plan: Hx of CABG   ASA, statin, coreg, hold ACE given JAKOB, increase hydral dose   cont lasix 40.   TTE with EF 40-45% consistent with pt h/o of CAD MI    Problem/Plan - 3:  ·  Problem: Elevated troponin.  Plan: Likely 2/2 decompensated HF  Hx of CABG and stent             Ebenezer Jones DO [facility]  Cardiovascular Medicine  [address], [address]  Office: [phone]  Cell: [phone]        Ebenezer Jones DO [facility]  Cardiovascular Medicine  [address], [address]  Office: [phone]  Cell: [phone]
Patient is a 69y old  Male who presents with a chief complaint of fall (31 Jul 2020 16:44)      INTERVAL HISTORY: feels ok    MEDICATIONS:  carvedilol 25 milliGRAM(s) Oral every 12 hours  hydrALAZINE 50 milliGRAM(s) Oral three times a day  tamsulosin 0.4 milliGRAM(s) Oral at bedtime        PHYSICAL EXAM:  T(C): 36.6 (07-31-20 @ 12:46), Max: 37.1 (07-31-20 @ 04:15)  HR: 67 (07-31-20 @ 12:46) (64 - 68)  BP: 134/63 (07-31-20 @ 12:46) (126/64 - 137/66)  RR: 18 (07-31-20 @ 12:46) (18 - 18)  SpO2: 96% (07-31-20 @ 12:46) (96% - 100%)  Wt(kg): --  I&O's Summary    30 Jul 2020 07:01  -  31 Jul 2020 07:00  --------------------------------------------------------  IN: 1630 mL / OUT: 975 mL / NET: 655 mL    31 Jul 2020 07:01  -  31 Jul 2020 19:23  --------------------------------------------------------  IN: 840 mL / OUT: 600 mL / NET: 240 mL          Appearance: In no distress	  HEENT:    PERRL, EOMI	  Cardiovascular:  S1 S2, No JVD  Respiratory: Lungs clear to auscultation	  Gastrointestinal:  Soft, Non-tender, + BS	  Vascularature:  No edema of LE  Psychiatric: Appropriate affect   Neuro: no acute focal deficits                               7.9    6.28  )-----------( 129      ( 31 Jul 2020 12:10 )             24.4     07-31    143  |  106  |  100<H>  ----------------------------<  131<H>  5.1   |  18<L>  |  4.57<H>    Ca    8.7      31 Jul 2020 06:52          Labs personally reviewed      Assessment and Plan:   Assessment:  · Assessment		  Patient is a 69 Male pmhx DM, HTN, HLD, CAD s/p CABG, cath ( 3/19/2018), MICU admission for influenza complicated by respiratory failure, JAKOB, NSTEMI, HF, A&Ox1 [place] speaking only, difficult to obtain information from. Collateral history obtained from wife, states patient had a fall on 7/1/2020 and has been complaining of L hip pain and since fall has been crawling to get around. Reports that he has been more confused and weak as well. Denies any chest pain, shortness of breath, fevers or chills.    Problem/Plan - 1:  ·  Problem: CAD s/p CABG Plan: med records from [facility] obtained and reviewed. h/o CABG with cath in 12/2018 patent graft to LIMA to LAD and SVG to RPDA, stent to 80% ramus  - ASA 81mg, statin, Coreg,   - d/c Plavix     Problem/Plan - 2: Systolic congestive heart failure.  Plan: Hx of CABG   ASA, statin, coreg, hold ACE given JAKOB, increase hydral dose   cont lasix 40.   TTE with EF 40-45% consistent with pt h/o of CAD MI    Problem/Plan - 3:  ·  Problem: Elevated troponin.  Plan: Likely 2/2 decompensated HF  Hx of CABG and stent           Ebenezer Jones DO [facility]  Cardiovascular Medicine  [address], [address]  Office: [phone]  Cell: [phone]
Pre-Endoscopy Evaluation      Referring Physician:  dr. matias                                  Procedure:  upper gastrointestinal endoscopy/colonoscopy    Indication for Procedure: gib    PAST MEDICAL & SURGICAL HISTORY:  CHF (congestive heart failure)  Hyperlipemia  DM (diabetes mellitus)  HTN (hypertension)  History of open heart surgery: bypass      PMH of Gastroparesis [ ]  Gastric Surgery [ ]  Gastric Outlet Obstruction [ ]    Allergies    No Known Allergies    Intolerances    Latex allergy: [ ] yes [x] no    Medications:MEDICATIONS  (STANDING):  aspirin enteric coated 81 milliGRAM(s) Oral daily  atorvastatin 80 milliGRAM(s) Oral at bedtime  bisacodyl 10 milliGRAM(s) Oral at bedtime  carvedilol 25 milliGRAM(s) Oral every 12 hours  dextrose 5%. 1000 milliLiter(s) (50 mL/Hr) IV Continuous <Continuous>  dextrose 50% Injectable 12.5 Gram(s) IV Push once  dextrose 50% Injectable 25 Gram(s) IV Push once  dextrose 50% Injectable 25 Gram(s) IV Push once  ferrous    sulfate 325 milliGRAM(s) Oral daily  finasteride 5 milliGRAM(s) Oral daily  furosemide    Tablet 40 milliGRAM(s) Oral daily  hydrALAZINE 50 milliGRAM(s) Oral three times a day  insulin glargine Injectable (LANTUS) 10 Unit(s) SubCutaneous at bedtime  insulin lispro (HumaLOG) corrective regimen sliding scale   SubCutaneous three times a day before meals  insulin lispro (HumaLOG) corrective regimen sliding scale   SubCutaneous at bedtime  pantoprazole  Injectable 40 milliGRAM(s) IV Push two times a day  tamsulosin 0.4 milliGRAM(s) Oral at bedtime    MEDICATIONS  (PRN):  acetaminophen   Tablet .. 650 milliGRAM(s) Oral every 6 hours PRN Moderate Pain (4 - 6)  dextrose 40% Gel 15 Gram(s) Oral once PRN Blood Glucose LESS THAN 70 milliGRAM(s)/deciliter  glucagon  Injectable 1 milliGRAM(s) IntraMuscular once PRN Glucose LESS THAN 70 milligrams/deciliter      Smoking: [ ] yes  [X] no    AICD/PPM: [ ] yes   [x] no    Pertinent lab data:                        8.5    5.59  )-----------( 164      ( 2020 07:40 )             26.5     07-27    141  |  106  |  100<H>  ----------------------------<  115<H>  4.6   |  16<L>  |  3.74<H>    Ca    9.0      2020 07:40      COVID-19 PCR: NotDetec (2020 12:10)      CAPILLARY BLOOD GLUCOSE  POCT Blood Glucose.: 129 mg/dL (2020 08:18)      Physical Examination:  Daily     Daily Weight in k.3 (2020 05:54)  Vital Signs Last 24 Hrs  T(C): 36.3 (2020 05:22), Max: 36.9 (2020 14:39)  T(F): 97.4 (2020 05:22), Max: 98.4 (2020 14:39)  HR: 67 (2020 05:22) (62 - 79)  BP: 143/76 (2020 05:54) (114/64 - 163/81)  BP(mean): --  RR: 19 (2020 05:22) (18 - 19)  SpO2: 99% (2020 05:22) (98% - 99%)    Drug Dosing Weight  Height (cm): 162.6 (15 Jul 2020 23:30)  Weight (kg): 70.5 (15 Jul 2020 23:30)  BMI (kg/m2): 26.7 (15 Jul 2020 23:30)  BSA (m2): 1.76 (15 Jul 2020 23:30)    Constitutional: NAD      Neck:  No JVD    Respiratory: CTAB/L    Cardiovascular: S1 and S2    Gastrointestinal: BS+, soft, NT/ND    Extremities: No peripheral edema    Neurological: A/O x 3    Comments:    The patient is a suitable candidate for the planned procedure unless box checked [ ]  No, explain:
[facility]   MD MISHA SÁNCHEZ MD RUORU WONG, [place]    TEL:  OFFICE: [phone]  DR PARKER CELL: [phone]  BETH SMITH CELL: [phone]  DR. CHAKRABORTY CELL: [phone]  DR. REYNA CELL: [phone]    FROM 5 PM - 7 AM PLEASE CALL ANSWERING SERVICE: [phone]    RENAL FOLLOW UP NOTE  --------------------------------------------------------------------------------  HPI:      Pt seen and examined at bedside.   Denies SOB, chest pain     PAST HISTORY  --------------------------------------------------------------------------------  No significant changes to PMH, PSH, FHx, SHx, unless otherwise noted    ALLERGIES & MEDICATIONS  --------------------------------------------------------------------------------  Allergies    No Known Allergies    Intolerances      Standing Inpatient Medications  aspirin enteric coated 81 milliGRAM(s) Oral daily  atorvastatin 40 milliGRAM(s) Oral at bedtime  carvedilol 12.5 milliGRAM(s) Oral every 12 hours  dextrose 5%. 1000 milliLiter(s) IV Continuous <Continuous>  dextrose 50% Injectable 12.5 Gram(s) IV Push once  dextrose 50% Injectable 25 Gram(s) IV Push once  dextrose 50% Injectable 25 Gram(s) IV Push once  furosemide   Injectable 40 milliGRAM(s) IV Push daily  insulin lispro (HumaLOG) corrective regimen sliding scale   SubCutaneous three times a day before meals  insulin lispro (HumaLOG) corrective regimen sliding scale   SubCutaneous at bedtime    PRN Inpatient Medications  acetaminophen   Tablet .. 650 milliGRAM(s) Oral every 6 hours PRN  dextrose 40% Gel 15 Gram(s) Oral once PRN  glucagon  Injectable 1 milliGRAM(s) IntraMuscular once PRN      REVIEW OF SYSTEMS  --------------------------------------------------------------------------------  General: no fever  CVS: no chest pain  RESP: no sob, no cough  ABD: no abdominal pain  : no dysuria,  MSK: improving edema     VITALS/PHYSICAL EXAM  --------------------------------------------------------------------------------  T(C): 36.6 (07-17-20 @ 05:17), Max: 36.6 (07-17-20 @ 05:17)  HR: 65 (07-17-20 @ 05:17) (65 - 72)  BP: 155/80 (07-17-20 @ 05:17) (155/80 - 158/79)  RR: 17 (07-17-20 @ 05:17) (17 - 17)  SpO2: 99% (07-17-20 @ 05:17) (96% - 99%)  Wt(kg): --  Height (cm): 162.6 (07-15-20 @ 23:30)  Weight (kg): 70.5 (07-15-20 @ 23:30)  BMI (kg/m2): 26.7 (07-15-20 @ 23:30)  BSA (m2): 1.76 (07-15-20 @ 23:30)      07-16-20 @ 07:01  -  07-17-20 @ 07:00  --------------------------------------------------------  IN: 840 mL / OUT: 1350 mL / NET: -510 mL      Physical Exam:  	Gen: NAD  	HEENT: MMM  	Pulm: CTA B/L  	CV: S1S2  	Abd: Soft, +BS  	Ext: improving  LE edema B/L                      Neuro: Awake   	Skin: Warm and Dry   	Vascular access: no hd catheter            no  brandy  LABS/STUDIES  --------------------------------------------------------------------------------              9.1    5.00  >-----------<  174      [07-17-20 @ 07:18]              28.9     138  |  103  |  84  ----------------------------<  61      [07-17-20 @ 07:13]  4.7   |  22  |  3.90        Ca     8.6     [07-17-20 @ 07:13]    TPro  6.6  /  Alb  2.9  /  TBili  0.3  /  DBili  x   /  AST  19  /  ALT  11  /  AlkPhos  271  [07-16-20 @ 07:42]    PT/INR: PT 13.2 , INR 1.12       [07-15-20 @ 12:57]  PTT: 39.7       [07-15-20 @ 12:57]          [07-15-20 @ 21:38]    Creatinine Trend:  SCr 3.90 [07-17 @ 07:13]  SCr 3.49 [07-16 @ 07:42]  SCr 3.40 [07-15 @ 21:38]  SCr 3.55 [07-15 @ 12:57]    Urinalysis - [07-16-20 @ 08:58]      Color Colorless / Appearance Clear / SG 1.012 / pH 6.5      Gluc Negative / Ketone Negative  / Bili Negative / Urobili <2 mg/dL       Blood Negative / Protein 30 mg/dL / Leuk Est Negative / Nitrite Negative      RBC 1 / WBC 1 / Hyaline 0 / Gran  / Sq Epi  / Non Sq Epi 0 / Bacteria Negative    Urine Creatinine 38      [07-16-20 @ 07:07]  Urine Protein 88      [07-16-20 @ 07:07]  Urine Sodium 98      [07-16-20 @ 07:07]  Urine Chloride 77      [07-16-20 @ 07:07]    Iron 40, TIBC 162, %sat 25      [07-16-20 @ 09:30]  Ferritin 334      [07-16-20 @ 09:29]  HbA1c 11.4      [03-04-18 @ 11:53]  TSH 3.57      [07-16-20 @ 09:29]  Lipid: chol 122, TG 47, HDL 39, LDL 73      [07-16-20 @ 09:30]    HCV 0.34, Nonreact      [07-16-20 @ 09:30]
[facility]   MD MISHA SÁNCHEZ MD RUORU WONG, [place]    TEL:  OFFICE: [phone]  DR PARKER CELL: [phone]  BETH SMITH CELL: [phone]  DR. CHAKRABORTY CELL: [phone]  DR. REYNA CELL: [phone]    FROM 5 PM - 7 AM PLEASE CALL ANSWERING SERVICE: [phone]    RENAL FOLLOW UP NOTE  --------------------------------------------------------------------------------  HPI:      Pt seen and examined at bedside.        PAST HISTORY  --------------------------------------------------------------------------------  No significant changes to PMH, PSH, FHx, SHx, unless otherwise noted    ALLERGIES & MEDICATIONS  --------------------------------------------------------------------------------  Allergies    No Known Allergies    Intolerances      Standing Inpatient Medications  aspirin enteric coated 81 milliGRAM(s) Oral daily  atorvastatin 80 milliGRAM(s) Oral at bedtime  carvedilol 12.5 milliGRAM(s) Oral every 12 hours  clopidogrel Tablet 75 milliGRAM(s) Oral daily  dextrose 5%. 1000 milliLiter(s) IV Continuous <Continuous>  dextrose 50% Injectable 12.5 Gram(s) IV Push once  dextrose 50% Injectable 25 Gram(s) IV Push once  dextrose 50% Injectable 25 Gram(s) IV Push once  ferrous    sulfate 325 milliGRAM(s) Oral daily  finasteride 5 milliGRAM(s) Oral daily  furosemide    Tablet 40 milliGRAM(s) Oral daily  hydrALAZINE 25 milliGRAM(s) Oral three times a day  insulin glargine Injectable (LANTUS) 10 Unit(s) SubCutaneous at bedtime  insulin lispro (HumaLOG) corrective regimen sliding scale   SubCutaneous three times a day before meals  insulin lispro (HumaLOG) corrective regimen sliding scale   SubCutaneous at bedtime  tamsulosin 0.4 milliGRAM(s) Oral at bedtime    PRN Inpatient Medications  acetaminophen   Tablet .. 650 milliGRAM(s) Oral every 6 hours PRN  dextrose 40% Gel 15 Gram(s) Oral once PRN  glucagon  Injectable 1 milliGRAM(s) IntraMuscular once PRN      REVIEW OF SYSTEMS  --------------------------------------------------------------------------------  General: no fever  MSK: no edema     VITALS/PHYSICAL EXAM  --------------------------------------------------------------------------------  T(C): 36.7 (07-18-20 @ 04:46), Max: 36.7 (07-18-20 @ 04:46)  HR: 67 (07-18-20 @ 04:46) (63 - 77)  BP: 155/74 (07-18-20 @ 04:46) (129/67 - 162/78)  RR: 18 (07-18-20 @ 04:46) (17 - 18)  SpO2: 96% (07-18-20 @ 04:46) (94% - 100%)  Wt(kg): --        07-17-20 @ 07:01  -  07-18-20 @ 07:00  --------------------------------------------------------  IN: 1060 mL / OUT: 1980 mL / NET: -920 mL      Physical Exam:  	Gen: NAD  	HEENT: MMM  	Pulm: CTA B/L  	CV: S1S2  	Abd: Soft, +BS  	Ext: No LE edema B/L                      Neuro: Awake alert  	Skin: Warm and Dry   	Vascular access: no hd catheter            no  nava  LABS/STUDIES  --------------------------------------------------------------------------------              8.8    5.01  >-----------<  163      [07-18-20 @ 07:10]              27.8     139  |  103  |  87  ----------------------------<  164      [07-18-20 @ 07:10]  4.4   |  21  |  3.85        Ca     8.6     [07-18-20 @ 07:10]            Creatinine Trend:  SCr 3.85 [07-18 @ 07:10]  SCr 3.90 [07-17 @ 07:13]  SCr 3.49 [07-16 @ 07:42]  SCr 3.40 [07-15 @ 21:38]  SCr 3.55 [07-15 @ 12:57]    Urinalysis - [07-16-20 @ 08:58]      Color Colorless / Appearance Clear / SG 1.012 / pH 6.5      Gluc Negative / Ketone Negative  / Bili Negative / Urobili <2 mg/dL       Blood Negative / Protein 30 mg/dL / Leuk Est Negative / Nitrite Negative      RBC 1 / WBC 1 / Hyaline 0 / Gran  / Sq Epi  / Non Sq Epi 0 / Bacteria Negative    Urine Creatinine 38      [07-16-20 @ 07:07]  Urine Protein 88      [07-16-20 @ 07:07]  Urine Sodium 98      [07-16-20 @ 07:07]  Urine Chloride 77      [07-16-20 @ 07:07]    Iron 40, TIBC 162, %sat 25      [07-16-20 @ 09:30]  Ferritin 334      [07-16-20 @ 09:29]  HbA1c 11.4      [03-04-18 @ 11:53]  TSH 3.57      [07-16-20 @ 09:29]  Lipid: chol 122, TG 47, HDL 39, LDL 73      [07-16-20 @ 09:30]    HBsAb Reactive      [07-17-20 @ 10:47]  HBsAg Nonreact      [07-17-20 @ 10:47]  HBcAb Reactive      [07-17-20 @ 10:47]  HCV 0.34, Nonreact      [07-16-20 @ 09:30]    C3 Complement 108      [07-17-20 @ 08:31]  C4 Complement 24      [07-17-20 @ 08:31]  Free Light Chains: kappa 12.10, lambda 11.77, ratio = 1.03      [07-17 @ 08:31]  Immunofixation Serum:   One IgG Kappa and One IgG Lambda Bands Identified    Reference Range: None Detected      [07-17-20 @ 08:31]
[facility]   MD Toñito Paris MD, D.O. Ruoru Wong, [place]    From 7 AM - 5 PM:  OFFICE: [phone]  Dr. Malcolm cell: [phone]  Dr. Teixeira cell: [phone]  Dr. Meza cell: [phone]  JD Brunson cell: [phone]    From 5 PM - 7 AM: Answering Service: [phone]      RENAL FOLLOW UP NOTE  --------------------------------------------------------------------------------  HPI: Pt seen and examined at bedside.   Denies SOB, chest pain or LE EDEMA     PAST HISTORY  --------------------------------------------------------------------------------  No significant changes to PMH, PSH, FHx, SHx, unless otherwise noted    ALLERGIES & MEDICATIONS  --------------------------------------------------------------------------------  Allergies    No Known Allergies    Intolerances      Standing Inpatient Medications  aspirin enteric coated 81 milliGRAM(s) Oral daily  atorvastatin 80 milliGRAM(s) Oral at bedtime  bisacodyl 10 milliGRAM(s) Oral at bedtime  carvedilol 25 milliGRAM(s) Oral every 12 hours  dextrose 5%. 1000 milliLiter(s) IV Continuous <Continuous>  dextrose 50% Injectable 12.5 Gram(s) IV Push once  dextrose 50% Injectable 25 Gram(s) IV Push once  dextrose 50% Injectable 25 Gram(s) IV Push once  ferrous    sulfate 325 milliGRAM(s) Oral daily  finasteride 5 milliGRAM(s) Oral daily  hydrALAZINE 50 milliGRAM(s) Oral three times a day  insulin glargine Injectable (LANTUS) 10 Unit(s) SubCutaneous at bedtime  insulin lispro (HumaLOG) corrective regimen sliding scale   SubCutaneous three times a day before meals  insulin lispro (HumaLOG) corrective regimen sliding scale   SubCutaneous at bedtime  pantoprazole  Injectable 40 milliGRAM(s) IV Push two times a day  sodium bicarbonate 1300 milliGRAM(s) Oral three times a day  tamsulosin 0.4 milliGRAM(s) Oral at bedtime    PRN Inpatient Medications  acetaminophen   Tablet .. 650 milliGRAM(s) Oral every 6 hours PRN  dextrose 40% Gel 15 Gram(s) Oral once PRN  glucagon  Injectable 1 milliGRAM(s) IntraMuscular once PRN      REVIEW OF SYSTEMS  --------------------------------------------------------------------------------  General: no fever  CVS: no chest pain  RESP: no sob, no cough  ABD: no abdominal pain  : no dysuria  MSK: no edema     VITALS/PHYSICAL EXAM  --------------------------------------------------------------------------------  T(C): 36.6 (08-01-20 @ 04:51), Max: 36.8 (07-31-20 @ 21:17)  HR: 60 (08-01-20 @ 04:51) (60 - 72)  BP: 135/64 (08-01-20 @ 04:51) (134/63 - 153/65)  RR: 18 (08-01-20 @ 04:51) (18 - 18)  SpO2: 99% (08-01-20 @ 04:51) (96% - 99%)  Wt(kg): --        07-31-20 @ 07:01  -  08-01-20 @ 07:00  --------------------------------------------------------  IN: 1560 mL / OUT: 2450 mL / NET: -890 mL      Physical Exam:  	Gen: NAD  	HEENT: MMM  	Pulm: CTA B/L  	CV: S1S2  	Abd: Soft, +BS  	Ext: No LE edema B/L                      Neuro: Awake   	Skin: Warm and Dry       LABS/STUDIES  --------------------------------------------------------------------------------              8.2    6.80  >-----------<  138      [08-01-20 @ 07:23]              25.3     143  |  106  |  103  ----------------------------<  131      [08-01-20 @ 07:21]  5.1   |  19  |  4.65        Ca     9.0     [08-01-20 @ 07:21]    Creatinine Trend:  SCr 4.65 [08-01 @ 07:21]  SCr 4.57 [07-31 @ 06:52]  SCr 4.00 [07-30 @ 07:19]  SCr 3.78 [07-29 @ 06:19]  SCr 3.44 [07-28 @ 06:30]    Urinalysis - [08-01-20 @ 03:28]      Color Light Yellow / Appearance Clear / SG 1.014 / pH 5.5      Gluc Negative / Ketone Negative  / Bili Negative / Urobili Negative       Blood Negative / Protein 30 mg/dL / Leuk Est Moderate / Nitrite Negative      RBC 2 / WBC 6 / Hyaline 0 / Gran  / Sq Epi  / Non Sq Epi 0 / Bacteria Negative    Urine Creatinine 58      [08-01-20 @ 03:28]  Urine Protein 87      [07-26-20 @ 23:48]    Iron 40, TIBC 162, %sat 25      [07-16-20 @ 09:30]  Ferritin 334      [07-16-20 @ 09:29]  HbA1c 11.4      [03-04-18 @ 11:53]  TSH 3.57      [07-16-20 @ 09:29]  Lipid: chol 122, TG 47, HDL 39, LDL 73      [07-16-20 @ 09:30]    HBsAb Reactive      [07-17-20 @ 10:47]  HBsAg Nonreact      [07-17-20 @ 10:47]  HBcAb Reactive      [07-17-20 @ 10:47]  HCV 0.34, Nonreact      [07-16-20 @ 09:30]    GLYNN: titer Negative, pattern --      [07-17-20 @ 10:44]  C3 Complement 108      [07-17-20 @ 08:31]  C4 Complement 24      [07-17-20 @ 08:31]  ANCA: cANCA Negative, pANCA Negative, atypical ANCA Indeterminate      [07-17-20 @ 10:43]  Syphilis Screen (Treponema Pallidum Ab) Positive      [07-17-20 @ 10:44]  Syphilis Screen (RPR Titer) <1:1      [07-17-20 @ 10:44]  PLA2R: NICKO <2, IFA Negative      [07-17-20 @ 13:04]  Free Light Chains: kappa 12.10, lambda 11.77, ratio = 1.03      [07-17 @ 08:31]  Immunofixation Serum:     IgG Kappa Band Identified and a weak IgG Lambda Band Identified    Reference Range: None Detected      [07-19-20 @ 14:08]  SPEP Interpretation: Two weak Gamma-Migrating Paraproteins Identified      [07-19-20 @ 14:08]  Immunofixation Urine: Reference Range: None Detected      [07-26-20 @ 23:48]
[facility] [place] [facility]   MD Toñito Paris MD, D.O. Ruoru Wong, PA    From 7 AM - 5 PM:  OFFICE: [phone]  Dr. Malcolm cell: [phone]  Dr. Teixeira cell: [phone]  Dr. Meza cell: [phone]  JD Brunson cell: [phone]    From 5 PM - 7 AM: Answering Service: [phone]      RENAL FOLLOW UP NOTE  --------------------------------------------------------------------------------  HPI: Pt seen and examined at bedside. Wife at bedside      PAST HISTORY  --------------------------------------------------------------------------------  No significant changes to PMH, PSH, FHx, SHx, unless otherwise noted    ALLERGIES & MEDICATIONS  --------------------------------------------------------------------------------  Allergies    No Known Allergies    Intolerances      Standing Inpatient Medications  aspirin enteric coated 81 milliGRAM(s) Oral daily  atorvastatin 80 milliGRAM(s) Oral at bedtime  bisacodyl 10 milliGRAM(s) Oral at bedtime  carvedilol 25 milliGRAM(s) Oral every 12 hours  dextrose 5%. 1000 milliLiter(s) IV Continuous <Continuous>  dextrose 50% Injectable 12.5 Gram(s) IV Push once  dextrose 50% Injectable 25 Gram(s) IV Push once  dextrose 50% Injectable 25 Gram(s) IV Push once  ferrous    sulfate 325 milliGRAM(s) Oral daily  finasteride 5 milliGRAM(s) Oral daily  hydrALAZINE 50 milliGRAM(s) Oral three times a day  insulin glargine Injectable (LANTUS) 10 Unit(s) SubCutaneous at bedtime  insulin lispro (HumaLOG) corrective regimen sliding scale   SubCutaneous three times a day before meals  insulin lispro (HumaLOG) corrective regimen sliding scale   SubCutaneous at bedtime  pantoprazole  Injectable 40 milliGRAM(s) IV Push two times a day  sodium bicarbonate 1300 milliGRAM(s) Oral three times a day  tamsulosin 0.4 milliGRAM(s) Oral at bedtime    PRN Inpatient Medications  acetaminophen   Tablet .. 650 milliGRAM(s) Oral every 6 hours PRN  dextrose 40% Gel 15 Gram(s) Oral once PRN  glucagon  Injectable 1 milliGRAM(s) IntraMuscular once PRN      REVIEW OF SYSTEMS  --------------------------------------------------------------------------------  General: no fever  CVS: no chest pain  RESP: no sob, no cough  ABD: no abdominal pain  : no dysuria,  MSK: no edema     VITALS/PHYSICAL EXAM  --------------------------------------------------------------------------------  T(C): 36.7 (08-02-20 @ 11:40), Max: 37.2 (08-02-20 @ 04:01)  HR: 60 (08-02-20 @ 11:40) (60 - 65)  BP: 109/51 (08-02-20 @ 11:40) (109/51 - 159/80)  RR: 16 (08-02-20 @ 11:40) (16 - 18)  SpO2: 97% (08-02-20 @ 11:40) (97% - 98%)  Wt(kg): --        08-01-20 @ 07:01  -  08-02-20 @ 07:00  --------------------------------------------------------  IN: 600 mL / OUT: 1600 mL / NET: -1000 mL    08-02-20 @ 07:01  -  08-02-20 @ 14:56  --------------------------------------------------------  IN: 720 mL / OUT: 200 mL / NET: 520 mL      Physical Exam:  	Gen: NAD  	HEENT: MMM  	Pulm: CTA B/L  	CV: S1S2  	Abd: Soft, +BS  	Ext: No LE edema B/L                      Neuro: Awake   	Skin: Warm and Dry       LABS/STUDIES  --------------------------------------------------------------------------------              8.0    6.32  >-----------<  127      [08-02-20 @ 07:07]              24.9     142  |  106  |  99  ----------------------------<  104      [08-02-20 @ 07:07]  5.1   |  19  |  4.27        Ca     8.7     [08-02-20 @ 07:07]    Creatinine Trend:  SCr 4.27 [08-02 @ 07:07]  SCr 4.65 [08-01 @ 07:21]  SCr 4.57 [07-31 @ 06:52]  SCr 4.00 [07-30 @ 07:19]  SCr 3.78 [07-29 @ 06:19]    Urinalysis - [08-01-20 @ 03:28]      Color Light Yellow / Appearance Clear / SG 1.014 / pH 5.5      Gluc Negative / Ketone Negative  / Bili Negative / Urobili Negative       Blood Negative / Protein 30 mg/dL / Leuk Est Moderate / Nitrite Negative      RBC 2 / WBC 6 / Hyaline 0 / Gran  / Sq Epi  / Non Sq Epi 0 / Bacteria Negative    Urine Creatinine 58      [08-01-20 @ 03:28]  Urine Protein 93      [08-02-20 @ 11:01]  Urine Urea Nitrogen 616      [08-01-20 @ 08:11]    Iron 40, TIBC 162, %sat 25      [07-16-20 @ 09:30]  Ferritin 334      [07-16-20 @ 09:29]  HbA1c 11.4      [03-04-18 @ 11:53]  TSH 3.57      [07-16-20 @ 09:29]  Lipid: chol 122, TG 47, HDL 39, LDL 73      [07-16-20 @ 09:30]    HBsAb Reactive      [07-17-20 @ 10:47]  HBsAg Nonreact      [07-17-20 @ 10:47]  HBcAb Reactive      [07-17-20 @ 10:47]  HCV 0.34, Nonreact      [07-16-20 @ 09:30]    GLYNN: titer Negative, pattern --      [07-17-20 @ 10:44]  C3 Complement 108      [07-17-20 @ 08:31]  C4 Complement 24      [07-17-20 @ 08:31]  ANCA: cANCA Negative, pANCA Negative, atypical ANCA Indeterminate      [07-17-20 @ 10:43]  Syphilis Screen (Treponema Pallidum Ab) Positive      [07-17-20 @ 10:44]  Syphilis Screen (RPR Titer) <1:1      [07-17-20 @ 10:44]  PLA2R: NICKO <2, IFA Negative      [07-17-20 @ 13:04]  Free Light Chains: kappa 12.10, lambda 11.77, ratio = 1.03      [07-17 @ 08:31]  Immunofixation Serum:     IgG Kappa Band Identified and a weak IgG Lambda Band Identified    Reference Range: None Detected      [07-19-20 @ 14:08]  SPEP Interpretation: Two weak Gamma-Migrating Paraproteins Identified      [07-19-20 @ 14:08]  Immunofixation Urine: Reference Range: None Detected      [07-26-20 @ 23:48]
[facility] [place] [facility]   MD MISHA SÁNCHEZ MD RUORU WONG, PA    TEL:  OFFICE: [phone]  DR PARKER CELL: [phone]  BETH SMITH CELL: [phone]  DR. CHAKRABORTY CELL: [phone]  DR. REYNA CELL: [phone]    FROM 5 PM - 7 AM PLEASE CALL ANSWERING SERVICE: [phone]    RENAL FOLLOW UP NOTE  --------------------------------------------------------------------------------  HPI:      Pt seen and examined at bedside.   Denies SOB, chest pain     PAST HISTORY  --------------------------------------------------------------------------------  No significant changes to PMH, PSH, FHx, SHx, unless otherwise noted    ALLERGIES & MEDICATIONS  --------------------------------------------------------------------------------  Allergies    No Known Allergies    Intolerances      Standing Inpatient Medications  aspirin enteric coated 81 milliGRAM(s) Oral daily  atorvastatin 80 milliGRAM(s) Oral at bedtime  carvedilol 25 milliGRAM(s) Oral every 12 hours  clopidogrel Tablet 75 milliGRAM(s) Oral daily  dextrose 5%. 1000 milliLiter(s) IV Continuous <Continuous>  dextrose 50% Injectable 12.5 Gram(s) IV Push once  dextrose 50% Injectable 25 Gram(s) IV Push once  dextrose 50% Injectable 25 Gram(s) IV Push once  ferrous    sulfate 325 milliGRAM(s) Oral daily  finasteride 5 milliGRAM(s) Oral daily  furosemide    Tablet 40 milliGRAM(s) Oral daily  hydrALAZINE 50 milliGRAM(s) Oral three times a day  insulin glargine Injectable (LANTUS) 10 Unit(s) SubCutaneous at bedtime  insulin lispro (HumaLOG) corrective regimen sliding scale   SubCutaneous three times a day before meals  insulin lispro (HumaLOG) corrective regimen sliding scale   SubCutaneous at bedtime  tamsulosin 0.4 milliGRAM(s) Oral at bedtime    PRN Inpatient Medications  acetaminophen   Tablet .. 650 milliGRAM(s) Oral every 6 hours PRN  dextrose 40% Gel 15 Gram(s) Oral once PRN  glucagon  Injectable 1 milliGRAM(s) IntraMuscular once PRN      REVIEW OF SYSTEMS  --------------------------------------------------------------------------------  General: no fever  CVS: no chest pain  RESP: no sob, no cough  ABD: no abdominal pain  : no dysuria,  MSK: no edema     VITALS/PHYSICAL EXAM  --------------------------------------------------------------------------------  T(C): 36.5 (07-24-20 @ 04:51), Max: 36.5 (07-23-20 @ 12:36)  HR: 66 (07-24-20 @ 04:51) (59 - 66)  BP: 138/68 (07-24-20 @ 04:51) (121/68 - 138/68)  RR: 18 (07-24-20 @ 04:51) (18 - 18)  SpO2: 98% (07-24-20 @ 04:51) (96% - 98%)  Wt(kg): --        07-23-20 @ 07:01  -  07-24-20 @ 07:00  --------------------------------------------------------  IN: 0 mL / OUT: 1000 mL / NET: -1000 mL    07-24-20 @ 07:01  -  07-24-20 @ 10:06  --------------------------------------------------------  IN: 240 mL / OUT: 0 mL / NET: 240 mL      Physical Exam:  	Gen: NAD  	HEENT: MMM  	Pulm: CTA B/L  	CV: S1S2  	Abd: Soft, +BS  	Ext: No LE edema B/L                      Neuro: Awake non focal  	Skin: Warm and Dry   	Vascular access: no hd catheter           no  nava  LABS/STUDIES  --------------------------------------------------------------------------------                Creatinine Trend:  SCr 3.93 [07-20 @ 07:23]  SCr 3.78 [07-19 @ 08:58]  SCr 3.85 [07-18 @ 07:10]  SCr 3.90 [07-17 @ 07:13]  SCr 3.49 [07-16 @ 07:42]    Urinalysis - [07-16-20 @ 08:58]      Color Colorless / Appearance Clear / SG 1.012 / pH 6.5      Gluc Negative / Ketone Negative  / Bili Negative / Urobili <2 mg/dL       Blood Negative / Protein 30 mg/dL / Leuk Est Negative / Nitrite Negative      RBC 1 / WBC 1 / Hyaline 0 / Gran  / Sq Epi  / Non Sq Epi 0 / Bacteria Negative      Iron 40, TIBC 162, %sat 25      [07-16-20 @ 09:30]  Ferritin 334      [07-16-20 @ 09:29]  HbA1c 11.4      [03-04-18 @ 11:53]  TSH 3.57      [07-16-20 @ 09:29]  Lipid: chol 122, TG 47, HDL 39, LDL 73      [07-16-20 @ 09:30]    HBsAb Reactive      [07-17-20 @ 10:47]  HBsAg Nonreact      [07-17-20 @ 10:47]  HBcAb Reactive      [07-17-20 @ 10:47]  HCV 0.34, Nonreact      [07-16-20 @ 09:30]    GLYNN: titer Negative, pattern --      [07-17-20 @ 10:44]  C3 Complement 108      [07-17-20 @ 08:31]  C4 Complement 24      [07-17-20 @ 08:31]  ANCA: cANCA Negative, pANCA Negative, atypical ANCA Indeterminate      [07-17-20 @ 10:43]  Syphilis Screen (Treponema Pallidum Ab) Positive      [07-17-20 @ 10:44]  Syphilis Screen (RPR Titer) <1:1      [07-17-20 @ 10:44]  PLA2R: NICKO <2, IFA Negative      [07-17-20 @ 13:04]  Free Light Chains: kappa 12.10, lambda 11.77, ratio = 1.03      [07-17 @ 08:31]  Immunofixation Serum:     IgG Kappa Band Identified and a weak IgG Lambda Band Identified    Reference Range: None Detected      [07-19-20 @ 14:08]  SPEP Interpretation: Two weak Gamma-Migrating Paraproteins Identified      [07-19-20 @ 14:08]
Subjective: Patient seen and examined. No new events except as noted.     REVIEW OF SYSTEMS:    CONSTITUTIONAL: No weakness, fevers or chills  EYES/ENT: No visual changes;  No vertigo or throat pain   NECK: No pain or stiffness  RESPIRATORY: No cough, wheezing, hemoptysis; No shortness of breath  CARDIOVASCULAR: No chest pain or palpitations  GASTROINTESTINAL: No abdominal or epigastric pain. No nausea, vomiting, or hematemesis; No diarrhea or constipation. No melena or hematochezia.  GENITOURINARY: No dysuria, frequency or hematuria  NEUROLOGICAL: No numbness or weakness  SKIN: No itching, burning, rashes, or lesions   All other review of systems is negative unless indicated above.    MEDICATIONS:  MEDICATIONS  (STANDING):  aspirin enteric coated 81 milliGRAM(s) Oral daily  atorvastatin 80 milliGRAM(s) Oral at bedtime  carvedilol 25 milliGRAM(s) Oral every 12 hours  clopidogrel Tablet 75 milliGRAM(s) Oral daily  dextrose 5%. 1000 milliLiter(s) (50 mL/Hr) IV Continuous <Continuous>  dextrose 50% Injectable 12.5 Gram(s) IV Push once  dextrose 50% Injectable 25 Gram(s) IV Push once  dextrose 50% Injectable 25 Gram(s) IV Push once  ferrous    sulfate 325 milliGRAM(s) Oral daily  finasteride 5 milliGRAM(s) Oral daily  furosemide    Tablet 40 milliGRAM(s) Oral daily  hydrALAZINE 25 milliGRAM(s) Oral three times a day  insulin glargine Injectable (LANTUS) 10 Unit(s) SubCutaneous at bedtime  insulin lispro (HumaLOG) corrective regimen sliding scale   SubCutaneous three times a day before meals  insulin lispro (HumaLOG) corrective regimen sliding scale   SubCutaneous at bedtime  tamsulosin 0.4 milliGRAM(s) Oral at bedtime      PHYSICAL EXAM:  T(C): 36.8 (07-20-20 @ 12:02), Max: 36.8 (07-20-20 @ 12:02)  HR: 64 (07-20-20 @ 12:02) (64 - 75)  BP: 150/72 (07-20-20 @ 12:02) (147/77 - 165/89)  RR: 18 (07-20-20 @ 12:02) (18 - 18)  SpO2: 97% (07-20-20 @ 12:02) (95% - 97%)  Wt(kg): --  I&O's Summary    19 Jul 2020 07:01  -  20 Jul 2020 07:00  --------------------------------------------------------  IN: 400 mL / OUT: 0 mL / NET: 400 mL    20 Jul 2020 07:01  -  20 Jul 2020 16:51  --------------------------------------------------------  IN: 480 mL / OUT: 1050 mL / NET: -570 mL          Appearance: Normal	  HEENT:  PERRLA   Lymphatic: No lymphadenopathy   Cardiovascular: Normal S1 S2, no JVD  Respiratory: normal effort , clear  Gastrointestinal:  Soft, Non-tender  Skin: No rashes,  warm to touch  Psychiatry:  Mood & affect appropriate  Musculuskeletal: No edema      All labs, Imaging and EKGs personally reviewed                           9.4    5.08  )-----------( 168      ( 19 Jul 2020 08:59 )             29.6               07-20    136  |  104  |  86<H>  ----------------------------<  230<H>  4.7   |  19<L>  |  3.93<H>    Ca    8.9      20 Jul 2020 07:23    TPro  6.3  /  Alb  x   /  TBili  x   /  DBili  x   /  AST  x   /  ALT  x   /  AlkPhos  x   07-19
Subjective: Patient seen and examined. No new events except as noted.   doing okay     REVIEW OF SYSTEMS:    CONSTITUTIONAL: No weakness, fevers or chills  EYES/ENT: No visual changes;  No vertigo or throat pain   NECK: No pain or stiffness  RESPIRATORY: No cough, wheezing, hemoptysis; No shortness of breath  CARDIOVASCULAR: No chest pain or palpitations  GASTROINTESTINAL: No abdominal or epigastric pain.   GENITOURINARY: No dysuria, frequency or hematuria  NEUROLOGICAL: No numbness or weakness  SKIN: No itching, burning, rashes, or lesions   All other review of systems is negative unless indicated above.    MEDICATIONS:  MEDICATIONS  (STANDING):  aspirin enteric coated 81 milliGRAM(s) Oral daily  atorvastatin 80 milliGRAM(s) Oral at bedtime  bisacodyl 10 milliGRAM(s) Oral at bedtime  carvedilol 25 milliGRAM(s) Oral every 12 hours  dextrose 5%. 1000 milliLiter(s) (50 mL/Hr) IV Continuous <Continuous>  dextrose 50% Injectable 12.5 Gram(s) IV Push once  dextrose 50% Injectable 25 Gram(s) IV Push once  dextrose 50% Injectable 25 Gram(s) IV Push once  ferrous    sulfate 325 milliGRAM(s) Oral daily  finasteride 5 milliGRAM(s) Oral daily  hydrALAZINE 50 milliGRAM(s) Oral three times a day  insulin glargine Injectable (LANTUS) 10 Unit(s) SubCutaneous at bedtime  insulin lispro (HumaLOG) corrective regimen sliding scale   SubCutaneous three times a day before meals  insulin lispro (HumaLOG) corrective regimen sliding scale   SubCutaneous at bedtime  pantoprazole  Injectable 40 milliGRAM(s) IV Push two times a day  sodium bicarbonate 1300 milliGRAM(s) Oral three times a day  tamsulosin 0.4 milliGRAM(s) Oral at bedtime      PHYSICAL EXAM:  T(C): 36.4 (20 @ 21:06), Max: 36.7 (20 @ 12:06)  HR: 60 (20 @ 21:06) (60 - 69)  BP: 144/73 (20 @ 21:06) (135/64 - 159/80)  RR: 17 (20 @ 21:06) (17 - 18)  SpO2: 98% (08-01-20 @ 21:06) (95% - 99%)  Wt(kg): --  I&O's Summary    2020 07:01  -  01 Aug 2020 07:00  --------------------------------------------------------  IN: 1560 mL / OUT: 2450 mL / NET: -890 mL    01 Aug 2020 07:01  -  02 Aug 2020 00:10  --------------------------------------------------------  IN: 480 mL / OUT: 350 mL / NET: 130 mL          Appearance: Normal	  HEENT:  PERRLA   Lymphatic: No lymphadenopathy   Cardiovascular: Normal S1 S2  Respiratory: normal effort , clear  Gastrointestinal:  Soft, Non-tender  Skin: No rashes,  warm to touch  Psychiatry:  Mood & affect appropriate  Musculuskeletal: No edema      All labs, Imaging and EKGs personally reviewed                             8.2    6.80  )-----------( 138      ( 01 Aug 2020 07:23 )             25.3               08-01    143  |  106  |  103<H>  ----------------------------<  131<H>  5.1   |  19<L>  |  4.65<H>    Ca    9.0      01 Aug 2020 07:21                         Urinalysis Basic - ( 01 Aug 2020 03:28 )    Color: Light Yellow / Appearance: Clear / S.014 / pH: x  Gluc: x / Ketone: Negative  / Bili: Negative / Urobili: Negative   Blood: x / Protein: 30 mg/dL / Nitrite: Negative   Leuk Esterase: Moderate / RBC: 2 /hpf / WBC 6 /HPF   Sq Epi: x / Non Sq Epi: 0 /hpf / Bacteria: Negative
Subjective: Patient seen and examined. No new events except as noted.   doing okay     REVIEW OF SYSTEMS:    CONSTITUTIONAL: No weakness, fevers or chills  EYES/ENT: No visual changes;  No vertigo or throat pain   NECK: No pain or stiffness  RESPIRATORY: No cough, wheezing, hemoptysis; No shortness of breath  CARDIOVASCULAR: No chest pain or palpitations  GASTROINTESTINAL: No abdominal or epigastric pain. No nausea,  GENITOURINARY: No dysuria, frequency or hematuria  NEUROLOGICAL: No numbness or weakness  SKIN: No itching, burning, rashes, or lesions   All other review of systems is negative unless indicated above.    MEDICATIONS:  MEDICATIONS  (STANDING):  aspirin enteric coated 81 milliGRAM(s) Oral daily  atorvastatin 80 milliGRAM(s) Oral at bedtime  carvedilol 25 milliGRAM(s) Oral every 12 hours  clopidogrel Tablet 75 milliGRAM(s) Oral daily  dextrose 5%. 1000 milliLiter(s) (50 mL/Hr) IV Continuous <Continuous>  dextrose 50% Injectable 12.5 Gram(s) IV Push once  dextrose 50% Injectable 25 Gram(s) IV Push once  dextrose 50% Injectable 25 Gram(s) IV Push once  ferrous    sulfate 325 milliGRAM(s) Oral daily  finasteride 5 milliGRAM(s) Oral daily  furosemide    Tablet 40 milliGRAM(s) Oral daily  hydrALAZINE 50 milliGRAM(s) Oral three times a day  insulin glargine Injectable (LANTUS) 10 Unit(s) SubCutaneous at bedtime  insulin lispro (HumaLOG) corrective regimen sliding scale   SubCutaneous three times a day before meals  insulin lispro (HumaLOG) corrective regimen sliding scale   SubCutaneous at bedtime  tamsulosin 0.4 milliGRAM(s) Oral at bedtime      PHYSICAL EXAM:  T(C): 36.5 (07-23-20 @ 12:36), Max: 36.5 (07-23-20 @ 12:36)  HR: 59 (07-23-20 @ 12:36) (59 - 66)  BP: 121/68 (07-23-20 @ 12:36) (121/68 - 140/75)  RR: 18 (07-23-20 @ 12:36) (18 - 18)  SpO2: 96% (07-23-20 @ 12:36) (96% - 100%)  Wt(kg): --  I&O's Summary    22 Jul 2020 07:01 - 23 Jul 2020 07:00  --------------------------------------------------------  IN: 1200 mL / OUT: 1150 mL / NET: 50 mL    23 Jul 2020 07:01  -  23 Jul 2020 17:12  --------------------------------------------------------  IN: 0 mL / OUT: 1000 mL / NET: -1000 mL          Appearance: Normal	  HEENT:  PERRLA   Lymphatic: No lymphadenopathy   Cardiovascular: Normal S1 S2, no JVD  Respiratory: normal effort , clear  Gastrointestinal:  Soft, Non-tender  Skin: No rashes,  warm to touch  Psychiatry:  Mood & affect appropriate  Musculuskeletal: No edema      All labs, Imaging and EKGs personally reviewed
Subjective: Patient seen and examined. No new events except as noted.   doing okay     REVIEW OF SYSTEMS:    CONSTITUTIONAL: No weakness, fevers or chills  EYES/ENT: No visual changes;  No vertigo or throat pain   NECK: No pain or stiffness  RESPIRATORY: No cough, wheezing, hemoptysis; No shortness of breath  CARDIOVASCULAR: No chest pain or palpitations  GASTROINTESTINAL: No abdominal or epigastric pain. No nausea, vomiting, or hematemesis; No diarrhea or constipation. No melena or hematochezia.  GENITOURINARY: No dysuria, frequency or hematuria  NEUROLOGICAL: No numbness or weakness  SKIN: No itching, burning, rashes, or lesions   All other review of systems is negative unless indicated above.    MEDICATIONS:  MEDICATIONS  (STANDING):  aspirin enteric coated 81 milliGRAM(s) Oral daily  atorvastatin 40 milliGRAM(s) Oral at bedtime  carvedilol 12.5 milliGRAM(s) Oral every 12 hours  dextrose 5%. 1000 milliLiter(s) (50 mL/Hr) IV Continuous <Continuous>  dextrose 50% Injectable 12.5 Gram(s) IV Push once  dextrose 50% Injectable 25 Gram(s) IV Push once  dextrose 50% Injectable 25 Gram(s) IV Push once  furosemide   Injectable 40 milliGRAM(s) IV Push daily  insulin lispro (HumaLOG) corrective regimen sliding scale   SubCutaneous three times a day before meals  insulin lispro (HumaLOG) corrective regimen sliding scale   SubCutaneous at bedtime      PHYSICAL EXAM:  T(C): 36.6 (20 @ 05:17), Max: 36.6 (20 @ 05:17)  HR: 65 (20 @ 05:17) (65 - 72)  BP: 155/80 (20 @ 05:17) (155/80 - 158/79)  RR: 17 (20 @ 05:17) (17 - 17)  SpO2: 99% (20 @ 05:17) (96% - 99%)  Wt(kg): --  I&O's Summary    2020 07:01  -  2020 07:00  --------------------------------------------------------  IN: 840 mL / OUT: 1350 mL / NET: -510 mL          Appearance: awake, demented 	  HEENT:  PERRLA   Lymphatic: No lymphadenopathy   Cardiovascular: Normal S1 S2, no JVD  Respiratory: normal effort , clear  Gastrointestinal:  Soft, Non-tender  Skin: No rashes,  warm to touch  Psychiatry:  Mood & affect appropriate  Musculuskeletal: No edema      All labs, Imaging and EKGs personally reviewed                           9.1    5.00  )-----------( 174      ( 2020 07:18 )             28.9               07-17    138  |  103  |  84<H>  ----------------------------<  61<L>  4.7   |  22  |  3.90<H>    Ca    8.6      2020 07:13    TPro  6.6  /  Alb  2.9<L>  /  TBili  0.3  /  DBili  x   /  AST  19  /  ALT  11  /  AlkPhos  271<H>  07-16    PT/INR - ( 15 Jul 2020 12:57 )   PT: 13.2 sec;   INR: 1.12 ratio         PTT - ( 15 Jul 2020 12:57 )  PTT:39.7 sec       CARDIAC MARKERS ( 15 Jul 2020 21:38 )  x     / x     / 105 U/L / x     / 6.8 ng/mL              Urinalysis Basic - ( 2020 08:58 )    Color: Colorless / Appearance: Clear / S.012 / pH: x  Gluc: x / Ketone: Negative  / Bili: Negative / Urobili: <2 mg/dL   Blood: x / Protein: 30 mg/dL / Nitrite: Negative   Leuk Esterase: Negative / RBC: 1 /HPF / WBC 1 /HPF   Sq Epi: x / Non Sq Epi: 0 /HPF / Bacteria: Negative
Subjective: Patient seen and examined. No new events except as noted.   doing okay     REVIEW OF SYSTEMS:    CONSTITUTIONAL: No weakness, fevers or chills  EYES/ENT: No visual changes;  No vertigo or throat pain   NECK: No pain or stiffness  RESPIRATORY: No cough, wheezing, hemoptysis; No shortness of breath  CARDIOVASCULAR: No chest pain or palpitations  GASTROINTESTINAL: No abdominal or epigastric pain. No nausea, vomiting, or hematemesis; No diarrhea or constipation. No melena or hematochezia.  GENITOURINARY: No dysuria, frequency or hematuria  NEUROLOGICAL: No numbness or weakness  SKIN: No itching, burning, rashes, or lesions   All other review of systems is negative unless indicated above.    MEDICATIONS:  MEDICATIONS  (STANDING):  aspirin enteric coated 81 milliGRAM(s) Oral daily  atorvastatin 80 milliGRAM(s) Oral at bedtime  bisacodyl 10 milliGRAM(s) Oral at bedtime  carvedilol 25 milliGRAM(s) Oral every 12 hours  dextrose 5%. 1000 milliLiter(s) (50 mL/Hr) IV Continuous <Continuous>  dextrose 50% Injectable 12.5 Gram(s) IV Push once  dextrose 50% Injectable 25 Gram(s) IV Push once  dextrose 50% Injectable 25 Gram(s) IV Push once  ferrous    sulfate 325 milliGRAM(s) Oral daily  finasteride 5 milliGRAM(s) Oral daily  hydrALAZINE 50 milliGRAM(s) Oral three times a day  insulin glargine Injectable (LANTUS) 10 Unit(s) SubCutaneous at bedtime  insulin lispro (HumaLOG) corrective regimen sliding scale   SubCutaneous three times a day before meals  insulin lispro (HumaLOG) corrective regimen sliding scale   SubCutaneous at bedtime  pantoprazole  Injectable 40 milliGRAM(s) IV Push two times a day  sodium bicarbonate 1300 milliGRAM(s) Oral three times a day  tamsulosin 0.4 milliGRAM(s) Oral at bedtime      PHYSICAL EXAM:  T(C): 36.8 (08-03-20 @ 12:06), Max: 36.8 (08-03-20 @ 12:06)  HR: 64 (08-03-20 @ 17:00) (61 - 64)  BP: 159/67 (08-03-20 @ 17:00) (137/55 - 159/67)  RR: 18 (08-03-20 @ 12:06) (18 - 18)  SpO2: 98% (08-03-20 @ 12:06) (97% - 98%)  Wt(kg): --  I&O's Summary    02 Aug 2020 07:01  -  03 Aug 2020 07:00  --------------------------------------------------------  IN: 960 mL / OUT: 1400 mL / NET: -440 mL    03 Aug 2020 07:01  -  03 Aug 2020 23:45  --------------------------------------------------------  IN: 960 mL / OUT: 550 mL / NET: 410 mL          Appearance: Normal	  HEENT:  PERRLA   Lymphatic: No lymphadenopathy   Cardiovascular: Normal S1 S2, no JVD  Respiratory: normal effort , clear  Gastrointestinal:  Soft, Non-tender  Skin: No rashes,  warm to touch  Psychiatry:  Mood & affect appropriate  Musculuskeletal: No edema      All labs, Imaging and EKGs personally reviewed                           7.7    5.50  )-----------( 113      ( 03 Aug 2020 06:45 )             24.4               08-03    144  |  107  |  103<H>  ----------------------------<  141<H>  4.7   |  22  |  3.89<H>    Ca    8.7      03 Aug 2020 06:44
Subjective: Patient seen and examined. No new events except as noted.   doing okay     REVIEW OF SYSTEMS:    CONSTITUTIONAL: No weakness, fevers or chills  EYES/ENT: No visual changes;  No vertigo or throat pain   NECK: No pain or stiffness  RESPIRATORY: No cough, wheezing, hemoptysis; No shortness of breath  CARDIOVASCULAR: No chest pain or palpitations  GASTROINTESTINAL: No abdominal or epigastric pain. No nausea, vomiting, or hematemesis; No diarrhea or constipation. No melena or hematochezia.  GENITOURINARY: No dysuria, frequency or hematuria  NEUROLOGICAL: No numbness or weakness  SKIN: No itching, burning, rashes, or lesions   All other review of systems is negative unless indicated above.    MEDICATIONS:  MEDICATIONS  (STANDING):  aspirin enteric coated 81 milliGRAM(s) Oral daily  atorvastatin 80 milliGRAM(s) Oral at bedtime  carvedilol 25 milliGRAM(s) Oral every 12 hours  clopidogrel Tablet 75 milliGRAM(s) Oral daily  dextrose 5%. 1000 milliLiter(s) (50 mL/Hr) IV Continuous <Continuous>  dextrose 50% Injectable 12.5 Gram(s) IV Push once  dextrose 50% Injectable 25 Gram(s) IV Push once  dextrose 50% Injectable 25 Gram(s) IV Push once  ferrous    sulfate 325 milliGRAM(s) Oral daily  finasteride 5 milliGRAM(s) Oral daily  furosemide    Tablet 40 milliGRAM(s) Oral daily  hydrALAZINE 50 milliGRAM(s) Oral three times a day  insulin glargine Injectable (LANTUS) 10 Unit(s) SubCutaneous at bedtime  insulin lispro (HumaLOG) corrective regimen sliding scale   SubCutaneous three times a day before meals  insulin lispro (HumaLOG) corrective regimen sliding scale   SubCutaneous at bedtime  tamsulosin 0.4 milliGRAM(s) Oral at bedtime      PHYSICAL EXAM:  T(C): 36.4 (07-22-20 @ 12:08), Max: 36.4 (07-22-20 @ 12:08)  HR: 56 (07-22-20 @ 12:08) (56 - 68)  BP: 132/56 (07-22-20 @ 12:08) (132/56 - 159/68)  RR: 18 (07-22-20 @ 12:08) (17 - 18)  SpO2: 98% (07-22-20 @ 12:08) (98% - 99%)  Wt(kg): --  I&O's Summary    21 Jul 2020 07:01  -  22 Jul 2020 07:00  --------------------------------------------------------  IN: 990 mL / OUT: 2050 mL / NET: -1060 mL    22 Jul 2020 07:01  -  22 Jul 2020 16:41  --------------------------------------------------------  IN: 960 mL / OUT: 550 mL / NET: 410 mL          Appearance: Normal	  HEENT:  PERRLA   Lymphatic: No lymphadenopathy   Cardiovascular: Normal S1 S2, no JVD  Respiratory: normal effort , clear  Gastrointestinal:  Soft, Non-tender  Skin: No rashes,  warm to touch  Psychiatry:  Mood & affect appropriate  Musculuskeletal: No edema      All labs, Imaging and EKGs personally reviewed
Subjective: Patient seen and examined. No new events except as noted.   doing okay    REVIEW OF SYSTEMS:    CONSTITUTIONAL: No weakness, fevers or chills  EYES/ENT: No visual changes;  No vertigo or throat pain   NECK: No pain or stiffness  RESPIRATORY: No cough, wheezing, hemoptysis; No shortness of breath  CARDIOVASCULAR: No chest pain or palpitations  GASTROINTESTINAL: No abdominal or epigastric pain. No nausea, vomiting, or hematemesis; No diarrhea or constipation. No melena or hematochezia.  GENITOURINARY: No dysuria, frequency or hematuria  NEUROLOGICAL: No numbness or weakness  SKIN: No itching, burning, rashes, or lesions   All other review of systems is negative unless indicated above.    MEDICATIONS:  MEDICATIONS  (STANDING):  aspirin enteric coated 81 milliGRAM(s) Oral daily  atorvastatin 80 milliGRAM(s) Oral at bedtime  carvedilol 12.5 milliGRAM(s) Oral every 12 hours  clopidogrel Tablet 75 milliGRAM(s) Oral daily  dextrose 5%. 1000 milliLiter(s) (50 mL/Hr) IV Continuous <Continuous>  dextrose 50% Injectable 12.5 Gram(s) IV Push once  dextrose 50% Injectable 25 Gram(s) IV Push once  dextrose 50% Injectable 25 Gram(s) IV Push once  ferrous    sulfate 325 milliGRAM(s) Oral daily  finasteride 5 milliGRAM(s) Oral daily  furosemide    Tablet 40 milliGRAM(s) Oral daily  hydrALAZINE 25 milliGRAM(s) Oral three times a day  insulin glargine Injectable (LANTUS) 10 Unit(s) SubCutaneous at bedtime  insulin lispro (HumaLOG) corrective regimen sliding scale   SubCutaneous three times a day before meals  insulin lispro (HumaLOG) corrective regimen sliding scale   SubCutaneous at bedtime  tamsulosin 0.4 milliGRAM(s) Oral at bedtime      PHYSICAL EXAM:  T(C): 36.6 (07-18-20 @ 17:40), Max: 36.7 (07-18-20 @ 04:46)  HR: 69 (07-18-20 @ 17:40) (67 - 75)  BP: 137/77 (07-18-20 @ 17:40) (137/77 - 159/76)  RR: 18 (07-18-20 @ 17:40) (18 - 18)  SpO2: 97% (07-18-20 @ 17:40) (95% - 98%)  Wt(kg): --  I&O's Summary    17 Jul 2020 07:01  -  18 Jul 2020 07:00  --------------------------------------------------------  IN: 1060 mL / OUT: 1980 mL / NET: -920 mL    18 Jul 2020 07:01  -  18 Jul 2020 20:10  --------------------------------------------------------  IN: 200 mL / OUT: 601 mL / NET: -401 mL          Appearance: awake, calm, demented 	  HEENT:  PERRLA   Lymphatic: No lymphadenopathy   Cardiovascular: Normal S1 S2, no JVD  Respiratory: normal effort , clear  Gastrointestinal:  Soft, Non-tender  Skin: No rashes,  warm to touch  Psychiatry:  Mood & affect appropriate  Musculuskeletal: No edema      All labs, Imaging and EKGs personally reviewed                             8.8    5.01  )-----------( 163      ( 18 Jul 2020 07:10 )             27.8               07-18    139  |  103  |  87<H>  ----------------------------<  164<H>  4.4   |  21<L>  |  3.85<H>    Ca    8.6      18 Jul 2020 07:10    TPro  6.0  /  Alb  x   /  TBili  x   /  DBili  x   /  AST  x   /  ALT  x   /  AlkPhos  x   07-18
Subjective: Patient seen and examined. No new events except as noted.   doing okay    REVIEW OF SYSTEMS:    CONSTITUTIONAL: No weakness, fevers or chills  EYES/ENT: No visual changes;  No vertigo or throat pain   NECK: No pain or stiffness  RESPIRATORY: No cough, wheezing, hemoptysis; No shortness of breath  CARDIOVASCULAR: No chest pain or palpitations  GASTROINTESTINAL: No abdominal or epigastric pain. No nausea, vomiting, or hematemesis; No diarrhea or constipation. No melena or hematochezia.  GENITOURINARY: No dysuria, frequency or hematuria  NEUROLOGICAL: No numbness or weakness  SKIN: No itching, burning, rashes, or lesions   All other review of systems is negative unless indicated above.    MEDICATIONS:  MEDICATIONS  (STANDING):  aspirin enteric coated 81 milliGRAM(s) Oral daily  atorvastatin 80 milliGRAM(s) Oral at bedtime  carvedilol 25 milliGRAM(s) Oral every 12 hours  clopidogrel Tablet 75 milliGRAM(s) Oral daily  dextrose 5%. 1000 milliLiter(s) (50 mL/Hr) IV Continuous <Continuous>  dextrose 50% Injectable 12.5 Gram(s) IV Push once  dextrose 50% Injectable 25 Gram(s) IV Push once  dextrose 50% Injectable 25 Gram(s) IV Push once  ferrous    sulfate 325 milliGRAM(s) Oral daily  finasteride 5 milliGRAM(s) Oral daily  furosemide    Tablet 40 milliGRAM(s) Oral daily  hydrALAZINE 25 milliGRAM(s) Oral three times a day  insulin glargine Injectable (LANTUS) 10 Unit(s) SubCutaneous at bedtime  insulin lispro (HumaLOG) corrective regimen sliding scale   SubCutaneous three times a day before meals  insulin lispro (HumaLOG) corrective regimen sliding scale   SubCutaneous at bedtime  tamsulosin 0.4 milliGRAM(s) Oral at bedtime      PHYSICAL EXAM:  T(C): 36.6 (07-19-20 @ 21:16), Max: 36.6 (07-19-20 @ 13:00)  HR: 75 (07-19-20 @ 21:16) (70 - 75)  BP: 165/89 (07-19-20 @ 21:16) (165/89 - 170/88)  RR: 18 (07-19-20 @ 21:16) (18 - 18)  SpO2: 95% (07-19-20 @ 21:16) (95% - 97%)  Wt(kg): --  I&O's Summary    18 Jul 2020 07:01  -  19 Jul 2020 07:00  --------------------------------------------------------  IN: 200 mL / OUT: 601 mL / NET: -401 mL          Appearance: Normal, demented 	  HEENT:  PERRLA   Lymphatic: No lymphadenopathy   Cardiovascular: Normal S1 S2, no JVD  Respiratory: normal effort , clear  Gastrointestinal:  Soft, Non-tender  Skin: No rashes,  warm to touch  Psychiatry:  Mood & affect appropriate  Musculuskeletal: No edema      All labs, Imaging and EKGs personally reviewed                             9.4    5.08  )-----------( 168      ( 19 Jul 2020 08:59 )             29.6               07-19    138  |  104  |  81<H>  ----------------------------<  77  4.5   |  19<L>  |  3.78<H>    Ca    9.0      19 Jul 2020 08:58    TPro  6.3  /  Alb  x   /  TBili  x   /  DBili  x   /  AST  x   /  ALT  x   /  AlkPhos  x   07-19
Subjective: Patient seen and examined. No new events except as noted.   doing okay   EGD/Colon today     REVIEW OF SYSTEMS:    CONSTITUTIONAL: No weakness, fevers or chills  EYES/ENT: No visual changes;  No vertigo or throat pain   NECK: No pain or stiffness  RESPIRATORY: No cough, wheezing, hemoptysis; No shortness of breath  CARDIOVASCULAR: No chest pain or palpitations  GASTROINTESTINAL: No abdominal or epigastric pain. No nausea, vomiting, or hematemesis; No diarrhea or constipation. No melena or hematochezia.  GENITOURINARY: No dysuria, frequency or hematuria  NEUROLOGICAL: No numbness or weakness  SKIN: No itching, burning, rashes, or lesions   All other review of systems is negative unless indicated above.    MEDICATIONS:  MEDICATIONS  (STANDING):  aspirin enteric coated 81 milliGRAM(s) Oral daily  atorvastatin 80 milliGRAM(s) Oral at bedtime  bisacodyl 10 milliGRAM(s) Oral at bedtime  carvedilol 25 milliGRAM(s) Oral every 12 hours  dextrose 5%. 1000 milliLiter(s) (50 mL/Hr) IV Continuous <Continuous>  dextrose 50% Injectable 12.5 Gram(s) IV Push once  dextrose 50% Injectable 25 Gram(s) IV Push once  dextrose 50% Injectable 25 Gram(s) IV Push once  ferrous    sulfate 325 milliGRAM(s) Oral daily  finasteride 5 milliGRAM(s) Oral daily  furosemide    Tablet 40 milliGRAM(s) Oral daily  hydrALAZINE 50 milliGRAM(s) Oral three times a day  insulin glargine Injectable (LANTUS) 10 Unit(s) SubCutaneous at bedtime  insulin lispro (HumaLOG) corrective regimen sliding scale   SubCutaneous three times a day before meals  insulin lispro (HumaLOG) corrective regimen sliding scale   SubCutaneous at bedtime  pantoprazole  Injectable 40 milliGRAM(s) IV Push two times a day  tamsulosin 0.4 milliGRAM(s) Oral at bedtime      PHYSICAL EXAM:  T(C): 36.4 (07-27-20 @ 12:41), Max: 36.7 (07-26-20 @ 18:01)  HR: 68 (07-27-20 @ 15:44) (61 - 71)  BP: 142/69 (07-27-20 @ 15:44) (133/66 - 163/81)  RR: 18 (07-27-20 @ 12:41) (18 - 19)  SpO2: 99% (07-27-20 @ 15:44) (98% - 99%)  Wt(kg): --  I&O's Summary    26 Jul 2020 07:01  -  27 Jul 2020 07:00  --------------------------------------------------------  IN: 5240 mL / OUT: 1300 mL / NET: 3940 mL    27 Jul 2020 07:01  -  27 Jul 2020 15:52  --------------------------------------------------------  IN: 0 mL / OUT: 500 mL / NET: -500 mL          Appearance: Normal	  HEENT:  PERRLA   Lymphatic: No lymphadenopathy   Cardiovascular: Normal S1 S2, no JVD  Respiratory: normal effort , clear  Gastrointestinal:  Soft, Non-tender  Skin: No rashes,  warm to touch  Psychiatry:  Mood & affect appropriate  Musculuskeletal: No edema      All labs, Imaging and EKGs personally reviewed                           8.5    5.59  )-----------( 164      ( 27 Jul 2020 07:40 )             26.5 07-27    141  |  106  |  100<H>  ----------------------------<  115<H>  4.6   |  16<L>  |  3.74<H>    Ca    9.0      27 Jul 2020 07:40
Subjective: Patient seen and examined. No new events except as noted.   doing okay  ?dark stool   mild drop in hgb juan     REVIEW OF SYSTEMS:    CONSTITUTIONAL: No weakness, fevers or chills  EYES/ENT: No visual changes;  No vertigo or throat pain   NECK: No pain or stiffness  RESPIRATORY: No cough, wheezing, hemoptysis; No shortness of breath  CARDIOVASCULAR: No chest pain or palpitations  GASTROINTESTINAL: No abdominal or epigastric pain. No nausea, vomiting, or hematemesis; No diarrhea or constipation. No melena or hematochezia.  GENITOURINARY: No dysuria, frequency or hematuria  NEUROLOGICAL: No numbness or weakness  SKIN: No itching, burning, rashes, or lesions   All other review of systems is negative unless indicated above.    MEDICATIONS:  MEDICATIONS  (STANDING):  aspirin enteric coated 81 milliGRAM(s) Oral daily  atorvastatin 80 milliGRAM(s) Oral at bedtime  carvedilol 25 milliGRAM(s) Oral every 12 hours  dextrose 5%. 1000 milliLiter(s) (50 mL/Hr) IV Continuous <Continuous>  dextrose 50% Injectable 12.5 Gram(s) IV Push once  dextrose 50% Injectable 25 Gram(s) IV Push once  dextrose 50% Injectable 25 Gram(s) IV Push once  ferrous    sulfate 325 milliGRAM(s) Oral daily  finasteride 5 milliGRAM(s) Oral daily  furosemide    Tablet 40 milliGRAM(s) Oral daily  hydrALAZINE 50 milliGRAM(s) Oral three times a day  insulin glargine Injectable (LANTUS) 10 Unit(s) SubCutaneous at bedtime  insulin lispro (HumaLOG) corrective regimen sliding scale   SubCutaneous three times a day before meals  insulin lispro (HumaLOG) corrective regimen sliding scale   SubCutaneous at bedtime  pantoprazole  Injectable 40 milliGRAM(s) IV Push two times a day  tamsulosin 0.4 milliGRAM(s) Oral at bedtime      PHYSICAL EXAM:  T(C): 36.7 (07-25-20 @ 13:23), Max: 36.9 (07-24-20 @ 21:12)  HR: 86 (07-25-20 @ 17:33) (68 - 86)  BP: 133/66 (07-25-20 @ 17:33) (132/66 - 149/70)  RR: 18 (07-25-20 @ 13:23) (18 - 18)  SpO2: 98% (07-25-20 @ 13:23) (97% - 98%)  Wt(kg): --  I&O's Summary    24 Jul 2020 07:01  -  25 Jul 2020 07:00  --------------------------------------------------------  IN: 1630 mL / OUT: 1200 mL / NET: 430 mL    25 Jul 2020 07:01  -  25 Jul 2020 18:50  --------------------------------------------------------  IN: 480 mL / OUT: 800 mL / NET: -320 mL          Appearance: Normal	  HEENT:  PERRLA   Lymphatic: No lymphadenopathy   Cardiovascular: Normal S1 S2, no JVD  Respiratory: normal effort , clear  Gastrointestinal:  Soft, Non-tender  Skin: No rashes,  warm to touch  Psychiatry:  Mood & affect appropriate  Musculuskeletal: No edema      All labs, Imaging and EKGs personally reviewed                           7.6    5.98  )-----------( 161      ( 25 Jul 2020 06:30 )             23.9
Subjective: Patient seen and examined. No new events except as noted.   doing okay  hgb stable     REVIEW OF SYSTEMS:    CONSTITUTIONAL: No weakness, fevers or chills  EYES/ENT: No visual changes;  No vertigo or throat pain   NECK: No pain or stiffness  RESPIRATORY: No cough, wheezing, hemoptysis; No shortness of breath  CARDIOVASCULAR: No chest pain or palpitations  GASTROINTESTINAL: No abdominal or epigastric pain. No nausea, vomiting, or hematemesis; No diarrhea or constipation. No melena or hematochezia.  GENITOURINARY: No dysuria, frequency or hematuria  NEUROLOGICAL: No numbness or weakness  SKIN: No itching, burning, rashes, or lesions   All other review of systems is negative unless indicated above.    MEDICATIONS:  MEDICATIONS  (STANDING):  aspirin enteric coated 81 milliGRAM(s) Oral daily  atorvastatin 80 milliGRAM(s) Oral at bedtime  bisacodyl 10 milliGRAM(s) Oral at bedtime  carvedilol 25 milliGRAM(s) Oral every 12 hours  dextrose 5%. 1000 milliLiter(s) (50 mL/Hr) IV Continuous <Continuous>  dextrose 50% Injectable 12.5 Gram(s) IV Push once  dextrose 50% Injectable 25 Gram(s) IV Push once  dextrose 50% Injectable 25 Gram(s) IV Push once  ferrous    sulfate 325 milliGRAM(s) Oral daily  finasteride 5 milliGRAM(s) Oral daily  furosemide    Tablet 40 milliGRAM(s) Oral daily  hydrALAZINE 50 milliGRAM(s) Oral three times a day  insulin glargine Injectable (LANTUS) 10 Unit(s) SubCutaneous at bedtime  insulin lispro (HumaLOG) corrective regimen sliding scale   SubCutaneous three times a day before meals  insulin lispro (HumaLOG) corrective regimen sliding scale   SubCutaneous at bedtime  pantoprazole  Injectable 40 milliGRAM(s) IV Push two times a day  sodium bicarbonate 1300 milliGRAM(s) Oral three times a day  tamsulosin 0.4 milliGRAM(s) Oral at bedtime      PHYSICAL EXAM:  T(C): 36.6 (07-28-20 @ 04:04), Max: 36.6 (07-28-20 @ 04:04)  HR: 65 (07-28-20 @ 04:04) (65 - 65)  BP: 150/76 (07-28-20 @ 04:04) (150/76 - 150/76)  RR: 18 (07-28-20 @ 04:04) (18 - 18)  SpO2: 99% (07-28-20 @ 04:04) (99% - 99%)  Wt(kg): --  I&O's Summary    27 Jul 2020 07:01  -  28 Jul 2020 07:00  --------------------------------------------------------  IN: 120 mL / OUT: 700 mL / NET: -580 mL    28 Jul 2020 07:01  -  28 Jul 2020 21:20  --------------------------------------------------------  IN: 120 mL / OUT: 400 mL / NET: -280 mL          Appearance: awake, forgetful 	  HEENT:  PERRLA   Lymphatic: No lymphadenopathy   Cardiovascular: Normal S1 S2, no JVD  Respiratory: normal effort , clear  Gastrointestinal:  Soft, Non-tender  Skin: No rashes,  warm to touch  Psychiatry:  Mood & affect appropriate  Musculuskeletal: No edema      All labs, Imaging and EKGs personally reviewed                           8.2    5.64  )-----------( 174      ( 28 Jul 2020 12:39 )             24.8               07-28    141  |  107  |  91<H>  ----------------------------<  87  4.5   |  15<L>  |  3.44<H>    Ca    9.0      28 Jul 2020 06:30           < from: Enteroscopy (07.27.20 @ 09:04) >  Impression:          - Active bleed in the distal 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                Dieulafoy. Successful hemostasis with argon plasma coagulation.  Recommendation:      - Return patient to hospital lin for ongoing care.                       - full liquid diet today.                       - Protonix 40 BID.             - Hold plavix for 1 week.                       - No contraindication to aspirin.                       - Consider repeat EGD as an outpatient for biopsy of the Z line to rule out                        Carrillo's if consistent with patient's goals of care.              < from: Colonoscopy (07.27.20 @ 10:01) >                                         Findings:       The perianal and digital rectal examinations were normal.       Fromed stool was found in the rectum. The procedure was aborted                                     Impression:          Aborted colonoscopy due to poor prep.  Recommendation:      - Repeat colonoscopy as an outpatient with 2 day preparation.
Subjective: Patient seen and examined. No new events except as noted.   doing okay  no bleeding   S/P 1 unit PRBC yesterday with lasix in between half units     REVIEW OF SYSTEMS:    CONSTITUTIONAL: No weakness, fevers or chills  EYES/ENT: No visual changes;  No vertigo or throat pain   NECK: No pain or stiffness  RESPIRATORY: No cough, wheezing, hemoptysis; No shortness of breath  CARDIOVASCULAR: No chest pain or palpitations  GASTROINTESTINAL: No abdominal or epigastric pain.   GENITOURINARY: No dysuria, frequency or hematuria  NEUROLOGICAL: No numbness or weakness  SKIN: No itching, burning, rashes, or lesions   All other review of systems is negative unless indicated above.    MEDICATIONS:  MEDICATIONS  (STANDING):  aspirin enteric coated 81 milliGRAM(s) Oral daily  atorvastatin 80 milliGRAM(s) Oral at bedtime  bisacodyl 10 milliGRAM(s) Oral at bedtime  carvedilol 25 milliGRAM(s) Oral every 12 hours  dextrose 5%. 1000 milliLiter(s) (50 mL/Hr) IV Continuous <Continuous>  dextrose 50% Injectable 12.5 Gram(s) IV Push once  dextrose 50% Injectable 25 Gram(s) IV Push once  dextrose 50% Injectable 25 Gram(s) IV Push once  ferrous    sulfate 325 milliGRAM(s) Oral daily  finasteride 5 milliGRAM(s) Oral daily  furosemide    Tablet 40 milliGRAM(s) Oral daily  hydrALAZINE 50 milliGRAM(s) Oral three times a day  insulin glargine Injectable (LANTUS) 10 Unit(s) SubCutaneous at bedtime  insulin lispro (HumaLOG) corrective regimen sliding scale   SubCutaneous three times a day before meals  insulin lispro (HumaLOG) corrective regimen sliding scale   SubCutaneous at bedtime  pantoprazole  Injectable 40 milliGRAM(s) IV Push two times a day  sodium bicarbonate 1300 milliGRAM(s) Oral three times a day  tamsulosin 0.4 milliGRAM(s) Oral at bedtime      PHYSICAL EXAM:  T(C): 36.6 (07-30-20 @ 12:09), Max: 36.8 (07-29-20 @ 20:08)  HR: 64 (07-30-20 @ 12:09) (64 - 76)  BP: 135/61 (07-30-20 @ 12:09) (108/57 - 159/73)  RR: 18 (07-30-20 @ 12:09) (18 - 19)  SpO2: 100% (07-30-20 @ 12:09) (100% - 100%)  Wt(kg): --  I&O's Summary    29 Jul 2020 07:01  -  30 Jul 2020 07:00  --------------------------------------------------------  IN: 1140 mL / OUT: 1350 mL / NET: -210 mL    30 Jul 2020 07:01  -  30 Jul 2020 17:10  --------------------------------------------------------  IN: 1200 mL / OUT: 575 mL / NET: 625 mL          Appearance: Normal	  HEENT:  PERRLA   Lymphatic: No lymphadenopathy   Cardiovascular: Normal S1 S2, no JVD  Respiratory: normal effort , clear  Gastrointestinal:  Soft, Non-tender  Skin: No rashes,  warm to touch  Psychiatry:  Mood & affect appropriate  Musculuskeletal: No edema      All labs, Imaging and EKGs personally reviewed                           8.5    5.68  )-----------( 153      ( 30 Jul 2020 07:19 )             26.3               07-30    144  |  107  |  93<H>  ----------------------------<  206<H>  4.6   |  21<L>  |  4.00<H>    Ca    8.9      30 Jul 2020 07:19
Subjective: Patient seen and examined. No new events except as noted.   doing okay  pending rehab placement     REVIEW OF SYSTEMS:    CONSTITUTIONAL: No weakness, fevers or chills  EYES/ENT: No visual changes;  No vertigo or throat pain   NECK: No pain or stiffness  RESPIRATORY: No cough, wheezing, hemoptysis; No shortness of breath  CARDIOVASCULAR: No chest pain or palpitations  GASTROINTESTINAL: No abdominal or epigastric pain. No nausea, vomiting, or hematemesis; No diarrhea or constipation. No melena or hematochezia.  GENITOURINARY: No dysuria, frequency or hematuria  NEUROLOGICAL: No numbness or weakness  SKIN: No itching, burning, rashes, or lesions   All other review of systems is negative unless indicated above.    MEDICATIONS:  MEDICATIONS  (STANDING):  aspirin enteric coated 81 milliGRAM(s) Oral daily  atorvastatin 80 milliGRAM(s) Oral at bedtime  carvedilol 25 milliGRAM(s) Oral every 12 hours  clopidogrel Tablet 75 milliGRAM(s) Oral daily  dextrose 5%. 1000 milliLiter(s) (50 mL/Hr) IV Continuous <Continuous>  dextrose 50% Injectable 12.5 Gram(s) IV Push once  dextrose 50% Injectable 25 Gram(s) IV Push once  dextrose 50% Injectable 25 Gram(s) IV Push once  ferrous    sulfate 325 milliGRAM(s) Oral daily  finasteride 5 milliGRAM(s) Oral daily  furosemide    Tablet 40 milliGRAM(s) Oral daily  hydrALAZINE 50 milliGRAM(s) Oral three times a day  insulin glargine Injectable (LANTUS) 10 Unit(s) SubCutaneous at bedtime  insulin lispro (HumaLOG) corrective regimen sliding scale   SubCutaneous three times a day before meals  insulin lispro (HumaLOG) corrective regimen sliding scale   SubCutaneous at bedtime  tamsulosin 0.4 milliGRAM(s) Oral at bedtime      PHYSICAL EXAM:  T(C): 36.8 (07-21-20 @ 11:55), Max: 36.9 (07-20-20 @ 20:37)  HR: 63 (07-21-20 @ 14:45) (60 - 72)  BP: 133/72 (07-21-20 @ 14:45) (111/57 - 157/76)  RR: 17 (07-21-20 @ 11:55) (17 - 18)  SpO2: 96% (07-21-20 @ 11:55) (96% - 98%)  Wt(kg): --  I&O's Summary    20 Jul 2020 07:01  -  21 Jul 2020 07:00  --------------------------------------------------------  IN: 1000 mL / OUT: 1600 mL / NET: -600 mL    21 Jul 2020 07:01  -  21 Jul 2020 15:26  --------------------------------------------------------  IN: 420 mL / OUT: 300 mL / NET: 120 mL          Appearance: Normal	  HEENT:  PERRLA   Lymphatic: No lymphadenopathy   Cardiovascular: Normal S1 S2, no JVD  Respiratory: normal effort , clear  Gastrointestinal:  Soft, Non-tender  Skin: No rashes,  warm to touch  Psychiatry:  Mood & affect appropriate  Musculuskeletal: No edema      All labs, Imaging and EKGs personally reviewed                 07-20    136  |  104  |  86<H>  ----------------------------<  230<H>  4.7   |  19<L>  |  3.93<H>    Ca    8.9      20 Jul 2020 07:23
Subjective: Patient seen and examined. No new events except as noted.   episodes of black stool       REVIEW OF SYSTEMS:    CONSTITUTIONAL: No weakness, fevers or chills  EYES/ENT: No visual changes;  No vertigo or throat pain   NECK: No pain or stiffness  RESPIRATORY: No cough, wheezing, hemoptysis; No shortness of breath  CARDIOVASCULAR: No chest pain or palpitations  GASTROINTESTINAL: No abdominal or epigastric pain. No nausea, vomiting, or hematemesis; No diarrhea or constipation. No melena or hematochezia.  GENITOURINARY: No dysuria, frequency or hematuria  NEUROLOGICAL: No numbness or weakness  SKIN: No itching, burning, rashes, or lesions   All other review of systems is negative unless indicated above.    MEDICATIONS:  MEDICATIONS  (STANDING):  aspirin enteric coated 81 milliGRAM(s) Oral daily  atorvastatin 80 milliGRAM(s) Oral at bedtime  carvedilol 25 milliGRAM(s) Oral every 12 hours  dextrose 5%. 1000 milliLiter(s) (50 mL/Hr) IV Continuous <Continuous>  dextrose 50% Injectable 12.5 Gram(s) IV Push once  dextrose 50% Injectable 25 Gram(s) IV Push once  dextrose 50% Injectable 25 Gram(s) IV Push once  ferrous    sulfate 325 milliGRAM(s) Oral daily  finasteride 5 milliGRAM(s) Oral daily  furosemide    Tablet 40 milliGRAM(s) Oral daily  hydrALAZINE 50 milliGRAM(s) Oral three times a day  insulin glargine Injectable (LANTUS) 10 Unit(s) SubCutaneous at bedtime  insulin lispro (HumaLOG) corrective regimen sliding scale   SubCutaneous three times a day before meals  insulin lispro (HumaLOG) corrective regimen sliding scale   SubCutaneous at bedtime  pantoprazole  Injectable 40 milliGRAM(s) IV Push two times a day  tamsulosin 0.4 milliGRAM(s) Oral at bedtime      PHYSICAL EXAM:  T(C): 36.7 (07-26-20 @ 18:01), Max: 36.9 (07-26-20 @ 14:39)  HR: 71 (07-26-20 @ 18:01) (62 - 79)  BP: 133/66 (07-26-20 @ 18:01) (110/58 - 156/68)  RR: 18 (07-26-20 @ 18:01) (18 - 19)  SpO2: 98% (07-26-20 @ 18:01) (98% - 99%)  Wt(kg): --  I&O's Summary    25 Jul 2020 07:01  -  26 Jul 2020 07:00  --------------------------------------------------------  IN: 970 mL / OUT: 1100 mL / NET: -130 mL    26 Jul 2020 07:01  -  26 Jul 2020 18:55  --------------------------------------------------------  IN: 1900 mL / OUT: 400 mL / NET: 1500 mL          Appearance: Normal	  HEENT:  PERRLA   Lymphatic: No lymphadenopathy   Cardiovascular: Normal S1 S2, no JVD  Respiratory: normal effort , clear  Gastrointestinal:  Soft, Non-tender  Skin: No rashes,  warm to touch  Psychiatry:  Mood & affect appropriate  Musculuskeletal: No edema      All labs, Imaging and EKGs personally reviewed                           7.3    6.18  )-----------( 165      ( 26 Jul 2020 17:03 )             23.1               07-26    138  |  107  |  98<H>  ----------------------------<  117<H>  4.6   |  19<L>  |  3.88<H>    Ca    9.1      26 Jul 2020 07:27
Subjective: Patient seen and examined. No new events except as noted.   no bloody or black stool     REVIEW OF SYSTEMS:    CONSTITUTIONAL: No weakness, fevers or chills  EYES/ENT: No visual changes;  No vertigo or throat pain   NECK: No pain or stiffness  RESPIRATORY: No cough, wheezing, hemoptysis; No shortness of breath  CARDIOVASCULAR: No chest pain or palpitations  GASTROINTESTINAL: No abdominal or epigastric pain. No nausea, vomiting, or hematemesis; No diarrhea or constipation. No melena or hematochezia.  GENITOURINARY: No dysuria, frequency or hematuria  NEUROLOGICAL: No numbness or weakness  SKIN: No itching, burning, rashes, or lesions   All other review of systems is negative unless indicated above.    MEDICATIONS:  MEDICATIONS  (STANDING):  aspirin enteric coated 81 milliGRAM(s) Oral daily  atorvastatin 80 milliGRAM(s) Oral at bedtime  bisacodyl 10 milliGRAM(s) Oral at bedtime  carvedilol 25 milliGRAM(s) Oral every 12 hours  dextrose 5%. 1000 milliLiter(s) (50 mL/Hr) IV Continuous <Continuous>  dextrose 50% Injectable 12.5 Gram(s) IV Push once  dextrose 50% Injectable 25 Gram(s) IV Push once  dextrose 50% Injectable 25 Gram(s) IV Push once  ferrous    sulfate 325 milliGRAM(s) Oral daily  finasteride 5 milliGRAM(s) Oral daily  furosemide    Tablet 40 milliGRAM(s) Oral daily  hydrALAZINE 50 milliGRAM(s) Oral three times a day  insulin glargine Injectable (LANTUS) 10 Unit(s) SubCutaneous at bedtime  insulin lispro (HumaLOG) corrective regimen sliding scale   SubCutaneous three times a day before meals  insulin lispro (HumaLOG) corrective regimen sliding scale   SubCutaneous at bedtime  pantoprazole  Injectable 40 milliGRAM(s) IV Push two times a day  sodium bicarbonate 1300 milliGRAM(s) Oral three times a day  tamsulosin 0.4 milliGRAM(s) Oral at bedtime      PHYSICAL EXAM:  T(C): 36.5 (07-29-20 @ 13:25), Max: 36.5 (07-29-20 @ 13:05)  HR: 70 (07-29-20 @ 13:25) (67 - 71)  BP: 102/61 (07-29-20 @ 13:25) (100/56 - 126/68)  RR: 18 (07-29-20 @ 13:25) (18 - 18)  SpO2: 100% (07-29-20 @ 13:25) (99% - 100%)  Wt(kg): --  I&O's Summary    28 Jul 2020 07:01  -  29 Jul 2020 07:00  --------------------------------------------------------  IN: 240 mL / OUT: 600 mL / NET: -360 mL    29 Jul 2020 07:01  -  29 Jul 2020 16:15  --------------------------------------------------------  IN: 480 mL / OUT: 350 mL / NET: 130 mL          Appearance: Normal	  HEENT:  PERRLA   Lymphatic: No lymphadenopathy   Cardiovascular: Normal S1 S2, no JVD  Respiratory: normal effort , clear  Gastrointestinal:  Soft, Non-tender  Skin: No rashes,  warm to touch  Psychiatry:  Mood & affect appropriate  Musculuskeletal: LE pain       All labs, Imaging and EKGs personally reviewed                           7.9    5.80  )-----------( 177      ( 29 Jul 2020 06:19 )             24.2               07-29    143  |  107  |  90<H>  ----------------------------<  91  4.6   |  18<L>  |  3.78<H>    Ca    8.8      29 Jul 2020 06:19
[facility] [place] [facility]   MD MISHA SÁNCHEZ MD RUORU WONG, PA    TEL:  OFFICE: [phone]  DR PARKER CELL: [phone]  BETH SMITH CELL: [phone]  DR. CHAKRABORTY CELL: [phone]  DR. REYNA CELL: [phone]    FROM 5 PM - 7 AM PLEASE CALL ANSWERING SERVICE: [phone]    RENAL FOLLOW UP NOTE  --------------------------------------------------------------------------------  HPI:      Pt seen and examined at bedside.       PAST HISTORY  --------------------------------------------------------------------------------  No significant changes to PMH, PSH, FHx, SHx, unless otherwise noted    ALLERGIES & MEDICATIONS  --------------------------------------------------------------------------------  Allergies    No Known Allergies    Intolerances      Standing Inpatient Medications  aspirin enteric coated 81 milliGRAM(s) Oral daily  atorvastatin 80 milliGRAM(s) Oral at bedtime  bisacodyl 10 milliGRAM(s) Oral at bedtime  carvedilol 25 milliGRAM(s) Oral every 12 hours  dextrose 5%. 1000 milliLiter(s) IV Continuous <Continuous>  dextrose 50% Injectable 12.5 Gram(s) IV Push once  dextrose 50% Injectable 25 Gram(s) IV Push once  dextrose 50% Injectable 25 Gram(s) IV Push once  ferrous    sulfate 325 milliGRAM(s) Oral daily  finasteride 5 milliGRAM(s) Oral daily  furosemide    Tablet 40 milliGRAM(s) Oral daily  hydrALAZINE 50 milliGRAM(s) Oral three times a day  insulin glargine Injectable (LANTUS) 10 Unit(s) SubCutaneous at bedtime  insulin lispro (HumaLOG) corrective regimen sliding scale   SubCutaneous three times a day before meals  insulin lispro (HumaLOG) corrective regimen sliding scale   SubCutaneous at bedtime  pantoprazole  Injectable 40 milliGRAM(s) IV Push two times a day  sodium bicarbonate 1300 milliGRAM(s) Oral three times a day  tamsulosin 0.4 milliGRAM(s) Oral at bedtime    PRN Inpatient Medications  acetaminophen   Tablet .. 650 milliGRAM(s) Oral every 6 hours PRN  dextrose 40% Gel 15 Gram(s) Oral once PRN  glucagon  Injectable 1 milliGRAM(s) IntraMuscular once PRN      REVIEW OF SYSTEMS  --------------------------------------------------------------------------------  General: no fever  CVS: no chest pain  MSK: no edema     VITALS/PHYSICAL EXAM  --------------------------------------------------------------------------------  T(C): 36.6 (07-30-20 @ 12:09), Max: 36.8 (07-29-20 @ 20:08)  HR: 64 (07-30-20 @ 12:09) (64 - 76)  BP: 135/61 (07-30-20 @ 12:09) (102/61 - 159/73)  RR: 18 (07-30-20 @ 12:09) (18 - 19)  SpO2: 100% (07-30-20 @ 12:09) (99% - 100%)  Wt(kg): --        07-29-20 @ 07:01  -  07-30-20 @ 07:00  --------------------------------------------------------  IN: 1140 mL / OUT: 1350 mL / NET: -210 mL    07-30-20 @ 07:01  -  07-30-20 @ 12:30  --------------------------------------------------------  IN: 960 mL / OUT: 575 mL / NET: 385 mL      Physical Exam:  	Gen: NAD  	HEENT: MMM  	Pulm: CTA B/L  	CV: S1S2  	Abd: Soft, +BS  	Ext: No LE edema B/L                      Neuro: Awake non focal  	Skin: Warm and Dry   	Vascular access: no hd catheter           no  brandy  LABS/STUDIES  --------------------------------------------------------------------------------              8.5    5.68  >-----------<  153      [07-30-20 @ 07:19]              26.3     144  |  107  |  93  ----------------------------<  206      [07-30-20 @ 07:19]  4.6   |  21  |  4.00        Ca     8.9     [07-30-20 @ 07:19]            Creatinine Trend:  SCr 4.00 [07-30 @ 07:19]  SCr 3.78 [07-29 @ 06:19]  SCr 3.44 [07-28 @ 06:30]  SCr 3.74 [07-27 @ 07:40]  SCr 3.88 [07-26 @ 07:27]    Urinalysis - [07-16-20 @ 08:58]      Color Colorless / Appearance Clear / SG 1.012 / pH 6.5      Gluc Negative / Ketone Negative  / Bili Negative / Urobili <2 mg/dL       Blood Negative / Protein 30 mg/dL / Leuk Est Negative / Nitrite Negative      RBC 1 / WBC 1 / Hyaline 0 / Gran  / Sq Epi  / Non Sq Epi 0 / Bacteria Negative    Urine Protein 87      [07-26-20 @ 23:48]    Iron 40, TIBC 162, %sat 25      [07-16-20 @ 09:30]  Ferritin 334      [07-16-20 @ 09:29]  HbA1c 11.4      [03-04-18 @ 11:53]  TSH 3.57      [07-16-20 @ 09:29]  Lipid: chol 122, TG 47, HDL 39, LDL 73      [07-16-20 @ 09:30]    HBsAb Reactive      [07-17-20 @ 10:47]  HBsAg Nonreact      [07-17-20 @ 10:47]  HBcAb Reactive      [07-17-20 @ 10:47]  HCV 0.34, Nonreact      [07-16-20 @ 09:30]    GLYNN: titer Negative, pattern --      [07-17-20 @ 10:44]  C3 Complement 108      [07-17-20 @ 08:31]  C4 Complement 24      [07-17-20 @ 08:31]  ANCA: cANCA Negative, pANCA Negative, atypical ANCA Indeterminate      [07-17-20 @ 10:43]  Syphilis Screen (Treponema Pallidum Ab) Positive      [07-17-20 @ 10:44]  Syphilis Screen (RPR Titer) <1:1      [07-17-20 @ 10:44]  PLA2R: NICKO <2, IFA Negative      [07-17-20 @ 13:04]  Free Light Chains: kappa 12.10, lambda 11.77, ratio = 1.03      [07-17 @ 08:31]  Immunofixation Serum:     IgG Kappa Band Identified and a weak IgG Lambda Band Identified    Reference Range: None Detected      [07-19-20 @ 14:08]  SPEP Interpretation: Two weak Gamma-Migrating Paraproteins Identified      [07-19-20 @ 14:08]
[facility] [place] [facility]   MD MISHA SÁNCHEZ MD RUORU WONG, PA    TEL:  OFFICE: [phone]  DR PARKER CELL: [phone]  BETH SMITH CELL: [phone]  DR. CHAKRABORTY CELL: [phone]  DR. REYNA CELL: [phone]    FROM 5 PM - 7 AM PLEASE CALL ANSWERING SERVICE: [phone]    RENAL FOLLOW UP NOTE  --------------------------------------------------------------------------------  HPI:      Pt seen and examined at bedside.     PAST HISTORY  --------------------------------------------------------------------------------  No significant changes to PMH, PSH, FHx, SHx, unless otherwise noted    ALLERGIES & MEDICATIONS  --------------------------------------------------------------------------------  Allergies    No Known Allergies    Intolerances      Standing Inpatient Medications  aspirin enteric coated 81 milliGRAM(s) Oral daily  atorvastatin 80 milliGRAM(s) Oral at bedtime  bisacodyl 10 milliGRAM(s) Oral at bedtime  carvedilol 25 milliGRAM(s) Oral every 12 hours  dextrose 5%. 1000 milliLiter(s) IV Continuous <Continuous>  dextrose 50% Injectable 12.5 Gram(s) IV Push once  dextrose 50% Injectable 25 Gram(s) IV Push once  dextrose 50% Injectable 25 Gram(s) IV Push once  ferrous    sulfate 325 milliGRAM(s) Oral daily  finasteride 5 milliGRAM(s) Oral daily  furosemide    Tablet 40 milliGRAM(s) Oral daily  hydrALAZINE 50 milliGRAM(s) Oral three times a day  insulin glargine Injectable (LANTUS) 10 Unit(s) SubCutaneous at bedtime  insulin lispro (HumaLOG) corrective regimen sliding scale   SubCutaneous three times a day before meals  insulin lispro (HumaLOG) corrective regimen sliding scale   SubCutaneous at bedtime  pantoprazole  Injectable 40 milliGRAM(s) IV Push two times a day  tamsulosin 0.4 milliGRAM(s) Oral at bedtime    PRN Inpatient Medications  acetaminophen   Tablet .. 650 milliGRAM(s) Oral every 6 hours PRN  dextrose 40% Gel 15 Gram(s) Oral once PRN  glucagon  Injectable 1 milliGRAM(s) IntraMuscular once PRN      REVIEW OF SYSTEMS  --------------------------------------------------------------------------------  General: no fever  CVS: no chest pain  MSK: no edema     VITALS/PHYSICAL EXAM  --------------------------------------------------------------------------------  T(C): 36.4 (07-27-20 @ 12:41), Max: 36.9 (07-26-20 @ 14:39)  HR: 61 (07-27-20 @ 12:41) (61 - 79)  BP: 139/66 (07-27-20 @ 12:41) (114/64 - 163/81)  RR: 18 (07-27-20 @ 12:41) (18 - 19)  SpO2: 99% (07-27-20 @ 12:41) (98% - 99%)  Wt(kg): --        07-26-20 @ 07:01  -  07-27-20 @ 07:00  --------------------------------------------------------  IN: 5240 mL / OUT: 1300 mL / NET: 3940 mL    07-27-20 @ 07:01  -  07-27-20 @ 13:51  --------------------------------------------------------  IN: 0 mL / OUT: 500 mL / NET: -500 mL      Physical Exam:  	Gen: NAD  	HEENT: MMM  	Pulm: CTA B/L  	CV: S1S2  	Abd: Soft, +BS  	Ext: No LE edema B/L                      Neuro: Awake alert  	Skin: Warm and Dry   	Vascular access: no hd catheter           no nava  LABS/STUDIES  --------------------------------------------------------------------------------              8.5    5.59  >-----------<  164      [07-27-20 @ 07:40]              26.5     141  |  106  |  100  ----------------------------<  115      [07-27-20 @ 07:40]  4.6   |  16  |  3.74        Ca     9.0     [07-27-20 @ 07:40]            Creatinine Trend:  SCr 3.74 [07-27 @ 07:40]  SCr 3.88 [07-26 @ 07:27]  SCr 3.93 [07-20 @ 07:23]  SCr 3.78 [07-19 @ 08:58]  SCr 3.85 [07-18 @ 07:10]    Urinalysis - [07-16-20 @ 08:58]      Color Colorless / Appearance Clear / SG 1.012 / pH 6.5      Gluc Negative / Ketone Negative  / Bili Negative / Urobili <2 mg/dL       Blood Negative / Protein 30 mg/dL / Leuk Est Negative / Nitrite Negative      RBC 1 / WBC 1 / Hyaline 0 / Gran  / Sq Epi  / Non Sq Epi 0 / Bacteria Negative    Urine Protein 87      [07-26-20 @ 23:48]    Iron 40, TIBC 162, %sat 25      [07-16-20 @ 09:30]  Ferritin 334      [07-16-20 @ 09:29]  HbA1c 11.4      [03-04-18 @ 11:53]  TSH 3.57      [07-16-20 @ 09:29]  Lipid: chol 122, TG 47, HDL 39, LDL 73      [07-16-20 @ 09:30]    HBsAb Reactive      [07-17-20 @ 10:47]  HBsAg Nonreact      [07-17-20 @ 10:47]  HBcAb Reactive      [07-17-20 @ 10:47]  HCV 0.34, Nonreact      [07-16-20 @ 09:30]    GLYNN: titer Negative, pattern --      [07-17-20 @ 10:44]  C3 Complement 108      [07-17-20 @ 08:31]  C4 Complement 24      [07-17-20 @ 08:31]  ANCA: cANCA Negative, pANCA Negative, atypical ANCA Indeterminate      [07-17-20 @ 10:43]  Syphilis Screen (Treponema Pallidum Ab) Positive      [07-17-20 @ 10:44]  Syphilis Screen (RPR Titer) <1:1      [07-17-20 @ 10:44]  PLA2R: NICKO <2, IFA Negative      [07-17-20 @ 13:04]  Free Light Chains: kappa 12.10, lambda 11.77, ratio = 1.03      [07-17 @ 08:31]  Immunofixation Serum:     IgG Kappa Band Identified and a weak IgG Lambda Band Identified    Reference Range: None Detected      [07-19-20 @ 14:08]  SPEP Interpretation: Two weak Gamma-Migrating Paraproteins Identified      [07-19-20 @ 14:08]
[facility] [place] [facility]   MD MISHA SÁNCHEZ MD RUORU WONG, PA    TEL:  OFFICE: [phone]  DR PARKER CELL: [phone]  BETH SMITH CELL: [phone]  DR. CHAKRABORTY CELL: [phone]  DR. REYNA CELL: [phone]    FROM 5 PM - 7 AM PLEASE CALL ANSWERING SERVICE: [phone]    RENAL FOLLOW UP NOTE  --------------------------------------------------------------------------------  HPI:      Pt seen and examined at bedside.   Denies SOB, chest pain     PAST HISTORY  --------------------------------------------------------------------------------  No significant changes to PMH, PSH, FHx, SHx, unless otherwise noted    ALLERGIES & MEDICATIONS  --------------------------------------------------------------------------------  Allergies    No Known Allergies    Intolerances      Standing Inpatient Medications  aspirin enteric coated 81 milliGRAM(s) Oral daily  atorvastatin 80 milliGRAM(s) Oral at bedtime  carvedilol 25 milliGRAM(s) Oral every 12 hours  clopidogrel Tablet 75 milliGRAM(s) Oral daily  dextrose 5%. 1000 milliLiter(s) IV Continuous <Continuous>  dextrose 50% Injectable 12.5 Gram(s) IV Push once  dextrose 50% Injectable 25 Gram(s) IV Push once  dextrose 50% Injectable 25 Gram(s) IV Push once  ferrous    sulfate 325 milliGRAM(s) Oral daily  finasteride 5 milliGRAM(s) Oral daily  furosemide    Tablet 40 milliGRAM(s) Oral daily  hydrALAZINE 25 milliGRAM(s) Oral three times a day  insulin glargine Injectable (LANTUS) 10 Unit(s) SubCutaneous at bedtime  insulin lispro (HumaLOG) corrective regimen sliding scale   SubCutaneous three times a day before meals  insulin lispro (HumaLOG) corrective regimen sliding scale   SubCutaneous at bedtime  tamsulosin 0.4 milliGRAM(s) Oral at bedtime    PRN Inpatient Medications  acetaminophen   Tablet .. 650 milliGRAM(s) Oral every 6 hours PRN  dextrose 40% Gel 15 Gram(s) Oral once PRN  glucagon  Injectable 1 milliGRAM(s) IntraMuscular once PRN      REVIEW OF SYSTEMS  --------------------------------------------------------------------------------  General: no fever  CVS: no chest pain  RESP: no sob, no cough  MSK: no edema     VITALS/PHYSICAL EXAM  --------------------------------------------------------------------------------  T(C): 36.4 (07-20-20 @ 04:27), Max: 36.6 (07-19-20 @ 13:00)  HR: 70 (07-20-20 @ 04:27) (70 - 75)  BP: 147/77 (07-20-20 @ 04:27) (147/77 - 170/88)  RR: 18 (07-20-20 @ 04:27) (18 - 18)  SpO2: 97% (07-20-20 @ 04:27) (95% - 97%)  Wt(kg): --        07-19-20 @ 07:01  -  07-20-20 @ 07:00  --------------------------------------------------------  IN: 400 mL / OUT: 0 mL / NET: 400 mL      Physical Exam:  	Gen: NAD  	HEENT: MMM  	Pulm: CTA B/L  	CV: S1S2  	Abd: Soft, +BS  	Ext: No LE edema B/L                      Neuro: Awake alert  	Skin: Warm and Dry   	Vascular access: no hd catheter            no  brandy  LABS/STUDIES  --------------------------------------------------------------------------------              9.4    5.08  >-----------<  168      [07-19-20 @ 08:59]              29.6     136  |  104  |  86  ----------------------------<  230      [07-20-20 @ 07:23]  4.7   |  19  |  3.93        Ca     8.9     [07-20-20 @ 07:23]    TPro  6.3  /  Alb  x   /  TBili  x   /  DBili  x   /  AST  x   /  ALT  x   /  AlkPhos  x   [07-19-20 @ 14:08]          Creatinine Trend:  SCr 3.93 [07-20 @ 07:23]  SCr 3.78 [07-19 @ 08:58]  SCr 3.85 [07-18 @ 07:10]  SCr 3.90 [07-17 @ 07:13]  SCr 3.49 [07-16 @ 07:42]    Urinalysis - [07-16-20 @ 08:58]      Color Colorless / Appearance Clear / SG 1.012 / pH 6.5      Gluc Negative / Ketone Negative  / Bili Negative / Urobili <2 mg/dL       Blood Negative / Protein 30 mg/dL / Leuk Est Negative / Nitrite Negative      RBC 1 / WBC 1 / Hyaline 0 / Gran  / Sq Epi  / Non Sq Epi 0 / Bacteria Negative    Urine Creatinine 38      [07-16-20 @ 07:07]  Urine Protein 88      [07-16-20 @ 07:07]  Urine Sodium 98      [07-16-20 @ 07:07]  Urine Chloride 77      [07-16-20 @ 07:07]    Iron 40, TIBC 162, %sat 25      [07-16-20 @ 09:30]  Ferritin 334      [07-16-20 @ 09:29]  HbA1c 11.4      [03-04-18 @ 11:53]  TSH 3.57      [07-16-20 @ 09:29]  Lipid: chol 122, TG 47, HDL 39, LDL 73      [07-16-20 @ 09:30]    HBsAb Reactive      [07-17-20 @ 10:47]  HBsAg Nonreact      [07-17-20 @ 10:47]  HBcAb Reactive      [07-17-20 @ 10:47]  HCV 0.34, Nonreact      [07-16-20 @ 09:30]    GLYNN: titer Negative, pattern --      [07-17-20 @ 10:44]  C3 Complement 108      [07-17-20 @ 08:31]  C4 Complement 24      [07-17-20 @ 08:31]  ANCA: cANCA Negative, pANCA Negative, atypical ANCA Indeterminate      [07-17-20 @ 10:43]  Free Light Chains: kappa 12.10, lambda 11.77, ratio = 1.03      [07-17 @ 08:31]  Immunofixation Serum:   One IgG Kappa and One IgG Lambda Bands Identified    Reference Range: None Detected      [07-17-20 @ 08:31]
Chief Complaint:  Patient is a 69y old  Male who presents with a chief complaint of fall (2020 14:36)      Interval Events:   no melena/hematochezia  Allergies:  No Known Allergies      Hospital Medications:  acetaminophen   Tablet .. 650 milliGRAM(s) Oral every 6 hours PRN  aspirin enteric coated 81 milliGRAM(s) Oral daily  atorvastatin 80 milliGRAM(s) Oral at bedtime  carvedilol 25 milliGRAM(s) Oral every 12 hours  clopidogrel Tablet 75 milliGRAM(s) Oral daily  dextrose 40% Gel 15 Gram(s) Oral once PRN  dextrose 5%. 1000 milliLiter(s) IV Continuous <Continuous>  dextrose 50% Injectable 12.5 Gram(s) IV Push once  dextrose 50% Injectable 25 Gram(s) IV Push once  dextrose 50% Injectable 25 Gram(s) IV Push once  ferrous    sulfate 325 milliGRAM(s) Oral daily  finasteride 5 milliGRAM(s) Oral daily  furosemide    Tablet 40 milliGRAM(s) Oral daily  glucagon  Injectable 1 milliGRAM(s) IntraMuscular once PRN  hydrALAZINE 25 milliGRAM(s) Oral three times a day  insulin glargine Injectable (LANTUS) 10 Unit(s) SubCutaneous at bedtime  insulin lispro (HumaLOG) corrective regimen sliding scale   SubCutaneous three times a day before meals  insulin lispro (HumaLOG) corrective regimen sliding scale   SubCutaneous at bedtime  tamsulosin 0.4 milliGRAM(s) Oral at bedtime      PMHX/PSHX:  CHF (congestive heart failure)  Hyperlipemia  DM (diabetes mellitus)  HTN (hypertension)  No pertinent past medical history  History of open heart surgery      Family history:  No pertinent family history in first degree relatives      ROS:     General:  No wt loss, fevers, chills, night sweats, fatigue,   Eyes:  Good vision, no reported pain  ENT:  No sore throat, pain, runny nose, dysphagia  CV:  No pain, palpitations, hypo/hypertension  Resp:  No dyspnea, cough, tachypnea, wheezing  GI:  See HPI  :  No pain, bleeding, incontinence, nocturia  Muscle:  No pain, weakness  Neuro:  No weakness, tingling, memory problems  Psych:  No fatigue, insomnia, mood problems, depression  Endocrine:  No polyuria, polydipsia, cold/heat intolerance  Heme:  No petechiae, ecchymosis, easy bruisability  Skin:  No rash, edema      PHYSICAL EXAM:     GENERAL:  Appears stated age, well-groomed, well-nourished, no distress  HEENT:  NC/AT,  conjunctivae clear, sclera-anicteric  NECK: Trachea midline, supple  CHEST:  Full & symmetric excursion, no increased effort, breath sounds clear  HEART:  Regular rhythm, no anton/heave  ABDOMEN:  Soft, non-tender, non-distended, normoactive bowel sounds,  no masses ,no hepato-splenomegaly,   EXTREMITIES:  no cyanosis,clubbing or edema  SKIN:  No rash/erythema/petechiae, no jaundice  NEURO:  Alert, no asterixis  RECTAL: Deferred    Vital Signs:  Vital Signs Last 24 Hrs  T(C): 36.8 (2020 12:02), Max: 36.8 (2020 12:02)  T(F): 98.2 (2020 12:02), Max: 98.2 (2020 12:02)  HR: 64 (2020 12:02) (64 - 75)  BP: 150/72 (2020 12:02) (147/77 - 165/89)  BP(mean): --  RR: 18 (2020 12:02) (18 - 18)  SpO2: 97% (2020 12:02) (95% - 97%)  Daily     Daily Weight in k.4 (2020 08:05)    LABS:                        9.4    5.08  )-----------( 168      ( 2020 08:59 )             29.6     07-20    136  |  104  |  86<H>  ----------------------------<  230<H>  4.7   |  19<L>  |  3.93<H>    Ca    8.9      2020 07:23    TPro  6.3  /  Alb  x   /  TBili  x   /  DBili  x   /  AST  x   /  ALT  x   /  AlkPhos  x   07-19    LIVER FUNCTIONS - ( 2020 14:08 )  Alb: x     / Pro: 6.3 g/dL / ALK PHOS: x     / ALT: x     / AST: x     / GGT: x                   Imaging:
Subjective: Patient seen and examined. No new events except as noted.   doing okay  Cr worsening   no melena or gross bleeding     REVIEW OF SYSTEMS:    CONSTITUTIONAL: No weakness, fevers or chills  EYES/ENT: No visual changes;  No vertigo or throat pain   NECK: No pain or stiffness  RESPIRATORY: No cough, wheezing, hemoptysis; No shortness of breath  CARDIOVASCULAR: No chest pain or palpitations  GASTROINTESTINAL: No abdominal or epigastric pain. No nausea, vomiting, or hematemesis; No diarrhea or constipation. No melena or hematochezia.  GENITOURINARY: No dysuria, frequency or hematuria  NEUROLOGICAL: No numbness or weakness  SKIN: No itching, burning, rashes, or lesions   All other review of systems is negative unless indicated above.    MEDICATIONS:  MEDICATIONS  (STANDING):  aspirin enteric coated 81 milliGRAM(s) Oral daily  atorvastatin 80 milliGRAM(s) Oral at bedtime  bisacodyl 10 milliGRAM(s) Oral at bedtime  carvedilol 25 milliGRAM(s) Oral every 12 hours  dextrose 5%. 1000 milliLiter(s) (50 mL/Hr) IV Continuous <Continuous>  dextrose 50% Injectable 12.5 Gram(s) IV Push once  dextrose 50% Injectable 25 Gram(s) IV Push once  dextrose 50% Injectable 25 Gram(s) IV Push once  ferrous    sulfate 325 milliGRAM(s) Oral daily  finasteride 5 milliGRAM(s) Oral daily  furosemide    Tablet 40 milliGRAM(s) Oral daily  hydrALAZINE 50 milliGRAM(s) Oral three times a day  insulin glargine Injectable (LANTUS) 10 Unit(s) SubCutaneous at bedtime  insulin lispro (HumaLOG) corrective regimen sliding scale   SubCutaneous three times a day before meals  insulin lispro (HumaLOG) corrective regimen sliding scale   SubCutaneous at bedtime  pantoprazole  Injectable 40 milliGRAM(s) IV Push two times a day  sodium bicarbonate 1300 milliGRAM(s) Oral three times a day  tamsulosin 0.4 milliGRAM(s) Oral at bedtime      PHYSICAL EXAM:  T(C): 36.6 (07-31-20 @ 12:46), Max: 37.1 (07-31-20 @ 04:15)  HR: 67 (07-31-20 @ 12:46) (64 - 68)  BP: 134/63 (07-31-20 @ 12:46) (126/64 - 137/66)  RR: 18 (07-31-20 @ 12:46) (18 - 18)  SpO2: 96% (07-31-20 @ 12:46) (96% - 100%)  Wt(kg): --  I&O's Summary    30 Jul 2020 07:01  -  31 Jul 2020 07:00  --------------------------------------------------------  IN: 1630 mL / OUT: 975 mL / NET: 655 mL    31 Jul 2020 07:01  -  31 Jul 2020 16:45  --------------------------------------------------------  IN: 840 mL / OUT: 600 mL / NET: 240 mL          Appearance: Normal	  HEENT:  PERRLA   Lymphatic: No lymphadenopathy   Cardiovascular: Normal S1 S2, no JVD  Respiratory: normal effort , clear  Gastrointestinal:  Soft, Non-tender  Skin: No rashes,  warm to touch  Psychiatry:  Mood & affect appropriate  Musculuskeletal: No edema      All labs, Imaging and EKGs personally reviewed                             7.9    6.28  )-----------( 129      ( 31 Jul 2020 12:10 )             24.4               07-31    143  |  106  |  100<H>  ----------------------------<  131<H>  5.1   |  18<L>  |  4.57<H>    Ca    8.7      31 Jul 2020 06:52
Subjective: Patient seen and examined. No new events except as noted.   doing okay  pending D.C     REVIEW OF SYSTEMS:    CONSTITUTIONAL: No weakness, fevers or chills  EYES/ENT: No visual changes;  No vertigo or throat pain   NECK: No pain or stiffness  RESPIRATORY: No cough, wheezing, hemoptysis; No shortness of breath  CARDIOVASCULAR: No chest pain or palpitations  GASTROINTESTINAL: No abdominal or epigastric pain. No nausea, vomiting, or hematemesis; No diarrhea or constipation. No melena or hematochezia.  GENITOURINARY: No dysuria, frequency or hematuria  NEUROLOGICAL: No numbness or weakness  SKIN: No itching, burning, rashes, or lesions   All other review of systems is negative unless indicated above.    MEDICATIONS:  MEDICATIONS  (STANDING):  aspirin enteric coated 81 milliGRAM(s) Oral daily  atorvastatin 80 milliGRAM(s) Oral at bedtime  carvedilol 25 milliGRAM(s) Oral every 12 hours  clopidogrel Tablet 75 milliGRAM(s) Oral daily  dextrose 5%. 1000 milliLiter(s) (50 mL/Hr) IV Continuous <Continuous>  dextrose 50% Injectable 12.5 Gram(s) IV Push once  dextrose 50% Injectable 25 Gram(s) IV Push once  dextrose 50% Injectable 25 Gram(s) IV Push once  ferrous    sulfate 325 milliGRAM(s) Oral daily  finasteride 5 milliGRAM(s) Oral daily  furosemide    Tablet 40 milliGRAM(s) Oral daily  hydrALAZINE 50 milliGRAM(s) Oral three times a day  insulin glargine Injectable (LANTUS) 10 Unit(s) SubCutaneous at bedtime  insulin lispro (HumaLOG) corrective regimen sliding scale   SubCutaneous three times a day before meals  insulin lispro (HumaLOG) corrective regimen sliding scale   SubCutaneous at bedtime  tamsulosin 0.4 milliGRAM(s) Oral at bedtime      PHYSICAL EXAM:  T(C): 36.8 (07-24-20 @ 12:37), Max: 36.8 (07-24-20 @ 12:37)  HR: 66 (07-24-20 @ 13:49) (62 - 66)  BP: 136/66 (07-24-20 @ 13:49) (131/63 - 138/68)  RR: 18 (07-24-20 @ 12:37) (18 - 18)  SpO2: 97% (07-24-20 @ 12:37) (97% - 98%)  Wt(kg): --  I&O's Summary    23 Jul 2020 07:01  -  24 Jul 2020 07:00  --------------------------------------------------------  IN: 0 mL / OUT: 1000 mL / NET: -1000 mL    24 Jul 2020 07:01  -  24 Jul 2020 15:27  --------------------------------------------------------  IN: 1080 mL / OUT: 300 mL / NET: 780 mL          Appearance: Normal	  HEENT:  PERRLA   Lymphatic: No lymphadenopathy   Cardiovascular: Normal S1 S2, no JVD  Respiratory: normal effort , clear  Gastrointestinal:  Soft, Non-tender  Skin: No rashes,  warm to touch  Psychiatry:  Mood & affect appropriate  Musculuskeletal: No edema      All labs, Imaging and EKGs personally reviewed
Subjective: Patient seen and examined. No new events except as noted.   doing okay   hgb stable cr improving slolwy     REVIEW OF SYSTEMS:    CONSTITUTIONAL: No weakness, fevers or chills  EYES/ENT: No visual changes;  No vertigo or throat pain   NECK: No pain or stiffness  RESPIRATORY: No cough, wheezing, hemoptysis; No shortness of breath  CARDIOVASCULAR: No chest pain or palpitations  GASTROINTESTINAL: No abdominal or epigastric pain. No nausea, vomiting, or hematemesis; No diarrhea or constipation. No melena or hematochezia.  GENITOURINARY: No dysuria, frequency or hematuria  NEUROLOGICAL: No numbness or weakness  SKIN: No itching, burning, rashes, or lesions   All other review of systems is negative unless indicated above.    MEDICATIONS:  MEDICATIONS  (STANDING):  aspirin enteric coated 81 milliGRAM(s) Oral daily  atorvastatin 80 milliGRAM(s) Oral at bedtime  bisacodyl 10 milliGRAM(s) Oral at bedtime  carvedilol 25 milliGRAM(s) Oral every 12 hours  dextrose 5%. 1000 milliLiter(s) (50 mL/Hr) IV Continuous <Continuous>  dextrose 50% Injectable 12.5 Gram(s) IV Push once  dextrose 50% Injectable 25 Gram(s) IV Push once  dextrose 50% Injectable 25 Gram(s) IV Push once  ferrous    sulfate 325 milliGRAM(s) Oral daily  finasteride 5 milliGRAM(s) Oral daily  hydrALAZINE 50 milliGRAM(s) Oral three times a day  insulin glargine Injectable (LANTUS) 10 Unit(s) SubCutaneous at bedtime  insulin lispro (HumaLOG) corrective regimen sliding scale   SubCutaneous three times a day before meals  insulin lispro (HumaLOG) corrective regimen sliding scale   SubCutaneous at bedtime  pantoprazole  Injectable 40 milliGRAM(s) IV Push two times a day  sodium bicarbonate 1300 milliGRAM(s) Oral three times a day  tamsulosin 0.4 milliGRAM(s) Oral at bedtime      PHYSICAL EXAM:  T(C): 36.7 (20 @ 11:40), Max: 37.2 (20 @ 04:01)  HR: 60 (20 @ 15:42) (60 - 60)  BP: 160/73 (20 @ 15:42) (109/51 - 160/73)  RR: 16 (20 @ 11:40) (16 - 18)  SpO2: 97% (20 @ 11:40) (97% - 98%)  Wt(kg): --  I&O's Summary    01 Aug 2020 07:  -  02 Aug 2020 07:00  --------------------------------------------------------  IN: 600 mL / OUT: 1600 mL / NET: -1000 mL    02 Aug 2020 07:  -  02 Aug 2020 17:53  --------------------------------------------------------  IN: 720 mL / OUT: 200 mL / NET: 520 mL          Appearance: Normal	  HEENT:  PERRLA   Lymphatic: No lymphadenopathy   Cardiovascular: Normal S1 S2, no JVD  Respiratory: normal effort , clear  Gastrointestinal:  Soft, Non-tender  Skin: No rashes,  warm to touch  Psychiatry:  Mood & affect appropriate  Musculuskeletal: No edema      All labs, Imaging and EKGs personally reviewed                           8.0    6.32  )-----------( 127      ( 02 Aug 2020 07:07 )             24.9               08-02    142  |  106  |  99<H>  ----------------------------<  104<H>  5.1   |  19<L>  |  4.27<H>    Ca    8.7      02 Aug 2020 07:07                         Urinalysis Basic - ( 01 Aug 2020 03:28 )    Color: Light Yellow / Appearance: Clear / S.014 / pH: x  Gluc: x / Ketone: Negative  / Bili: Negative / Urobili: Negative   Blood: x / Protein: 30 mg/dL / Nitrite: Negative   Leuk Esterase: Moderate / RBC: 2 /hpf / WBC 6 /HPF   Sq Epi: x / Non Sq Epi: 0 /hpf / Bacteria: Negative

## 2020-08-03 NOTE — PROGRESS NOTE ADULT - PROBLEM SELECTOR PLAN 3
Hx of CABG   ASA and statin  cont lasix 40 oral  ID eval appreciated for RPR
Monitor BUN/Cr , tredning down   cont to hold lasix   avoid nephrotoxic meds  resume home meds  defer to renal
Hx of CABG   ASA and statin  cont lasix 40
Hx of CABG   ASA and statin  cont lasix 40  oral
Hx of CABG   ASA and statin  cont lasix 40  oral
Hx of CABG   ASA and statin  cont lasix 40 oral  ID eval appreciated for RPR
Hx of CABG   ASA and statin  cont lasix 40, can switch to oral
Monitor BUN/Cr  avoid nephrotoxic meds  resume home meds
Monitor BUN/Cr  avoid nephrotoxic meds  resume home meds  monitor Cr tomorrow
Monitor BUN/Cr , tredning down   cont to hold lasix   avoid nephrotoxic meds  resume home meds  defer to renal
Monitor BUN/Cr worsenign BUN.Cr   avoid nephrotoxic meds  resume home meds  defer to renal
Monitor BUN/Cr worsenign BUN.Cr   avoid nephrotoxic meds  resume home meds  defer to renal

## 2020-08-03 NOTE — PROGRESS NOTE ADULT - PROBLEM SELECTOR PROBLEM 5
Diabetes
Elevated troponin
Diabetes
Elevated troponin
Diabetes
Elevated troponin

## 2020-08-03 NOTE — PROGRESS NOTE ADULT - PROBLEM SELECTOR PLAN 6
serial CE and ekg  Card eval appreciated   Echo   Hx of CABG
Anemia W/U  GI eval appreciated   monitor hgb level   check occult stool
serial CE and ekg  Card eval appreciated   Echo   Hx of CABG
Anemia W/U  GI eval appreciated   monitor hgb level   check occult stool
Anemia W?U  GI eval appreciated   monitro hgb level   check occult stool
serial CE and ekg  Card eval appreciated   Echo   Hx of CABG

## 2020-08-03 NOTE — PROGRESS NOTE ADULT - ASSESSMENT
69 Male pmhx DM, HTN, HLD, CAD s/p CABG 2014, cath ([facility] 3/19/2018), MICU admission for influenza complicated by respiratory failure, JAKOB, NSTEMI, HF admitted for right hip pain. Pt found to have renal failure and hyperkalemia. Nephrology consulted for renal failure.     CKD stage IV/V  renal function fluctuating -- 3.4-3.9 possibly his baseline   Scr improving   Lasix remains held  Renal sonogram with no hydro   Monitor CBC with diff   Avoid nephrotoxins    Hyperkalemia  improved with medical mgn  low K diet  MOnitor serum K      HTN  BP fluctuating   continue current meds  Low salt diet  MOnitor BP      Proteinuria  2.3gm proteinuria  Follow up  Vasculitis work up   hep B reactive (possible past infection) , K/L ok, C3, C4 not low, ANCA/GLYNN neg   serum immunofixation suggestive of one kappa and one macarena band, spep has 2 weak migrating paraprotein bands  Needs UPEP and urine immunofixation  If gets discharged needs hematology evaluation  RPR positive, 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 Dieulafoy s/p hemostasis with argon plasma coagulation.   Continue oral iron  Give one dose of epogen today   Monitor Hb

## 2020-08-03 NOTE — PROGRESS NOTE ADULT - PROBLEM SELECTOR PROBLEM 4
Diabetes
Acute systolic congestive heart failure
Diabetes
Acute systolic congestive heart failure
Diabetes
Acute systolic congestive heart failure

## 2020-08-03 NOTE — PROGRESS NOTE ADULT - PROBLEM SELECTOR PROBLEM 7
Prophylactic measure

## 2020-08-03 NOTE — PROGRESS NOTE ADULT - PROBLEM SELECTOR PLAN 2
Ortho eval appreciated   no surgical intervention   fall precautions   pain control  knee Xray negative, no fracture
Monitor BUN/Cr  avoid nephrotoxic meds  on enlapril, hold for now  Pharmacy F/U for home meds
Monitor BUN/Cr  avoid nephrotoxic meds  on enalapril, hold for now  Pharmacy F/U for home meds
Monitor BUN/Cr  avoid nephrotoxic meds  on enlapril, hold for now  Pharmacy F/U for home meds
Monitor BUN/Cr  avoid nephrotoxic meds  resume home meds
Monitor BUN/Cr  avoid nephrotoxic meds  resume [facility]
Monitor BUN/Cr  avoid nephrotoxic meds  resume [facility] em[facility]
Ortho eval appreciated   no surgical intervention   fall precautions   pain control
Ortho eval appreciated   no surgical intervention   fall precautions   pain control  check knee Xray
Ortho eval appreciated   no surgical intervention   fall precautions   pain control  check knee Xray
Ortho eval appreciated   no surgical intervention   fall precautions   pain control  knee Xray negative, no fracture
Monitor BUN/Cr  avoid nephrotoxic meds  resume home meds

## 2020-08-04 LAB
% GAMMA, URINE: 13.8 % — SIGNIFICANT CHANGE UP
ALBUMIN 24H MFR UR ELPH: 50.1 % — SIGNIFICANT CHANGE UP
ALPHA1 GLOB 24H MFR UR ELPH: 14.6 % — SIGNIFICANT CHANGE UP
ALPHA2 GLOB 24H MFR UR ELPH: 9 % — SIGNIFICANT CHANGE UP
B-GLOBULIN 24H MFR UR ELPH: 12.5 % — SIGNIFICANT CHANGE UP
COLLECT DURATION TIME UR: 24 HR — SIGNIFICANT CHANGE UP
INTERPRETATION 24H UR IFE-IMP: SIGNIFICANT CHANGE UP
INTERPRETATION 24H UR IFE-IMP: SIGNIFICANT CHANGE UP
M PROTEIN 24H UR ELPH-MRATE: 0 MG/24HR — SIGNIFICANT CHANGE UP (ref 0–0)
M PROTEIN 24H UR ELPH-MRATE: 0 MG/DL — SIGNIFICANT CHANGE UP
PROT ?TM UR-MCNC: 93 MG/DL — HIGH (ref 0–12)
PROT PATTERN 24H UR ELPH-IMP: SIGNIFICANT CHANGE UP
PROTEIN QUANT CALC, URINE: 1116 MG/24 H — HIGH (ref 50–100)
TOTAL VOLUME - 24 HOUR: 1200 ML — SIGNIFICANT CHANGE UP
URINE CREATININE CALCULATION: 0.7 G/24 H — LOW (ref 1–2)

## 2020-08-06 DIAGNOSIS — E11.37X3 TYPE 2 DIABETES MELLITUS WITH DIABETIC MACULAR EDEMA, RESOLVED FOLLOWING TREATMENT, BILATERAL: ICD-10-CM

## 2020-08-06 DIAGNOSIS — N17.9 ACUTE KIDNEY FAILURE, UNSPECIFIED: ICD-10-CM

## 2020-08-06 DIAGNOSIS — E11.9 TYPE 2 DIABETES MELLITUS W/OUT COMPLICATIONS: ICD-10-CM

## 2020-08-06 DIAGNOSIS — Z79.4 TYPE 2 DIABETES MELLITUS WITH DIABETIC MACULAR EDEMA, RESOLVED FOLLOWING TREATMENT, BILATERAL: ICD-10-CM

## 2020-08-06 DIAGNOSIS — I21.4 NON-ST ELEVATION (NSTEMI) MYOCARDIAL INFARCTION: ICD-10-CM

## 2020-08-06 DIAGNOSIS — M25.559 PAIN IN UNSPECIFIED HIP: ICD-10-CM

## 2020-08-06 DIAGNOSIS — Z91.81 HISTORY OF FALLING: ICD-10-CM

## 2020-08-06 DIAGNOSIS — I50.21 ACUTE SYSTOLIC (CONGESTIVE) HEART FAILURE: ICD-10-CM

## 2020-08-06 PROBLEM — Z00.00 ENCOUNTER FOR PREVENTIVE HEALTH EXAMINATION: Status: ACTIVE | Noted: 2020-08-06

## 2020-08-07 ENCOUNTER — APPOINTMENT (OUTPATIENT)
Dept: CARDIOLOGY | Facility: CLINIC | Age: 70
End: 2020-08-07
Payer: MEDICARE

## 2020-08-07 ENCOUNTER — LABORATORY RESULT (OUTPATIENT)
Age: 70
End: 2020-08-07

## 2020-08-07 ENCOUNTER — NON-APPOINTMENT (OUTPATIENT)
Age: 70
End: 2020-08-07

## 2020-08-07 VITALS
DIASTOLIC BLOOD PRESSURE: 71 MMHG | HEART RATE: 45 BPM | SYSTOLIC BLOOD PRESSURE: 150 MMHG | WEIGHT: 140 LBS | OXYGEN SATURATION: 100 %

## 2020-08-07 DIAGNOSIS — Z87.891 PERSONAL HISTORY OF NICOTINE DEPENDENCE: ICD-10-CM

## 2020-08-07 DIAGNOSIS — I42.9 CARDIOMYOPATHY, UNSPECIFIED: ICD-10-CM

## 2020-08-07 DIAGNOSIS — I10 ESSENTIAL (PRIMARY) HYPERTENSION: ICD-10-CM

## 2020-08-07 DIAGNOSIS — E78.5 HYPERLIPIDEMIA, UNSPECIFIED: ICD-10-CM

## 2020-08-07 DIAGNOSIS — D64.9 ANEMIA, UNSPECIFIED: ICD-10-CM

## 2020-08-07 DIAGNOSIS — I25.10 ATHEROSCLEROTIC HEART DISEASE OF NATIVE CORONARY ARTERY W/OUT ANGINA PECTORIS: ICD-10-CM

## 2020-08-07 PROCEDURE — 99204 OFFICE O/P NEW MOD 45 MIN: CPT

## 2020-08-07 PROCEDURE — 93000 ELECTROCARDIOGRAM COMPLETE: CPT

## 2020-08-07 RX ORDER — TAMSULOSIN HYDROCHLORIDE 0.4 MG/1
0.4 CAPSULE ORAL
Qty: 30 | Refills: 6 | Status: ACTIVE | COMMUNITY

## 2020-08-07 RX ORDER — ASPIRIN ENTERIC COATED TABLETS 81 MG 81 MG/1
81 TABLET, DELAYED RELEASE ORAL
Refills: 0 | Status: ACTIVE | COMMUNITY

## 2020-08-07 RX ORDER — ALENDRONATE SODIUM 70 MG/1
70 TABLET ORAL
Refills: 0 | Status: ACTIVE | COMMUNITY

## 2020-08-07 RX ORDER — CARVEDILOL 25 MG/1
25 TABLET, FILM COATED ORAL
Qty: 30 | Refills: 0 | Status: ACTIVE | COMMUNITY

## 2020-08-07 RX ORDER — CYPROHEPTADINE HYDROCHLORIDE 2 MG/5ML
2 SOLUTION ORAL DAILY
Refills: 0 | Status: ACTIVE | COMMUNITY

## 2020-08-07 RX ORDER — INSULIN GLARGINE 100 [IU]/ML
100 INJECTION, SOLUTION SUBCUTANEOUS
Refills: 0 | Status: ACTIVE | COMMUNITY

## 2020-08-07 RX ORDER — PANTOPRAZOLE SODIUM 40 MG/1
40 TABLET, DELAYED RELEASE ORAL TWICE DAILY
Refills: 0 | Status: ACTIVE | COMMUNITY

## 2020-08-07 RX ORDER — GABAPENTIN 300 MG
300 TABLET ORAL DAILY
Refills: 0 | Status: ACTIVE | COMMUNITY

## 2020-08-07 RX ORDER — HYDRALAZINE HYDROCHLORIDE 50 MG/1
50 TABLET ORAL
Qty: 270 | Refills: 3 | Status: ACTIVE | COMMUNITY

## 2020-08-07 RX ORDER — INSULIN LISPRO 100 [IU]/ML
100 INJECTION, SOLUTION INTRAVENOUS; SUBCUTANEOUS
Refills: 0 | Status: ACTIVE | COMMUNITY

## 2020-08-07 RX ORDER — ACETAMINOPHEN 325 MG/1
325 TABLET ORAL
Refills: 0 | Status: ACTIVE | COMMUNITY

## 2020-08-07 RX ORDER — BISACODYL 5 MG/1
5 TABLET ORAL
Refills: 0 | Status: ACTIVE | COMMUNITY

## 2020-08-07 RX ORDER — FINASTERIDE 5 MG/1
5 TABLET, FILM COATED ORAL
Qty: 90 | Refills: 3 | Status: ACTIVE | COMMUNITY

## 2020-08-07 RX ORDER — SODIUM BICARBONATE 650 MG/1
650 TABLET ORAL 3 TIMES DAILY
Refills: 0 | Status: ACTIVE | COMMUNITY

## 2020-08-07 RX ORDER — ATORVASTATIN CALCIUM 80 MG/1
80 TABLET, FILM COATED ORAL
Refills: 0 | Status: ACTIVE | COMMUNITY

## 2020-08-07 RX ORDER — CHLORHEXIDINE GLUCONATE 4 %
325 (65 FE) LIQUID (ML) TOPICAL
Refills: 0 | Status: ACTIVE | COMMUNITY

## 2020-08-10 LAB
ALBUMIN SERPL ELPH-MCNC: 3.6 G/DL
ALP BLD-CCNC: 219 U/L
ALT SERPL-CCNC: 15 U/L
ANION GAP SERPL CALC-SCNC: 12 MMOL/L
AST SERPL-CCNC: 19 U/L
BASOPHILS # BLD AUTO: 0.03 K/UL
BASOPHILS NFR BLD AUTO: 0.5 %
BILIRUB SERPL-MCNC: 0.2 MG/DL
BUN SERPL-MCNC: 94 MG/DL
CALCIUM SERPL-MCNC: 8.8 MG/DL
CHLORIDE SERPL-SCNC: 106 MMOL/L
CO2 SERPL-SCNC: 22 MMOL/L
CREAT SERPL-MCNC: 4.06 MG/DL
EOSINOPHIL # BLD AUTO: 0.35 K/UL
EOSINOPHIL NFR BLD AUTO: 5.6 %
GLUCOSE SERPL-MCNC: 105 MG/DL
HCT VFR BLD CALC: 27.6 %
HGB BLD-MCNC: 8.6 G/DL
IMM GRANULOCYTES NFR BLD AUTO: 0.2 %
LYMPHOCYTES # BLD AUTO: 0.97 K/UL
LYMPHOCYTES NFR BLD AUTO: 15.6 %
MAN DIFF?: NORMAL
MCHC RBC-ENTMCNC: 31.2 GM/DL
MCHC RBC-ENTMCNC: 31.3 PG
MCV RBC AUTO: 100.4 FL
MONOCYTES # BLD AUTO: 0.45 K/UL
MONOCYTES NFR BLD AUTO: 7.2 %
NEUTROPHILS # BLD AUTO: 4.41 K/UL
NEUTROPHILS NFR BLD AUTO: 70.9 %
PLATELET # BLD AUTO: 98 K/UL
POTASSIUM SERPL-SCNC: 6.7 MMOL/L
PROT SERPL-MCNC: 6.5 G/DL
RBC # BLD: 2.75 M/UL
RBC # FLD: 16.6 %
SODIUM SERPL-SCNC: 140 MMOL/L
WBC # FLD AUTO: 6.22 K/UL

## 2020-08-19 PROBLEM — I25.10 CAD (CORONARY ARTERY DISEASE): Status: ACTIVE | Noted: 2020-08-06

## 2020-08-19 PROBLEM — I10 HTN (HYPERTENSION), BENIGN: Status: ACTIVE | Noted: 2020-08-06

## 2020-08-19 PROBLEM — I42.9 CARDIOMYOPATHY: Status: ACTIVE | Noted: 2020-08-19

## 2020-08-19 PROBLEM — E78.5 HLD (HYPERLIPIDEMIA): Status: ACTIVE | Noted: 2020-08-06

## 2020-08-19 RX ORDER — CLOPIDOGREL BISULFATE 75 MG/1
75 TABLET, FILM COATED ORAL
Qty: 30 | Refills: 0 | Status: DISCONTINUED | COMMUNITY
End: 2020-08-19

## 2020-08-19 NOTE — HISTORY OF PRESENT ILLNESS
[FreeTextEntry1] : Information obtained from chart review.  \par 70 yo man with dementia, IDDM, CKD, uncontrolled HTN on meds, HLD on statin, CAD s/p CABG and PCIs ([facility]), recent hospitalization at [facility] (07/15/2020-08/03/2020) for L pubic rami fracture after a fall, hospital course complicated by GIB, NSTEMI.  While at the hospital pt was seen by Dr. Jones cardiology.  [phone]  consultation today for medication management whether Lasix and Entresto can be restarted.  Patient is wheelchair bound, oriented to self only.  He denies any CP, SOB, STUART, LE edema, syncope, dizziness.  Nursing home attendant provided me with medication list from the facility.

## 2020-08-19 NOTE — REASON FOR VISIT
[Consultation] : a consultation regarding [Coronary Artery Disease] : coronary artery disease [Cardiomyopathy] : cardiomyopathy [Formal Caregiver] : formal caregiver [[employer] ] : provided by [employer]   [Medication Management] : Medication management [FreeTextEntry1] : 190620 [TWNoteComboBox1] : [place]

## 2020-08-19 NOTE — PHYSICAL EXAM
[General Appearance - In No Acute Distress] : no acute distress [Normal Conjunctiva] : the conjunctiva exhibited no abnormalities [Eyelids - No Xanthelasma] : the eyelids demonstrated no xanthelasmas [Normal Jugular Venous A Waves Present] : normal jugular venous A waves present [Normal Jugular Venous V Waves Present] : normal jugular venous V waves present [No Jugular Venous Govea A Waves] : no jugular venous govea A waves [Heart Sounds] : normal S1 and S2 [Edema] : no peripheral edema present [Murmurs] : no murmurs present [2+] : left 2+ [Exaggerated Use Of Accessory Muscles For Inspiration] : no accessory muscle use [Respiration, Rhythm And Depth] : normal respiratory rhythm and effort [Auscultation Breath Sounds / Voice Sounds] : lungs were clear to auscultation bilaterally [Abdomen Soft] : soft [Abdomen Tenderness] : non-tender [Cyanosis, Localized] : no localized cyanosis [Petechial Hemorrhages (___cm)] : no petechial hemorrhages [Nail Clubbing] : no clubbing of the fingernails [Skin Color & Pigmentation] : normal skin color and pigmentation [] : no rash [No Anxiety] : not feeling anxious [FreeTextEntry1] : awake, alert, oriented to self

## 2020-08-19 NOTE — DISCUSSION/SUMMARY
[FreeTextEntry1] : 70 yo man with dementia, IDDM, CKD, uncontrolled HTN on meds, HLD on statin, CAD s/p CABG and PCIs ([facility]), recent hospitalization at [facility] (07/15/2020-08/03/2020) for L pubic rami fracture after a fall, hospital course complicated by GIB, NSTEMI, presents for consultation for medication management after hospitalization.  \par \par CAD stable s/p CABG and PCIs\par -Continue ASA\par \par Cardiomyopathy with mild to moderate LV function\par -Would not restart either Lasix or Entresto given significant CKD on discharge creat 3.89. Pt is on chronic bicarb po.   Will get BMP at this time.  Pt appears to be euvolemic on clinical exam and no need for urgent Lasix currently.  Continue afterload reduction with Hydralazine home dose 50 mg q8, recommend increasing to 75 mg q8 if BP remains elevated.  Today noted to be markedly bradycardic recommend decreasing Coreg dose to 12.5 q12 and if bradycardia does not improve continue decreasing the dose.  \par \par HLD on statin \par -Continue Lipitor 80 mg po daily \par \par HTN uncontrolled\par -Increase Hydralazine if BP remains elevated to 75 mg q8\par \par Anemia with GIB in the hospital\par -Hb at discharge 7.7.  Will check baseline at this time.\par \par Follow up with me in one month

## 2020-09-18 ENCOUNTER — APPOINTMENT (OUTPATIENT)
Dept: CARDIOLOGY | Facility: CLINIC | Age: 70
End: 2020-09-18

## 2021-03-01 ENCOUNTER — INPATIENT (INPATIENT)
Facility: HOSPITAL | Age: 71
LOS: 3 days | Discharge: LEFT BEFORE DISCHARGE | DRG: 814 | End: 2021-03-05
Attending: STUDENT IN AN ORGANIZED HEALTH CARE EDUCATION/TRAINING PROGRAM | Admitting: SURGERY
Payer: MEDICARE

## 2021-03-01 VITALS
DIASTOLIC BLOOD PRESSURE: 68 MMHG | HEIGHT: 64 IN | OXYGEN SATURATION: 94 % | WEIGHT: 171.96 LBS | RESPIRATION RATE: 16 BRPM | SYSTOLIC BLOOD PRESSURE: 133 MMHG | TEMPERATURE: 98 F | HEART RATE: 54 BPM

## 2021-03-01 DIAGNOSIS — S36.039A UNSPECIFIED LACERATION OF SPLEEN, INITIAL ENCOUNTER: ICD-10-CM

## 2021-03-01 DIAGNOSIS — Z98.890 OTHER SPECIFIED POSTPROCEDURAL STATES: Chronic | ICD-10-CM

## 2021-03-01 LAB
ALBUMIN SERPL ELPH-MCNC: 3 G/DL — LOW (ref 3.3–5)
ALP SERPL-CCNC: 255 U/L — HIGH (ref 40–120)
ALT FLD-CCNC: 14 U/L — SIGNIFICANT CHANGE UP (ref 10–45)
ANION GAP SERPL CALC-SCNC: 7 MMOL/L — SIGNIFICANT CHANGE UP (ref 5–17)
APPEARANCE UR: CLEAR — SIGNIFICANT CHANGE UP
APTT BLD: 34.3 SEC — SIGNIFICANT CHANGE UP (ref 27.5–35.5)
AST SERPL-CCNC: 20 U/L — SIGNIFICANT CHANGE UP (ref 10–40)
BACTERIA # UR AUTO: NEGATIVE — SIGNIFICANT CHANGE UP
BASOPHILS # BLD AUTO: 0.03 K/UL — SIGNIFICANT CHANGE UP (ref 0–0.2)
BASOPHILS NFR BLD AUTO: 0.6 % — SIGNIFICANT CHANGE UP (ref 0–2)
BILIRUB SERPL-MCNC: 0.3 MG/DL — SIGNIFICANT CHANGE UP (ref 0.2–1.2)
BILIRUB UR-MCNC: NEGATIVE — SIGNIFICANT CHANGE UP
BLD GP AB SCN SERPL QL: NEGATIVE — SIGNIFICANT CHANGE UP
BUN SERPL-MCNC: 50 MG/DL — HIGH (ref 7–23)
CALCIUM SERPL-MCNC: 9 MG/DL — SIGNIFICANT CHANGE UP (ref 8.4–10.5)
CHLORIDE SERPL-SCNC: 106 MMOL/L — SIGNIFICANT CHANGE UP (ref 96–108)
CO2 SERPL-SCNC: 26 MMOL/L — SIGNIFICANT CHANGE UP (ref 22–31)
COLOR SPEC: YELLOW — SIGNIFICANT CHANGE UP
CREAT SERPL-MCNC: 4.5 MG/DL — HIGH (ref 0.5–1.3)
DIFF PNL FLD: NEGATIVE — SIGNIFICANT CHANGE UP
EOSINOPHIL # BLD AUTO: 0.08 K/UL — SIGNIFICANT CHANGE UP (ref 0–0.5)
EOSINOPHIL NFR BLD AUTO: 1.6 % — SIGNIFICANT CHANGE UP (ref 0–6)
EPI CELLS # UR: 1 — SIGNIFICANT CHANGE UP
GAS PNL BLDV: SIGNIFICANT CHANGE UP
GLUCOSE BLDC GLUCOMTR-MCNC: 100 MG/DL — HIGH (ref 70–99)
GLUCOSE SERPL-MCNC: 100 MG/DL — HIGH (ref 70–99)
GLUCOSE UR QL: NEGATIVE — SIGNIFICANT CHANGE UP
HCT VFR BLD CALC: 23.4 % — LOW (ref 39–50)
HGB BLD-MCNC: 7.1 G/DL — LOW (ref 13–17)
HYALINE CASTS # UR AUTO: 1 /LPF — SIGNIFICANT CHANGE UP (ref 0–7)
IMM GRANULOCYTES NFR BLD AUTO: 0.2 % — SIGNIFICANT CHANGE UP (ref 0–1.5)
INR BLD: 1.11 RATIO — SIGNIFICANT CHANGE UP (ref 0.88–1.16)
KETONES UR-MCNC: NEGATIVE — SIGNIFICANT CHANGE UP
LEUKOCYTE ESTERASE UR-ACNC: NEGATIVE — SIGNIFICANT CHANGE UP
LYMPHOCYTES # BLD AUTO: 0.74 K/UL — LOW (ref 1–3.3)
LYMPHOCYTES # BLD AUTO: 15 % — SIGNIFICANT CHANGE UP (ref 13–44)
MCHC RBC-ENTMCNC: 30.3 GM/DL — LOW (ref 32–36)
MCHC RBC-ENTMCNC: 30.7 PG — SIGNIFICANT CHANGE UP (ref 27–34)
MCV RBC AUTO: 101.3 FL — HIGH (ref 80–100)
MONOCYTES # BLD AUTO: 0.32 K/UL — SIGNIFICANT CHANGE UP (ref 0–0.9)
MONOCYTES NFR BLD AUTO: 6.5 % — SIGNIFICANT CHANGE UP (ref 2–14)
NEUTROPHILS # BLD AUTO: 3.74 K/UL — SIGNIFICANT CHANGE UP (ref 1.8–7.4)
NEUTROPHILS NFR BLD AUTO: 76.1 % — SIGNIFICANT CHANGE UP (ref 43–77)
NITRITE UR-MCNC: NEGATIVE — SIGNIFICANT CHANGE UP
NRBC # BLD: 0 /100 WBCS — SIGNIFICANT CHANGE UP (ref 0–0)
PH UR: 7.5 — SIGNIFICANT CHANGE UP (ref 5–8)
PLATELET # BLD AUTO: 119 K/UL — LOW (ref 150–400)
POTASSIUM SERPL-MCNC: 7.7 MMOL/L — CRITICAL HIGH (ref 3.5–5.3)
POTASSIUM SERPL-SCNC: 7.7 MMOL/L — CRITICAL HIGH (ref 3.5–5.3)
PROT SERPL-MCNC: 6.2 G/DL — SIGNIFICANT CHANGE UP (ref 6–8.3)
PROT UR-MCNC: ABNORMAL
PROTHROM AB SERPL-ACNC: 13.2 SEC — SIGNIFICANT CHANGE UP (ref 10.6–13.6)
RBC # BLD: 2.31 M/UL — LOW (ref 4.2–5.8)
RBC # FLD: 15.7 % — HIGH (ref 10.3–14.5)
RBC CASTS # UR COMP ASSIST: 0 /HPF — SIGNIFICANT CHANGE UP (ref 0–4)
RH IG SCN BLD-IMP: POSITIVE — SIGNIFICANT CHANGE UP
SODIUM SERPL-SCNC: 139 MMOL/L — SIGNIFICANT CHANGE UP (ref 135–145)
SP GR SPEC: 1.02 — SIGNIFICANT CHANGE UP (ref 1.01–1.02)
UROBILINOGEN FLD QL: NEGATIVE — SIGNIFICANT CHANGE UP
WBC # BLD: 4.92 K/UL — SIGNIFICANT CHANGE UP (ref 3.8–10.5)
WBC # FLD AUTO: 4.92 K/UL — SIGNIFICANT CHANGE UP (ref 3.8–10.5)
WBC UR QL: 0 /HPF — SIGNIFICANT CHANGE UP (ref 0–5)

## 2021-03-01 PROCEDURE — 99291 CRITICAL CARE FIRST HOUR: CPT

## 2021-03-01 PROCEDURE — 93010 ELECTROCARDIOGRAM REPORT: CPT

## 2021-03-01 RX ORDER — ACETAMINOPHEN 500 MG
1000 TABLET ORAL ONCE
Refills: 0 | Status: COMPLETED | OUTPATIENT
Start: 2021-03-01 | End: 2021-03-01

## 2021-03-01 RX ORDER — ONDANSETRON 8 MG/1
4 TABLET, FILM COATED ORAL ONCE
Refills: 0 | Status: DISCONTINUED | OUTPATIENT
Start: 2021-03-01 | End: 2021-03-01

## 2021-03-01 RX ORDER — FUROSEMIDE 40 MG
40 TABLET ORAL ONCE
Refills: 0 | Status: COMPLETED | OUTPATIENT
Start: 2021-03-01 | End: 2021-03-01

## 2021-03-01 RX ORDER — SODIUM ZIRCONIUM CYCLOSILICATE 10 G/10G
10 POWDER, FOR SUSPENSION ORAL EVERY 8 HOURS
Refills: 0 | Status: COMPLETED | OUTPATIENT
Start: 2021-03-01 | End: 2021-03-02

## 2021-03-01 RX ORDER — INSULIN HUMAN 100 [IU]/ML
10 INJECTION, SOLUTION SUBCUTANEOUS ONCE
Refills: 0 | Status: COMPLETED | OUTPATIENT
Start: 2021-03-01 | End: 2021-03-01

## 2021-03-01 RX ORDER — CALCIUM GLUCONATE 100 MG/ML
2 VIAL (ML) INTRAVENOUS ONCE
Refills: 0 | Status: COMPLETED | OUTPATIENT
Start: 2021-03-01 | End: 2021-03-01

## 2021-03-01 RX ORDER — CHLORHEXIDINE GLUCONATE 213 G/1000ML
1 SOLUTION TOPICAL
Refills: 0 | Status: DISCONTINUED | OUTPATIENT
Start: 2021-03-02 | End: 2021-03-05

## 2021-03-01 RX ORDER — DEXTROSE 50 % IN WATER 50 %
50 SYRINGE (ML) INTRAVENOUS ONCE
Refills: 0 | Status: COMPLETED | OUTPATIENT
Start: 2021-03-01 | End: 2021-03-01

## 2021-03-01 RX ADMIN — Medication 40 MILLIGRAM(S): at 23:39

## 2021-03-01 RX ADMIN — Medication 50 MILLILITER(S): at 22:23

## 2021-03-01 RX ADMIN — Medication 400 MILLIGRAM(S): at 22:32

## 2021-03-01 RX ADMIN — SODIUM ZIRCONIUM CYCLOSILICATE 10 GRAM(S): 10 POWDER, FOR SUSPENSION ORAL at 23:39

## 2021-03-01 RX ADMIN — INSULIN HUMAN 10 UNIT(S): 100 INJECTION, SOLUTION SUBCUTANEOUS at 22:23

## 2021-03-01 RX ADMIN — Medication 200 GRAM(S): at 22:23

## 2021-03-01 NOTE — H&P ADULT - NSICDXPASTMEDICALHX_GEN_ALL_CORE_FT
PAST MEDICAL HISTORY:  CHF (congestive heart failure)     DM (diabetes mellitus)     HTN (hypertension)     Hyperlipemia
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
## 2021-03-01 NOTE — H&P ADULT - ATTENDING COMMENTS
Patient seen and examined and agree with above.   70 year old male with multiple comorbidities including DM, HTN, ESRD on HD (non-compliant), COPD (current smoker -6 cigarettes/day), CAD s/p CABG who presents after a fall 3 days ago to an outside hospital ([facility]). THe patient denies any other symptoms except for abdominal discomfort.  He was transferred here as there was a splenic laceration with hemoperitoneum . His primary survey is intact with SBP in ED ranging from 115-130s mmHg. HIs BP and oxygenation appropriate.  On secondary survey patient has normocephalic. NO tenderness to palpation.   ON abdominal exam he has discomfort along the mid to upper left quadrant but there are no signs of peritonitis.  Labs reviewed.   Of note, patient was hyperkalemic 8.6 and was treated. Repeat labs 7.7 and plan for HD tonight as nephrology was consulted.  Will plan to give 1 unit PRBC for acute blood loss anemia (Hb previously 10 at outside hospital within a couple of months ago.   ESRD and hyperkalemia -will correct for electrolye abnormalities  Will obtain q4h lab to monitor CBC. Given that he fell several days ago and hemodynamics have remained stable, it does not appear that he is bleeding at this time but will monitor closely.    This patient was critically ill with one or more vital organ systems at a high probability of imminent or life threatening deterioration. Complex decision making was required to assess and treat single or multiple vital organ system failure and/or prevent further life threatening deterioration of the patient's condition.   CC time:46 min

## 2021-03-01 NOTE — ED PROVIDER NOTE - ATTENDING CONTRIBUTION TO CARE
71 yo male, hx of ?dementia, ESRD T/R/Sa, xfer from OSH, fell 3-5d ago, unclear mechanism, has had intermittent abdominal pain since then.  Hgb ~7 @ OSH, imaging concerning for splenic lac, hypotensive to 80s systolic.  transported without transfusing patient.  on arrival here, systolic pressure up to 110s on my assessment.  repeat Hgb also about 7 here, consented for 1U PRBC.  remainder of labs pending.  SICU saw patient earlier, will be admitted to SICU for overnight monitoring.

## 2021-03-01 NOTE — CONSULT NOTE ADULT - SUBJECTIVE AND OBJECTIVE BOX
[facility] DIVISION OF KIDNEY DISEASES AND HYPERTENSION -- [phone]  -- INITIAL CONSULT NOTE  --------------------------------------------------------------------------------   used for HPI     HPI: Patient is a 71 y/o M w PMH of ESRD on HD TTS (has been on HD for 6 months, uncertain why), DM, CAD, CABG, COPD, dementia and frequent falls presented initially to [facility] after fall a few days ago. Was found to have splenic laceration. Transferred to [facility] for further management. Nephrology consulted for hyperkalemia and ESRD management. On labs on arrival, K was 7.7 on BMP. Patient said that he gets HD at [facility], uncertain name of nephrologist. Last HD as per patient was on Saturday 2/27/21.     Patient was seen and examined at bedside. Reported feeling hungry. Denies CP, SOB, fever, chills, nausea, vomiting, diarrhea, LE edema.    PAST HISTORY  --------------------------------------------------------------------------------  PAST MEDICAL & SURGICAL HISTORY:  CHF (congestive heart failure)    Hyperlipemia    DM (diabetes mellitus)    HTN (hypertension)    History of open heart surgery  bypass      FAMILY HISTORY:  No pertinent family history in first degree relatives      PAST SOCIAL HISTORY:    ALLERGIES & MEDICATIONS  --------------------------------------------------------------------------------  Allergies    No Known Allergies    Intolerances      Standing Inpatient Medications  sodium zirconium cyclosilicate 10 Gram(s) Oral every 8 hours    PRN Inpatient Medications  artificial  tears Solution 1 Drop(s) Both EYES three times a day PRN      REVIEW OF SYSTEMS  --------------------------------------------------------------------------------  Gen: No fevers/chills  Respiratory: No dyspnea, cough  CV: No chest pain  GI: No diarrhea, vomitting   MSK: No  edema    All other systems were reviewed and are negative, except as noted.    VITALS/PHYSICAL EXAM  --------------------------------------------------------------------------------  T(C): 37 (03-01-21 @ 22:00), Max: 37 (03-01-21 @ 22:00)  HR: 63 (03-01-21 @ 23:00) (54 - 63)  BP: 106/54 (03-01-21 @ 23:00) (106/54 - 133/68)  RR: 23 (03-01-21 @ 23:00) (15 - 24)  SpO2: 98% (03-01-21 @ 23:00) (94% - 99%)  Wt(kg): --  Height (cm): 162.6 (03-01-21 @ 20:11)  Weight (kg): 68 (03-01-21 @ 22:00)  BMI (kg/m2): 25.7 (03-01-21 @ 22:00)  BSA (m2): 1.73 (03-01-21 @ 22:00)    03-01-21 @ 07:01  -  03-01-21 @ 23:45  --------------------------------------------------------  IN: 200 mL / OUT: 0 mL / NET: 200 mL      Physical Exam:  	Gen: NAD  	HEENT: MMM  	Pulm: CTA B/L, no crackles   	CV: S1S2  	Abd: Soft, +BS   	Ext: No LE edema B/L  	Neuro: Awake and alert   	Skin: Warm and dry  	Vascular access: R neck non-tunneled HD catheter     LABS/STUDIES  --------------------------------------------------------------------------------              7.1    4.92  >-----------<  119      [03-01-21 @ 21:02]              23.4     139  |  106  |  50  ----------------------------<  100      [03-01-21 @ 21:02]  7.7   |  26  |  4.50        Ca     9.0     [03-01-21 @ 21:02]    TPro  6.2  /  Alb  3.0  /  TBili  0.3  /  DBili  x   /  AST  20  /  ALT  14  /  AlkPhos  255  [03-01-21 @ 21:02]    PT/INR: PT 13.2 , INR 1.11       [03-01-21 @ 21:02]  PTT: 34.3       [03-01-21 @ 21:02]    Creatinine Trend:  SCr 4.50 [03-01 @ 21:02]    Urinalysis - [03-01-21 @ 21:12]      Color Yellow / Appearance Clear / SG 1.024 / pH 7.5      Gluc Negative / Ketone Negative  / Bili Negative / Urobili Negative       Blood Negative / Protein 300 mg/dL / Leuk Est Negative / Nitrite Negative      RBC 0 / WBC 0 / Hyaline 1 / Gran  / Sq Epi  / Non Sq Epi 1 / Bacteria Negative      Iron 40, TIBC 162, %sat 25      [07-16-20 @ 09:30]  Ferritin 334      [07-16-20 @ 09:29]  HbA1c 11.4      [03-04-18 @ 11:53]  TSH 3.57      [07-16-20 @ 09:29]  Lipid: chol 122, TG 47, HDL 39, LDL 73      [07-16-20 @ 09:30]    HBsAb Reactive      [07-17-20 @ 10:47]  HBsAg Nonreact      [07-17-20 @ 10:47]  HBcAb Reactive      [07-17-20 @ 10:47]  HCV 0.34, Nonreact      [07-16-20 @ 09:30]    GLYNN: titer Negative, pattern --      [07-17-20 @ 10:44]  C3 Complement 108      [07-17-20 @ 08:31]  C4 Complement 24      [07-17-20 @ 08:31]  ANCA: cANCA Negative, pANCA Negative, atypical ANCA Indeterminate      [07-17-20 @ 10:43]  Syphilis Screen (Treponema Pallidum Ab) Positive      [07-17-20 @ 10:44]  Syphilis Screen (RPR Titer) <1:1      [07-17-20 @ 10:44]  PLA2R: NICKO <2, IFA Negative      [07-17-20 @ 13:04]  Free Light Chains: kappa 12.10, lambda 11.77, ratio = 1.03      [07-17 @ 08:31]  Immunofixation Serum:     IgG Kappa Band Identified and a weak IgG Lambda Band Identified    Reference Range: None Detected      [07-19-20 @ 14:08]  SPEP Interpretation: Two weak Gamma-Migrating Paraproteins Identified      [07-19-20 @ 14:08]  Immunofixation Urine: Reference Range: None Detected      [08-02-20 @ 11:01]  UPEP Interpretation: Weak Gamma-Migrating Paraprotein Identified      [08-02-20 @ 11:01]

## 2021-03-01 NOTE — CONSULT NOTE ADULT - ATTENDING COMMENTS
pt seen by me 3/2 at approx 10am  #esrd on hd tthsat; s/p hd last night forhyperkalemia    next hd thursday  #hyperkalemia-improved  #anemia in ckd- confirm outpt benji dose/sp prbc; monitor hb

## 2021-03-01 NOTE — ED PROVIDER NOTE - IV ALTEPLASE DOOR HIDDEN
show Covering note   Pt claiming that he is " not sleeping well cause for the last few days I've been waking up in a cold sweat , it must be that antidepressant medication Celexa. "Pt was sarcastic , anxious and depressed .Now he is claiming that he feels "too sleepy in the morning" and is" really stupid that I'm given a Klonopin and then getting too tired during the day. So I can refuse to take the morning Klonopin right?" Pt seems suspious and guarded.     Pt reviewed with writer a new litany of worries and concerns regarding his outpatient providers, as above as well as pt concerns and disappointment with his parents who " are of a different age and generation and they don't believe in any of this stuff. They think I am here to gett off all my medications and that I don't need any 'therapy'"   Lengthy discussion with the pt as to reality testing  ( ie pt with treatable anxiety and depressive disorders, with ideal treatment for this pt a combination of medication + 'talk therapy' etc).   Pt still not sleeping well despite resumption of previously effective Trazodone ( one of the medications pt had used in his suicide attempt via an OD of pills prior to admission)

## 2021-03-01 NOTE — CONSULT NOTE ADULT - ASSESSMENT
69 y/o male w/ a PMHx of CAD s/p CABG, HFpEF, HTN, active smoker, DM type II c/b neuropathy, CKD stage V on hemodialysis M/W/F, GERD, BPH, osteoporosis, and dementia who presented after a fall ~3 days prior with hyperkalemia due to a missed hemodialysis session and acute blood loss anemia secondary to a splenic laceration w/ hemoperitoneum    PLAN:    Neuro: acute traumatic pain, dementia  - Monitor mental status  - Pain control as needed with acetaminophen and Dilaudid as needed    Resp: no acute issues  - Monitor pulse oximeter  - Out of bed to chair, ambulate as tolerated, and incentive spirometry to prevent atelectasis  - Will offer nicotine patch as patient is an active smoker    CV: CAD, HFpEF, HTN  - Monitor vital signs  - Will holding home anti-hypertensives and anti-platelets for now in the setting of acute blood loss anemia    GI: splenic laceration, GERD  - NPO except medications for now; will reassess diet if patient remains hemodynamically stable overnight    Renal: CKD stage V on hemodialysis, hyperkalemia, BPH  - Monitor I&Os  - Monitor electrolytes and replete as necessary  - Will place urgent nephrology consult for hemodialysis and give D50+insulin to temporarily shift patient given his hyperkalemia  - NS at 50 mL/hr while NPO    Heme: acute blood loss anemia secondary to splenic laceration  - Monitor CBC and coags  - Lovenox for VTE prophylaxis    ID: no acute issues  - Monitor for clinical evidence of active infection  - UA negative so UTI ruled out as cause of fall    Endo: DM type II  - Monitor glucose w/ ISS q6hrs for glycemic control  - Will send HgbA1C    Code Status: Full code    Disposition: Will admit to [facility]     [id number] 69 y/o male w/ a PMHx of CAD s/p CABG, HFpEF, HTN, active smoker, DM type II c/b neuropathy, CKD stage V on hemodialysis M/W/F, GERD, BPH, osteoporosis, and dementia who presented after a fall ~3 days prior with hyperkalemia due to a missed hemodialysis session and acute blood loss anemia secondary to a splenic laceration w/ hemoperitoneum    PLAN:    Neuro: acute traumatic pain, dementia  - Monitor mental status  - Pain control as needed with acetaminophen and Dilaudid as needed    Resp: no acute issues  - Monitor pulse oximeter  - Out of bed to chair, ambulate as tolerated, and incentive spirometry to prevent atelectasis  - Will offer nicotine patch as patient is an active smoker    CV: CAD, HFpEF, HTN  - Monitor vital signs  - Will holding home anti-hypertensives and anti-platelets for now in the setting of acute blood loss anemia    GI: splenic laceration, GERD  - NPO except medications for now; will reassess diet if patient remains hemodynamically stable overnight    Renal: CKD stage V on hemodialysis, hyperkalemia, BPH  - Monitor I&Os  - Monitor electrolytes and replete as necessary  - Will place urgent nephrology consult for hemodialysis and give D50+insulin to temporarily shift patient given his hyperkalemia  - NS at 50 mL/hr while NPO    Heme: acute blood loss anemia secondary to splenic laceration  - Monitor CBC and coags  - Venodynes for mechanical VTE prophlyaxis; holding chemical VTE prophylaxis in the setting of acute blood loss anemia secondary to splenic laceration    ID: no acute issues  - Monitor for clinical evidence of active infection  - UA negative so UTI ruled out as cause of fall    Endo: DM type II  - Monitor glucose w/ ISS q6hrs for glycemic control  - Will send HgbA1C    Code Status: Full code    Disposition: Will admit to [facility]     [id number]

## 2021-03-01 NOTE — ED PROVIDER NOTE - CLINICAL SUMMARY MEDICAL DECISION MAKING FREE TEXT BOX
70yM Vietnamese speaking h/o HTN, DM, ESRD (HD-T/Th/Sat) presents as a transfer patient from [facility] for trauma eval due to concern for splenic laceration. Patient well appearing with stable vitals. Will get ekg, labs, coags, type and screen, surgery consult and reassess

## 2021-03-01 NOTE — ED PROVIDER NOTE - OBJECTIVE STATEMENT
ID: [id number]    70yM Monegasque speaking h/o HTN, DM, ESRD (HD-T/Th/Sat) presents as a transfer patient from [facility] for trauma eval due to concern for splenic laceration. Patient initially presented due to back pain of 4-5 day duration. Patient was found to be hyperkalemic to 8.4. Received fluids, calcium, Na Bicarb and insulin prior to presentation. Was ordered for 2U PRBC but did not receive any. Currently endorsing some abd pain but denies fever, chest pain, sob, n/v.

## 2021-03-01 NOTE — CONSULT NOTE ADULT - PROBLEM SELECTOR RECOMMENDATION 5
not on phos binders at home as per med list. Suggest to obtain phosphate level. Monitor phos.     Case discussed with on-call nephrology attending   If any questions, please feel free to contact me     Royal Herbert  Nephrology Fellow  [facility] Pager: [phone]  [facility] Pager: [phone]

## 2021-03-01 NOTE — CONSULT NOTE ADULT - PROBLEM SELECTOR RECOMMENDATION 9
Pt. with ESRD on HD three times a week (TTS) transferred from [facility] to [facility] for further management of splenic laceration. Last HD as per patient and confirmed by wife was on Saturday 2/27/21. No CP, SOB or palpitations during evaluation. HD consent obtained from pt, placed in chart. Labs reviewed. Will arrange for urgent HD tonight for hyperkalemia. Monitor labs.

## 2021-03-01 NOTE — H&P ADULT - RESP RATE
Addended by: GLADYS DOUGHERTY on: 2/3/2020 09:53 AM     Modules accepted: Orders     (Respiratory Rate) 4= 10-29

## 2021-03-01 NOTE — ED PROVIDER NOTE - NS ED ROS FT
GENERAL: No fever or chills, EYES: no change in vision, HEENT: no trouble swallowing or speaking, CARDIAC: no chest pain, PULMONARY: no cough or SOB, GI: +abdominal pain, no nausea, no vomiting, no diarrhea or constipation, : No changes in urination, SKIN: no rashes, NEURO: no headache,  MSK: No joint pain ~Flako Guillory M.D. Resident

## 2021-03-01 NOTE — ED ADULT NURSE NOTE - OBJECTIVE STATEMENT
PT 70 year old male, A/O x3 Salvadorean speaking,. PT transferred from [place] due to splenic laceration. PMH- Dementia, HTN, COPD, ESRD (T, Th, S- unknown last dialysis), Hypothyroid dz. PT states he has been having back and generalized chest pain last night (2/28/21). At [place], PT found to be hyperkalemic (K 8.6). PT provided Aspirin, calcium chloride, calcium gluconate, dextrose, insulin human R, sodium Bicarb, 1 L of fluids. CT should hypodensity of spleen- concern for splenic laceration. PT was ordered to received PRBC due to low Hbg- did not receive. Upon entry to [facility] ED, PT A/O x3, lethargic, pale in color. Nava placed at [place]0- 100 cc clear yellow urine in nava  bag. Skin warm, dry, intact. Dialysis port right chest wall. VSS. Complaining of hunger and abdominal pain. Denies SOB, chest pain, fever, chills, N/V/D, numbness/tingling. Interventions- non slip red socks, side rails up, cardiac monitor in place.

## 2021-03-01 NOTE — H&P ADULT - ASSESSMENT
70M s/p fall and missed HD with splenic laceration, anemic and hyperkalemic    - seen and examined in trauma bay with Dr. Hayden  - will admit to ICU for hemorrhage watch and coordination of dialysis due to severe hyperkalemia  - Nephrology consult  - will transfuse PRBC now for anemia in coordination with HD  - STAT labs pending  - Outside CT scans to be uploaded  - will obtain collateral from family  - NPO for now  - DM, ISS  - IS, PT consult  - will hold VTE ppx for the moment until hemorrhage watch complete    JUHI Lau MD  ACS Surgery  [id number]

## 2021-03-01 NOTE — ED PROVIDER NOTE - INTERPRETATION
EKG reviewed for rate, rhythm, axis, intervals and segments, including QRS morphology, P wave appearance T wave appearance, KY interval, and QT interval.  I find the EKG to be unremarkable in all of these regards except as follows: sinus meir, LVH, biphasic lateral precordial and anterior T waves

## 2021-03-01 NOTE — CONSULT NOTE ADULT - SUBJECTIVE AND OBJECTIVE BOX
HISTORY:  69 y/o male w/ a PMHx of CAD s/p CABG, HFpEF, HTN, active smoker, DM type II c/b neuropathy, CKD stage V on hemodialysis M/W/F, GERD, BPH, osteoporosis, and dementia who presented on 3/1 as a transfer from [facility] for trauma evaluation. Patient reportedly fell ~3 days prior and only presented to the ED because of worsening LUQ abdominal pain. He was found to be hyperkalemic (patient missed his hemodialysis session but is also known to be non-compliant) and anemic w/ a splenic laceration w/ hemoperitoneum on imaging requiring transfer to [facility] for further management. SICU consulted for hemodynamic monitoring and emergent hemodialysis. Patient reports LUQ abdominal pain but otherwise denies headache, dizzness, weakness, shortness of breath, chest pain, and/or nausea/vomiting.    PAST MEDICAL HISTORY:  - CAD (coronary artery disease)  - CHF (congestive heart failure)  - Hyperlipemia  - DM (diabetes mellitus)  - HTN (hypertension)  - ESRD (end stage renal disease)  - GERD (gastroesophageal reflex disease)  - BPH (benign prostate hypertrophy)  - Osteoporosis  - Dementia    PAST SURGICAL HISTORY: History of open heart surgery    HOME MEDICATIONS: Will obtain medication reconciliation from patient's pharmacy in the morning as patient is a very poor historian    ALLERGIES: No Known Allergies    FAMILY HISTORY: No pertinent family history in first degree relatives    SOCIAL HISTORY: Denies EtOH and illicit substance use, smokes ~6 cigarettes per day for "many many years"    VITAL SIGNS:  ICU Vital Signs Last 24 Hrs  T(C): 36.2 (02 Mar 2021 03:00), Max: 37 (01 Mar 2021 22:00)  T(F): 97.2 (02 Mar 2021 03:00), Max: 98.6 (01 Mar 2021 22:00)  HR: 63 (02 Mar 2021 04:00) (50 - 63)  BP: 115/56 (02 Mar 2021 04:00) (88/51 - 133/68)  BP(mean): 80 (02 Mar 2021 04:00) (67 - 81)  RR: 12 (02 Mar 2021 04:00) (12 - 24)  SpO2: 96% (02 Mar 2021 04:00) (94% - 99%)    PHYSICAL EXAMINATION:  General - well-nourished, no acute distress  Neuro - awake, alert, oriented x4, no acute focal deficits  HEENT - normocephalic, PERRL, moist mucous membranes  Lungs - clear to auscultation bilaterally  Heart - regular rate and rhythm, S1S2  Abdomen - soft, nondistended, LUQ tenderness to deep palpation  Extremities - all four extremities are warm & pink with 2+ pulses, strength 5/5, sensation intact    LABS:               7.1    4.92  )-----------( 119      ( 01 Mar 2021 21:02 )             23.4     141  |  107  |  54<H>  ----------------------------<  122<H>  5.8<H>   |  25  |  4.72<H>    Ca    9.6      01 Mar 2021 23:42    TPro  6.2  /  Alb  3.0<L>  /  TBili  0.3  /  DBili  x   /  AST  20  /  ALT  14  /  AlkPhos  255<H>    PT/INR - ( 01 Mar 2021 21:02 )   PT: 13.2 sec;   INR: 1.11 ratio    PTT - ( 01 Mar 2021 21:02 )  PTT:34.3 sec    VBG - ( 01 Mar 2021 21:02 )  pH: 7.32  /  pCO2: 54    /  pO2: 20    / HCO3: 27    / Base Excess: 1.6   /  SaO2: 29   /     Lactate: 1.3      IMAGING STUDIES: Will upload imaging from [facility]

## 2021-03-01 NOTE — ED ADULT NURSE REASSESSMENT NOTE - NS ED NURSE REASSESS COMMENT FT1
PRBC not started due to unavailable Blood type results. PRBC not started due to unavailable Blood type results. ED Resident Kahlil made aware.

## 2021-03-01 NOTE — H&P ADULT - NSHPPHYSICALEXAM_GEN_ALL_CORE
NAD, awake and alert  Affect appropriate  No gross neurologic deficits  No rashes  No jaundice or scleral icterus  No cervical spinal tenderness  Respirations nonlabored  No chest wall tenderness  CV regular pulse, at times meir to high 50s  Clavicles intact  Abdomen soft, tender LUQ  No guarding or rebound tenderness  No skin changes or abdominal surgical scars  Extremities warm, without any signs of trauma, moving spontaneously

## 2021-03-01 NOTE — H&P ADULT - HISTORY OF PRESENT ILLNESS
70M hx of DM, CAD, CABG, COPD, ESRD (MWF), dementia, frequent falls who presented to [facility] after a fall a few days ago, per report. He missed dialysis today. He was found to be anemic to 7.4 from hx of 10, hyperkalemic to >8, and to have a splenic laceration. He is not a good historian about the details of the event. He currently complains of LUQ pain and eye pain, but he is able to see without issue. He says he normally walks without aid.    A- intact speaking full sentences  B - equal bilaterally sat in high 90s  C - IV access present, BP 130s systolic  D - GCS 14 only for some confusion, no other obvious deformities

## 2021-03-01 NOTE — ED PROVIDER NOTE - PHYSICAL EXAMINATION
Gen: AAOx3, non-toxic  Head: NCAT  HEENT: EOMI, oral mucosa moist, normal conjunctiva  Lung: CTAB, no respiratory distress, speaking in full sentences  CV: RRR, no murmurs, rubs or gallops  Abd: soft, RUQ ttp, no guarding, no CVA tenderness  MSK: no visible deformities  : Marks in place  Neuro: No focal sensory or motor deficits  Skin: Warm, well perfused, no rash  Psych: normal affect.   ~Flako Guillory M.D. Resident

## 2021-03-02 DIAGNOSIS — E11.69 TYPE 2 DIABETES MELLITUS WITH OTHER SPECIFIED COMPLICATION: ICD-10-CM

## 2021-03-02 DIAGNOSIS — E78.2 MIXED HYPERLIPIDEMIA: ICD-10-CM

## 2021-03-02 DIAGNOSIS — N18.6 END STAGE RENAL DISEASE: ICD-10-CM
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
## 2021-03-02 DIAGNOSIS — E88.9 METABOLIC DISORDER, UNSPECIFIED: ICD-10-CM

## 2021-03-02 DIAGNOSIS — I25.810 ATHEROSCLEROSIS OF CORONARY ARTERY BYPASS GRAFT(S) WITHOUT ANGINA PECTORIS: ICD-10-CM

## 2021-03-02 DIAGNOSIS — I10 ESSENTIAL (PRIMARY) HYPERTENSION: ICD-10-CM

## 2021-03-02 DIAGNOSIS — I50.42 CHRONIC COMBINED SYSTOLIC (CONGESTIVE) AND DIASTOLIC (CONGESTIVE) HEART FAILURE: ICD-10-CM

## 2021-03-02 DIAGNOSIS — E87.5 HYPERKALEMIA: ICD-10-CM

## 2021-03-02 DIAGNOSIS — D64.9 ANEMIA, UNSPECIFIED: ICD-10-CM

## 2021-03-02 DIAGNOSIS — Z29.9 ENCOUNTER FOR PROPHYLACTIC MEASURES, UNSPECIFIED: ICD-10-CM

## 2021-03-02 LAB
A1C WITH ESTIMATED AVERAGE GLUCOSE RESULT: 7.5 % — HIGH (ref 4–5.6)
ANION GAP SERPL CALC-SCNC: 10 MMOL/L — SIGNIFICANT CHANGE UP (ref 5–17)
ANION GAP SERPL CALC-SCNC: 7 MMOL/L — SIGNIFICANT CHANGE UP (ref 5–17)
ANION GAP SERPL CALC-SCNC: 8 MMOL/L — SIGNIFICANT CHANGE UP (ref 5–17)
ANION GAP SERPL CALC-SCNC: 8 MMOL/L — SIGNIFICANT CHANGE UP (ref 5–17)
ANION GAP SERPL CALC-SCNC: 9 MMOL/L — SIGNIFICANT CHANGE UP (ref 5–17)
APTT BLD: 33.8 SEC — SIGNIFICANT CHANGE UP (ref 27.5–35.5)
BUN SERPL-MCNC: 30 MG/DL — HIGH (ref 7–23)
BUN SERPL-MCNC: 30 MG/DL — HIGH (ref 7–23)
BUN SERPL-MCNC: 32 MG/DL — HIGH (ref 7–23)
BUN SERPL-MCNC: 34 MG/DL — HIGH (ref 7–23)
BUN SERPL-MCNC: 54 MG/DL — HIGH (ref 7–23)
CALCIUM SERPL-MCNC: 8.2 MG/DL — LOW (ref 8.4–10.5)
CALCIUM SERPL-MCNC: 8.3 MG/DL — LOW (ref 8.4–10.5)
CALCIUM SERPL-MCNC: 8.4 MG/DL — SIGNIFICANT CHANGE UP (ref 8.4–10.5)
CALCIUM SERPL-MCNC: 8.5 MG/DL — SIGNIFICANT CHANGE UP (ref 8.4–10.5)
CALCIUM SERPL-MCNC: 9.6 MG/DL — SIGNIFICANT CHANGE UP (ref 8.4–10.5)
CHLORIDE SERPL-SCNC: 101 MMOL/L — SIGNIFICANT CHANGE UP (ref 96–108)
CHLORIDE SERPL-SCNC: 102 MMOL/L — SIGNIFICANT CHANGE UP (ref 96–108)
CHLORIDE SERPL-SCNC: 105 MMOL/L — SIGNIFICANT CHANGE UP (ref 96–108)
CHLORIDE SERPL-SCNC: 107 MMOL/L — SIGNIFICANT CHANGE UP (ref 96–108)
CHLORIDE SERPL-SCNC: 108 MMOL/L — SIGNIFICANT CHANGE UP (ref 96–108)
CO2 SERPL-SCNC: 24 MMOL/L — SIGNIFICANT CHANGE UP (ref 22–31)
CO2 SERPL-SCNC: 25 MMOL/L — SIGNIFICANT CHANGE UP (ref 22–31)
CO2 SERPL-SCNC: 27 MMOL/L — SIGNIFICANT CHANGE UP (ref 22–31)
CO2 SERPL-SCNC: 29 MMOL/L — SIGNIFICANT CHANGE UP (ref 22–31)
CO2 SERPL-SCNC: 29 MMOL/L — SIGNIFICANT CHANGE UP (ref 22–31)
CREAT SERPL-MCNC: 2.88 MG/DL — HIGH (ref 0.5–1.3)
CREAT SERPL-MCNC: 3.15 MG/DL — HIGH (ref 0.5–1.3)
CREAT SERPL-MCNC: 3.25 MG/DL — HIGH (ref 0.5–1.3)
CREAT SERPL-MCNC: 3.56 MG/DL — HIGH (ref 0.5–1.3)
CREAT SERPL-MCNC: 4.72 MG/DL — HIGH (ref 0.5–1.3)
ESTIMATED AVERAGE GLUCOSE: 169 MG/DL — HIGH (ref 68–114)
GAS PNL BLDV: SIGNIFICANT CHANGE UP
GAS PNL BLDV: SIGNIFICANT CHANGE UP
GLUCOSE BLDC GLUCOMTR-MCNC: 121 MG/DL — HIGH (ref 70–99)
GLUCOSE BLDC GLUCOMTR-MCNC: 178 MG/DL — HIGH (ref 70–99)
GLUCOSE BLDC GLUCOMTR-MCNC: 182 MG/DL — HIGH (ref 70–99)
GLUCOSE BLDC GLUCOMTR-MCNC: 77 MG/DL — SIGNIFICANT CHANGE UP (ref 70–99)
GLUCOSE SERPL-MCNC: 122 MG/DL — HIGH (ref 70–99)
GLUCOSE SERPL-MCNC: 181 MG/DL — HIGH (ref 70–99)
GLUCOSE SERPL-MCNC: 72 MG/DL — SIGNIFICANT CHANGE UP (ref 70–99)
GLUCOSE SERPL-MCNC: 86 MG/DL — SIGNIFICANT CHANGE UP (ref 70–99)
GLUCOSE SERPL-MCNC: 92 MG/DL — SIGNIFICANT CHANGE UP (ref 70–99)
HBV CORE AB SER-ACNC: REACTIVE
HBV SURFACE AB SER-ACNC: 113.1 MIU/ML — SIGNIFICANT CHANGE UP
HBV SURFACE AG SER-ACNC: SIGNIFICANT CHANGE UP
HCT VFR BLD CALC: 23.6 % — LOW (ref 39–50)
HCT VFR BLD CALC: 24.3 % — LOW (ref 39–50)
HCV AB S/CO SERPL IA: 0.16 S/CO — SIGNIFICANT CHANGE UP (ref 0–0.99)
HCV AB SERPL-IMP: SIGNIFICANT CHANGE UP
HGB BLD-MCNC: 7.5 G/DL — LOW (ref 13–17)
HGB BLD-MCNC: 7.7 G/DL — LOW (ref 13–17)
INR BLD: 1.12 RATIO — SIGNIFICANT CHANGE UP (ref 0.88–1.16)
MAGNESIUM SERPL-MCNC: 1.7 MG/DL — SIGNIFICANT CHANGE UP (ref 1.6–2.6)
MAGNESIUM SERPL-MCNC: 1.9 MG/DL — SIGNIFICANT CHANGE UP (ref 1.6–2.6)
MCHC RBC-ENTMCNC: 30.3 PG — SIGNIFICANT CHANGE UP (ref 27–34)
MCHC RBC-ENTMCNC: 30.9 PG — SIGNIFICANT CHANGE UP (ref 27–34)
MCHC RBC-ENTMCNC: 31.7 GM/DL — LOW (ref 32–36)
MCHC RBC-ENTMCNC: 31.8 GM/DL — LOW (ref 32–36)
MCV RBC AUTO: 95.7 FL — SIGNIFICANT CHANGE UP (ref 80–100)
MCV RBC AUTO: 97.1 FL — SIGNIFICANT CHANGE UP (ref 80–100)
NRBC # BLD: 0 /100 WBCS — SIGNIFICANT CHANGE UP (ref 0–0)
NRBC # BLD: 0 /100 WBCS — SIGNIFICANT CHANGE UP (ref 0–0)
PHOSPHATE SERPL-MCNC: 3.3 MG/DL — SIGNIFICANT CHANGE UP (ref 2.5–4.5)
PHOSPHATE SERPL-MCNC: 3.7 MG/DL — SIGNIFICANT CHANGE UP (ref 2.5–4.5)
PHOSPHATE SERPL-MCNC: 3.8 MG/DL — SIGNIFICANT CHANGE UP (ref 2.5–4.5)
PHOSPHATE SERPL-MCNC: 4 MG/DL — SIGNIFICANT CHANGE UP (ref 2.5–4.5)
PLATELET # BLD AUTO: 100 K/UL — LOW (ref 150–400)
PLATELET # BLD AUTO: 123 K/UL — LOW (ref 150–400)
POTASSIUM SERPL-MCNC: 4.5 MMOL/L — SIGNIFICANT CHANGE UP (ref 3.5–5.3)
POTASSIUM SERPL-MCNC: 5.3 MMOL/L — SIGNIFICANT CHANGE UP (ref 3.5–5.3)
POTASSIUM SERPL-MCNC: 5.6 MMOL/L — HIGH (ref 3.5–5.3)
POTASSIUM SERPL-MCNC: 5.8 MMOL/L — HIGH (ref 3.5–5.3)
POTASSIUM SERPL-MCNC: 6.6 MMOL/L — CRITICAL HIGH (ref 3.5–5.3)
POTASSIUM SERPL-SCNC: 4.5 MMOL/L — SIGNIFICANT CHANGE UP (ref 3.5–5.3)
POTASSIUM SERPL-SCNC: 5.3 MMOL/L — SIGNIFICANT CHANGE UP (ref 3.5–5.3)
POTASSIUM SERPL-SCNC: 5.6 MMOL/L — HIGH (ref 3.5–5.3)
POTASSIUM SERPL-SCNC: 5.8 MMOL/L — HIGH (ref 3.5–5.3)
POTASSIUM SERPL-SCNC: 6.6 MMOL/L — CRITICAL HIGH (ref 3.5–5.3)
PROTHROM AB SERPL-ACNC: 13.4 SEC — SIGNIFICANT CHANGE UP (ref 10.6–13.6)
RBC # BLD: 2.43 M/UL — LOW (ref 4.2–5.8)
RBC # BLD: 2.54 M/UL — LOW (ref 4.2–5.8)
RBC # FLD: 17.1 % — HIGH (ref 10.3–14.5)
RBC # FLD: 17.3 % — HIGH (ref 10.3–14.5)
SARS-COV-2 RNA SPEC QL NAA+PROBE: SIGNIFICANT CHANGE UP
SODIUM SERPL-SCNC: 137 MMOL/L — SIGNIFICANT CHANGE UP (ref 135–145)
SODIUM SERPL-SCNC: 138 MMOL/L — SIGNIFICANT CHANGE UP (ref 135–145)
SODIUM SERPL-SCNC: 141 MMOL/L — SIGNIFICANT CHANGE UP (ref 135–145)
SODIUM SERPL-SCNC: 141 MMOL/L — SIGNIFICANT CHANGE UP (ref 135–145)
SODIUM SERPL-SCNC: 142 MMOL/L — SIGNIFICANT CHANGE UP (ref 135–145)
WBC # BLD: 5.16 K/UL — SIGNIFICANT CHANGE UP (ref 3.8–10.5)
WBC # BLD: 5.31 K/UL — SIGNIFICANT CHANGE UP (ref 3.8–10.5)
WBC # FLD AUTO: 5.16 K/UL — SIGNIFICANT CHANGE UP (ref 3.8–10.5)
WBC # FLD AUTO: 5.31 K/UL — SIGNIFICANT CHANGE UP (ref 3.8–10.5)

## 2021-03-02 PROCEDURE — 99222 1ST HOSP IP/OBS MODERATE 55: CPT | Mod: GC

## 2021-03-02 PROCEDURE — 99223 1ST HOSP IP/OBS HIGH 75: CPT | Mod: GC

## 2021-03-02 PROCEDURE — 99232 SBSQ HOSP IP/OBS MODERATE 35: CPT | Mod: GC

## 2021-03-02 PROCEDURE — 99223 1ST HOSP IP/OBS HIGH 75: CPT

## 2021-03-02 RX ORDER — NICOTINE POLACRILEX 2 MG
1 GUM BUCCAL DAILY
Refills: 0 | Status: DISCONTINUED | OUTPATIENT
Start: 2021-03-02 | End: 2021-03-05

## 2021-03-02 RX ORDER — SODIUM CHLORIDE 9 MG/ML
1000 INJECTION, SOLUTION INTRAVENOUS
Refills: 0 | Status: DISCONTINUED | OUTPATIENT
Start: 2021-03-02 | End: 2021-03-02

## 2021-03-02 RX ORDER — ASPIRIN/CALCIUM CARB/MAGNESIUM 324 MG
81 TABLET ORAL DAILY
Refills: 0 | Status: DISCONTINUED | OUTPATIENT
Start: 2021-03-03 | End: 2021-03-03

## 2021-03-02 RX ORDER — INSULIN LISPRO 100/ML
VIAL (ML) SUBCUTANEOUS EVERY 6 HOURS
Refills: 0 | Status: DISCONTINUED | OUTPATIENT
Start: 2021-03-02 | End: 2021-03-02

## 2021-03-02 RX ORDER — SODIUM ZIRCONIUM CYCLOSILICATE 10 G/10G
10 POWDER, FOR SUSPENSION ORAL EVERY 8 HOURS
Refills: 0 | Status: DISCONTINUED | OUTPATIENT
Start: 2021-03-02 | End: 2021-03-03

## 2021-03-02 RX ORDER — INSULIN HUMAN 100 [IU]/ML
5 INJECTION, SOLUTION SUBCUTANEOUS ONCE
Refills: 0 | Status: COMPLETED | OUTPATIENT
Start: 2021-03-02 | End: 2021-03-02

## 2021-03-02 RX ORDER — INSULIN LISPRO 100/ML
VIAL (ML) SUBCUTANEOUS
Refills: 0 | Status: DISCONTINUED | OUTPATIENT
Start: 2021-03-02 | End: 2021-03-05

## 2021-03-02 RX ORDER — ACETAMINOPHEN 500 MG
1000 TABLET ORAL EVERY 6 HOURS
Refills: 0 | Status: COMPLETED | OUTPATIENT
Start: 2021-03-02 | End: 2021-03-05

## 2021-03-02 RX ORDER — TAMSULOSIN HYDROCHLORIDE 0.4 MG/1
0.4 CAPSULE ORAL AT BEDTIME
Refills: 0 | Status: DISCONTINUED | OUTPATIENT
Start: 2021-03-02 | End: 2021-03-05

## 2021-03-02 RX ORDER — CARVEDILOL PHOSPHATE 80 MG/1
6.25 CAPSULE, EXTENDED RELEASE ORAL EVERY 12 HOURS
Refills: 0 | Status: DISCONTINUED | OUTPATIENT
Start: 2021-03-03 | End: 2021-03-05

## 2021-03-02 RX ORDER — CLOPIDOGREL BISULFATE 75 MG/1
0 TABLET, FILM COATED ORAL
Qty: 0 | Refills: 0 | DISCHARGE

## 2021-03-02 RX ORDER — FINASTERIDE 5 MG/1
5 TABLET, FILM COATED ORAL DAILY
Refills: 0 | Status: DISCONTINUED | OUTPATIENT
Start: 2021-03-02 | End: 2021-03-02

## 2021-03-02 RX ORDER — SODIUM BICARBONATE 1 MEQ/ML
1300 SYRINGE (ML) INTRAVENOUS THREE TIMES A DAY
Refills: 0 | Status: DISCONTINUED | OUTPATIENT
Start: 2021-03-02 | End: 2021-03-04

## 2021-03-02 RX ORDER — INSULIN LISPRO 100/ML
0 VIAL (ML) SUBCUTANEOUS
Qty: 0 | Refills: 0 | DISCHARGE

## 2021-03-02 RX ORDER — PANTOPRAZOLE SODIUM 20 MG/1
1 TABLET, DELAYED RELEASE ORAL
Qty: 0 | Refills: 0 | DISCHARGE

## 2021-03-02 RX ORDER — CYPROHEPTADINE HYDROCHLORIDE 4 MG/1
5 TABLET ORAL
Qty: 0 | Refills: 0 | DISCHARGE

## 2021-03-02 RX ORDER — SODIUM CHLORIDE 9 MG/ML
1000 INJECTION INTRAMUSCULAR; INTRAVENOUS; SUBCUTANEOUS
Refills: 0 | Status: DISCONTINUED | OUTPATIENT
Start: 2021-03-02 | End: 2021-03-02

## 2021-03-02 RX ORDER — HYDROMORPHONE HYDROCHLORIDE 2 MG/ML
0.5 INJECTION INTRAMUSCULAR; INTRAVENOUS; SUBCUTANEOUS ONCE
Refills: 0 | Status: DISCONTINUED | OUTPATIENT
Start: 2021-03-02 | End: 2021-03-02

## 2021-03-02 RX ORDER — FUROSEMIDE 40 MG
80 TABLET ORAL ONCE
Refills: 0 | Status: COMPLETED | OUTPATIENT
Start: 2021-03-02 | End: 2021-03-02

## 2021-03-02 RX ORDER — DEXTROSE 50 % IN WATER 50 %
50 SYRINGE (ML) INTRAVENOUS ONCE
Refills: 0 | Status: COMPLETED | OUTPATIENT
Start: 2021-03-02 | End: 2021-03-02

## 2021-03-02 RX ORDER — ASPIRIN/CALCIUM CARB/MAGNESIUM 324 MG
81 TABLET ORAL DAILY
Refills: 0 | Status: DISCONTINUED | OUTPATIENT
Start: 2021-03-02 | End: 2021-03-02

## 2021-03-02 RX ORDER — GABAPENTIN 400 MG/1
300 CAPSULE ORAL DAILY
Refills: 0 | Status: DISCONTINUED | OUTPATIENT
Start: 2021-03-02 | End: 2021-03-03

## 2021-03-02 RX ORDER — HEPARIN SODIUM 5000 [USP'U]/ML
5000 INJECTION INTRAVENOUS; SUBCUTANEOUS EVERY 8 HOURS
Refills: 0 | Status: DISCONTINUED | OUTPATIENT
Start: 2021-03-02 | End: 2021-03-03

## 2021-03-02 RX ORDER — INSULIN LISPRO 100/ML
VIAL (ML) SUBCUTANEOUS AT BEDTIME
Refills: 0 | Status: DISCONTINUED | OUTPATIENT
Start: 2021-03-02 | End: 2021-03-05

## 2021-03-02 RX ORDER — ALENDRONATE SODIUM 70 MG/1
70 TABLET ORAL
Qty: 0 | Refills: 0 | DISCHARGE

## 2021-03-02 RX ORDER — IPRATROPIUM/ALBUTEROL SULFATE 18-103MCG
3 AEROSOL WITH ADAPTER (GRAM) INHALATION EVERY 6 HOURS
Refills: 0 | Status: DISCONTINUED | OUTPATIENT
Start: 2021-03-02 | End: 2021-03-05

## 2021-03-02 RX ORDER — ATORVASTATIN CALCIUM 80 MG/1
80 TABLET, FILM COATED ORAL AT BEDTIME
Refills: 0 | Status: DISCONTINUED | OUTPATIENT
Start: 2021-03-02 | End: 2021-03-05

## 2021-03-02 RX ORDER — SODIUM ZIRCONIUM CYCLOSILICATE 10 G/10G
10 POWDER, FOR SUSPENSION ORAL ONCE
Refills: 0 | Status: COMPLETED | OUTPATIENT
Start: 2021-03-02 | End: 2021-03-02

## 2021-03-02 RX ADMIN — HEPARIN SODIUM 5000 UNIT(S): 5000 INJECTION INTRAVENOUS; SUBCUTANEOUS at 22:53

## 2021-03-02 RX ADMIN — GABAPENTIN 300 MILLIGRAM(S): 400 CAPSULE ORAL at 12:13

## 2021-03-02 RX ADMIN — CHLORHEXIDINE GLUCONATE 1 APPLICATION(S): 213 SOLUTION TOPICAL at 05:23

## 2021-03-02 RX ADMIN — SODIUM CHLORIDE 50 MILLILITER(S): 9 INJECTION, SOLUTION INTRAVENOUS at 06:54

## 2021-03-02 RX ADMIN — Medication 1 PATCH: at 13:11

## 2021-03-02 RX ADMIN — Medication 1300 MILLIGRAM(S): at 22:53

## 2021-03-02 RX ADMIN — SODIUM CHLORIDE 50 MILLILITER(S): 9 INJECTION INTRAMUSCULAR; INTRAVENOUS; SUBCUTANEOUS at 01:09

## 2021-03-02 RX ADMIN — TAMSULOSIN HYDROCHLORIDE 0.4 MILLIGRAM(S): 0.4 CAPSULE ORAL at 22:53

## 2021-03-02 RX ADMIN — HEPARIN SODIUM 5000 UNIT(S): 5000 INJECTION INTRAVENOUS; SUBCUTANEOUS at 12:13

## 2021-03-02 RX ADMIN — INSULIN HUMAN 5 UNIT(S): 100 INJECTION, SOLUTION SUBCUTANEOUS at 16:33

## 2021-03-02 RX ADMIN — Medication 2: at 17:21

## 2021-03-02 RX ADMIN — HYDROMORPHONE HYDROCHLORIDE 0.5 MILLIGRAM(S): 2 INJECTION INTRAMUSCULAR; INTRAVENOUS; SUBCUTANEOUS at 00:24

## 2021-03-02 RX ADMIN — Medication 1 PATCH: at 21:45

## 2021-03-02 RX ADMIN — SODIUM ZIRCONIUM CYCLOSILICATE 10 GRAM(S): 10 POWDER, FOR SUSPENSION ORAL at 16:26

## 2021-03-02 RX ADMIN — ATORVASTATIN CALCIUM 80 MILLIGRAM(S): 80 TABLET, FILM COATED ORAL at 22:53

## 2021-03-02 RX ADMIN — Medication 80 MILLIGRAM(S): at 12:13

## 2021-03-02 RX ADMIN — FINASTERIDE 5 MILLIGRAM(S): 5 TABLET, FILM COATED ORAL at 12:14

## 2021-03-02 RX ADMIN — SODIUM ZIRCONIUM CYCLOSILICATE 10 GRAM(S): 10 POWDER, FOR SUSPENSION ORAL at 05:23

## 2021-03-02 RX ADMIN — Medication 50 MILLILITER(S): at 16:26

## 2021-03-02 RX ADMIN — SODIUM ZIRCONIUM CYCLOSILICATE 10 GRAM(S): 10 POWDER, FOR SUSPENSION ORAL at 23:29

## 2021-03-02 RX ADMIN — Medication 81 MILLIGRAM(S): at 12:12

## 2021-03-02 RX ADMIN — Medication 1300 MILLIGRAM(S): at 12:13

## 2021-03-02 NOTE — CONSULT NOTE ADULT - PROBLEM SELECTOR RECOMMENDATION 6
- c/w Lipitor 80mg po qbedtime - home anti-hypertensives held on admission in the setting of acute blood loss anemia  - would restart Coreg, Entresto with hold parameters

## 2021-03-02 NOTE — PROGRESS NOTE ADULT - SUBJECTIVE AND OBJECTIVE BOX
HISTORY:  71 y/o male w/ a PMHx of CAD s/p CABG, HFpEF, HTN, active smoker, DM type II c/b neuropathy, CKD stage V on hemodialysis M/W/F, GERD, BPH, osteoporosis, and dementia who presented on 3/1 as a transfer from [facility] for trauma evaluation. Patient reportedly fell ~3 days prior and only presented to the ED because of worsening LUQ abdominal pain. He was found to be hyperkalemic (patient missed his hemodialysis session but is also known to be non-compliant) and anemic w/ a splenic laceration w/ hemoperitoneum on imaging requiring transfer to [facility] for further management. SICU consulted for hemodynamic monitoring and emergent hemodialysis. Patient reports LUQ abdominal pain but otherwise denies headache, dizzness, weakness, shortness of breath, chest pain, and/or nausea/vomiting.    OVERNIGHT EVENTS:  - Given Lasix 40 mg IV x1 dose w/ no response in UOP and Lokelma 10 grams q8hrs x2 doses as per nephrology for hyperkalemia  - Underwent hemodialysis with 0 fluid removal but became hypotensive during the session so 1 unit of PRBCs given for resuscitation as patient was anemic to 7.1/23.4 w/ response to      HISTORY:  69 y/o male w/ a PMHx of CAD s/p CABG, HFpEF, HTN, active smoker, DM type II c/b neuropathy, CKD stage V on hemodialysis M/W/F, GERD, BPH, osteoporosis, and dementia who presented on 3/1 as a transfer from [facility] for trauma evaluation. Patient reportedly fell ~3 days prior and only presented to the ED because of worsening LUQ abdominal pain. He was found to be hyperkalemic (patient missed his hemodialysis session but is also known to be non-compliant) and anemic w/ a splenic laceration w/ hemoperitoneum on imaging requiring transfer to [facility] for further management. SICU consulted for hemodynamic monitoring and emergent hemodialysis. Patient reports LUQ abdominal pain but otherwise denies headache, dizzness, weakness, shortness of breath, chest pain, and/or nausea/vomiting.    OVERNIGHT EVENTS:  - Given Lasix 40 mg IV x1 dose w/ no response in UOP and Lokelma 10 grams q8hrs x2 doses as per nephrology for hyperkalemia  - Underwent hemodialysis with 0 fluid removal but became hypotensive during the session so 1 unit of PRBCs given for resuscitation as patient was anemic to 7.1/23.4 w/ inappropriate response to 7.5/23.6    SUBJECTIVE/ROS:  [x] A ten-point review of systems was otherwise negative except as noted.  [ ] Due to altered mental status/intubation, subjective information were not able to be obtained from the patient. History was obtained, to the extent possible, from review of the chart and collateral sources of information.    NEURO  Exam: awake, alert, oriented x4, no acute focal deficits  Meds: acetaminophen  IVPB .. 1000 milliGRAM(s) IV Intermittent every 6 hours PRN Mild Pain (1 - 3)  [x] Adequacy of sedation and pain control has been assessed and adjusted    RESPIRATORY  RR: 12 (03-02-21 @ 06:00) (12 - 24)  SpO2: 96% (03-02-21 @ 06:00) (94% - 99%)  Exam: clear to auscultation bilaterally  Mechanical Ventilation: no  [N/A] Extubation Readiness Assessed  Meds: none    CARDIOVASCULAR  HR: 65 (03-02-21 @ 06:00) (50 - 72)  BP: 96/52 (03-02-21 @ 06:00) (88/51 - 133/68)  BP(mean): 71 (03-02-21 @ 06:00) (67 - 81)  VBG - ( 02 Mar 2021 05:14 )  pH: 7.36  /  pCO2: 50    /  pO2: 34    / HCO3: 28    / Base Excess: 2.4   /  SaO2: 62   /   Lactate: 0.8    Exam: regular rate and rhythm, S1S2  Cardiac Rhythm: sinus  Perfusion    [x]Adequate    [ ]Inadequate  Mentation   [x]Normal       [ ]Reduced  Extremities  [x]Warm         [ ]Cool  Volume Status [ ]Hypervolemic [ ]Euvolemic [x]Hypovolemic  Meds: none    GI/NUTRITION  Exam: soft, nondistended, LUQ tenderness to deep palpation  Diet: NPO except medications  Meds: none    GENITOURINARY  I&O's Detail    03-01 @ 07:01  -  03-02 @ 06:45  --------------------------------------------------------  IN:    IV PiggyBack: 100 mL    IV PiggyBack: 100 mL    Oral Fluid: 100 mL    PRBCs (Packed Red Blood Cells): 300 mL    sodium chloride 0.9%: 250 mL  Total IN: 850 mL    OUT:    Indwelling Catheter - Urethral (mL): 290 mL    Other (mL): 0 mL  Total OUT: 290 mL    Total NET: 560 mL    142  |  108  |  30<H>  ----------------------------<  72  4.5   |  24  |  2.88<H>    Ca    8.4      02 Mar 2021 05:18  Phos  3.3  Mg     1.7    [ ] Marks catheter, indication: none  Meds: dextrose 5% + sodium chloride 0.9% infuse at 50 mL/hr    HEMATOLOGIC  Meds: none  [x] VTE Prophylaxis                        7.5    5.16  )-----------( 100      ( 02 Mar 2021 05:19 )             23.6     PT/INR - ( 02 Mar 2021 05:19 )   PT: 13.4 sec;   INR: 1.12 ratio    PTT - ( 02 Mar 2021 05:19 )  PTT:33.8 sec    INFECTIOUS DISEASES  T(C): 36.2 (03-02-21 @ 03:00), Max: 37 (03-01-21 @ 22:00)  WBC Count:  - 5.16 K/uL (03-02 @ 05:19)  - 4.92 K/uL (03-01 @ 21:02)  Recent Cultures: none  Meds: none    ENDOCRINE  Capillary Blood Glucose:  - 77 mg/dL (02 Mar 2021 05:22)  - 100 mg/dL (01 Mar 2021 22:23)  Meds: insulin lispro (ADMELOG) corrective regimen sliding scale   SubCutaneous every 6 hours    ACCESS DEVICES:  [x] Peripheral IV  [ ] Central Venous Line	[ ] R	[ ] L	[ ] IJ	[ ] Fem	[ ] SC	Placed:   [ ] Arterial Line		[ ] R	[ ] L	[ ] Fem	[ ] Rad	[ ] Ax	Placed:   [ ] PICC:					[ ] Mediport  [x] Urinary Catheter, Date Placed: 3/1  [x] Necessity of urinary, arterial, and venous catheters discussed    OTHER MEDICATIONS:  - artificial  tears Solution 1 Drop(s) Both EYES three times a day PRN  - chlorhexidine 2% Cloths 1 Application(s) Topical <User Schedule>    CODE STATUS: Full code    IMAGING:

## 2021-03-02 NOTE — CONSULT NOTE ADULT - PROBLEM SELECTOR RECOMMENDATION 3
currently borderline hypotensive, suspect 2/2 to active bleeding. Would hold all BP regimen.
- HD initiated 6 months ago; MWF schedule  - presented with hyperkalemia 7.7 after missed HD session ; mgt as above  -  Monitor I&Os  - Monitor electrolytes and replete as necessary  - c/w Sodium bicarb  - Nephrology following

## 2021-03-02 NOTE — CONSULT NOTE ADULT - PROBLEM SELECTOR PROBLEM 7
Type 2 diabetes mellitus with other specified complication, with long-term current use of insulin Mixed hyperlipidemia

## 2021-03-02 NOTE — PATIENT PROFILE ADULT - NSPROPOAPRESSUREINJURY_GEN_A_NUR
[facility] at [phone]. no

## 2021-03-02 NOTE — CONSULT NOTE ADULT - PROBLEM SELECTOR RECOMMENDATION 5
- home anti-hypertensives held on admission in the setting of acute blood loss anemia  - would restart Coreg, Entresto with hold parameters - CAD s/p CABG  - ASA on hold  - c/w Lipitor 80mg po qbedtime

## 2021-03-02 NOTE — PROGRESS NOTE ADULT - SUBJECTIVE AND OBJECTIVE BOX
ACS Surgery Daily Progress Note  =====================================================    SUBJECTIVE: Patient seen and examined at bedside on AM rounds. Patient reports that they're feeling well. NPO, denies nausea, vomiting. OOB/Ambulating as tolerated. Denies fever, chills.     24 HOUR EVENTS:    MEDICATIONS  (STANDING):  chlorhexidine 2% Cloths 1 Application(s) Topical <User Schedule>  insulin lispro (ADMELOG) corrective regimen sliding scale   SubCutaneous every 6 hours  sodium chloride 0.9%. 1000 milliLiter(s) (50 mL/Hr) IV Continuous <Continuous>  sodium zirconium cyclosilicate 10 Gram(s) Oral every 8 hours    MEDICATIONS  (PRN):  acetaminophen  IVPB .. 1000 milliGRAM(s) IV Intermittent every 6 hours PRN Mild Pain (1 - 3)  artificial  tears Solution 1 Drop(s) Both EYES three times a day PRN Dry Eyes      OBJECTIVE:    Vital Signs Last 24 Hrs  T(C): 36.7 (02 Mar 2021 00:10), Max: 37 (01 Mar 2021 22:00)  T(F): 98 (02 Mar 2021 00:10), Max: 98.6 (01 Mar 2021 22:00)  HR: 50 (02 Mar 2021 01:00) (50 - 63)  BP: 88/51 (02 Mar 2021 01:00) (88/51 - 133/68)  BP(mean): 67 (02 Mar 2021 01:00) (67 - 81)  RR: 15 (02 Mar 2021 01:00) (12 - 24)  SpO2: 97% (02 Mar 2021 01:00) (94% - 99%)        I&O's Detail    01 Mar 2021 07:01  -  02 Mar 2021 01:36  --------------------------------------------------------  IN:    IV PiggyBack: 100 mL    IV PiggyBack: 100 mL    Oral Fluid: 50 mL    PRBCs (Packed Red Blood Cells): 300 mL  Total IN: 550 mL    OUT:    Indwelling Catheter - Urethral (mL): 255 mL  Total OUT: 255 mL    Total NET: 295 mL          Daily Height in cm: 162.56 (01 Mar 2021 20:11)    Daily     PHYSICAL EXAM:  Constitutional: well developed, well nourished, NAD  ENMT: normal facies, symmetric  Respiratory: CTA bilaterally  Cardiovascular: RRR  Gastrointestinal: abdomen soft, nontender, nondistended. No obvious masses. No peritonitis  Extremities: FROM, warm  Neurological: intact, non-focal  Skin: no gross lesions  Lymph Nodes: no gross adenopathy    LABS:                        7.1    4.92  )-----------( 119      ( 01 Mar 2021 21:02 )             23.4     03    141  |  107  |  54<H>  ----------------------------<  122<H>  5.8<H>   |  25  |  4.72<H>    Ca    9.6      01 Mar 2021 23:42    TPro  6.2  /  Alb  3.0<L>  /  TBili  0.3  /  DBili  x   /  AST  20  /  ALT  14  /  AlkPhos  255<H>  03-    PT/INR - ( 01 Mar 2021 21:02 )   PT: 13.2 sec;   INR: 1.11 ratio         PTT - ( 01 Mar 2021 21:02 )  PTT:34.3 sec  Urinalysis Basic - ( 01 Mar 2021 21:12 )    Color: Yellow / Appearance: Clear / S.024 / pH: x  Gluc: x / Ketone: Negative  / Bili: Negative / Urobili: Negative   Blood: x / Protein: 300 mg/dL / Nitrite: Negative   Leuk Esterase: Negative / RBC: 0 /hpf / WBC 0 /HPF   Sq Epi: x / Non Sq Epi: 1 / Bacteria: Negative

## 2021-03-02 NOTE — CONSULT NOTE ADULT - PROBLEM SELECTOR RECOMMENDATION 2
in setting of ESRD, could also have component of possible GIB given low Hb of 7.1. Got Lokelma 10g PO, calcium gluconate, insulin/d50 and lasix 40 mg IVP. Will be for urgent HD tonight with low K bath. Low K diet. Monitor K.
- in the setting of missed HD session ; no EKG changes  - pt is s/p urgent HD ; also s/p IV insulin/d50/calcium gluconate/Lokelma  - K 5.3 today  - Nephrology following ; next HD on thursday 3/5  - monitor BMP

## 2021-03-02 NOTE — CONSULT NOTE ADULT - ASSESSMENT
70M with h/o CAD s/p CABG, HFpEF, HTN, active smoker, DMT2, ESRD on HD (MWF),  GERD, BPH, osteoporosis, and dementia who presented to OSH after a fall 3 days ago with hyperkalemia due to a missed hemodialysis session. Pt was also found to have acute blood loss anemia secondary to a splenic laceration w/ hemoperitoneum,. He was transferred to [facility] for further management.

## 2021-03-02 NOTE — CONSULT NOTE ADULT - PROBLEM SELECTOR RECOMMENDATION 7
- on Lantus 10u q bedtime at home ; would restart now that pt is no longer NPO  - c/w admelog sliding scale  - c/w Gabapentin for diabetic neuropathy  - FS qac qhs - c/w Lipitor 80mg po qbedtime

## 2021-03-02 NOTE — PATIENT PROFILE ADULT - NSPROHMDIABETMGMTSTRAT_GEN_A_NUR
blood glucose testing/diet modification/medication therapy/routine screenings/weight management pt states he does not take insulin, only pills for diabetes, but does not know names of medications. states wife takes care of everything. states his wife cooks healthy diet and she manages his diabetes. states he does not see doctor for his diabetes./blood glucose testing/diet modification/medication therapy/routine screenings/weight management

## 2021-03-02 NOTE — CONSULT NOTE ADULT - PROBLEM SELECTOR RECOMMENDATION 9
acute blood loss anemia secondary to splenic laceration  - s/p 1U prbc   - Hb stable 7.7 today  - c/t  monitor CBC and coags - h/o frequent falls  - denies cp, sob, palpitations, dizziness, nausea prior to falls  - denies LOC, headstrike  - PT/OT eval pending

## 2021-03-02 NOTE — PATIENT PROFILE ADULT - ARRIVAL FROM
Hospitals/Psychiatric Facilities Initial admit from home to [facility], but transferred to [facility] from [facility]/Psychiatric Facilities

## 2021-03-02 NOTE — CONSULT NOTE ADULT - PROBLEM SELECTOR RECOMMENDATION 4
TTE from 7/2020 with EF 40-45% ;  Mild to moderate segmental left ventricular systolic dysfunction , hypokinetic mid to distal septum and inferior walls and stage II diastolic dysfunction  - home meds include Coreg/Entresto/Enalapril/Lasix   - all held on admission in the setting of acute blood loss anemia  - would restart Coreg and Entresto w/hold parameters  - c/t hold Enalapril , Lasix for now ; would restart as BP tolerates
suspect 2/2 to active bleeding. Getting pRBC transfusion. Monitor CBC and transfusion as per primary team.

## 2021-03-02 NOTE — CONSULT NOTE ADULT - SUBJECTIVE AND OBJECTIVE BOX
TRAUMA/ACUTE CARE SURGERY - [facility] MEDICINE CO-MANAGEMENT INITIAL VISIT NOTE  Spectralink: [id number]    CHIEF COMPLAINT: Patient is a 70y old  Male who presents with a chief complaint of     HPI: 70M hx of DM, CAD, CABG, COPD, ESRD (MWF), dementia, frequent falls who presented to [facility] after a fall a few days ago, per report. He missed dialysis today. He was found to be anemic to 7.4 from hx of 10, hyperkalemic to >8, and to have a splenic laceration. He is not a good historian about the details of the event. He currently complains of LUQ pain and eye pain, but he is able to see without issue. He says he normally walks without aid.    A- intact speaking full sentences  B - equal bilaterally sat in high 90s  C - IV access present, BP 130s systolic  D - GCS 14 only for some confusion, no other obvious deformities (01 Mar 2021 21:00)      History limited due to: [ ] Dementia  [ ] Delirium  [ ] Condition    Allergies    No Known Allergies    Intolerances        HOME MEDICATIONS: [ ] Reviewed  Home Medications:  acetaminophen 325 mg oral tablet: 2 tab(s) orally every 6 hours, As needed, Moderate Pain (4 - 6) (2020 11:09)  alendronate weekly: 70 milligram(s) orally once a week (2020 10:13)  aspirin 81 mg oral delayed release tablet: 1 tab(s) orally once a day (2020 11:09)  atorvastatin 80 mg oral tablet: 1 tab(s) orally once a day (at bedtime) (2020 11:09)  bisacodyl 5 mg oral delayed release tablet: 2 tab(s) orally once a day (at bedtime) (03 Aug 2020 16:02)  carvedilol 25 mg oral tablet: 1 tab(s) orally every 12 hours (2020 11:09)  cyproheptadine 2 mg/5 mL oral syrup: 5 milliliter(s) orally once a day (2020 10:13)  ferrous sulfate 325 mg (65 mg elemental iron) oral tablet: 1 tab(s) orally once a day (2020 11:09)  finasteride 5 mg oral tablet: 1 tab(s) orally once a day (2020 11:09)  gabapentin 300 mg oral tablet: 1 tab(s) orally once a day (2020 10:13)  HumaLOG: subcutaneous once a day (at bedtime)  0 Unit(s) if Glucose 0 - 250  1 Unit(s) if Glucose 251 - 300  2 Unit(s) if Glucose 301 - 350  3 Unit(s) if Glucose 351 - 400  4 Unit(s) if Glucose Greater Than 400, and contact MD (03 Aug 2020 16:05)  HumaLOG: subcutaneous 3 times a day (before meals)  1 Unit(s) if Glucose 151 - 200  2 Unit(s) if Glucose 201 - 250  3 Unit(s) if Glucose 251 - 300  4 Unit(s) if Glucose 301 - 350  5 Unit(s) if Glucose 351 - 400  6 Unit(s) if Glucose Greater Than 400, and contact MD (03 Aug 2020 16:05)  hydrALAZINE 50 mg oral tablet: 1 tab(s) orally 3 times a day (2020 11:09)  insulin glargine: 10 unit(s) subcutaneous once a day (at bedtime) (2020 11:09)  Plavix: Can restart on 2020 ONLY if Hemoglobin/Hematocrit are stable on 2020 - follow up with MD before starting (03 Aug 2020 16:31)  Protonix 40 mg oral delayed release tablet: 1 tab(s) orally 2 times a day  Take through 2020, and then decrease to Protonix 40mg oral daily (03 Aug 2020 16:05)  sodium bicarbonate 650 mg oral tablet: 2 tab(s) orally 3 times a day (03 Aug 2020 16:02)  tamsulosin 0.4 mg oral capsule: 1 cap(s) orally once a day (at bedtime) (2020 11:09)      MEDICATIONS  (STANDING):  aspirin  chewable 81 milliGRAM(s) Oral daily  atorvastatin 80 milliGRAM(s) Oral at bedtime  chlorhexidine 2% Cloths 1 Application(s) Topical <User Schedule>  finasteride 5 milliGRAM(s) Oral daily  furosemide   Injectable 80 milliGRAM(s) IV Push once  gabapentin 300 milliGRAM(s) Oral daily  heparin   Injectable 5000 Unit(s) SubCutaneous every 8 hours  insulin lispro (ADMELOG) corrective regimen sliding scale   SubCutaneous three times a day before meals  insulin lispro (ADMELOG) corrective regimen sliding scale   SubCutaneous at bedtime  nicotine - 21 mG/24Hr(s) Patch 1 patch Transdermal daily  sodium bicarbonate 1300 milliGRAM(s) Oral three times a day  tamsulosin 0.4 milliGRAM(s) Oral at bedtime    MEDICATIONS  (PRN):  acetaminophen  IVPB .. 1000 milliGRAM(s) IV Intermittent every 6 hours PRN Mild Pain (1 - 3)  albuterol/ipratropium for Nebulization 3 milliLiter(s) Nebulizer every 6 hours PRN Shortness of Breath and/or Wheezing  artificial  tears Solution 1 Drop(s) Both EYES three times a day PRN Dry Eyes      PAST MEDICAL & SURGICAL HISTORY:  CHF (congestive heart failure)    Hyperlipemia    DM (diabetes mellitus)    HTN (hypertension)    History of open heart surgery  bypass    [ ] Reviewed     Functional Assessment: [ ] Independent  [ ] Assistance  [ ] Total care  [ ] Non-ambulatory    SOCIAL HISTORY:  Residence: [ ] [facility]  [ ] SNF  [ ] Community  [ ] Substance abuse:   [ ] Tobacco:   [ ] Alcohol use:     FAMILY HISTORY:  No pertinent family history in first degree relatives    [ ] No pertinent family history in first degree relatives     REVIEW OF SYSTEMS:    CONSTITUTIONAL: No fever, weight loss, or fatigue  EYES: No eye pain, visual disturbances, or discharge  ENMT:  No difficulty hearing, tinnitus, vertigo; No sinus or throat pain  NECK: No pain or stiffness  BREASTS: No pain, masses, or nipple discharge  RESPIRATORY: No cough, wheezing, chills or hemoptysis; No shortness of breath  CARDIOVASCULAR: No chest pain, palpitations, dizziness, or leg swelling  GASTROINTESTINAL: No abdominal or epigastric pain. No nausea, vomiting, or hematemesis; No diarrhea or constipation. No melena or hematochezia.  GENITOURINARY: No dysuria, frequency, hematuria, or incontinence  NEUROLOGICAL: No headaches, memory loss, loss of strength, numbness, or tremors  SKIN: No itching, burning, rashes, or lesions   LYMPH NODES: No enlarged glands  ENDOCRINE: No heat or cold intolerance; No hair loss  MUSCULOSKELETAL: No muscle or back pain  PSYCHIATRIC: No depression, anxiety, mood swings, or difficulty sleeping  HEME/LYMPH: No easy bruising, or bleeding gums  ALLERGY AND IMMUNOLOGIC: No hives or eczema    [  ] All other ROS negative  [  ] Unable to obtain due to poor mental status    PHYSICAL EXAM:    Vital Signs Last 24 Hrs  T(C): 36.7 (02 Mar 2021 08:00), Max: 37 (01 Mar 2021 22:00)  T(F): 98.1 (02 Mar 2021 08:00), Max: 98.6 (01 Mar 2021 22:00)  HR: 63 (02 Mar 2021 10:00) (50 - 72)  BP: 118/59 (02 Mar 2021 10:00) (88/51 - 133/68)  BP(mean): 85 (02 Mar 2021 10:00) (67 - 85)  RR: 19 (02 Mar 2021 10:00) (12 - 24)  SpO2: 94% (02 Mar 2021 10:00) (93% - 99%)    GENERAL: NAD, well-groomed, well-developed  HEAD:  Atraumatic, Normocephalic  EYES: EOMI, PERRLA, conjunctiva and sclera clear  ENMT: Moist mucous membranes  NECK: Supple, No JVD  RESPIRATORY: Clear to auscultation bilaterally; No rales, rhonchi, wheezing, or rubs  CARDIOVASCULAR: Regular rate and rhythm; No murmurs, rubs, or gallops  GASTROINTESTINAL: Soft, Nontender, Nondistended; Bowel sounds present  GENITOURINARY: Not examined  EXTREMITIES:  2+ Peripheral Pulses, No clubbing, cyanosis, or edema  NERVOUS SYSTEM:  Alert & Oriented X3; Moving all 4 extremities; No gross sensory deficits  HEME/LYMPH: No lymphadenopathy noted  SKIN: No rashes or lesions; Incisions C/D/I    LABS:                        7.7    5.31  )-----------( 123      ( 02 Mar 2021 09:15 )             24.3     Hemoglobin: 7.7 g/dL ( @ 09:15)  Hemoglobin: 7.5 g/dL ( @ 05:19)  Hemoglobin: 7.1 g/dL ( @ 21:02)        141  |  105  |  32<H>  ----------------------------<  92  5.3   |  29  |  3.25<H>    Ca    8.5      02 Mar 2021 10:16  Phos  3.7     03-02  Mg     1.9     -02    TPro  6.2  /  Alb  3.0<L>  /  TBili  0.3  /  DBili  x   /  AST  20  /  ALT  14  /  AlkPhos  255<H>  03-    PT/INR - ( 02 Mar 2021 05:19 )   PT: 13.4 sec;   INR: 1.12 ratio         PTT - ( 02 Mar 2021 05:19 )  PTT:33.8 sec  Urinalysis Basic - ( 01 Mar 2021 21:12 )    Color: Yellow / Appearance: Clear / S.024 / pH: x  Gluc: x / Ketone: Negative  / Bili: Negative / Urobili: Negative   Blood: x / Protein: 300 mg/dL / Nitrite: Negative   Leuk Esterase: Negative / RBC: 0 /hpf / WBC 0 /HPF   Sq Epi: x / Non Sq Epi: 1 / Bacteria: Negative      CAPILLARY BLOOD GLUCOSE      POCT Blood Glucose.: 77 mg/dL (02 Mar 2021 05:22)        RADIOLOGY & ADDITIONAL STUDIES:    EKG:   Personally Reviewed:  [ ] YES     Imaging:   Personally Reviewed:  [ ] YES               [ ] Consultant(s) Notes Reviewed  [x] Care Discussed with Consultants/Other Providers: Trauma Team    [ ] Fall risks identified:    [ ] High risk medications identified:    [ ] Probable osteoporosis    [ ] Possible osteomalacia    [ ] Increased delirium risk    [ ] Delirium and other risks can be reduced by:          -early ambulation          -minimizing "tethers" - IV, oxygen, catheters, etc          -avoiding hypnotics and sedatives          -maintaining hydration/nutrition          -avoid anticholinergics - diphenhydramine, etc          -pain control          -supportive environment    Advanced Directives: [ ] DNR  [ ] No feeding tube  [ ] MOLST in chart  [ ] MOLST completed today  [ ] Unknown   TRAUMA/ACUTE CARE SURGERY - HOSPITAL MEDICINE CO-MANAGEMENT INITIAL VISIT NOTE  Spectralink: [id number]    CHIEF COMPLAINT: Patient is a 70y old  Male who presents with a chief complaint of      Melquiades ID# [id number]    HPI: 70M with h/o CAD s/p CABG, HFpEF, HTN, active smoker, DMT2, ESRD on HD (MWF),  GERD, BPH, osteoporosis, and dementia who presented to OSH after a fall 3 days ago with hyperkalemia due to a missed hemodialysis session. Pt was also found to have acute blood loss anemia secondary to a splenic laceration w/ hemoperitoneum,. He was transferred to [facility] for further management. He was admitted to SICU for hemodynamic monitoring and emergent hemodialysis. Pt is  s/p 1U PRBC tranfusion.  He currently denies abd pain, cp, sob, n/v, fever/chills, headache.       Allergies    No Known Allergies    Intolerances        HOME MEDICATIONS: [ ] Reviewed  Home Medications:  acetaminophen 325 mg oral tablet: 2 tab(s) orally every 6 hours, As needed, Moderate Pain (4 - 6) (2020 11:09)  alendronate weekly: 70 milligram(s) orally once a week (2020 10:13)  aspirin 81 mg oral delayed release tablet: 1 tab(s) orally once a day (2020 11:09)  atorvastatin 80 mg oral tablet: 1 tab(s) orally once a day (at bedtime) (2020 11:09)  bisacodyl 5 mg oral delayed release tablet: 2 tab(s) orally once a day (at bedtime) (03 Aug 2020 16:02)  carvedilol 25 mg oral tablet: 1 tab(s) orally every 12 hours (2020 11:09)  cyproheptadine 2 mg/5 mL oral syrup: 5 milliliter(s) orally once a day (2020 10:13)  ferrous sulfate 325 mg (65 mg elemental iron) oral tablet: 1 tab(s) orally once a day (2020 11:09)  finasteride 5 mg oral tablet: 1 tab(s) orally once a day (2020 11:09)  gabapentin 300 mg oral tablet: 1 tab(s) orally once a day (2020 10:13)  HumaLOG: subcutaneous once a day (at bedtime)  0 Unit(s) if Glucose 0 - 250  1 Unit(s) if Glucose 251 - 300  2 Unit(s) if Glucose 301 - 350  3 Unit(s) if Glucose 351 - 400  4 Unit(s) if Glucose Greater Than 400, and contact MD (03 Aug 2020 16:05)  HumaLOG: subcutaneous 3 times a day (before meals)  1 Unit(s) if Glucose 151 - 200  2 Unit(s) if Glucose 201 - 250  3 Unit(s) if Glucose 251 - 300  4 Unit(s) if Glucose 301 - 350  5 Unit(s) if Glucose 351 - 400  6 Unit(s) if Glucose Greater Than 400, and contact MD (03 Aug 2020 16:05)  hydrALAZINE 50 mg oral tablet: 1 tab(s) orally 3 times a day (2020 11:09)  insulin glargine: 10 unit(s) subcutaneous once a day (at bedtime) (2020 11:09)  Plavix: Can restart on 2020 ONLY if Hemoglobin/Hematocrit are stable on 2020 - follow up with MD before starting (03 Aug 2020 16:31)  Protonix 40 mg oral delayed release tablet: 1 tab(s) orally 2 times a day  Take through 2020, and then decrease to Protonix 40mg oral daily (03 Aug 2020 16:05)  sodium bicarbonate 650 mg oral tablet: 2 tab(s) orally 3 times a day (03 Aug 2020 16:02)  tamsulosin 0.4 mg oral capsule: 1 cap(s) orally once a day (at bedtime) (2020 11:09)      MEDICATIONS  (STANDING):  aspirin  chewable 81 milliGRAM(s) Oral daily  atorvastatin 80 milliGRAM(s) Oral at bedtime  chlorhexidine 2% Cloths 1 Application(s) Topical <User Schedule>  finasteride 5 milliGRAM(s) Oral daily  furosemide   Injectable 80 milliGRAM(s) IV Push once  gabapentin 300 milliGRAM(s) Oral daily  heparin   Injectable 5000 Unit(s) SubCutaneous every 8 hours  insulin lispro (ADMELOG) corrective regimen sliding scale   SubCutaneous three times a day before meals  insulin lispro (ADMELOG) corrective regimen sliding scale   SubCutaneous at bedtime  nicotine - 21 mG/24Hr(s) Patch 1 patch Transdermal daily  sodium bicarbonate 1300 milliGRAM(s) Oral three times a day  tamsulosin 0.4 milliGRAM(s) Oral at bedtime    MEDICATIONS  (PRN):  acetaminophen  IVPB .. 1000 milliGRAM(s) IV Intermittent every 6 hours PRN Mild Pain (1 - 3)  albuterol/ipratropium for Nebulization 3 milliLiter(s) Nebulizer every 6 hours PRN Shortness of Breath and/or Wheezing  artificial  tears Solution 1 Drop(s) Both EYES three times a day PRN Dry Eyes      PAST MEDICAL & SURGICAL HISTORY:  CHF (congestive heart failure)    Hyperlipemia    DM (diabetes mellitus)    HTN (hypertension)    History of open heart surgery  bypass    [ ] Reviewed     Functional Assessment: [x ] Independent  [ ] Assistance  [ ] Total care  [ ] Non-ambulatory    SOCIAL HISTORY:  Residence: [ ] [facility]  [ ] SNF  [x ] Community  Pt smokes about half pack cigarettes daily   Denies Alcohol use /Substance abuse.      FAMILY HISTORY:  No pertinent family history in first degree relatives      REVIEW OF SYSTEMS:    CONSTITUTIONAL: No fever, weight loss, or fatigue  RESPIRATORY: No cough, wheezing, chills , No shortness of breath  CARDIOVASCULAR: No chest pain, palpitations, dizziness, or leg swelling  GASTROINTESTINAL: No abdominal or epigastric pain. No nausea/ vomiting  GENITOURINARY: No dysuria, frequency, hematuria, or incontinence  NEUROLOGICAL: No headaches, memory loss, loss of strength, numbness, or tremors  SKIN: No itching, burning, rashes, or lesions   ENDOCRINE: No heat or cold intolerance; No hair loss  MUSCULOSKELETAL: No muscle or back pain  PSYCHIATRIC: No depression, anxiety  HEME/LYMPH: No easy bruising, or bleeding gums      [ x ] All other ROS negative      PHYSICAL EXAM:    Vital Signs Last 24 Hrs  T(C): 36.7 (02 Mar 2021 08:00), Max: 37 (01 Mar 2021 22:00)  T(F): 98.1 (02 Mar 2021 08:00), Max: 98.6 (01 Mar 2021 22:00)  HR: 63 (02 Mar 2021 10:00) (50 - 72)  BP: 118/59 (02 Mar 2021 10:00) (88/51 - 133/68)  BP(mean): 85 (02 Mar 2021 10:00) (67 - 85)  RR: 19 (02 Mar 2021 10:00) (12 - 24)  SpO2: 94% (02 Mar 2021 10:00) (93% - 99%)    GENERAL: NAD, anicteric, afebrile  HEAD:  Atraumatic, Normocephalic  EYES: EOMI, PERRLA, conjunctiva and sclera clear  ENMT: Moist mucous membranes  NECK: Supple, No JVD ;  R neck non-tunneled HD catheter   RESPIRATORY: Clear to auscultation bilaterally; No rales, rhonchi, wheezing, or rubs  CARDIOVASCULAR: Regular rate and rhythm; No murmurs, rubs, or gallops  GASTROINTESTINAL: Soft, Nontender, Nondistended; Bowel sounds present  EXTREMITIES:  2+ Peripheral Pulses, No clubbing, cyanosis, or edema  NERVOUS SYSTEM:  AAOX 3, ; Moving all 4 extremities; No gross sensory deficits  SKIN: No rashes or lesions    LABS:                        7.7    5.31  )-----------( 123      ( 02 Mar 2021 09:15 )             24.3     Hemoglobin: 7.7 g/dL ( @ 09:15)  Hemoglobin: 7.5 g/dL ( @ 05:19)  Hemoglobin: 7.1 g/dL ( @ 21:02)        141  |  105  |  32<H>  ----------------------------<  92  5.3   |  29  |  3.25<H>    Ca    8.5      02 Mar 2021 10:16  Phos  3.7       Mg     1.9         TPro  6.2  /  Alb  3.0<L>  /  TBili  0.3  /  DBili  x   /  AST  20  /  ALT  14  /  AlkPhos  255<H>      PT/INR - ( 02 Mar 2021 05:19 )   PT: 13.4 sec;   INR: 1.12 ratio         PTT - ( 02 Mar 2021 05:19 )  PTT:33.8 sec  Urinalysis Basic - ( 01 Mar 2021 21:12 )    Color: Yellow / Appearance: Clear / S.024 / pH: x  Gluc: x / Ketone: Negative  / Bili: Negative / Urobili: Negative   Blood: x / Protein: 300 mg/dL / Nitrite: Negative   Leuk Esterase: Negative / RBC: 0 /hpf / WBC 0 /HPF   Sq Epi: x / Non Sq Epi: 1 / Bacteria: Negative      CAPILLARY BLOOD GLUCOSE      POCT Blood Glucose.: 77 mg/dL (02 Mar 2021 05:22)        RADIOLOGY & ADDITIONAL STUDIES:    EKG:   Personally Reviewed:  [ X ] YES     Imaging:   Personally Reviewed:  [X ] YES               [ ] Consultant(s) Notes Reviewed  [x] Care Discussed with Consultants/Other Providers: Trauma Team    [X ] Fall risks identified:      [X ] Increased delirium risk    [ X] Delirium and other risks can be reduced by:          -early ambulation          -minimizing "tethers" - IV, oxygen, catheters, etc          -avoiding hypnotics and sedatives          -maintaining hydration/nutrition          -avoid anticholinergics - diphenhydramine, etc          -pain control          -supportive environment

## 2021-03-03 DIAGNOSIS — R41.0 DISORIENTATION, UNSPECIFIED: ICD-10-CM

## 2021-03-03 LAB
ALBUMIN SERPL ELPH-MCNC: 3.1 G/DL — LOW (ref 3.3–5)
ALP SERPL-CCNC: 273 U/L — HIGH (ref 40–120)
ALT FLD-CCNC: 13 U/L — SIGNIFICANT CHANGE UP (ref 10–45)
ANION GAP SERPL CALC-SCNC: 11 MMOL/L — SIGNIFICANT CHANGE UP (ref 5–17)
APTT BLD: 32 SEC — SIGNIFICANT CHANGE UP (ref 27.5–35.5)
AST SERPL-CCNC: 22 U/L — SIGNIFICANT CHANGE UP (ref 10–40)
BILIRUB DIRECT SERPL-MCNC: 0.1 MG/DL — SIGNIFICANT CHANGE UP (ref 0–0.2)
BILIRUB INDIRECT FLD-MCNC: 0.1 MG/DL — LOW (ref 0.2–1)
BILIRUB SERPL-MCNC: 0.2 MG/DL — SIGNIFICANT CHANGE UP (ref 0.2–1.2)
BUN SERPL-MCNC: 44 MG/DL — HIGH (ref 7–23)
CALCIUM SERPL-MCNC: 8 MG/DL — LOW (ref 8.4–10.5)
CHLORIDE SERPL-SCNC: 101 MMOL/L — SIGNIFICANT CHANGE UP (ref 96–108)
CO2 SERPL-SCNC: 27 MMOL/L — SIGNIFICANT CHANGE UP (ref 22–31)
CREAT SERPL-MCNC: 4.45 MG/DL — HIGH (ref 0.5–1.3)
GLUCOSE BLDC GLUCOMTR-MCNC: 125 MG/DL — HIGH (ref 70–99)
GLUCOSE BLDC GLUCOMTR-MCNC: 129 MG/DL — HIGH (ref 70–99)
GLUCOSE BLDC GLUCOMTR-MCNC: 157 MG/DL — HIGH (ref 70–99)
GLUCOSE BLDC GLUCOMTR-MCNC: 176 MG/DL — HIGH (ref 70–99)
GLUCOSE SERPL-MCNC: 177 MG/DL — HIGH (ref 70–99)
HCT VFR BLD CALC: 23.3 % — LOW (ref 39–50)
HGB BLD-MCNC: 7.4 G/DL — LOW (ref 13–17)
INR BLD: 1.07 RATIO — SIGNIFICANT CHANGE UP (ref 0.88–1.16)
MAGNESIUM SERPL-MCNC: 1.9 MG/DL — SIGNIFICANT CHANGE UP (ref 1.6–2.6)
MCHC RBC-ENTMCNC: 30.6 PG — SIGNIFICANT CHANGE UP (ref 27–34)
MCHC RBC-ENTMCNC: 31.8 GM/DL — LOW (ref 32–36)
MCV RBC AUTO: 96.3 FL — SIGNIFICANT CHANGE UP (ref 80–100)
NRBC # BLD: 0 /100 WBCS — SIGNIFICANT CHANGE UP (ref 0–0)
PHOSPHATE SERPL-MCNC: 4.3 MG/DL — SIGNIFICANT CHANGE UP (ref 2.5–4.5)
PLATELET # BLD AUTO: 116 K/UL — LOW (ref 150–400)
POTASSIUM SERPL-MCNC: 5 MMOL/L — SIGNIFICANT CHANGE UP (ref 3.5–5.3)
POTASSIUM SERPL-SCNC: 5 MMOL/L — SIGNIFICANT CHANGE UP (ref 3.5–5.3)
PROT SERPL-MCNC: 6.3 G/DL — SIGNIFICANT CHANGE UP (ref 6–8.3)
PROTHROM AB SERPL-ACNC: 12.8 SEC — SIGNIFICANT CHANGE UP (ref 10.6–13.6)
RBC # BLD: 2.42 M/UL — LOW (ref 4.2–5.8)
RBC # FLD: 16.7 % — HIGH (ref 10.3–14.5)
SODIUM SERPL-SCNC: 139 MMOL/L — SIGNIFICANT CHANGE UP (ref 135–145)
WBC # BLD: 5.41 K/UL — SIGNIFICANT CHANGE UP (ref 3.8–10.5)
WBC # FLD AUTO: 5.41 K/UL — SIGNIFICANT CHANGE UP (ref 3.8–10.5)

## 2021-03-03 PROCEDURE — 99232 SBSQ HOSP IP/OBS MODERATE 35: CPT | Mod: GC

## 2021-03-03 PROCEDURE — 99233 SBSQ HOSP IP/OBS HIGH 50: CPT

## 2021-03-03 PROCEDURE — 71045 X-RAY EXAM CHEST 1 VIEW: CPT | Mod: 26

## 2021-03-03 PROCEDURE — 99233 SBSQ HOSP IP/OBS HIGH 50: CPT | Mod: GC

## 2021-03-03 RX ORDER — ASPIRIN/CALCIUM CARB/MAGNESIUM 324 MG
81 TABLET ORAL DAILY
Refills: 0 | Status: DISCONTINUED | OUTPATIENT
Start: 2021-03-03 | End: 2021-03-05

## 2021-03-03 RX ORDER — MAGNESIUM SULFATE 500 MG/ML
2 VIAL (ML) INJECTION ONCE
Refills: 0 | Status: COMPLETED | OUTPATIENT
Start: 2021-03-03 | End: 2021-03-03

## 2021-03-03 RX ORDER — FUROSEMIDE 40 MG
80 TABLET ORAL ONCE
Refills: 0 | Status: COMPLETED | OUTPATIENT
Start: 2021-03-03 | End: 2021-03-03

## 2021-03-03 RX ORDER — ERYTHROPOIETIN 10000 [IU]/ML
6000 INJECTION, SOLUTION INTRAVENOUS; SUBCUTANEOUS
Refills: 0 | Status: DISCONTINUED | OUTPATIENT
Start: 2021-03-03 | End: 2021-03-05

## 2021-03-03 RX ORDER — HEPARIN SODIUM 5000 [USP'U]/ML
5000 INJECTION INTRAVENOUS; SUBCUTANEOUS EVERY 8 HOURS
Refills: 0 | Status: COMPLETED | OUTPATIENT
Start: 2021-03-03 | End: 2021-03-03

## 2021-03-03 RX ORDER — SODIUM ZIRCONIUM CYCLOSILICATE 10 G/10G
10 POWDER, FOR SUSPENSION ORAL EVERY 8 HOURS
Refills: 0 | Status: COMPLETED | OUTPATIENT
Start: 2021-03-03 | End: 2021-03-04

## 2021-03-03 RX ORDER — GABAPENTIN 400 MG/1
300 CAPSULE ORAL AT BEDTIME
Refills: 0 | Status: DISCONTINUED | OUTPATIENT
Start: 2021-03-03 | End: 2021-03-05

## 2021-03-03 RX ADMIN — Medication 80 MILLIGRAM(S): at 11:48

## 2021-03-03 RX ADMIN — ATORVASTATIN CALCIUM 80 MILLIGRAM(S): 80 TABLET, FILM COATED ORAL at 21:46

## 2021-03-03 RX ADMIN — SODIUM ZIRCONIUM CYCLOSILICATE 10 GRAM(S): 10 POWDER, FOR SUSPENSION ORAL at 18:13

## 2021-03-03 RX ADMIN — Medication 1000 MILLIGRAM(S): at 14:40

## 2021-03-03 RX ADMIN — Medication 400 MILLIGRAM(S): at 14:27

## 2021-03-03 RX ADMIN — TAMSULOSIN HYDROCHLORIDE 0.4 MILLIGRAM(S): 0.4 CAPSULE ORAL at 21:47

## 2021-03-03 RX ADMIN — GABAPENTIN 300 MILLIGRAM(S): 400 CAPSULE ORAL at 21:46

## 2021-03-03 RX ADMIN — Medication 1 PATCH: at 22:06

## 2021-03-03 RX ADMIN — Medication 2: at 18:13

## 2021-03-03 RX ADMIN — Medication 1 PATCH: at 12:01

## 2021-03-03 RX ADMIN — Medication 1300 MILLIGRAM(S): at 21:46

## 2021-03-03 RX ADMIN — Medication 1000 MILLIGRAM(S): at 08:40

## 2021-03-03 RX ADMIN — SODIUM ZIRCONIUM CYCLOSILICATE 10 GRAM(S): 10 POWDER, FOR SUSPENSION ORAL at 08:08

## 2021-03-03 RX ADMIN — Medication 1 PATCH: at 06:28

## 2021-03-03 RX ADMIN — Medication 400 MILLIGRAM(S): at 08:25

## 2021-03-03 RX ADMIN — Medication 81 MILLIGRAM(S): at 11:48

## 2021-03-03 RX ADMIN — HEPARIN SODIUM 5000 UNIT(S): 5000 INJECTION INTRAVENOUS; SUBCUTANEOUS at 21:46

## 2021-03-03 RX ADMIN — HEPARIN SODIUM 5000 UNIT(S): 5000 INJECTION INTRAVENOUS; SUBCUTANEOUS at 11:48

## 2021-03-03 RX ADMIN — Medication 1300 MILLIGRAM(S): at 06:13

## 2021-03-03 RX ADMIN — CARVEDILOL PHOSPHATE 6.25 MILLIGRAM(S): 80 CAPSULE, EXTENDED RELEASE ORAL at 18:13

## 2021-03-03 RX ADMIN — Medication 1300 MILLIGRAM(S): at 13:24

## 2021-03-03 RX ADMIN — CHLORHEXIDINE GLUCONATE 1 APPLICATION(S): 213 SOLUTION TOPICAL at 06:13

## 2021-03-03 RX ADMIN — Medication 50 GRAM(S): at 04:21

## 2021-03-03 RX ADMIN — CARVEDILOL PHOSPHATE 6.25 MILLIGRAM(S): 80 CAPSULE, EXTENDED RELEASE ORAL at 06:13

## 2021-03-03 NOTE — PROGRESS NOTE ADULT - PROBLEM SELECTOR PLAN 1
acute blood loss anemia secondary to splenic laceration  - s/p 1U prbc   - Hb stable 7.4 today  - c/t  monitor CBC and coags; transfuse for Hb < 7

## 2021-03-03 NOTE — PROVIDER CONTACT NOTE (OTHER) - REASON
Pt found to have pulled out R-IJ permacath and L-AC 20g PIV while sleeping. Some bleeding noted from L-AC PIV, no bleeding/hematoma visible from permacath removal site Pt found to have pulled out R-IJ permacath and L-AC 20g PIV while sleeping. Some bleeding noted from L-AC PIV, no bleeding/hematoma visible from permacath removal site.

## 2021-03-03 NOTE — OCCUPATIONAL THERAPY INITIAL EVALUATION ADULT - PERTINENT HX OF CURRENT PROBLEM, REHAB EVAL
70M admitted to [facility] on 3/1/21 hx of DM, CAD, CABG, COPD, ESRD (MWF), dementia, frequent falls who presented to [facility] after a fall a few days ago, per report. He missed dialysis today. + found to be anemic to 7.4 from hx of 10, hyperkalemic to >8, and to have a splenic laceration

## 2021-03-03 NOTE — PROGRESS NOTE ADULT - PROBLEM SELECTOR PLAN 4
- pt was noted to be agitated overnight ; may be 2/2 ICU delirium  - pt also has h/o underlying dementia  - currently AAO x 3 ; calm , cooperative  - can start Seroquel 25mg po q bedtime - pt was noted to be agitated overnight ; may be 2/2 ICU delirium  - pt also has h/o underlying dementia  - currently AAO x 3 ; calm , cooperative  - would c/t to monitor for now  -  on EKG done 3/1  - can start Zyprexa 2.5mg po 12 prn for agitation

## 2021-03-03 NOTE — PROVIDER CONTACT NOTE (OTHER) - ACTION/TREATMENT ORDERED:
Assessed patient at bedside. Placed pressure on neck. Ordered chest xray, made NPO to attempt for new HD access, held heparin subQ. Called wife Jo Ann to update on status. Dr. Cole assessed patient at bedside, placed pressure on neck. Ordered chest xray, made NPO to attempt for new HD access, held heparin subQ. JD [place] assessed & called wife Jo Ann to update.

## 2021-03-03 NOTE — OCCUPATIONAL THERAPY INITIAL EVALUATION ADULT - PRECAUTIONS/LIMITATIONS, REHAB EVAL
He is not a good historian about the details of the event. He currently complains of LUQ pain and eye pain, but he is able to see without issue. Hospital course: SICU consulted for hemodynamic monitoring and emergent hemodialysis. +hemorrhage watch and coordination of dialysis due to severe hyperkalemia, HD scheduled for today but patient will need new access as he self-discontinued right Permacath so will consult IR vs. place Maryjane at bedside, H&H 3/3/21 7.4/23.3/fall precautions

## 2021-03-03 NOTE — OCCUPATIONAL THERAPY INITIAL EVALUATION ADULT - PLANNED THERAPY INTERVENTIONS, OT EVAL
ADL retraining/balance training/bed mobility training/cognitive, visual perceptual/fine motor coordination training/strengthening/transfer training

## 2021-03-03 NOTE — PROGRESS NOTE ADULT - SUBJECTIVE AND OBJECTIVE BOX
HISTORY:  71 y/o male w/ a PMHx of CAD s/p CABG, HFpEF, HTN, active smoker, DM type II c/b neuropathy, CKD stage V on hemodialysis M/W/F, GERD, BPH, osteoporosis, and dementia who presented on 3/1 as a transfer from [facility] for trauma evaluation. Patient reportedly fell ~3 days prior and only presented to the ED because of worsening LUQ abdominal pain. He was found to be hyperkalemic (patient missed his hemodialysis session but is also known to be non-compliant) and anemic w/ a splenic laceration w/ hemoperitoneum on imaging requiring transfer to [facility] for further management. SICU consulted for hemodynamic monitoring and emergent hemodialysis. Patient reports LUQ abdominal pain but otherwise denies headache, dizzness, weakness, shortness of breath, chest pain, and/or nausea/vomiting.   HISTORY:  69 y/o male w/ a PMHx of CAD s/p CABG, HFpEF, HTN, active smoker, DM type II c/b neuropathy, CKD stage V on hemodialysis M/W/F, GERD, BPH, osteoporosis, and dementia who presented on 3/1 as a transfer from [facility] for trauma evaluation. Patient reportedly fell ~3 days prior and only presented to the ED because of worsening LUQ abdominal pain. He was found to be hyperkalemic (patient missed his hemodialysis session but is also known to be non-compliant) and anemic w/ a splenic laceration w/ hemoperitoneum on imaging requiring transfer to [facility] for further management. SICU consulted for hemodynamic monitoring and emergent hemodialysis. Patient reports LUQ abdominal pain but otherwise denies headache, dizzness, weakness, shortness of breath, chest pain, and/or nausea/vomiting.    24 HOUR EVENTS:  - Started nicotine patch and added Duoneb PRN as patient is active smoker  - Advanced to regular diet and IV locked  - Restarted home medications  - Marks discontinued and passed trial of void  - Potassium 5.6 in the afternoon so given Lokelma 10 g q8hrs x3 doses, gave Lasix 80 mg IV x1 dose for diuresis, and shifted w/ insulin & D50 w/ improvement in K+ to 5  - Self-discontinued right Permacath  - Started VTE prophylaxis w/ SQH  - PT/OT    SUBJECTIVE/ROS:  [x] A ten-point review of systems was otherwise negative except as noted.  [ ] Due to altered mental status/intubation, subjective information were not able to be obtained from the patient. History was obtained, to the extent possible, from review of the chart and collateral sources of information.    NEURO  Exam: awake, alert, oriented x3, no acute focal deficits  Meds:  - acetaminophen  IVPB .. 1000 milliGRAM(s) IV Intermittent every 6 hours PRN Mild Pain (1 - 3)  - gabapentin 300 milliGRAM(s) Oral daily  [x] Adequacy of sedation and pain control has been assessed and adjusted    RESPIRATORY  RR: 10 (03-03-21 @ 05:00) (10 - 28)  SpO2: 96% (03-03-21 @ 05:00) (88% - 97%)  Exam: clear to auscultation bilaterally  Mechanical Ventilation: no  [N/A] Extubation Readiness Assessed  Meds: albuterol/ipratropium for Nebulization 3 milliLiter(s) Nebulizer every 6 hours PRN Shortness of Breath and/or Wheezing    CARDIOVASCULAR  HR: 60 (03-03-21 @ 05:00) (60 - 80)  BP: 116/55 (03-03-21 @ 05:00) (96/52 - 149/71)  BP(mean): 79 (03-03-21 @ 05:00) (71 - 102)  Exam: regular rate and rhythm, S1S2  Cardiac Rhythm: sinus  Perfusion    [x]Adequate    [ ]Inadequate  Mentation   [x]Normal       [ ]Reduced  Extremities  [x]Warm         [ ]Cool  Volume Status [ ]Hypervolemic [ ]Euvolemic [x]Hypovolemic  Meds: carvedilol 6.25 milliGRAM(s) Oral every 12 hours    GI/NUTRITION  Exam: soft, nondistended, LUQ tenderness to deep palpation  Diet: regular  Meds: none    GENITOURINARY  I&O's Detail  03-02 @ 07:01  -  03-03 @ 05:58  --------------------------------------------------------  IN:    dextrose 5% + sodium chloride 0.9%: 150 mL    Oral Fluid: 700 mL  Total IN: 850 mL    OUT:    Indwelling Catheter - Urethral (mL): 75 mL    Voided (mL): 650 mL  Total OUT: 725 mL    Total NET: 125 mL    139  |  101  |  44<H>  ----------------------------<  177<H>  5.0   |  27  |  4.45<H>    Ca    8.0<L>      03 Mar 2021 00:31  Phos  4.3  Mg     1.9    [ ] Marks catheter, indication: N/A  Meds:  - sodium bicarbonate 1300 milliGRAM(s) Oral three times a day  - tamsulosin 0.4 milliGRAM(s) Oral at bedtime    HEMATOLOGIC  Meds:  - aspirin  chewable 81 milliGRAM(s) Oral daily  - heparin   Injectable 5000 Unit(s), SubCutaneous, every 8 hours  [x] VTE Prophylaxis                        7.4    5.41  )-----------( 116      ( 03 Mar 2021 00:31 )             23.3     PT/INR - ( 03 Mar 2021 00:31 )   PT: 12.8 sec;   INR: 1.07 ratio    PTT - ( 03 Mar 2021 00:31 )  PTT:32.0 sec    INFECTIOUS DISEASES  T(C): 36.9 (03-03-21 @ 03:00), Max: 37.1 (03-02-21 @ 16:00)  WBC Count:  - 5.41 K/uL (03-03 @ 00:31)  - 5.31 K/uL (03-02 @ 09:15)  Recent Cultures: none  Meds: none    ENDOCRINE  Capillary Blood Glucose:  - 182 mg/dL (02 Mar 2021 22:51)  - 178 mg/dL (02 Mar 2021 17:20)  - 121 mg/dL (02 Mar 2021 12:11)  Meds:  - atorvastatin 80 milliGRAM(s) Oral at bedtime  - insulin lispro (ADMELOG) corrective regimen sliding scale   SubCutaneous three times a day before meals  - insulin lispro (ADMELOG) corrective regimen sliding scale   SubCutaneous at bedtime    ACCESS DEVICES:  [x] Peripheral IV  [ ] Central Venous Line	[ ] R	[ ] L	[ ] IJ	[ ] Fem	[ ] SC	Placed:   [ ] Arterial Line		[ ] R	[ ] L	[ ] Fem	[ ] Rad	[ ] Ax	Placed:   [ ] PICC:					[ ] Mediport  [ ] Urinary Catheter, Date Placed:   [x] Necessity of urinary, arterial, and venous catheters discussed    OTHER MEDICATIONS:  - artificial  tears Solution 1 Drop(s) Both EYES three times a day PRN  - chlorhexidine 2% Cloths 1 Application(s) Topical <User Schedule>  - nicotine - 21 mG/24Hr(s) Patch 1 patch Transdermal daily    CODE STATUS: Full code    IMAGING:

## 2021-03-03 NOTE — OCCUPATIONAL THERAPY INITIAL EVALUATION ADULT - LIVES WITH, PROFILE
Pt lives in basement apartment within  +9STE. Pt requires assistance for ADls and uses a RW for ambulation./spouse

## 2021-03-03 NOTE — PROVIDER CONTACT NOTE (OTHER) - RECOMMENDATIONS
Assess patient at bedside. Do chest xray. Place new [facility] for HD Assess patient at bedside. Do chest xray. Place new [facility] for HD?

## 2021-03-03 NOTE — PROGRESS NOTE ADULT - SUBJECTIVE AND OBJECTIVE BOX
Patient is a 70y old  Male who presents with a chief complaint of     SUBJECTIVE / OVERNIGHT EVENTS:    MEDICATIONS  (STANDING):  aspirin  chewable 81 milliGRAM(s) Oral daily  atorvastatin 80 milliGRAM(s) Oral at bedtime  carvedilol 6.25 milliGRAM(s) Oral every 12 hours  chlorhexidine 2% Cloths 1 Application(s) Topical <User Schedule>  gabapentin 300 milliGRAM(s) Oral at bedtime  insulin lispro (ADMELOG) corrective regimen sliding scale   SubCutaneous three times a day before meals  insulin lispro (ADMELOG) corrective regimen sliding scale   SubCutaneous at bedtime  nicotine - 21 mG/24Hr(s) Patch 1 patch Transdermal daily  sodium bicarbonate 1300 milliGRAM(s) Oral three times a day  sodium zirconium cyclosilicate 10 Gram(s) Oral every 8 hours  tamsulosin 0.4 milliGRAM(s) Oral at bedtime    MEDICATIONS  (PRN):  acetaminophen  IVPB .. 1000 milliGRAM(s) IV Intermittent every 6 hours PRN Mild Pain (1 - 3)  albuterol/ipratropium for Nebulization 3 milliLiter(s) Nebulizer every 6 hours PRN Shortness of Breath and/or Wheezing  artificial  tears Solution 1 Drop(s) Both EYES three times a day PRN Dry Eyes      Vital Signs Last 24 Hrs  T(C): 36.2 (03 Mar 2021 08:00), Max: 37.1 (02 Mar 2021 16:00)  T(F): 97.2 (03 Mar 2021 08:00), Max: 98.8 (02 Mar 2021 16:00)  HR: 54 (03 Mar 2021 10:00) (53 - 80)  BP: 117/56 (03 Mar 2021 10:00) (100/65 - 149/71)  BP(mean): 81 (03 Mar 2021 10:00) (76 - 102)  RR: 22 (03 Mar 2021 10:00) (10 - 28)  SpO2: 96% (03 Mar 2021 10:00) (88% - 97%)  CAPILLARY BLOOD GLUCOSE      POCT Blood Glucose.: 125 mg/dL (03 Mar 2021 06:27)  POCT Blood Glucose.: 182 mg/dL (02 Mar 2021 22:51)  POCT Blood Glucose.: 178 mg/dL (02 Mar 2021 17:20)  POCT Blood Glucose.: 121 mg/dL (02 Mar 2021 12:11)    I&O's Summary    02 Mar 2021 07:01  -  03 Mar 2021 07:00  --------------------------------------------------------  IN: 950 mL / OUT: 725 mL / NET: 225 mL    03 Mar 2021 07:01  -  03 Mar 2021 11:16  --------------------------------------------------------  IN: 100 mL / OUT: 0 mL / NET: 100 mL        PHYSICAL EXAM:  GENERAL: NAD, well-developed  HEAD:  Atraumatic, Normocephalic  EYES: EOMI, PERRLA, conjunctiva and sclera clear  NECK: Supple, No JVD  CHEST/LUNG: Clear to auscultation bilaterally; No wheeze  HEART: Regular rate and rhythm; No murmurs, rubs, or gallops  ABDOMEN: Soft, Nontender, Nondistended; Bowel sounds present  EXTREMITIES:  2+ Peripheral Pulses, No clubbing, cyanosis, or edema  PSYCH: AAOx3  NEUROLOGY: non-focal  SKIN: No rashes or lesions    LABS:                        7.4    5.41  )-----------( 116      ( 03 Mar 2021 00:31 )             23.3     03-03    139  |  101  |  44<H>  ----------------------------<  177<H>  5.0   |  27  |  4.45<H>    Ca    8.0<L>      03 Mar 2021 00:31  Phos  4.3     03-  Mg     1.9     -    TPro  6.2  /  Alb  3.0<L>  /  TBili  0.3  /  DBili  x   /  AST  20  /  ALT  14  /  AlkPhos  255<H>  03    PT/INR - ( 03 Mar 2021 00:31 )   PT: 12.8 sec;   INR: 1.07 ratio         PTT - ( 03 Mar 2021 00:31 )  PTT:32.0 sec      Urinalysis Basic - ( 01 Mar 2021 21:12 )    Color: Yellow / Appearance: Clear / S.024 / pH: x  Gluc: x / Ketone: Negative  / Bili: Negative / Urobili: Negative   Blood: x / Protein: 300 mg/dL / Nitrite: Negative   Leuk Esterase: Negative / RBC: 0 /hpf / WBC 0 /HPF   Sq Epi: x / Non Sq Epi: 1 / Bacteria: Negative        RADIOLOGY & ADDITIONAL TESTS:    Imaging Personally Reviewed:    Consultant(s) Notes Reviewed:      Care Discussed with Consultants/Other Providers:      Harjinder ID#[id number]    SUBJECTIVE / OVERNIGHT EVENTS:  Pt seen and examined. Was agitated overnight and pulled out his permacath. This morning he denies any acute c/o. States that he does not remember what happened last night. He denies cp, sob, fever/chills, abd pain, n/v.    MEDICATIONS  (STANDING):  aspirin  chewable 81 milliGRAM(s) Oral daily  atorvastatin 80 milliGRAM(s) Oral at bedtime  carvedilol 6.25 milliGRAM(s) Oral every 12 hours  chlorhexidine 2% Cloths 1 Application(s) Topical <User Schedule>  gabapentin 300 milliGRAM(s) Oral at bedtime  insulin lispro (ADMELOG) corrective regimen sliding scale   SubCutaneous three times a day before meals  insulin lispro (ADMELOG) corrective regimen sliding scale   SubCutaneous at bedtime  nicotine - 21 mG/24Hr(s) Patch 1 patch Transdermal daily  sodium bicarbonate 1300 milliGRAM(s) Oral three times a day  sodium zirconium cyclosilicate 10 Gram(s) Oral every 8 hours  tamsulosin 0.4 milliGRAM(s) Oral at bedtime    MEDICATIONS  (PRN):  acetaminophen  IVPB .. 1000 milliGRAM(s) IV Intermittent every 6 hours PRN Mild Pain (1 - 3)  albuterol/ipratropium for Nebulization 3 milliLiter(s) Nebulizer every 6 hours PRN Shortness of Breath and/or Wheezing  artificial  tears Solution 1 Drop(s) Both EYES three times a day PRN Dry Eyes      Vital Signs Last 24 Hrs  T(C): 36.2 (03 Mar 2021 08:00), Max: 37.1 (02 Mar 2021 16:00)  T(F): 97.2 (03 Mar 2021 08:00), Max: 98.8 (02 Mar 2021 16:00)  HR: 54 (03 Mar 2021 10:00) (53 - 80)  BP: 117/56 (03 Mar 2021 10:00) (100/65 - 149/71)  BP(mean): 81 (03 Mar 2021 10:00) (76 - 102)  RR: 22 (03 Mar 2021 10:00) (10 - 28)  SpO2: 96% (03 Mar 2021 10:00) (88% - 97%)  CAPILLARY BLOOD GLUCOSE      POCT Blood Glucose.: 125 mg/dL (03 Mar 2021 06:27)  POCT Blood Glucose.: 182 mg/dL (02 Mar 2021 22:51)  POCT Blood Glucose.: 178 mg/dL (02 Mar 2021 17:20)  POCT Blood Glucose.: 121 mg/dL (02 Mar 2021 12:11)    I&O's Summary    02 Mar 2021 07:01  -  03 Mar 2021 07:00  --------------------------------------------------------  IN: 950 mL / OUT: 725 mL / NET: 225 mL    03 Mar 2021 07:01  -  03 Mar 2021 11:16  --------------------------------------------------------  IN: 100 mL / OUT: 0 mL / NET: 100 mL        PHYSICAL EXAM:  GENERAL: NAD, anicteric, afebrile  HEAD:  Atraumatic, Normocephalic  EYES: EOMI, PERRLA, conjunctiva and sclera clear  ENMT: Moist mucous membranes  NECK: Supple, No JVD ;  dressing at previous catheter site cdi   RESPIRATORY: Clear to auscultation bilaterally; No rales, rhonchi, wheezing, or rubs  CARDIOVASCULAR: Regular rate and rhythm; No murmurs, rubs, or gallops  GASTROINTESTINAL: Soft, Nontender, Nondistended; Bowel sounds present  EXTREMITIES:  2+ Peripheral Pulses, No clubbing, cyanosis, or edema  NERVOUS SYSTEM:  AAOX 3, ; Moving all 4 extremities; No gross sensory deficits  SKIN: No rashes or lesions    LABS:                        7.4    5.41  )-----------( 116      ( 03 Mar 2021 00:31 )             23.3         139  |  101  |  44<H>  ----------------------------<  177<H>  5.0   |  27  |  4.45<H>    Ca    8.0<L>      03 Mar 2021 00:31  Phos  4.3     03-  Mg     1.9     -03    TPro  6.2  /  Alb  3.0<L>  /  TBili  0.3  /  DBili  x   /  AST  20  /  ALT  14  /  AlkPhos  255<H>      PT/INR - ( 03 Mar 2021 00:31 )   PT: 12.8 sec;   INR: 1.07 ratio         PTT - ( 03 Mar 2021 00:31 )  PTT:32.0 sec      Urinalysis Basic - ( 01 Mar 2021 21:12 )    Color: Yellow / Appearance: Clear / S.024 / pH: x  Gluc: x / Ketone: Negative  / Bili: Negative / Urobili: Negative   Blood: x / Protein: 300 mg/dL / Nitrite: Negative   Leuk Esterase: Negative / RBC: 0 /hpf / WBC 0 /HPF   Sq Epi: x / Non Sq Epi: 1 / Bacteria: Negative        RADIOLOGY & ADDITIONAL TESTS:    Imaging Personally Reviewed:    Consultant(s) Notes Reviewed:      Care Discussed with Consultants/Other Providers:

## 2021-03-03 NOTE — PHYSICAL THERAPY INITIAL EVALUATION ADULT - GENERAL OBSERVATIONS, REHAB EVAL
Pt is s/p fall at home, +missed HD tx, +anemia and splenic laceration. Pt received sitting in chair, +ICU monitoring, +IVL. Pt is A&Ox4, follows simple commands 100% of the time, +[place] speaking. (Katlyn ID#[id number]). Pt moving all extremities,

## 2021-03-03 NOTE — PHYSICAL THERAPY INITIAL EVALUATION ADULT - DISCHARGE DISPOSITION, PT EVAL
DC home with home PT services for general strengthening, to increase endurance, address fall prevention, perform balance training, safety assessment of home environment, and to restore pt's prior level of function, recommend rolling walker, 3:1 commode, assist from spouse as needed for mobility/ADLs, RADHA Stewart aware.

## 2021-03-03 NOTE — CONSULT NOTE ADULT - ASSESSMENT
Interventional Radiology    Evaluate for Procedure: Tunneled HD catheter    HPI: 70y Male with CAD s/p CABG, HFpEF, HTN, active smoker, DM type II c/b neuropathy, CKD stage V on hemodialysis M/W/F, GERD, BPH, osteoporosis, and dementia who initially presented with splenic laceration after fall. Patient in the SICU with acute blood loss anemia and hemoperitoneum. Patient also self dc tunneled catheter. IR consulted to replace tunneled HD catheter.     Allergies:   Medications (Abx/Cardiac/Anticoagulation/Blood Products)    aspirin  chewable: 81 milliGRAM(s) Oral (03-02 @ 12:12)  carvedilol: 6.25 milliGRAM(s) Oral (03-03 @ 06:13)  furosemide   Injectable: 40 milliGRAM(s) IV Push (03-01 @ 23:39)  furosemide   Injectable: 80 milliGRAM(s) IV Push (03-02 @ 12:13)  heparin   Injectable: 5000 Unit(s) SubCutaneous (03-02 @ 22:53)  tamsulosin: 0.4 milliGRAM(s) Oral (03-02 @ 22:53)    Data:    T(C): 36.2  HR: 54  BP: 117/56  RR: 22  SpO2: 96%    -WBC 5.41 / HgB 7.4 / Hct 23.3 / Plt 116  -Na 139 / Cl 101 / BUN 44 / Glucose 177  -K 5.0 / CO2 27 / Cr 4.45  -ALT -- / Alk Phos -- / T.Bili --  -INR 1.07 / PTT 32.0      Radiology:   Imaging findings reviewed.    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 Patient with acute blood loss anemia and hemoperitoneum. Patient also self dc tunneled catheter. IR consulted to replace tunneled HD catheter.     Per discussion with SICU team they will placed temporary non tunneled catheter. Please re consult IR when patient is nearing discharge. Will plan on placement of tunneled catheter day prior to discharge.  Interventional Radiology    Evaluate for Procedure: Tunneled HD catheter    HPI: 70y Male with CAD s/p CABG, HFpEF, HTN, active smoker, DM type II c/b neuropathy, CKD stage V on hemodialysis M/W/F, GERD, BPH, osteoporosis, and dementia who initially presented with splenic laceration after fall. Patient in the SICU with acute blood loss anemia and hemoperitoneum. Patient also self dc tunneled catheter. IR consulted to replace tunneled HD catheter.     Allergies:   Medications (Abx/Cardiac/Anticoagulation/Blood Products)    aspirin  chewable: 81 milliGRAM(s) Oral (03-02 @ 12:12)  carvedilol: 6.25 milliGRAM(s) Oral (03-03 @ 06:13)  furosemide   Injectable: 40 milliGRAM(s) IV Push (03-01 @ 23:39)  furosemide   Injectable: 80 milliGRAM(s) IV Push (03-02 @ 12:13)  heparin   Injectable: 5000 Unit(s) SubCutaneous (03-02 @ 22:53)  tamsulosin: 0.4 milliGRAM(s) Oral (03-02 @ 22:53)    Data:    T(C): 36.2  HR: 54  BP: 117/56  RR: 22  SpO2: 96%    -WBC 5.41 / HgB 7.4 / Hct 23.3 / Plt 116  -Na 139 / Cl 101 / BUN 44 / Glucose 177  -K 5.0 / CO2 27 / Cr 4.45  -ALT -- / Alk Phos -- / T.Bili --  -INR 1.07 / PTT 32.0      Radiology:   Imaging findings reviewed.    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 Patient with acute blood loss anemia and hemoperitoneum. Patient also self dc tunneled catheter. IR consulted to replace tunneled HD catheter.     -- IR will plan for procedure on 3/4/21.  -- NPO at midnight 3/3.  -- Please hold am anti coagulation.  -- Please obtain new BMP, CBC and coags  -- Please obtain COVID pcr within 72 hours of procedure  -- Please place IR procedure request order under Dr. Nick Molina

## 2021-03-03 NOTE — PROVIDER CONTACT NOTE (OTHER) - ASSESSMENT
Pt A&Ox3, disoriented to time, forgetful@times, on room air, w/ VS as charted. Pt denies any pain. Pt unsure of what happened. Pt w/ 2 intact PIV's. Pt A&Ox3, disoriented to time, forgetful@times, on room air, w/ VS as charted. Pt denies any pain. Pt unsure of what happened. Pt w/ 2 intact PIV's. R-IJ permacath removal site cleaned w/ CHG, applied gauze/tape dressing, remains CDI. Pt also removed R-shoulder nicotine patch.

## 2021-03-03 NOTE — PROGRESS NOTE ADULT - SUBJECTIVE AND OBJECTIVE BOX
ACS Surgery Daily Progress Note  =====================================================    SUBJECTIVE: Patient seen and examined at bedside on AM rounds. Patient reports that they're feeling well. Tolerating, denies nausea, vomiting. OOB/Ambulating as tolerated. Denies fever, chills.         MEDICATIONS  (STANDING):  aspirin  chewable 81 milliGRAM(s) Oral daily  atorvastatin 80 milliGRAM(s) Oral at bedtime  carvedilol 6.25 milliGRAM(s) Oral every 12 hours  chlorhexidine 2% Cloths 1 Application(s) Topical <User Schedule>  gabapentin 300 milliGRAM(s) Oral daily  heparin   Injectable 5000 Unit(s) SubCutaneous every 8 hours  insulin lispro (ADMELOG) corrective regimen sliding scale   SubCutaneous three times a day before meals  insulin lispro (ADMELOG) corrective regimen sliding scale   SubCutaneous at bedtime  nicotine - 21 mG/24Hr(s) Patch 1 patch Transdermal daily  sodium bicarbonate 1300 milliGRAM(s) Oral three times a day  sodium zirconium cyclosilicate 10 Gram(s) Oral every 8 hours  tamsulosin 0.4 milliGRAM(s) Oral at bedtime    MEDICATIONS  (PRN):  acetaminophen  IVPB .. 1000 milliGRAM(s) IV Intermittent every 6 hours PRN Mild Pain (1 - 3)  albuterol/ipratropium for Nebulization 3 milliLiter(s) Nebulizer every 6 hours PRN Shortness of Breath and/or Wheezing  artificial  tears Solution 1 Drop(s) Both EYES three times a day PRN Dry Eyes      OBJECTIVE:    Vital Signs Last 24 Hrs  T(C): 36.9 (02 Mar 2021 23:00), Max: 37.1 (02 Mar 2021 16:00)  T(F): 98.4 (02 Mar 2021 23:00), Max: 98.8 (02 Mar 2021 16:00)  HR: 76 (03 Mar 2021 01:00) (51 - 80)  BP: 149/67 (03 Mar 2021 01:00) (96/52 - 149/71)  BP(mean): 96 (03 Mar 2021 01:00) (71 - 102)  RR: 14 (03 Mar 2021 01:00) (12 - 28)  SpO2: 95% (03 Mar 2021 01:00) (88% - 99%)        I&O's Detail    01 Mar 2021 07:01  -  02 Mar 2021 07:00  --------------------------------------------------------  IN:    dextrose 5% + sodium chloride 0.9%: 50 mL    IV PiggyBack: 100 mL    IV PiggyBack: 100 mL    Oral Fluid: 100 mL    PRBCs (Packed Red Blood Cells): 300 mL    sodium chloride 0.9%: 250 mL  Total IN: 900 mL    OUT:    Indwelling Catheter - Urethral (mL): 330 mL    Other (mL): 0 mL  Total OUT: 330 mL    Total NET: 570 mL      02 Mar 2021 07:01  -  03 Mar 2021 01:40  --------------------------------------------------------  IN:    dextrose 5% + sodium chloride 0.9%: 150 mL    Oral Fluid: 700 mL  Total IN: 850 mL    OUT:    Indwelling Catheter - Urethral (mL): 75 mL    Voided (mL): 650 mL  Total OUT: 725 mL    Total NET: 125 mL          Daily     Daily Weight in k (03 Mar 2021 00:31)    PHYSICAL EXAM:  Constitutional: well developed, well nourished, NAD  ENMT: normal facies, symmetric  Respiratory: CTA bilaterally  Cardiovascular: RRR  Gastrointestinal: abdomen soft, nontender, nondistended. No obvious masses. No peritonitis  Extremities: FROM, warm  Neurological: intact, non-focal  Skin: no gross lesions  Lymph Nodes: no gross adenopathy    LABS:                        7.4    5.41  )-----------( 116      ( 03 Mar 2021 00:31 )             23.3     03-03    139  |  101  |  44<H>  ----------------------------<  177<H>  5.0   |  27  |  4.45<H>    Ca    8.0<L>      03 Mar 2021 00:31  Phos  4.3     03-  Mg     1.9     -03    TPro  6.2  /  Alb  3.0<L>  /  TBili  0.3  /  DBili  x   /  AST  20  /  ALT  14  /  AlkPhos  255<H>      PT/INR - ( 03 Mar 2021 00:31 )   PT: 12.8 sec;   INR: 1.07 ratio         PTT - ( 03 Mar 2021 00:31 )  PTT:32.0 sec  Urinalysis Basic - ( 01 Mar 2021 21:12 )    Color: Yellow / Appearance: Clear / S.024 / pH: x  Gluc: x / Ketone: Negative  / Bili: Negative / Urobili: Negative   Blood: x / Protein: 300 mg/dL / Nitrite: Negative   Leuk Esterase: Negative / RBC: 0 /hpf / WBC 0 /HPF   Sq Epi: x / Non Sq Epi: 1 / Bacteria: Negative

## 2021-03-03 NOTE — CHART NOTE - NSCHARTNOTEFT_GEN_A_CORE
S: Patient received on 7T. No current complaints of abdominal pain. Patient feels well.     O:  Physical Exam:  Gen: NAD, sitting upright in bed  Chest: rrr, nonlabored respirations, dressing over RIJ wound after patient pulled his permacath overnight last night  Abd: softly distended, nontender    Vital Signs Last 24 Hrs  T(C): 36.4 (03 Mar 2021 14:15), Max: 37.1 (02 Mar 2021 16:00)  T(F): 97.5 (03 Mar 2021 14:15), Max: 98.8 (02 Mar 2021 16:00)  HR: 65 (03 Mar 2021 14:15) (53 - 80)  BP: 154/77 (03 Mar 2021 14:15) (100/65 - 159/69)  BP(mean): 88 (03 Mar 2021 13:00) (77 - 102)  RR: 18 (03 Mar 2021 14:15) (10 - 23)  SpO2: 93% (03 Mar 2021 14:15) (88% - 96%)    I&O's Detail    02 Mar 2021 07:01  -  03 Mar 2021 07:00  --------------------------------------------------------  IN:    dextrose 5% + sodium chloride 0.9%: 150 mL    IV PiggyBack: 50 mL    Oral Fluid: 750 mL  Total IN: 950 mL    OUT:    Indwelling Catheter - Urethral (mL): 75 mL    Voided (mL): 650 mL  Total OUT: 725 mL    Total NET: 225 mL      03 Mar 2021 07:01  -  03 Mar 2021 15:07  --------------------------------------------------------  IN:    IV PiggyBack: 100 mL  Total IN: 100 mL    OUT:    Voided (mL): 350 mL  Total OUT: 350 mL    Total NET: -250 mL      MEDICATIONS  (STANDING):  aspirin  chewable 81 milliGRAM(s) Oral daily  atorvastatin 80 milliGRAM(s) Oral at bedtime  carvedilol 6.25 milliGRAM(s) Oral every 12 hours  chlorhexidine 2% Cloths 1 Application(s) Topical <User Schedule>  epoetin torres-epbx (RETACRIT) Injectable 6000 Unit(s) IV Push <User Schedule>  gabapentin 300 milliGRAM(s) Oral at bedtime  heparin   Injectable 5000 Unit(s) SubCutaneous every 8 hours  insulin lispro (ADMELOG) corrective regimen sliding scale   SubCutaneous three times a day before meals  insulin lispro (ADMELOG) corrective regimen sliding scale   SubCutaneous at bedtime  nicotine - 21 mG/24Hr(s) Patch 1 patch Transdermal daily  sodium bicarbonate 1300 milliGRAM(s) Oral three times a day  sodium zirconium cyclosilicate 10 Gram(s) Oral every 8 hours  tamsulosin 0.4 milliGRAM(s) Oral at bedtime    MEDICATIONS  (PRN):  acetaminophen  IVPB .. 1000 milliGRAM(s) IV Intermittent every 6 hours PRN Mild Pain (1 - 3)  albuterol/ipratropium for Nebulization 3 milliLiter(s) Nebulizer every 6 hours PRN Shortness of Breath and/or Wheezing  artificial  tears Solution 1 Drop(s) Both EYES three times a day PRN Dry Eyes      LABS:                        7.4    5.41  )-----------( 116      ( 03 Mar 2021 00:31 )             23.3     03-03    139  |  101  |  44<H>  ----------------------------<  177<H>  5.0   |  27  |  4.45<H>    Ca    8.0<L>      03 Mar 2021 00:31  Phos  4.3     03-  Mg     1.9     -    TPro  6.2  /  Alb  3.0<L>  /  TBili  0.3  /  DBili  x   /  AST  20  /  ALT  14  /  AlkPhos  255<H>      PT/INR - ( 03 Mar 2021 00:31 )   PT: 12.8 sec;   INR: 1.07 ratio         PTT - ( 03 Mar 2021 00:31 )  PTT:32.0 sec  LIVER FUNCTIONS - ( 01 Mar 2021 21:02 )  Alb: 3.0 g/dL / Pro: 6.2 g/dL / ALK PHOS: 255 U/L / ALT: 14 U/L / AST: 20 U/L / GGT: x           Urinalysis Basic - ( 01 Mar 2021 21:12 )    Color: Yellow / Appearance: Clear / S.024 / pH: x  Gluc: x / Ketone: Negative  / Bili: Negative / Urobili: Negative   Blood: x / Protein: 300 mg/dL / Nitrite: Negative   Leuk Esterase: Negative / RBC: 0 /hpf / WBC 0 /HPF   Sq Epi: x / Non Sq Epi: 1 / Bacteria: Negative          A/P: 70 M w/ metabolic syndrome, CHF, ESRD s/p fall, Trauma transfer for hyperkalemia to  at [facility], planning for permacath replacement tomorrow with IR. Stable.    - Lokelma and lasix tonight  - NPO and hold heparin p midnight for IR permacath tomorrow  - f/u AM labs  - If for any reason pt is found to be hyperkalemic requiring HD overnight, pt will need urgent [facility] for HD access.    Trauma [id number]

## 2021-03-03 NOTE — PHYSICAL THERAPY INITIAL EVALUATION ADULT - ADDITIONAL COMMENTS
Pts spouse reported herself and the pt reside in a basement apartment within a private home with 9 steps down in [place], [place]. PTA the pt requires assistance from his spouse with ADLs and has as spouse reported a broken RW for ambulation. Pt with no current or previous HHA services. Pts spouse reported pt attends dialysis Tuesday, Thursday, and Saturday, Pts spouse reported herself and the pt reside in a basement apartment within a private home with 9 steps down in [place], [place]. PTA the pt requires assistance from his spouse with ADLs and per SW per spouse reported a "broken RW" for ambulation. Pt with no current or previous HHA services. Pts spouse reported pt attends dialysis Tuesday, Thursday, and Saturday,

## 2021-03-03 NOTE — PROGRESS NOTE ADULT - SUBJECTIVE AND OBJECTIVE BOX
[facility] DIVISION OF KIDNEY DISEASES AND HYPERTENSION -- FOLLOW UP NOTE  --------------------------------------------------------------------------------  Ryan Riley   Nephrology Fellow  Pager NS: [phone]/ [facility]: [phone]  (After 5 pm or on weekends please page the on-call fellow)      Patient is a 70y old  Male who presents with a chief complaint of     24 hour events/subjective: Patient seen and examined at the bedside. Vital signs, labs, medications reviewed.        PAST HISTORY  --------------------------------------------------------------------------------  No significant changes to PMH, PSH, FHx, SHx, unless otherwise noted    ALLERGIES & MEDICATIONS  --------------------------------------------------------------------------------  Allergies    No Known Allergies    Intolerances      Standing Inpatient Medications  atorvastatin 80 milliGRAM(s) Oral at bedtime  carvedilol 6.25 milliGRAM(s) Oral every 12 hours  chlorhexidine 2% Cloths 1 Application(s) Topical <User Schedule>  gabapentin 300 milliGRAM(s) Oral at bedtime  insulin lispro (ADMELOG) corrective regimen sliding scale   SubCutaneous three times a day before meals  insulin lispro (ADMELOG) corrective regimen sliding scale   SubCutaneous at bedtime  nicotine - 21 mG/24Hr(s) Patch 1 patch Transdermal daily  sodium bicarbonate 1300 milliGRAM(s) Oral three times a day  sodium zirconium cyclosilicate 10 Gram(s) Oral every 8 hours  tamsulosin 0.4 milliGRAM(s) Oral at bedtime    PRN Inpatient Medications  acetaminophen  IVPB .. 1000 milliGRAM(s) IV Intermittent every 6 hours PRN  albuterol/ipratropium for Nebulization 3 milliLiter(s) Nebulizer every 6 hours PRN  artificial  tears Solution 1 Drop(s) Both EYES three times a day PRN      REVIEW OF SYSTEMS  --------------------------------------------------------------------------------  Gen: No fevers/chills  Skin: No rashes  Head/Eyes/Ears: Normal hearing, no difficulty seeing  Respiratory: No dyspnea, cough  CV: No chest pain  GI: No abdominal pain, diarrhea  : No dysuria, hematuria  MSK: No  edema  Heme: No easy bruising or bleeding  Psych: No significant depression    >>> <<<    VITALS/PHYSICAL EXAM  --------------------------------------------------------------------------------  T(C): 36.2 (03-03-21 @ 08:00), Max: 37.1 (03-02-21 @ 16:00)  HR: 54 (03-03-21 @ 10:00) (53 - 80)  BP: 117/56 (03-03-21 @ 10:00) (100/65 - 149/71)  RR: 22 (03-03-21 @ 10:00) (10 - 28)  SpO2: 96% (03-03-21 @ 10:00) (88% - 97%)  Wt(kg): --  Height (cm): 162.6 (03-01-21 @ 20:11)  Weight (kg): 68 (03-01-21 @ 22:00)  BMI (kg/m2): 25.7 (03-01-21 @ 22:00)  BSA (m2): 1.73 (03-01-21 @ 22:00)      03-02-21 @ 07:01  -  03-03-21 @ 07:00  --------------------------------------------------------  IN: 950 mL / OUT: 725 mL / NET: 225 mL    03-03-21 @ 07:01  -  03-03-21 @ 10:56  --------------------------------------------------------  IN: 100 mL / OUT: 0 mL / NET: 100 mL      Physical Exam:    	Gen: NAD  	HEENT: Anicteric  	Pulm: CTA B/L  	CV: S1S2  	Abd: Soft, +BS   	MSK: No LE edema B/L  	Neuro: Awake  	Skin: Warm and dry  	Vascular access:      LABS/STUDIES  --------------------------------------------------------------------------------              7.4    5.41  >-----------<  116      [03-03-21 @ 00:31]              23.3     139  |  101  |  44  ----------------------------<  177      [03-03-21 @ 00:31]  5.0   |  27  |  4.45        Ca     8.0     [03-03-21 @ 00:31]      Mg     1.9     [03-03-21 @ 00:31]      Phos  4.3     [03-03-21 @ 00:31]    TPro  6.2  /  Alb  3.0  /  TBili  0.3  /  DBili  x   /  AST  20  /  ALT  14  /  AlkPhos  255  [03-01-21 @ 21:02]    PT/INR: PT 12.8 , INR 1.07       [03-03-21 @ 00:31]  PTT: 32.0       [03-03-21 @ 00:31]      Creatinine Trend:  SCr 4.45 [03-03 @ 00:31]  SCr 3.56 [03-02 @ 15:32]  SCr 3.25 [03-02 @ 10:16]  SCr 3.15 [03-02 @ 09:15]  SCr 2.88 [03-02 @ 05:18]    Urinalysis - [03-01-21 @ 21:12]      Color Yellow / Appearance Clear / SG 1.024 / pH 7.5      Gluc Negative / Ketone Negative  / Bili Negative / Urobili Negative       Blood Negative / Protein 300 mg/dL / Leuk Est Negative / Nitrite Negative      RBC 0 / WBC 0 / Hyaline 1 / Gran  / Sq Epi  / Non Sq Epi 1 / Bacteria Negative      Iron 40, TIBC 162, %sat 25      [07-16-20 @ 09:30]  Ferritin 334      [07-16-20 @ 09:29]  HbA1c 11.4      [03-04-18 @ 11:53]  TSH 3.57      [07-16-20 @ 09:29]  Lipid: chol 122, TG 47, HDL 39, LDL 73      [07-16-20 @ 09:30]    HBsAb 113.1      [03-02-21 @ 14:13]  HBsAg Nonreact      [03-02-21 @ 14:13]  HBcAb Reactive      [03-02-21 @ 14:13]  HCV 0.16, Nonreact      [03-02-21 @ 14:13]     [facility] DIVISION OF KIDNEY DISEASES AND HYPERTENSION -- FOLLOW UP NOTE  --------------------------------------------------------------------------------  Ryan Riley   Nephrology Fellow  Pager NS: [phone]/ [facility]: [phone]  (After 5 pm or on weekends please page the on-call fellow)      Patient is a 70y old  Male who presents with a chief complaint of     24 hour events/subjective: Patient seen and examined at the bedside. Vital signs, labs, medications reviewed. Pt accidentally removed HD catheter this am, IR consulted for replacement. Hgb stable at 7.4. AM labs with K of 5        PAST HISTORY  --------------------------------------------------------------------------------  No significant changes to PMH, PSH, FHx, SHx, unless otherwise noted    ALLERGIES & MEDICATIONS  --------------------------------------------------------------------------------  Allergies    No Known Allergies    Intolerances      Standing Inpatient Medications  atorvastatin 80 milliGRAM(s) Oral at bedtime  carvedilol 6.25 milliGRAM(s) Oral every 12 hours  chlorhexidine 2% Cloths 1 Application(s) Topical <User Schedule>  gabapentin 300 milliGRAM(s) Oral at bedtime  insulin lispro (ADMELOG) corrective regimen sliding scale   SubCutaneous three times a day before meals  insulin lispro (ADMELOG) corrective regimen sliding scale   SubCutaneous at bedtime  nicotine - 21 mG/24Hr(s) Patch 1 patch Transdermal daily  sodium bicarbonate 1300 milliGRAM(s) Oral three times a day  sodium zirconium cyclosilicate 10 Gram(s) Oral every 8 hours  tamsulosin 0.4 milliGRAM(s) Oral at bedtime    PRN Inpatient Medications  acetaminophen  IVPB .. 1000 milliGRAM(s) IV Intermittent every 6 hours PRN  albuterol/ipratropium for Nebulization 3 milliLiter(s) Nebulizer every 6 hours PRN  artificial  tears Solution 1 Drop(s) Both EYES three times a day PRN      REVIEW OF SYSTEMS  --------------------------------------------------------------------------------  Gen: No fevers/chills  Skin: No rashes  Head/Eyes/Ears: Normal hearing, no difficulty seeing  Respiratory: No dyspnea, cough  CV: No chest pain  GI: No abdominal pain, diarrhea  : No dysuria, hematuria  MSK: No  edema      >>> <<<    VITALS/PHYSICAL EXAM  --------------------------------------------------------------------------------  T(C): 36.2 (03-03-21 @ 08:00), Max: 37.1 (03-02-21 @ 16:00)  HR: 54 (03-03-21 @ 10:00) (53 - 80)  BP: 117/56 (03-03-21 @ 10:00) (100/65 - 149/71)  RR: 22 (03-03-21 @ 10:00) (10 - 28)  SpO2: 96% (03-03-21 @ 10:00) (88% - 97%)  Wt(kg): --  Height (cm): 162.6 (03-01-21 @ 20:11)  Weight (kg): 68 (03-01-21 @ 22:00)  BMI (kg/m2): 25.7 (03-01-21 @ 22:00)  BSA (m2): 1.73 (03-01-21 @ 22:00)      03-02-21 @ 07:01  -  03-03-21 @ 07:00  --------------------------------------------------------  IN: 950 mL / OUT: 725 mL / NET: 225 mL    03-03-21 @ 07:01  -  03-03-21 @ 10:56  --------------------------------------------------------  IN: 100 mL / OUT: 0 mL / NET: 100 mL      Physical Exam:    	Gen: NAD  	HEENT: Anicteric  	Pulm: CTA B/L  	CV: S1S2  	Abd: Soft, +BS   	MSK: No LE edema B/L  	Neuro: Awake  	Skin: Warm and dry  	Vascular access: none      LABS/STUDIES  --------------------------------------------------------------------------------              7.4    5.41  >-----------<  116      [03-03-21 @ 00:31]              23.3     139  |  101  |  44  ----------------------------<  177      [03-03-21 @ 00:31]  5.0   |  27  |  4.45        Ca     8.0     [03-03-21 @ 00:31]      Mg     1.9     [03-03-21 @ 00:31]      Phos  4.3     [03-03-21 @ 00:31]    TPro  6.2  /  Alb  3.0  /  TBili  0.3  /  DBili  x   /  AST  20  /  ALT  14  /  AlkPhos  255  [03-01-21 @ 21:02]    PT/INR: PT 12.8 , INR 1.07       [03-03-21 @ 00:31]  PTT: 32.0       [03-03-21 @ 00:31]      Creatinine Trend:  SCr 4.45 [03-03 @ 00:31]  SCr 3.56 [03-02 @ 15:32]  SCr 3.25 [03-02 @ 10:16]  SCr 3.15 [03-02 @ 09:15]  SCr 2.88 [03-02 @ 05:18]    Urinalysis - [03-01-21 @ 21:12]      Color Yellow / Appearance Clear / SG 1.024 / pH 7.5      Gluc Negative / Ketone Negative  / Bili Negative / Urobili Negative       Blood Negative / Protein 300 mg/dL / Leuk Est Negative / Nitrite Negative      RBC 0 / WBC 0 / Hyaline 1 / Gran  / Sq Epi  / Non Sq Epi 1 / Bacteria Negative      Iron 40, TIBC 162, %sat 25      [07-16-20 @ 09:30]  Ferritin 334      [07-16-20 @ 09:29]  HbA1c 11.4      [03-04-18 @ 11:53]  TSH 3.57      [07-16-20 @ 09:29]  Lipid: chol 122, TG 47, HDL 39, LDL 73      [07-16-20 @ 09:30]    HBsAb 113.1      [03-02-21 @ 14:13]  HBsAg Nonreact      [03-02-21 @ 14:13]  HBcAb Reactive      [03-02-21 @ 14:13]  HCV 0.16, Nonreact      [03-02-21 @ 14:13]

## 2021-03-03 NOTE — PHYSICAL THERAPY INITIAL EVALUATION ADULT - PERTINENT HX OF CURRENT PROBLEM, REHAB EVAL
Pt is a 70M admitted to [facility] on 3/1/21 hx of DM, CAD, CABG, COPD, ESRD (MWF), dementia, frequent falls who presented to [facility] after a fall a few days ago, per report. He missed dialysis today. + found to be anemic to 7.4 from hx of 10, hyperkalemic to >8, and to have a splenic laceration.

## 2021-03-03 NOTE — PHYSICAL THERAPY INITIAL EVALUATION ADULT - PRECAUTIONS/LIMITATIONS, REHAB EVAL
He is not a good historian about the details of the event. He currently complains of LUQ pain and eye pain, but he is able to see without issue. Hospital course: SICU consulted for hemodynamic monitoring and emergent hemodialysis. +hemorrhage watch and coordination of dialysis due to severe hyperkalemia, HD scheduled for today but patient will need new access as he self-discontinued right Permacath so will consult IR vs. román Wesley at bedside, H&H 3/3/21 7.4/23.3 He is not a good historian about the details of the event. He currently complains of LUQ pain and eye pain, but he is able to see without issue. Hospital course: SICU consulted for hemodynamic monitoring and emergent hemodialysis. +hemorrhage watch and coordination of dialysis due to severe hyperkalemia, HD scheduled for today but patient will need new access as he self-discontinued right Permacath so will consult IR vs. place Maryjane at bedside, H&H 3/3/21 7.4/23.3/fall precautions

## 2021-03-03 NOTE — PHYSICAL THERAPY INITIAL EVALUATION ADULT - PLANNED THERAPY INTERVENTIONS, PT EVAL
stair neg GOAL: pt will neg 9 steps supervision 1HR in 2wks./bed mobility training/gait training/transfer training

## 2021-03-03 NOTE — OCCUPATIONAL THERAPY INITIAL EVALUATION ADULT - RUE MMT, REHAB EVAL
Problem: Falls - Risk of  Goal: Absence of falls  Outcome: Ongoing  No falls this shift. Patient is able to use call light appropriately. Hourly rounding. Bed alarm used. Problem: Risk for Impaired Skin Integrity  Goal: Tissue integrity - skin and mucous membranes  Structural intactness and normal physiological function of skin and  mucous membranes. Outcome: Ongoing  No new skin integrity issues this shift. Patient has blanchable redness to bilateral ears, bilateral heels, coccyx/buttocks. Has stage II pressure ulcer to left elbow. Patient is unable to turn and reposition in bed, so he needs assistance. Problem: Cardiovascular  Goal: No DVT, peripheral vascular complications  Outcome: Ongoing  No signs of DVT at this time. Patient does have bilateral leg swelling. Dopplers of legs pending. Patient on coumadin at home. Goal: Hemodynamic stability  Outcome: Not Met This Shift  Patient has worsening on chest xray. He is very SOB and has need his oxygen increased to 6 liters. Goal: Anticoagulate/Hct stable  Outcome: Ongoing  Stable at this time. Continue to monitor with bloodwork. Goal: Agreement to quit smoking  Outcome: Completed Date Met: 11/19/17    Goal: Weight maintained or lost  Outcome: Ongoing  Patient receiving IV lasix. Will see if he has lost weight tomorrow. Goal: Understanding of dietary restrictions  Outcome: Ongoing  Patient on low sodium diet. He understands restrictions. Problem: Respiratory  Goal: No pulmonary complications  Outcome: Ongoing  Patient very SOB. Has dyspnea at rest and with exertion. Has to increase patient's oxygen to 6 liters per nasal cannula. Goal: O2 Sat > 90%  Outcome: Not Met This Shift  Patient's oxygen saturation dropped to 79% on 3 liters. Had to increase to 6 liters. Repeat chest xray worsened. Patient started on IV lasix. Goal: Supplemental O2 requirements decreased  Outcome: Not Met This Shift  Had to increase oxygen to 6 liters per nasal cannula. 3+/5

## 2021-03-04 ENCOUNTER — TRANSCRIPTION ENCOUNTER (OUTPATIENT)
Age: 71
End: 2021-03-04

## 2021-03-04 LAB
AMMONIA BLD-MCNC: 27 UMOL/L — SIGNIFICANT CHANGE UP (ref 11–55)
ANION GAP SERPL CALC-SCNC: 12 MMOL/L — SIGNIFICANT CHANGE UP (ref 5–17)
APTT BLD: 34.3 SEC — SIGNIFICANT CHANGE UP (ref 27.5–35.5)
BLD GP AB SCN SERPL QL: NEGATIVE — SIGNIFICANT CHANGE UP
BUN SERPL-MCNC: 53 MG/DL — HIGH (ref 7–23)
CALCIUM SERPL-MCNC: 8.1 MG/DL — LOW (ref 8.4–10.5)
CHLORIDE SERPL-SCNC: 100 MMOL/L — SIGNIFICANT CHANGE UP (ref 96–108)
CO2 SERPL-SCNC: 26 MMOL/L — SIGNIFICANT CHANGE UP (ref 22–31)
CREAT SERPL-MCNC: 5.07 MG/DL — HIGH (ref 0.5–1.3)
GLUCOSE BLDC GLUCOMTR-MCNC: 104 MG/DL — HIGH (ref 70–99)
GLUCOSE BLDC GLUCOMTR-MCNC: 89 MG/DL — SIGNIFICANT CHANGE UP (ref 70–99)
GLUCOSE SERPL-MCNC: 93 MG/DL — SIGNIFICANT CHANGE UP (ref 70–99)
HCT VFR BLD CALC: 22.3 % — LOW (ref 39–50)
HGB BLD-MCNC: 7.1 G/DL — LOW (ref 13–17)
INR BLD: 1.04 RATIO — SIGNIFICANT CHANGE UP (ref 0.88–1.16)
MAGNESIUM SERPL-MCNC: 2.3 MG/DL — SIGNIFICANT CHANGE UP (ref 1.6–2.6)
MCHC RBC-ENTMCNC: 30.7 PG — SIGNIFICANT CHANGE UP (ref 27–34)
MCHC RBC-ENTMCNC: 31.8 GM/DL — LOW (ref 32–36)
MCV RBC AUTO: 96.5 FL — SIGNIFICANT CHANGE UP (ref 80–100)
NRBC # BLD: 0 /100 WBCS — SIGNIFICANT CHANGE UP (ref 0–0)
PHOSPHATE SERPL-MCNC: 4.6 MG/DL — HIGH (ref 2.5–4.5)
PLATELET # BLD AUTO: 127 K/UL — LOW (ref 150–400)
POTASSIUM SERPL-MCNC: 4.3 MMOL/L — SIGNIFICANT CHANGE UP (ref 3.5–5.3)
POTASSIUM SERPL-SCNC: 4.3 MMOL/L — SIGNIFICANT CHANGE UP (ref 3.5–5.3)
PROTHROM AB SERPL-ACNC: 12.2 SEC — SIGNIFICANT CHANGE UP (ref 10.6–13.6)
RBC # BLD: 2.31 M/UL — LOW (ref 4.2–5.8)
RBC # FLD: 16.4 % — HIGH (ref 10.3–14.5)
RH IG SCN BLD-IMP: POSITIVE — SIGNIFICANT CHANGE UP
SARS-COV-2 RNA SPEC QL NAA+PROBE: SIGNIFICANT CHANGE UP
SODIUM SERPL-SCNC: 138 MMOL/L — SIGNIFICANT CHANGE UP (ref 135–145)
WBC # BLD: 4.43 K/UL — SIGNIFICANT CHANGE UP (ref 3.8–10.5)
WBC # FLD AUTO: 4.43 K/UL — SIGNIFICANT CHANGE UP (ref 3.8–10.5)

## 2021-03-04 PROCEDURE — 77001 FLUOROGUIDE FOR VEIN DEVICE: CPT | Mod: 26

## 2021-03-04 PROCEDURE — 99233 SBSQ HOSP IP/OBS HIGH 50: CPT

## 2021-03-04 PROCEDURE — 36580 REPLACE CVAD CATH: CPT

## 2021-03-04 RX ADMIN — Medication 81 MILLIGRAM(S): at 12:36

## 2021-03-04 RX ADMIN — CARVEDILOL PHOSPHATE 6.25 MILLIGRAM(S): 80 CAPSULE, EXTENDED RELEASE ORAL at 06:48

## 2021-03-04 RX ADMIN — SODIUM ZIRCONIUM CYCLOSILICATE 10 GRAM(S): 10 POWDER, FOR SUSPENSION ORAL at 06:48

## 2021-03-04 RX ADMIN — Medication 1000 MILLIGRAM(S): at 07:15

## 2021-03-04 RX ADMIN — Medication 1 PATCH: at 12:00

## 2021-03-04 RX ADMIN — Medication 1 PATCH: at 19:58

## 2021-03-04 RX ADMIN — Medication 1 PATCH: at 12:36

## 2021-03-04 RX ADMIN — Medication 1 PATCH: at 07:09

## 2021-03-04 RX ADMIN — Medication 400 MILLIGRAM(S): at 06:44

## 2021-03-04 NOTE — DISCHARGE NOTE PROVIDER - NSDCFUADDAPPT_GEN_ALL_CORE_FT
You are leaving AMA despite providing the health and medication information and the risk of refusing to stay in the hospital for close BP monitoring. You are leaving the hospital with out completing the diagnostic evaluation and medical treatment. You are leaving the hospital without completing the diagnostic evaluation and medical treatment.  F/u with PMD in 24hrs.Patient is A&O x3,verbalized understanding ,insisting to leav, understand the risk.

## 2021-03-04 NOTE — DISCHARGE NOTE PROVIDER - HOSPITAL COURSE
70M with h/o CAD s/p CABG, HFpEF, HTN, active smoker, DMT2, ESRD on HD (MWF),  GERD, BPH, osteoporosis, and dementia who presented to OSH after a fall 3 days ago with hyperkalemia due to a missed hemodialysis session. Pt was also found to have acute blood loss anemia secondary to a splenic laceration w/ hemoperitoneum,. He was transferred to [facility] for further management.   admit to ICU for hemorrhage watch and coordination of dialysis due to severe hyperkalemia.      Dx: 	Acute blood loss anemia 2/2 splenic laceration w/ hemoperitoneum, s/p 2u PRBC during the hospital course.  No active bleeding .  	Hyperkalemia (7.7) 2/2 missed HD session, requiring urgent HD, s/p SICU  HD was initiated 6 months ago; MWF schedule  Agitation likely 2/2 ICU delirium, pulled out permacath on 3/1  Chronic combined systolic and diastolic congestive heart failure.    TTE from 7/2020 with EF 40-45% ;    -Mild to moderate segmental left ventricular systolic dysfunction , hypokinetic mid to distal septum and inferior walls and stage II diastolic dysfunction  - home meds include Coreg/Entresto/Enalapril/Lasix   - all held on admission in the setting of acute blood loss anemia  -  Coreg restarted at lower dose 6.25mg po q 12  - c/t hold Enalapril.could restart as BP tolerates in the community after seeing the PCP  Will resume  Entresto, Lasix for now ;      Tract recanalization and HD catheter reinsertion by IR on 3/4/201 Tip in SVC , had HD post permacath placement.  Patient is alert and oriented x3. wanting to go home.  But not Medically cleared for discharge, patient is leaving against medical advise. aware, medication reviewed with attending. 70M with h/o CAD s/p CABG, HFpEF, HTN, active smoker, DMT2, ESRD on HD (MWF),  GERD, BPH, osteoporosis, and dementia who presented to OSH after a fall 3 days ago with hyperkalemia due to a missed hemodialysis session. Pt was also found to have acute blood loss anemia secondary to a splenic laceration w/ hemoperitoneum,. He was transferred to [facility] for further management.   he was admitted to ICU for hemorrhage watch and coordination of dialysis due to severe hyperkalemia 2/2 missed HD session, HD was initiated 6 months ago; MWF schedule  Patient was also noted to ahve Agitation likely 2/2 ICU delirium, pulled out permacath on 3/1, HD cath was replaced on 3/4 subsequently had  HD post permacath placement.  he also has h/o Chronic combined systolic and diastolic congestive heart failure. currently euvolemic,  home meds include Coreg/Entresto/Enalapril/Lasix, all were held on admission in the setting of acute blood loss anemia, Coreg restarted and tolerated well by patient. He was restarted on Entresto, and Lasix TID today 3/5, will need to monitor BP ~24hrs, d/w patient at length, he is refusing to stay in the hospital anymore, he understands and verbalizes the risks of leaving AMA is alert and oriented x3, c/t hold Enalapril could restart as BP tolerates in the community after seeing the PCP  Patient is not Medically cleared for discharge, patient is leaving against medical advise, Patient advised to f/u with cardiology/PCP with in 1 week

## 2021-03-04 NOTE — PRE PROCEDURE NOTE - HISTORY OF PRESENT ILLNESS
Interventional Radiology  Pre-Procedure Note    This is a 70y  Male  with dislodged HD catheter for reinsertion    HPI:  70M hx of DM, CAD, CABG, COPD, ESRD (MWF), dementia, frequent falls who presented to [facility] after a fall a few days ago, per report. He missed dialysis today. He was found to be anemic to 7.4 from hx of 10, hyperkalemic to >8, and to have a splenic laceration. He is not a good historian about the details of the event. He currently complains of LUQ pain and eye pain, but he is able to see without issue. He says he normally walks without aid.    A- intact speaking full sentences  B - equal bilaterally sat in high 90s  C - IV access present, BP 130s systolic  D - GCS 14 only for some confusion, no other obvious deformities (01 Mar 2021 21:00)      PAST MEDICAL & SURGICAL HISTORY:  CHF (congestive heart failure)    Hyperlipemia    DM (diabetes mellitus)    HTN (hypertension)    History of open heart surgery  bypass        Social History:     FAMILY HISTORY:  No pertinent family history in first degree relatives        Allergies: No Known Allergies      Current Medications: acetaminophen  IVPB .. 1000 milliGRAM(s) IV Intermittent every 6 hours PRN  albuterol/ipratropium for Nebulization 3 milliLiter(s) Nebulizer every 6 hours PRN  artificial  tears Solution 1 Drop(s) Both EYES three times a day PRN  aspirin  chewable 81 milliGRAM(s) Oral daily  atorvastatin 80 milliGRAM(s) Oral at bedtime  carvedilol 6.25 milliGRAM(s) Oral every 12 hours  chlorhexidine 2% Cloths 1 Application(s) Topical <User Schedule>  dextrose 5%. 1000 milliLiter(s) IV Continuous <Continuous>  epoetin torres-epbx (RETACRIT) Injectable 6000 Unit(s) IV Push <User Schedule>  gabapentin 300 milliGRAM(s) Oral at bedtime  insulin lispro (ADMELOG) corrective regimen sliding scale   SubCutaneous three times a day before meals  insulin lispro (ADMELOG) corrective regimen sliding scale   SubCutaneous at bedtime  nicotine - 21 mG/24Hr(s) Patch 1 patch Transdermal daily  tamsulosin 0.4 milliGRAM(s) Oral at bedtime      Labs:                         7.1    4.43  )-----------( 127      ( 04 Mar 2021 07:56 )             22.3       03-04    138  |  100  |  53<H>  ----------------------------<  93  4.3   |  26  |  5.07<H>    Ca    8.1<L>      04 Mar 2021 07:56  Phos  4.6     03-04  Mg     2.3     03-04    TPro  6.3  /  Alb  3.1<L>  /  TBili  0.2  /  DBili  0.1  /  AST  22  /  ALT  13  /  AlkPhos  273<H>  03-03      Assessment/Plan:   This is a 70y Male  presents with dislodged HD catheter  Patient presents to IR for reinsertion.  Procedure/ risks/ benefits/ goals/ alternatives were explained. Initially told by service the patient was not consentable, but patient is alert and oriented in IR. All questions answered. Informed content obtained from patient. Consent placed in chart.

## 2021-03-04 NOTE — PROGRESS NOTE ADULT - SUBJECTIVE AND OBJECTIVE BOX
[facility] DIVISION OF KIDNEY DISEASES AND HYPERTENSION -- FOLLOW UP NOTE  --------------------------------------------------------------------------------  Chief Complaint:/subjective: no complaints except hes feeling hungry;     24 hour events:awaiting permcath placement        PAST HISTORY  --------------------------------------------------------------------------------  No significant changes to PMH, PSH, FHx, SHx, unless otherwise noted    ALLERGIES & MEDICATIONS  --------------------------------------------------------------------------------  Allergies    No Known Allergies    Intolerances      Standing Inpatient Medications  aspirin  chewable 81 milliGRAM(s) Oral daily  atorvastatin 80 milliGRAM(s) Oral at bedtime  carvedilol 6.25 milliGRAM(s) Oral every 12 hours  chlorhexidine 2% Cloths 1 Application(s) Topical <User Schedule>  epoetin torres-epbx (RETACRIT) Injectable 6000 Unit(s) IV Push <User Schedule>  gabapentin 300 milliGRAM(s) Oral at bedtime  insulin lispro (ADMELOG) corrective regimen sliding scale   SubCutaneous three times a day before meals  insulin lispro (ADMELOG) corrective regimen sliding scale   SubCutaneous at bedtime  nicotine - 21 mG/24Hr(s) Patch 1 patch Transdermal daily  tamsulosin 0.4 milliGRAM(s) Oral at bedtime    PRN Inpatient Medications  acetaminophen  IVPB .. 1000 milliGRAM(s) IV Intermittent every 6 hours PRN  albuterol/ipratropium for Nebulization 3 milliLiter(s) Nebulizer every 6 hours PRN  artificial  tears Solution 1 Drop(s) Both EYES three times a day PRN      REVIEW OF SYSTEMS  --------------------------------------------------------------------------------  Gen: No weight changes, fatigue, fevers/chills, weakness  Skin: No rashes  Head/Eyes/Ears/Mouth: No headache;   Respiratory: No dyspnea, cough  CV: No chest pain, PND, orthopnea  GI: No abdominal pain, diarrhea, constipation, nausea, vomiting  : No increased frequency, dysuria, hematuria, nocturia  MSK: No joint pain/swelling; no back pain; no edema  Neuro: No dizziness/lightheadedness, weakness  Heme: No easy bruising or bleeding  Psych: No significant nervousness, anxiety, stress, depression    All other systems were reviewed and are negative, except as noted.    VITALS/PHYSICAL EXAM  --------------------------------------------------------------------------------  T(C): 36.6 (03-04-21 @ 08:15), Max: 36.7 (03-03-21 @ 20:49)  HR: 55 (03-04-21 @ 08:15) (55 - 69)  BP: 123/50 (03-04-21 @ 08:15) (120/63 - 154/77)  RR: 18 (03-04-21 @ 08:15) (18 - 18)  SpO2: 94% (03-04-21 @ 08:15) (90% - 98%)  Wt(kg): --  Adult Advanced Hemodynamics Last 24 Hrs  ABP: --  ABP(mean): --  CVP(mm Hg): --  CO: --  CI: --  PA: --  PA(mean): --  PCWP: --  SVR: --  SVRI: --        03-03-21 @ 07:01  -  03-04-21 @ 07:00  --------------------------------------------------------  IN: 490 mL / OUT: 1500 mL / NET: -1010 mL    03-04-21 @ 07:01  -  03-04-21 @ 13:16  --------------------------------------------------------  IN: 0 mL / OUT: 250 mL / NET: -250 mL      Physical Exam:  	Gen: NAD,   	HEENT:  no jvp  	Pulm: CTA B/L  	CV: RRR, S1S2; no rub  	Back:   no sacral edema  	Abd: +BS, soft,    	: No suprapubic tenderness  	Ext: no edema  	Neuro: awake  	Psych: alert  	Skin: Warm,    	Vascular access: no access    LABS/STUDIES  --------------------------------------------------------------------------------              7.1    4.43  >-----------<  127      [03-04-21 @ 07:56]              22.3     Hemoglobin: 7.1 g/dL (03-04-21 @ 07:56)  Hemoglobin: 7.4 g/dL (03-03-21 @ 00:31)    Platelet Count - Automated: 127 K/uL (03-04-21 @ 07:56)  Platelet Count - Automated: 116 K/uL (03-03-21 @ 00:31)    138  |  100  |  53  ----------------------------<  93      [03-04-21 @ 07:56]  4.3   |  26  |  5.07        Ca     8.1     [03-04-21 @ 07:56]      Mg     2.3     [03-04-21 @ 07:56]      Phos  4.6     [03-04-21 @ 07:56]    TPro  6.3  /  Alb  3.1  /  TBili  0.2  /  DBili  0.1  /  AST  22  /  ALT  13  /  AlkPhos  273  [03-03-21 @ 00:31]    PT/INR: PT 12.8 , INR 1.07       [03-03-21 @ 00:31]  PTT: 32.0       [03-03-21 @ 00:31]      Creatinine Trend:  SCr 5.07 [03-04 @ 07:56]  SCr 4.45 [03-03 @ 00:31]  SCr 3.56 [03-02 @ 15:32]  SCr 3.25 [03-02 @ 10:16]  SCr 3.15 [03-02 @ 09:15]    Urinalysis - [03-01-21 @ 21:12]      Color Yellow / Appearance Clear / SG 1.024 / pH 7.5      Gluc Negative / Ketone Negative  / Bili Negative / Urobili Negative       Blood Negative / Protein 300 mg/dL / Leuk Est Negative / Nitrite Negative      RBC 0 / WBC 0 / Hyaline 1 / Gran  / Sq Epi  / Non Sq Epi 1 / Bacteria Negative      Iron 40, TIBC 162, %sat 25      [07-16-20 @ 09:30]  Ferritin 334      [07-16-20 @ 09:29]  HbA1c 11.4      [03-04-18 @ 11:53]  TSH 3.57      [07-16-20 @ 09:29]  Lipid: chol 122, TG 47, HDL 39, LDL 73      [07-16-20 @ 09:30]    HBsAb 113.1      [03-02-21 @ 14:13]  HBsAg Nonreact      [03-02-21 @ 14:13]  HBcAb Reactive      [03-02-21 @ 14:13]  HCV 0.16, Nonreact      [03-02-21 @ 14:13]

## 2021-03-04 NOTE — PROGRESS NOTE ADULT - SUBJECTIVE AND OBJECTIVE BOX
Patient is a 70y old  Male who presents with a chief complaint of hyperkalemia, acute blood loss anemia (03 Mar 2021 11:16)      SUBJECTIVE / OVERNIGHT EVENTS:    MEDICATIONS  (STANDING):  aspirin  chewable 81 milliGRAM(s) Oral daily  atorvastatin 80 milliGRAM(s) Oral at bedtime  carvedilol 6.25 milliGRAM(s) Oral every 12 hours  chlorhexidine 2% Cloths 1 Application(s) Topical <User Schedule>  epoetin torres-epbx (RETACRIT) Injectable 6000 Unit(s) IV Push <User Schedule>  gabapentin 300 milliGRAM(s) Oral at bedtime  insulin lispro (ADMELOG) corrective regimen sliding scale   SubCutaneous three times a day before meals  insulin lispro (ADMELOG) corrective regimen sliding scale   SubCutaneous at bedtime  nicotine - 21 mG/24Hr(s) Patch 1 patch Transdermal daily  tamsulosin 0.4 milliGRAM(s) Oral at bedtime    MEDICATIONS  (PRN):  acetaminophen  IVPB .. 1000 milliGRAM(s) IV Intermittent every 6 hours PRN Mild Pain (1 - 3)  albuterol/ipratropium for Nebulization 3 milliLiter(s) Nebulizer every 6 hours PRN Shortness of Breath and/or Wheezing  artificial  tears Solution 1 Drop(s) Both EYES three times a day PRN Dry Eyes      Vital Signs Last 24 Hrs  T(C): 36.6 (04 Mar 2021 08:15), Max: 36.7 (03 Mar 2021 20:49)  T(F): 97.9 (04 Mar 2021 08:15), Max: 98.1 (03 Mar 2021 20:49)  HR: 55 (04 Mar 2021 08:15) (55 - 69)  BP: 123/50 (04 Mar 2021 08:15) (120/63 - 159/69)  BP(mean): 88 (03 Mar 2021 13:00) (88 - 99)  RR: 18 (04 Mar 2021 08:15) (18 - 23)  SpO2: 94% (04 Mar 2021 08:15) (90% - 98%)  CAPILLARY BLOOD GLUCOSE      POCT Blood Glucose.: 104 mg/dL (04 Mar 2021 08:11)  POCT Blood Glucose.: 157 mg/dL (03 Mar 2021 21:25)  POCT Blood Glucose.: 176 mg/dL (03 Mar 2021 17:22)  POCT Blood Glucose.: 129 mg/dL (03 Mar 2021 11:47)    I&O's Summary    03 Mar 2021 07:01  -  04 Mar 2021 07:00  --------------------------------------------------------  IN: 490 mL / OUT: 1500 mL / NET: -1010 mL    04 Mar 2021 07:01  -  04 Mar 2021 11:16  --------------------------------------------------------  IN: 0 mL / OUT: 250 mL / NET: -250 mL        PHYSICAL EXAM:  GENERAL: NAD, well-developed  HEAD:  Atraumatic, Normocephalic  EYES: EOMI, PERRLA, conjunctiva and sclera clear  NECK: Supple, No JVD  CHEST/LUNG: Clear to auscultation bilaterally; No wheeze  HEART: Regular rate and rhythm; No murmurs, rubs, or gallops  ABDOMEN: Soft, Nontender, Nondistended; Bowel sounds present  EXTREMITIES:  2+ Peripheral Pulses, No clubbing, cyanosis, or edema  PSYCH: AAOx3  NEUROLOGY: non-focal  SKIN: No rashes or lesions    LABS:                        7.1    4.43  )-----------( 127      ( 04 Mar 2021 07:56 )             22.3     03-04    138  |  100  |  53<H>  ----------------------------<  93  4.3   |  26  |  5.07<H>    Ca    8.1<L>      04 Mar 2021 07:56  Phos  4.6     03-04  Mg     2.3     03-04    TPro  6.3  /  Alb  3.1<L>  /  TBili  0.2  /  DBili  0.1  /  AST  22  /  ALT  13  /  AlkPhos  273<H>  03-03    PT/INR - ( 03 Mar 2021 00:31 )   PT: 12.8 sec;   INR: 1.07 ratio         PTT - ( 03 Mar 2021 00:31 )  PTT:32.0 sec          RADIOLOGY & ADDITIONAL TESTS:    Imaging Personally Reviewed:    Consultant(s) Notes Reviewed:      Care Discussed with Consultants/Other Providers:   Patient is a 70y old  Male who presents with a chief complaint of hyperkalemia, acute blood loss anemia (03 Mar 2021 11:16)      SUBJECTIVE / OVERNIGHT EVENTS:  Pt seen and examined. No acute events overnight. He denies cp, sob, abd pain. Pt is  for replacement of tunneled HD catheter per IR.       MEDICATIONS  (STANDING):  aspirin  chewable 81 milliGRAM(s) Oral daily  atorvastatin 80 milliGRAM(s) Oral at bedtime  carvedilol 6.25 milliGRAM(s) Oral every 12 hours  chlorhexidine 2% Cloths 1 Application(s) Topical <User Schedule>  epoetin torres-epbx (RETACRIT) Injectable 6000 Unit(s) IV Push <User Schedule>  gabapentin 300 milliGRAM(s) Oral at bedtime  insulin lispro (ADMELOG) corrective regimen sliding scale   SubCutaneous three times a day before meals  insulin lispro (ADMELOG) corrective regimen sliding scale   SubCutaneous at bedtime  nicotine - 21 mG/24Hr(s) Patch 1 patch Transdermal daily  tamsulosin 0.4 milliGRAM(s) Oral at bedtime    MEDICATIONS  (PRN):  acetaminophen  IVPB .. 1000 milliGRAM(s) IV Intermittent every 6 hours PRN Mild Pain (1 - 3)  albuterol/ipratropium for Nebulization 3 milliLiter(s) Nebulizer every 6 hours PRN Shortness of Breath and/or Wheezing  artificial  tears Solution 1 Drop(s) Both EYES three times a day PRN Dry Eyes      Vital Signs Last 24 Hrs  T(C): 36.6 (04 Mar 2021 08:15), Max: 36.7 (03 Mar 2021 20:49)  T(F): 97.9 (04 Mar 2021 08:15), Max: 98.1 (03 Mar 2021 20:49)  HR: 55 (04 Mar 2021 08:15) (55 - 69)  BP: 123/50 (04 Mar 2021 08:15) (120/63 - 159/69)  BP(mean): 88 (03 Mar 2021 13:00) (88 - 99)  RR: 18 (04 Mar 2021 08:15) (18 - 23)  SpO2: 94% (04 Mar 2021 08:15) (90% - 98%)  CAPILLARY BLOOD GLUCOSE      POCT Blood Glucose.: 104 mg/dL (04 Mar 2021 08:11)  POCT Blood Glucose.: 157 mg/dL (03 Mar 2021 21:25)  POCT Blood Glucose.: 176 mg/dL (03 Mar 2021 17:22)  POCT Blood Glucose.: 129 mg/dL (03 Mar 2021 11:47)    I&O's Summary    03 Mar 2021 07:01  -  04 Mar 2021 07:00  --------------------------------------------------------  IN: 490 mL / OUT: 1500 mL / NET: -1010 mL    04 Mar 2021 07:01  -  04 Mar 2021 11:16  --------------------------------------------------------  IN: 0 mL / OUT: 250 mL / NET: -250 mL        PHYSICAL EXAM:  GENERAL: NAD, anicteric, afebrile  HEAD:  Atraumatic, Normocephalic  EYES: EOMI, PERRLA, conjunctiva and sclera clear  ENMT: Moist mucous membranes  NECK: Supple, No JVD ;  dressing at previous catheter site cdi   RESPIRATORY: Clear to auscultation bilaterally; No rales, rhonchi, wheezing, or rubs  CARDIOVASCULAR: Regular rate and rhythm; No murmurs, rubs, or gallops  GASTROINTESTINAL: Soft, Nontender, Nondistended; Bowel sounds present  EXTREMITIES:  2+ Peripheral Pulses, No clubbing, cyanosis, or edema  NERVOUS SYSTEM:  AAOX 3, ; Moving all 4 extremities; No gross sensory deficits  SKIN: No rashes or lesions    LABS:                        7.1    4.43  )-----------( 127      ( 04 Mar 2021 07:56 )             22.3     03-04    138  |  100  |  53<H>  ----------------------------<  93  4.3   |  26  |  5.07<H>    Ca    8.1<L>      04 Mar 2021 07:56  Phos  4.6     03-04  Mg     2.3     03-04    TPro  6.3  /  Alb  3.1<L>  /  TBili  0.2  /  DBili  0.1  /  AST  22  /  ALT  13  /  AlkPhos  273<H>  03-03    PT/INR - ( 03 Mar 2021 00:31 )   PT: 12.8 sec;   INR: 1.07 ratio         PTT - ( 03 Mar 2021 00:31 )  PTT:32.0 sec

## 2021-03-04 NOTE — PROGRESS NOTE ADULT - SUBJECTIVE AND OBJECTIVE BOX
ACS Surgery Daily Progress Note  =====================================================    SUBJECTIVE: Patient seen and examined at bedside on AM rounds. Patient reports that they're feeling well. Tolerating diet, denies nausea, vomiting. OOB/Ambulating as tolerated. Denies fever, chills.     24 HOUR EVENTS: Patient self removed permacath, CBC stable, K+ stable    MEDICATIONS  (STANDING):  aspirin  chewable 81 milliGRAM(s) Oral daily  atorvastatin 80 milliGRAM(s) Oral at bedtime  carvedilol 6.25 milliGRAM(s) Oral every 12 hours  chlorhexidine 2% Cloths 1 Application(s) Topical <User Schedule>  epoetin torres-epbx (RETACRIT) Injectable 6000 Unit(s) IV Push <User Schedule>  gabapentin 300 milliGRAM(s) Oral at bedtime  insulin lispro (ADMELOG) corrective regimen sliding scale   SubCutaneous three times a day before meals  insulin lispro (ADMELOG) corrective regimen sliding scale   SubCutaneous at bedtime  nicotine - 21 mG/24Hr(s) Patch 1 patch Transdermal daily  sodium bicarbonate 1300 milliGRAM(s) Oral three times a day  sodium zirconium cyclosilicate 10 Gram(s) Oral every 8 hours  tamsulosin 0.4 milliGRAM(s) Oral at bedtime    MEDICATIONS  (PRN):  acetaminophen  IVPB .. 1000 milliGRAM(s) IV Intermittent every 6 hours PRN Mild Pain (1 - 3)  albuterol/ipratropium for Nebulization 3 milliLiter(s) Nebulizer every 6 hours PRN Shortness of Breath and/or Wheezing  artificial  tears Solution 1 Drop(s) Both EYES three times a day PRN Dry Eyes      OBJECTIVE:    Vital Signs Last 24 Hrs  T(C): 36.7 (03 Mar 2021 23:54), Max: 36.9 (03 Mar 2021 03:00)  T(F): 98 (03 Mar 2021 23:54), Max: 98.4 (03 Mar 2021 03:00)  HR: 69 (03 Mar 2021 23:54) (53 - 75)  BP: 143/70 (03 Mar 2021 23:54) (100/65 - 159/69)  BP(mean): 88 (03 Mar 2021 13:00) (77 - 99)  RR: 18 (03 Mar 2021 23:54) (10 - 23)  SpO2: 93% (03 Mar 2021 23:54) (90% - 98%)        I&O's Detail    02 Mar 2021 07:01  -  03 Mar 2021 07:00  --------------------------------------------------------  IN:    dextrose 5% + sodium chloride 0.9%: 150 mL    IV PiggyBack: 50 mL    Oral Fluid: 750 mL  Total IN: 950 mL    OUT:    Indwelling Catheter - Urethral (mL): 75 mL    Voided (mL): 650 mL  Total OUT: 725 mL    Total NET: 225 mL      03 Mar 2021 07:01  -  04 Mar 2021 01:22  --------------------------------------------------------  IN:    IV PiggyBack: 200 mL    Oral Fluid: 240 mL  Total IN: 440 mL    OUT:    Voided (mL): 1100 mL  Total OUT: 1100 mL    Total NET: -660 mL          Daily     Daily     PHYSICAL EXAM:  Physical Exam:  Gen: NAD, sitting upright in bed  Chest: rrr, nonlabored respirations, dressing over RIJ wound after patient pulled his permacath overnight last night  Abd: softly distended, nontender    LABS:                        7.4    5.41  )-----------( 116      ( 03 Mar 2021 00:31 )             23.3     03-03    139  |  101  |  44<H>  ----------------------------<  177<H>  5.0   |  27  |  4.45<H>    Ca    8.0<L>      03 Mar 2021 00:31  Phos  4.3     03-03  Mg     1.9     03-03    TPro  6.3  /  Alb  3.1<L>  /  TBili  0.2  /  DBili  0.1  /  AST  22  /  ALT  13  /  AlkPhos  273<H>  03-03    PT/INR - ( 03 Mar 2021 00:31 )   PT: 12.8 sec;   INR: 1.07 ratio         PTT - ( 03 Mar 2021 00:31 )  PTT:32.0 sec

## 2021-03-04 NOTE — DISCHARGE NOTE PROVIDER - NSDCCPCAREPLAN_GEN_ALL_CORE_FT
PRINCIPAL DISCHARGE DIAGNOSIS  Diagnosis: Splenic laceration, initial encounter  Assessment and Plan of Treatment: acute blood loss anemia secondary to splenic laceration  - s/p 1U prbc 3/1  - Hb 7.1 today ; for 1u prbc today during HD  - c/t  monitor CBC and coags  - no active bleeding         SECONDARY DISCHARGE DIAGNOSES  Diagnosis: Chronic combined systolic and diastolic congestive heart failure  Assessment and Plan of Treatment: - home meds include Coreg/Entresto/Enalapril/Lasix   - all held on admission in the setting of acute blood loss anemia  -  Coreg restarted at lower dose 6.25mg po q 12  - c/t hold Enalapril now   - restart Entresto and Lasix for now .  Needs to follow up with PCP on resuming the Enalapril.    Diagnosis: Essential hypertension  Assessment and Plan of Treatment: HD initiated 6 months ago; [facility] schedule  - presented with hyperkalemia 7.7 after missed HD session.  - pt was agitated and pulled out perma cath 3/1   -  replaced  tunneled HD catheter by IR.   Resummed HD

## 2021-03-04 NOTE — CHART NOTE - NSCHARTNOTEFT_GEN_A_CORE
-------------------------------------------------  Interventional Radiology Event Note  -------------------------------------------------    Procedure: tunneled dialysis catheter placement    Event Description: Called patient's wife Jo Ann to get consent via [employer] translation service,  ID# [id number]. Attempted to explain our goal to place a tunneled hemodialysis catheter via IJ access/chest wall however patient insisted that the catheter be placed in the arm. Attempted to explain that dialysis catheter cannot be placed in the arm however she insisted that they could and should. She then demanded that her  be transferred to another hospital. I attempted to explain numerous times that she would need to discuss transfer/inpatient management with the primary team however she repeatedly interrupted the  and would not let her fully translate what I said until she eventually abruptly disconnected.    Recommendations:  -will defer tunneled dialysis catheter placement due to consenting agent's refusal to give consent  -if patient requires urgent dialysis will potentially need bedside catheter placement with emergency (2 physician) consent  -discussed above with HUNTER Perales from primary team and asked her to call Jo Ann with  on the line  -Please call interventional radiology at [phone] during normal business hours, or [phone] and page [phone] during call hours or weekends with any questions, concerns or issues.    Rome Caldwell MD, RPVI  Chief Resident, Interventional Radiology  [facility]: [phone] (p) [phone]  [facility]: [phone]) [phone]

## 2021-03-04 NOTE — PROGRESS NOTE ADULT - PROBLEM SELECTOR PLAN 1
acute blood loss anemia secondary to splenic laceration  - s/p 1U prbc 3/1  - Hb 7.1 today ; for 1u prbc today during HD  - c/t  monitor CBC and coags acute blood loss anemia secondary to splenic laceration  - s/p 1U prbc 3/1  - Hb 7.1 today ; for 1u prbc today during HD  - c/t  monitor CBC and coags  - no active bleeding   - pt will be transferred to Medicine service today

## 2021-03-04 NOTE — PROGRESS NOTE ADULT - PROBLEM SELECTOR PLAN 4
- pt was noted to be agitated 3/2 ; was likely 2/2 ICU delirium  - pt also has h/o underlying dementia  - he has remained AAO x 3 ; calm , cooperative since then  - would c/t to monitor for now  - fall precautions

## 2021-03-04 NOTE — DISCHARGE NOTE PROVIDER - NSDCHHHOMEBOUND_GEN_ALL_CORE








GLORIA: 





GLORIA: Le Light Le 


 


  Fall risk

## 2021-03-05 ENCOUNTER — TRANSCRIPTION ENCOUNTER (OUTPATIENT)
Age: 71
End: 2021-03-05

## 2021-03-05 VITALS
RESPIRATION RATE: 18 BRPM | TEMPERATURE: 100 F | SYSTOLIC BLOOD PRESSURE: 157 MMHG | HEART RATE: 68 BPM | DIASTOLIC BLOOD PRESSURE: 81 MMHG | OXYGEN SATURATION: 95 %

## 2021-03-05 LAB
GLUCOSE BLDC GLUCOMTR-MCNC: 131 MG/DL — HIGH (ref 70–99)
GLUCOSE BLDC GLUCOMTR-MCNC: 148 MG/DL — HIGH (ref 70–99)

## 2021-03-05 PROCEDURE — 99239 HOSP IP/OBS DSCHRG MGMT >30: CPT

## 2021-03-05 RX ORDER — SACUBITRIL AND VALSARTAN 24; 26 MG/1; MG/1
1 TABLET, FILM COATED ORAL
Refills: 0 | Status: DISCONTINUED | OUTPATIENT
Start: 2021-03-05 | End: 2021-03-05

## 2021-03-05 RX ORDER — ROSUVASTATIN CALCIUM 5 MG/1
1 TABLET ORAL
Qty: 0 | Refills: 0 | DISCHARGE

## 2021-03-05 RX ORDER — FUROSEMIDE 40 MG
1 TABLET ORAL
Qty: 0 | Refills: 0 | DISCHARGE

## 2021-03-05 RX ORDER — TAMSULOSIN HYDROCHLORIDE 0.4 MG/1
1 CAPSULE ORAL
Qty: 0 | Refills: 0 | DISCHARGE
Start: 2021-03-05

## 2021-03-05 RX ORDER — SACUBITRIL AND VALSARTAN 24; 26 MG/1; MG/1
1 TABLET, FILM COATED ORAL
Qty: 0 | Refills: 0 | DISCHARGE

## 2021-03-05 RX ORDER — CARVEDILOL PHOSPHATE 80 MG/1
1 CAPSULE, EXTENDED RELEASE ORAL
Qty: 0 | Refills: 0 | DISCHARGE

## 2021-03-05 RX ORDER — GABAPENTIN 400 MG/1
1 CAPSULE ORAL
Qty: 0 | Refills: 0 | DISCHARGE

## 2021-03-05 RX ORDER — ISOSORBIDE MONONITRATE 60 MG/1
1 TABLET, EXTENDED RELEASE ORAL
Qty: 0 | Refills: 0 | DISCHARGE

## 2021-03-05 RX ORDER — CARVEDILOL PHOSPHATE 80 MG/1
1 CAPSULE, EXTENDED RELEASE ORAL
Qty: 0 | Refills: 0 | DISCHARGE
Start: 2021-03-05

## 2021-03-05 RX ORDER — GABAPENTIN 400 MG/1
1 CAPSULE ORAL
Qty: 0 | Refills: 0 | DISCHARGE
Start: 2021-03-05

## 2021-03-05 RX ORDER — SACUBITRIL AND VALSARTAN 24; 26 MG/1; MG/1
1 TABLET, FILM COATED ORAL
Qty: 0 | Refills: 0 | DISCHARGE
Start: 2021-03-05

## 2021-03-05 RX ORDER — FUROSEMIDE 40 MG
20 TABLET ORAL THREE TIMES A DAY
Refills: 0 | Status: DISCONTINUED | OUTPATIENT
Start: 2021-03-05 | End: 2021-03-05

## 2021-03-05 RX ORDER — FUROSEMIDE 40 MG
1 TABLET ORAL
Qty: 0 | Refills: 0 | DISCHARGE
Start: 2021-03-05

## 2021-03-05 RX ADMIN — CARVEDILOL PHOSPHATE 6.25 MILLIGRAM(S): 80 CAPSULE, EXTENDED RELEASE ORAL at 00:42

## 2021-03-05 RX ADMIN — Medication 1 PATCH: at 07:51

## 2021-03-05 RX ADMIN — CARVEDILOL PHOSPHATE 6.25 MILLIGRAM(S): 80 CAPSULE, EXTENDED RELEASE ORAL at 10:13

## 2021-03-05 RX ADMIN — Medication 400 MILLIGRAM(S): at 00:53

## 2021-03-05 RX ADMIN — Medication 1000 MILLIGRAM(S): at 01:23

## 2021-03-05 NOTE — PROGRESS NOTE ADULT - PROBLEM SELECTOR PLAN 1
acute blood loss anemia secondary to splenic laceration  - s/p 1U prbc 3/1  - Hb 7.1 stable from yesterday pt refused labs this morning   - no active bleeding

## 2021-03-05 NOTE — PROGRESS NOTE ADULT - PROBLEM SELECTOR PROBLEM 1
Anemia, unspecified type
ESRD (end stage renal disease) on dialysis
ESRD (end stage renal disease) on dialysis
Anemia, unspecified type
Anemia, unspecified type

## 2021-03-05 NOTE — PROGRESS NOTE ADULT - ATTENDING COMMENTS
plan hd today after permacath placement
Pt seen and examined  Chart reviewed  Resident note confirmed  Plan of care discussed with Dr. Pearson  Management per SICU team .
Pt seen and examined  Chart reviewed  Resident note confirmed  Plan of care discussed with Dr. Pearson  Management per SICU team.
69 y/o male w/ a PMHx of CAD s/p CABG, HFpEF, HTN, active smoker, DM type II c/b neuropathy, CKD stage V on hemodialysis M/W/F, GERD, BPH, osteoporosis, and dementia who presented after a fall ~3 days prior with hyperkalemia due to a missed hemodialysis session and acute blood loss anemia secondary to a splenic laceration w/ hemoperitoneum    overnight, K stable. self removed the tunnelled cath    aaox3, upset being NPO for HD access  on RA, encourage IS  stable CV, hold antiHTN meds  resume diet given IR not planned   HD last 2/2, repeat K 5.0 stable, no need for emergent HD  repeat lasix IV 80mg to temporize hyperkalemia  EPO  on VTE PPX, heparin SQ  monitor blood sugar, SSI  home meds restarted at lower dose  out of bed, ambulate  to floor
69 y/o male w/ a PMHx of CAD s/p CABG, HFpEF, HTN, active smoker, DM type II c/b neuropathy, CKD stage V on hemodialysis M/W/F, GERD, BPH, osteoporosis, and dementia who presented after a fall ~3 days prior with hyperkalemia due to a missed hemodialysis session and acute blood loss anemia secondary to a splenic laceration w/ hemoperitoneum    overnight, received 1u RBC, HD without volume removal    aaox3, pleasant in chair and eating  on RA, encourage IS  stable CV, hold antiHTN meds  on a diet now  post HD, repeat K 5,3, PM recheck  IV lasix 80mg now  stop IV fluids  repeat CBC improved, no transfusion now  on VTE PPX, heparin  monitor blood sugar, SSI  needs med rec  out of bed, ambulate  remains in SICU
Dr. Cecily Sinclair   Division of Hospital Medicine  [facility]   Pager: [phone]

## 2021-03-05 NOTE — PROGRESS NOTE ADULT - PROBLEM SELECTOR PLAN 5
TTE from 7/2020 with EF 40-45% ;  Mild to moderate segmental left ventricular systolic dysfunction , hypokinetic mid to distal septum and inferior walls and stage II diastolic dysfunction  - home meds include Coreg/Entresto/Enalapril/Lasix   - all held on admission in the setting of acute blood loss anemia  -  Coreg restarted at lower dose 6.25mg po q 12  - c/t hold Entresto, Enalapril , Lasix for now ; would restart as BP tolerates.
TTE from 7/2020 with EF 40-45% ;  Mild to moderate segmental left ventricular systolic dysfunction , hypokinetic mid to distal septum and inferior walls and stage II diastolic dysfunction  - home meds include Coreg/Entresto/Enalapril/Lasix   - all held on admission in the setting of acute blood loss anemia  -  Coreg restarted at lower dose 6.25mg po q 12  - c/t hold Entresto, Enalapril , Lasix for now ; would restart as BP tolerates.
TTE from 7/2020 with EF 40-45% ;  Mild to moderate segmental left ventricular systolic dysfunction , hypokinetic mid to distal septum and inferior walls and stage II diastolic dysfunction  - home meds include Coreg/Entresto/Enalapril/Lasix   - all held on admission in the setting of acute blood loss anemia  - c/w Coreg 6.25mg po q 12  - restarted Entresto, and Lasix TID today, will need to monitor BP ~24hrs, d/w patient at length, he is refusing to stay in the hospital anymore, he understands and verbalizes the risks of leaving AMA   - continue to hold ACE-I until f/u w/ PCP/Cardiology outpt

## 2021-03-05 NOTE — PROGRESS NOTE ADULT - PROBLEM SELECTOR PROBLEM 9
Type 2 diabetes mellitus with other specified complication, with long-term current use of insulin

## 2021-03-05 NOTE — PROGRESS NOTE ADULT - PROBLEM SELECTOR PLAN 7
- home anti-hypertensives held on admission in the setting of acute blood loss anemia  - c/w Coreg hold parameters.
- home anti-hypertensives held on admission in the setting of acute blood loss anemia  - c/w Coreg hold parameters.
- home anti-hypertensives held on admission in the setting of acute blood loss anemia  - c/w Coreg hold parameters.  - see above regarding Anti-HTN

## 2021-03-05 NOTE — PROGRESS NOTE ADULT - PROBLEM SELECTOR PLAN 9
- on Lantus 10u q bedtime at home   - FS well controlled  - c/w admelog sliding scale  - c/w Gabapentin for diabetic neuropathy  - FS qac qhs.

## 2021-03-05 NOTE — PROGRESS NOTE ADULT - PROBLEM SELECTOR PLAN 2
in the setting of missed HD session ; no EKG changes  - pt is s/p urgent HD ; also s/p IV insulin/d50/calcium gluconate/Lokelma  - K 4.3   - Nephrology following   - monitor BMP.
in the setting of missed HD session ; no EKG changes  - pt is s/p urgent HD ; also s/p IV insulin/d50/calcium gluconate/Lokelma  - K 4.3 today  - Nephrology following   - monitor BMP.
in the setting of missed HD session ; no EKG changes  - pt is s/p urgent HD ; also s/p IV insulin/d50/calcium gluconate/Lokelma  - K 5.0 today  - Nephrology following ; next HD on thursday 3/5  - monitor BMP.

## 2021-03-05 NOTE — PROGRESS NOTE ADULT - SUBJECTIVE AND OBJECTIVE BOX
Patient is a 70y old  Male who presents with a chief complaint of hyperkalemia, acute blood loss anemia (04 Mar 2021 11:16)    : [id number]     SUBJECTIVE / OVERNIGHT EVENTS: Patient seen and examined at beside. He states that he feels well, wants to go home now, denies any CP, SOB, abd pain and n/v. NO events overnight     ROS:  All other review of systems negative    Allergies    No Known Allergies    Intolerances        MEDICATIONS  (STANDING):  aspirin  chewable 81 milliGRAM(s) Oral daily  atorvastatin 80 milliGRAM(s) Oral at bedtime  carvedilol 6.25 milliGRAM(s) Oral every 12 hours  chlorhexidine 2% Cloths 1 Application(s) Topical <User Schedule>  dextrose 5%. 1000 milliLiter(s) (50 mL/Hr) IV Continuous <Continuous>  epoetin torres-epbx (RETACRIT) Injectable 6000 Unit(s) IV Push <User Schedule>  furosemide    Tablet 20 milliGRAM(s) Oral three times a day  gabapentin 300 milliGRAM(s) Oral at bedtime  insulin lispro (ADMELOG) corrective regimen sliding scale   SubCutaneous three times a day before meals  insulin lispro (ADMELOG) corrective regimen sliding scale   SubCutaneous at bedtime  nicotine - 21 mG/24Hr(s) Patch 1 patch Transdermal daily  sacubitril 24 mG/valsartan 26 mG 1 Tablet(s) Oral two times a day  tamsulosin 0.4 milliGRAM(s) Oral at bedtime    MEDICATIONS  (PRN):  albuterol/ipratropium for Nebulization 3 milliLiter(s) Nebulizer every 6 hours PRN Shortness of Breath and/or Wheezing  artificial  tears Solution 1 Drop(s) Both EYES three times a day PRN Dry Eyes      Vital Signs Last 24 Hrs  T(C): 37.6 (05 Mar 2021 10:08), Max: 37.6 (05 Mar 2021 10:08)  T(F): 99.6 (05 Mar 2021 10:08), Max: 99.6 (05 Mar 2021 10:08)  HR: 68 (05 Mar 2021 10:08) (55 - 69)  BP: 157/81 (05 Mar 2021 10:08) (128/66 - 179/86)  BP(mean): 97 (04 Mar 2021 19:30) (91 - 97)  RR: 18 (05 Mar 2021 10:08) (15 - 18)  SpO2: 95% (05 Mar 2021 10:08) (93% - 100%)  CAPILLARY BLOOD GLUCOSE      POCT Blood Glucose.: 131 mg/dL (05 Mar 2021 08:40)  POCT Blood Glucose.: 148 mg/dL (05 Mar 2021 00:36)  POCT Blood Glucose.: 89 mg/dL (04 Mar 2021 20:09)  POCT Blood Glucose.: 94 mg/dL (04 Mar 2021 14:28)    I&O's Summary    04 Mar 2021 07:01  -  05 Mar 2021 07:00  --------------------------------------------------------  IN: 1630 mL / OUT: 2750 mL / NET: -1120 mL    05 Mar 2021 07:01  -  05 Mar 2021 12:01  --------------------------------------------------------  IN: 120 mL / OUT: 0 mL / NET: 120 mL        PHYSICAL EXAM:  GENERAL: elderly   HEAD:  Atraumatic, Normocephalic  EYES: EOMI, PERRLA, conjunctiva and sclera clear  NECK: Supple, +Right IJ cath   CHEST/LUNG: Clear to auscultation bilaterally; No wheeze  HEART: Regular rate and rhythm; No murmurs, rubs, or gallops  ABDOMEN: Soft, Nontender, Nondistended; Bowel sounds present  EXTREMITIES:  2+ Peripheral Pulses, No clubbing, cyanosis, or edema  NEUROLOGY: AAOx3, non-focal  PSYCH: calm  SKIN: No rashes or lesions    LABS:                        7.1    4.43  )-----------( 127      ( 04 Mar 2021 07:56 )             22.3     03-04    138  |  100  |  53<H>  ----------------------------<  93  4.3   |  26  |  5.07<H>    Ca    8.1<L>      04 Mar 2021 07:56  Phos  4.6     03-04  Mg     2.3     03-04      PT/INR - ( 04 Mar 2021 11:26 )   PT: 12.2 sec;   INR: 1.04 ratio         PTT - ( 04 Mar 2021 11:26 )  PTT:34.3 sec          RADIOLOGY & ADDITIONAL TESTS:    Care Discussed with Consultants/Other Providers: Medicine NP and CM and Nurse manager

## 2021-03-05 NOTE — PROGRESS NOTE ADULT - PROBLEM SELECTOR PROBLEM 6
Coronary artery disease involving coronary bypass graft of native heart without angina pectoris

## 2021-03-05 NOTE — PROGRESS NOTE ADULT - PROBLEM SELECTOR PLAN 10
SCDs for DVT ppx given acute blood loss anemia.
SCDs for DVT ppx given acute blood loss anemia.  PT reccs home PT
SCDs for DVT ppx given acute blood loss anemia.  PT reccs home PT    Dispo: Patient is leaving AMA, see above

## 2021-03-05 NOTE — PROGRESS NOTE ADULT - PROBLEM SELECTOR PLAN 3
HD initiated 6 months ago; MWF schedule  - presented with hyperkalemia 7.7 after missed HD session ; mgt as above  - pt was agitated and pulled out perma cath 3/1   - s/p tunneled HD catheter per IR yesterday 3/4  - Monitor I&Os  - Monitor electrolytes and replete as necessary  - d/c'ed Sodium bicarb per Nephrology
HD initiated 6 months ago; MWF schedule  - presented with hyperkalemia 7.7 after missed HD session ; mgt as above  - pt was agitated and pulled out perma cath overnight ; IR consulted for access replacement  -  Monitor I&Os  - Monitor electrolytes and replete as necessary  - c/w Sodium bicarb  - Nephrology following.
HD initiated 6 months ago; MWF schedule  - presented with hyperkalemia 7.7 after missed HD session ; mgt as above  - pt was agitated and pulled out perma cath 3/1   - for replacement of tunneled HD catheter per IR.   -  Monitor I&Os  - Monitor electrolytes and replete as necessary  - c/w Sodium bicarb  - Nephrology following.

## 2021-03-05 NOTE — PROGRESS NOTE ADULT - PROBLEM SELECTOR PROBLEM 5
Bone metabolism disorder
Bone metabolism disorder
Chronic combined systolic and diastolic congestive heart failure

## 2021-03-05 NOTE — PROGRESS NOTE ADULT - ASSESSMENT
70 M w/ metabolic syndrome, CHF, ESRD s/p fall, Trauma transfer for hyperkalemia to  at [facility], planning for permacath replacement today with IR. Stable.    - Lokelma and lasix tonight  - NPO and hold heparin @ midnight for IR permacath tomorrow  - f/u AM labs  - If for any reason pt is found to be hyperkalemic requiring HD overnight, pt will need urgent [facility] for HD access.    Trauma [id number].  
71 y/o male w/ a PMHx of CAD s/p CABG, HFpEF, HTN, active smoker, DM type II c/b neuropathy, CKD stage V on hemodialysis M/W/F, GERD, BPH, osteoporosis, and dementia who presented after a fall ~3 days prior with hyperkalemia due to a missed hemodialysis session and acute blood loss anemia secondary to a splenic laceration w/ hemoperitoneum    PLAN:    Neuro: acute traumatic pain, dementia  - Monitor mental status  - Pain control as needed with acetaminophen and Dilaudid as needed    Resp: no acute issues  - Monitor pulse oximeter  - Out of bed to chair, ambulate as tolerated, and incentive spirometry to prevent atelectasis  - Will offer nicotine patch if requested as patient is an active smoker    CV: CAD, HFpEF, HTN  - Monitor vital signs  - Will holding home anti-hypertensives and anti-platelets for now in the setting of acute blood loss anemia    GI: splenic laceration, GERD  - NPO except medications for now; will reassess diet if H&H remains stable this morning    Renal: CKD stage V on hemodialysis, hyperkalemia, BPH  - Monitor I&Os  - Monitor electrolytes and replete as necessary  - NS at 50 mL/hr while NPO  - Appreciate nephrology recommendations    Heme: acute blood loss anemia secondary to splenic laceration  - Monitor CBC and coags  - Venodynes for mechanical VTE prophlyaxis; holding chemical VTE prophylaxis in the setting of acute blood loss anemia secondary to splenic laceration    ID: no acute issues  - Monitor for clinical evidence of active infection  - UA negative so UTI ruled out as cause of fall    Endo: DM type II  - Monitor glucose w/ ISS q6hrs for glycemic control  - Will f/u HgbA1C    Code Status: Full code    Disposition: Will remain in [facility]    Yamile Colorado PA-C     [id number]
70M s/p fall and missed HD with splenic laceration, anemic and hyperkalemic    - dialysis PRN  - will transfuse PRBC for anemia in coordination with HD  -monitor K+ 8->7.7->5.8->5  - NPO for now  - DM, ISS  - IS, PT consult  - DVT ppx: hep subq    Trauma [id number]
70M s/p fall and missed HD with splenic laceration, anemic and hyperkalemic    - hemorrhage watch and coordination of dialysis due to severe hyperkalemia  - Nephrology consult-urgent dialysis 3/1  - will transfuse PRBC for anemia in coordination with HD  - STAT labs pending, monitor K+ 8->7.7->5.8  - Outside CT scans to be uploaded  - will obtain collateral from family  - NPO for now  - DM, ISS  - IS, PT consult  - will hold VTE ppx for the moment until hemorrhage watch complete    Trauma [id number]
69 y/o male w/ a PMHx of CAD s/p CABG, HFpEF, HTN, active smoker, DM type II c/b neuropathy, CKD stage V on hemodialysis M/W/F, GERD, BPH, osteoporosis, and dementia who presented after a fall ~3 days prior with hyperkalemia due to a missed hemodialysis session and acute blood loss anemia secondary to a splenic laceration w/ hemoperitoneum    PLAN:    Neuro: acute traumatic pain, diabetic neuropathy, dementia  - Monitor mental status  - Pain control as needed with acetaminophen  - Home gabapentin for diabetic neuropathy    Resp: no acute issues  - Monitor pulse oximeter  - Out of bed to chair, ambulate as tolerated, and incentive spirometry to prevent atelectasis  - Nicotine patch with Duoneb PRN shortness of breath/wheezing as patient is active smoker    CV: CAD, HFpEF, HTN  - Monitor vital signs  - Home ASA and Lipitor for CAD  - Home carvedilol 6.25 mg PO BID for history of HFpEF and HTN    GI: splenic laceration, GERD  - Regular diet as tolerated    Renal: CKD stage V on hemodialysis, hyperkalemia, BPH  - Monitor I&Os  - Monitor electrolytes and replete as necessary  - Sodium bicarbonate 1300 mg q8hrs for acidosis in the setting of CKD  - Flomax for BPH  - Appreciate nephrology recommendations; HD scheduled for today but patient will need new access as he self-discontinued right Permacath so will consult IR vs. román Wesley at bedside    Heme: acute blood loss anemia secondary to splenic laceration  - Monitor CBC and coags  - SQH for VTE prophlyaxis    ID: no acute issues  - Monitor for clinical evidence of active infection  - UA negative so UTI ruled out as cause of fall    Endo: DM type II  - Monitor glucose w/ ISS before meals and at bedtime for glycemic control  - Will f/u HgbA1C    Code Status: Full code    Disposition: Will remain in [facility]    Yamile Colorado PA-C     [id number]
70M with h/o CAD s/p CABG, HFpEF, HTN, active smoker, DMT2, ESRD on HD (MWF),  GERD, BPH, osteoporosis, and dementia who presented to OSH after a fall 3 days ago with hyperkalemia due to a missed hemodialysis session. Pt was also found to have acute blood loss anemia secondary to a splenic laceration w/ hemoperitoneum,. He was transferred to [facility] for further management. 
70M with h/o CAD s/p CABG, HFpEF, HTN, active smoker, DMT2, ESRD on HD (MWF),  GERD, BPH, osteoporosis, and dementia who presented to OSH after a fall 3 days ago with hyperkalemia due to a missed hemodialysis session. Pt was also found to have acute blood loss anemia secondary to a splenic laceration w/ hemoperitoneum,. He was transferred to [facility] for further management. 
ESRD on HD TIW       #ESRD     Pt. with ESRD on HD three times a week (TTS) transferred from [facility] to [facility] for further management of splenic laceration. Received urgent HD on 3/1 overnight for hyperkalemia to 7.7. Now K ~5 but receiving lokelma 10g TID. HD catheter accidentally dc'ed today by patient, IR consulted for replacement. HD consent obtained from pt, placed in chart. Labs reviewed. Will arrange for HD after catheter placement.    - Please d/c bicarb tabs    #HTN  BP at target range. Monitor BP on current regimen. Low salt diet.    #Anemia  Patient with anemia in the setting of ESRD. Hemoglobin below target range. cont MARIA L therapy. Monitor hemoglobin.    #Secondary hyperparathyroidism  recommend checking serum phosphorus level QD. Low phosphorus diet.    
ESRD on HD TIW       #ESRD       Pt. with ESRD on HD three times a week (TTS) transferred from [facility] to [facility] for further management of splenic laceration. Received urgent HD on 3/1 overnight for hyperkalemia to 7.7. Now K ~5 but receiving lokelma 10g TID. HD catheter accidentally dc'ed today, IR consulted for replacement. HD consent obtained from pt, placed in chart. Labs reviewed. Will arrange for HD after catheter placement. Can d/c lokelma post HD today  - Please d/c bicarb tabs    #HTN  BP at target range. Monitor BP on current regimen. Low salt diet.    #Anemia  Patient with anemia in the setting of ESRD. Hemoglobin below target range. Will restart MARIA L therapy. Monitor hemoglobin.    #Secondary hyperparathyroidism  PRecommend checking serum phosphorus level QD. Low phosphorus diet.    
70M with h/o CAD s/p CABG, HFpEF, HTN, active smoker, DMT2, ESRD on HD (MWF),  GERD, BPH, osteoporosis, and dementia who presented to OSH after a fall 3 days ago with hyperkalemia due to a missed hemodialysis session. Pt was also found to have acute blood loss anemia secondary to a splenic laceration w/ hemoperitoneum,. He was transferred to [facility] for further management.

## 2021-03-05 NOTE — PROGRESS NOTE ADULT - PROBLEM SELECTOR PROBLEM 3
Hypertension, unspecified type
Hypertension, unspecified type
ESRD (end stage renal disease) on dialysis

## 2021-03-06 LAB
SARS-COV-2 IGG SERPL QL IA: POSITIVE
SARS-COV-2 IGM SERPL IA-ACNC: 1.95 INDEX — HIGH

## 2021-03-16 PROCEDURE — 85027 COMPLETE CBC AUTOMATED: CPT

## 2021-03-16 PROCEDURE — 86901 BLOOD TYPING SEROLOGIC RH(D): CPT

## 2021-03-16 PROCEDURE — 81001 URINALYSIS AUTO W/SCOPE: CPT

## 2021-03-16 PROCEDURE — 85018 HEMOGLOBIN: CPT

## 2021-03-16 PROCEDURE — 82435 ASSAY OF BLOOD CHLORIDE: CPT

## 2021-03-16 PROCEDURE — 83605 ASSAY OF LACTIC ACID: CPT

## 2021-03-16 PROCEDURE — 36580 REPLACE CVAD CATH: CPT

## 2021-03-16 PROCEDURE — 82803 BLOOD GASES ANY COMBINATION: CPT

## 2021-03-16 PROCEDURE — C1894: CPT

## 2021-03-16 PROCEDURE — C1887: CPT

## 2021-03-16 PROCEDURE — C1750: CPT

## 2021-03-16 PROCEDURE — 85610 PROTHROMBIN TIME: CPT

## 2021-03-16 PROCEDURE — 87340 HEPATITIS B SURFACE AG IA: CPT

## 2021-03-16 PROCEDURE — 80076 HEPATIC FUNCTION PANEL: CPT

## 2021-03-16 PROCEDURE — P9016: CPT

## 2021-03-16 PROCEDURE — 86769 SARS-COV-2 COVID-19 ANTIBODY: CPT

## 2021-03-16 PROCEDURE — 84295 ASSAY OF SERUM SODIUM: CPT

## 2021-03-16 PROCEDURE — 99285 EMERGENCY DEPT VISIT HI MDM: CPT | Mod: 25

## 2021-03-16 PROCEDURE — 85730 THROMBOPLASTIN TIME PARTIAL: CPT

## 2021-03-16 PROCEDURE — 77001 FLUOROGUIDE FOR VEIN DEVICE: CPT

## 2021-03-16 PROCEDURE — C1769: CPT

## 2021-03-16 PROCEDURE — U0003: CPT

## 2021-03-16 PROCEDURE — 83735 ASSAY OF MAGNESIUM: CPT

## 2021-03-16 PROCEDURE — 85025 COMPLETE CBC W/AUTO DIFF WBC: CPT

## 2021-03-16 PROCEDURE — 82947 ASSAY GLUCOSE BLOOD QUANT: CPT

## 2021-03-16 PROCEDURE — 86706 HEP B SURFACE ANTIBODY: CPT

## 2021-03-16 PROCEDURE — 71045 X-RAY EXAM CHEST 1 VIEW: CPT

## 2021-03-16 PROCEDURE — 86923 COMPATIBILITY TEST ELECTRIC: CPT

## 2021-03-16 PROCEDURE — 82962 GLUCOSE BLOOD TEST: CPT

## 2021-03-16 PROCEDURE — 99261: CPT

## 2021-03-16 PROCEDURE — 82140 ASSAY OF AMMONIA: CPT

## 2021-03-16 PROCEDURE — 82330 ASSAY OF CALCIUM: CPT

## 2021-03-16 PROCEDURE — 84100 ASSAY OF PHOSPHORUS: CPT

## 2021-03-16 PROCEDURE — 85014 HEMATOCRIT: CPT

## 2021-03-16 PROCEDURE — 80048 BASIC METABOLIC PNL TOTAL CA: CPT

## 2021-03-16 PROCEDURE — 86803 HEPATITIS C AB TEST: CPT

## 2021-03-16 PROCEDURE — 83036 HEMOGLOBIN GLYCOSYLATED A1C: CPT

## 2021-03-16 PROCEDURE — 80053 COMPREHEN METABOLIC PANEL: CPT

## 2021-03-16 PROCEDURE — 84132 ASSAY OF SERUM POTASSIUM: CPT

## 2021-03-16 PROCEDURE — 86900 BLOOD TYPING SEROLOGIC ABO: CPT

## 2021-03-16 PROCEDURE — 86850 RBC ANTIBODY SCREEN: CPT

## 2021-03-16 PROCEDURE — 97161 PT EVAL LOW COMPLEX 20 MIN: CPT

## 2021-03-16 PROCEDURE — 36430 TRANSFUSION BLD/BLD COMPNT: CPT

## 2021-03-16 PROCEDURE — U0005: CPT

## 2021-03-16 PROCEDURE — 86704 HEP B CORE ANTIBODY TOTAL: CPT

## 2021-03-16 PROCEDURE — 97166 OT EVAL MOD COMPLEX 45 MIN: CPT

## 2021-06-14 NOTE — H&P ADULT - NSTOBACCOEDUHP_GEN_A_CS
Offered and provided Expected Date Of Service: 06/14/2021 Bill For Surgical Tray: no Billing Type: Third-Party Bill Performing Laboratory: 0

## 2021-09-09 NOTE — ED ADULT NURSE NOTE - NS ED NURSE LEVEL OF CONSCIOUSNESS AFFECT
Calm [At Term] : at term [Normal Vaginal Route] : by normal vaginal route [None] : there were no delivery complications [Age Appropriate] : age appropriate developmental milestones met [FreeTextEntry1] : 8 LBS 7 OZS

## 2021-10-26 NOTE — DISCHARGE NOTE ADULT - IF YOU ARE A SMOKER, IT IS IMPORTANT FOR YOUR HEALTH TO STOP SMOKING. PLEASE BE AWARE THAT SECOND HAND SMOKE IS ALSO HARMFUL.
no abrasion/no bleeding/no confusion/no deformity/no fever/no loss of consciousness/no numbness/no tingling/no vomiting/no weakness Statement Selected

## 2021-12-11 NOTE — PROGRESS NOTE ADULT - PROBLEM SELECTOR PLAN 5
Progress Note    Patient: Mukesh Byrne MRN: [id number]  SSN: [id number]    YOB: 1947  Age: 76 y.o. Sex: female      Admit Date: 12/9/2021    LOS: 2 days     Subjective:   GI in consultation for Pancreatitis    Patient seen resting at this time. She states her abdominal pain is slightly improving. No new labs at this time. She is on a clear liquid diet. She states the dilaudid does help with her pain. Her abdomen is tender to light palpation. She reports improvement with nausea. She does report a headache but feels it is due to not having caffeine. 12/9 GI consult note:  History of Present Illness: Nevin Cole is a 76 y. o. female who is seen in consultation for pancreatitis. Alston Saint has a past medical history of  Paroxysmal Atrial Fibrillation, CHF, COPD, renal cell carcinoma Stage IV s/p nephrectomy, GERD sjoren's syndrome, hypertension, and depression. Patient under went EUS on 12/6/2021 showing 2.7 X3 cm lesion in the area of head of pancreas with dilation of the Pancreatic duct abutting the portal vein but not involving the SMA or AMV ; Tumor T2 by endosonographic criteria ; one small lymph node in the area of head of pancreas. Pathology shows rare cells present suspicious for malignancy.   She had an ERCP on 11/4 2021 ERCP; with sphincterotomy/papillotomy ERCP; with placement of endoscopic stent into biliary or pancreatic duct, including pre and postdilation and guide wire passage, when performed, including sphincterotomy, when performed, each stent. . Patient had a PET scan on 11/22/21 which shows a lesion in the pancreatic head consistent with malignancy. CTA of abdomen shows ill defined hypodense mass in the pancreas. There are inflammatory changes. Patient states she experienced nausea, vomiting and diarrhea since Monday. Her abdominal pain has progressively gotten worse. She reports a poor appetite. She does complain of increased \"burping\".  Total bilirubin 1.4, AsT 36, ALT 31, Alk Phos. 217, Lipase > 3,000. Plan nothing by mouth except ice chips and sips of water with medication. IV hydration. Pain management     PET Scan 11/22/2021: IMPRESSION  1.  FDG avid lesion in the pancreatic head consistent with malignancy. 2.  Subcentimeter focus of FDG uptake in the liver, indeterminate. 3.  FDG uptake associated with density expanding the right facet at C5-C6,  possibly due to an ectatic artery. EUS on 12/6/2021 showing 2.7 X3 cm lesion in the area of head of pancreas with dilation of the Pancreatic duct abutting the portal vein but not involving the SMA or AMV ; Tumor T2 by endosonographic criteria ; one small lymph node in the area of head of pancreas. CTA abdomen 12/9/2021: IMPRESSION  1. Ill-defined hypodense mass in the pancreas. There are inflammatory changes and fluid adjacent to the pancreas or duodenum and stomach most likely related to biopsy or focal pancreatitis. No pseudocyst formation, active bleeding or other acute findings. 2. Enlarged adrenal glands left greater than right with noncontrast densities  consistent with adrenal adenomas. 3. Right nephrectomy. 4. Other findings as described. Objective:     Vitals:    12/10/21 2000 12/11/21 0000 12/11/21 0355 12/11/21 0737   BP:   (!) 141/56 (!) 141/52   Pulse: 66 65 64 63   Resp:   16 16   Temp:   99.1 °F (37.3 °C) 98.9 °F (37.2 °C)   SpO2:   98% 100%   Weight:       Height:            Intake and Output:  Current Shift: No intake/output data recorded. Last three shifts: No intake/output data recorded. Physical Exam:   Skin:  Extremities and face reveal no rashes. No chapin erythema. HEENT: Sclerae anicteric. Extra-occular muscles are intact. No abnormal pigmentation of the lips. The neck is supple. Cardiovascular: Regular rate and rhythm. Respiratory:  Comfortable breathing with no accessory muscle use. GI:  Abdomen nondistended, soft, and +tender. No enlargement of the liver or spleen.  No masses palpable. Rectal:  Deferred  Musculoskeletal: Extremities have good range of motion. Neurological:  Gross memory appears intact. Patient is alert and oriented. Psychiatric:  Mood appears appropriate with judgement intact. Lymphatic:  No visible adenopathy      Lab/Data Review:  No results found for this or any previous visit (from the past 24 hour(s)). CTA ABDOMEN PELV W CONT   Final Result   1. Ill-defined hypodense mass in the pancreas. There are inflammatory changes   and fluid adjacent to the pancreas or duodenum and stomach most likely related   to biopsy or focal pancreatitis. No pseudocyst formation, active bleeding or   other acute findings. 2. Enlarged adrenal glands left greater than right with noncontrast densities   consistent with adrenal adenomas. 3. Right nephrectomy. 4. Other findings as described. XR CHEST PORT   Final Result   No acute findings. Assessment:     Active Problems:    Pancreatitis (12/9/2021)        Plan:   1. Pancreatitis      -clear liquid diet      -IV hydration @ 125 ml/hr      -Lipase improved 782 on 12/10/21       - AM labs pending      -Lipase in the am      -Pain management  2. CHF      -Started on Chlorthalidone  3. COPD       -Continue home medications       -Albuterol as needed  Thank you for allowing me to participate in this patients care. Plan discussed with Dr. Pete Davies and he approves.     Signed By: Radha Talley NP     December 11, 2021 Hep c Negative

## 2022-07-19 NOTE — ED ADULT NURSE NOTE - NSFALLRSKASSESSDT_ED_ALL_ED
08-Jun-2020 15:00 [Feeling Fatigued] : feeling fatigued [SOB] : shortness of breath [Negative] : Heme/Lymph

## 2023-05-17 NOTE — ED PROVIDER NOTE - MUSCULOSKELETAL, MLM
unable to assess; pt confused
Spine appears normal, range of motion is not limited, no muscle or joint tenderness

## 2023-07-04 NOTE — PHYSICAL THERAPY INITIAL EVALUATION ADULT - ASSISTIVE DEVICE FOR TRANSFER: GAIT, REHAB EVAL
-Take antibiotic as prescribed. -Use mupirocin as directed  -Keep clean with antibacterial soap.  -Wear compression stockings.   -Keep area elevated when possible. -Warm epsom salt soaks recommended  -Monitor for worsening redness, warmth, edema, and purulent drainage  -The patient is to follow up with PCP or return to clinic if symptoms worsen/fail to improve. rolling walker

## 2023-09-20 NOTE — ED PROVIDER NOTE - RESPIRATORY RALES
Action Requested: Summary for Provider     []  Critical Lab, Recommendations Needed  [] Need Additional Advice  []   FYI    []   Need Orders  [] Need Medications Sent to Pharmacy  []  Other     SUMMARY: Per protocol, patient should be seen in office today. She asked if antibiotics could be ordered. RN relayed she would need an appointment. She declined appointment offered today. Requested tomorrow. Future Appointments   Date Time Provider Brielle Morales   9/21/2023  4:45 [facility]   11/17/2023  9:00 AM Krystina Kat MD [address] [place] [address] ONC EMO       Reason for call: Sore Throat  Onset: Data Unavailable    Patient developed a sore throat yesterday. She has swollen tonsils and white spots on throat. She said her throat hurts a little. She denies fever or inability to swallow. RN advised her to be seen sooner if symptoms worsened.        Reason for Disposition   Patient wants to be seen    Protocols used: Sore Throat-A-OH
RIGHT LOWER LOBE/LEFT LOWER LOBE

## 2023-11-09 NOTE — ED PROVIDER NOTE - OBJECTIVE STATEMENT
1.94
69 M pmhx DM, HTN, HLD, CAD s/p CABG, cath ([facility] 3/19/2018), MICU admission for influenza complicated by respiratory failure, JAKOB, NSTEMI, HF, A&Ox1 Albanian speaking only, difficult to obtain information from. Collateral history obtained from wife, states patient had a fall on 7/1/2020 and has been complaining of L hip pain and since fall has been crawling to get around. Reports that he has been more confused and weak as well. Denies any chest pain, shortness of breath, fevers or chills.

## 2024-01-26 NOTE — PROGRESS NOTE ADULT - PROBLEM SELECTOR PLAN 7
No care due was identified.  [facility] Embedded Care Due Messages. Reference number: [id number].   1/26/2024 2:12:23 PM CST   DVT Prophylaxis with Heparin ggt  General precautions

## 2024-03-25 NOTE — PROGRESS NOTE ADULT - PROBLEM SELECTOR PLAN 1
Appreciate cardiology follow up will continue diuresis with Lasix
Appreciate cardiology follow up will continue diuresis with Lasix
cont w/ Bumex, pt tolerating well
RUE and BLE 5/5 MMT, LUE shoulder flex 3-/5 , L elbow 3/5 MMT, L hand 4/5 MMT

## 2024-05-23 NOTE — ED ADULT NURSE REASSESSMENT NOTE - PERIPHERAL VASCULAR
[facility] Call Center    Phone Message    May a detailed message be left on voicemail: yes     Reason for Call: Other: Tammy is calling from imaging. She has an incidental finding on a MRI to discuss with team.    [phone]   Action Taken: Other: te    Travel Screening: Not Applicable                                                                   
MRI results reviewed.  Phone call to patient.  Please see telephone encounter.  Will refer to orthopedic oncology at the emergency [place] for their expert opinion  On next steps.    Casper Yang DPM, FACCORA, MS  [facility] Department of Podiatry/Foot & Ankle Surgery      
Patient had  MRI of right foot completed today. 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     Kenyetta Chand MSA, ATC  Certified Athletic Trainer       
WDL

## 2024-07-25 ENCOUNTER — INPATIENT (INPATIENT)
Facility: HOSPITAL | Age: 74
LOS: 29 days | Discharge: NURSING, REHAB OR LONG-TERM CARE | DRG: 280 | End: 2024-08-24
Attending: HOSPITALIST | Admitting: STUDENT IN AN ORGANIZED HEALTH CARE EDUCATION/TRAINING PROGRAM
Payer: MEDICARE

## 2024-07-25 VITALS
HEART RATE: 86 BPM | DIASTOLIC BLOOD PRESSURE: 68 MMHG | SYSTOLIC BLOOD PRESSURE: 176 MMHG | WEIGHT: 170.86 LBS | TEMPERATURE: 98 F | RESPIRATION RATE: 20 BRPM | HEIGHT: 60 IN | OXYGEN SATURATION: 95 %

## 2024-07-25 DIAGNOSIS — Z98.890 OTHER SPECIFIED POSTPROCEDURAL STATES: Chronic | ICD-10-CM

## 2024-07-25 LAB
ALBUMIN SERPL ELPH-MCNC: 3.1 G/DL — LOW (ref 3.3–5)
ALP SERPL-CCNC: 750 U/L — HIGH (ref 40–120)
ALT FLD-CCNC: 25 U/L — SIGNIFICANT CHANGE UP (ref 10–45)
AMMONIA BLD-MCNC: 18 UMOL/L — SIGNIFICANT CHANGE UP (ref 11–55)
ANION GAP SERPL CALC-SCNC: 17 MMOL/L — SIGNIFICANT CHANGE UP (ref 5–17)
APTT BLD: 40.2 SEC — HIGH (ref 24.5–35.6)
AST SERPL-CCNC: 63 U/L — HIGH (ref 10–40)
BASE EXCESS BLDV CALC-SCNC: 7.5 MMOL/L — HIGH (ref -2–3)
BASOPHILS # BLD AUTO: 0.04 K/UL — SIGNIFICANT CHANGE UP (ref 0–0.2)
BASOPHILS NFR BLD AUTO: 0.3 % — SIGNIFICANT CHANGE UP (ref 0–2)
BILIRUB SERPL-MCNC: 1.5 MG/DL — HIGH (ref 0.2–1.2)
BUN SERPL-MCNC: 30 MG/DL — HIGH (ref 7–23)
CA-I SERPL-SCNC: 1.02 MMOL/L — LOW (ref 1.15–1.33)
CALCIUM SERPL-MCNC: 8.9 MG/DL — SIGNIFICANT CHANGE UP (ref 8.4–10.5)
CHLORIDE BLDV-SCNC: 98 MMOL/L — SIGNIFICANT CHANGE UP (ref 96–108)
CHLORIDE SERPL-SCNC: 93 MMOL/L — LOW (ref 96–108)
CO2 BLDV-SCNC: 33 MMOL/L — HIGH (ref 22–26)
CO2 SERPL-SCNC: 26 MMOL/L — SIGNIFICANT CHANGE UP (ref 22–31)
CREAT SERPL-MCNC: 4.53 MG/DL — HIGH (ref 0.5–1.3)
EGFR: 13 ML/MIN/1.73M2 — LOW
EGFR: 13 ML/MIN/1.73M2 — LOW
EOSINOPHIL # BLD AUTO: 0.09 K/UL — SIGNIFICANT CHANGE UP (ref 0–0.5)
EOSINOPHIL NFR BLD AUTO: 0.8 % — SIGNIFICANT CHANGE UP (ref 0–6)
GAS PNL BLDV: 136 MMOL/L — SIGNIFICANT CHANGE UP (ref 136–145)
GAS PNL BLDV: SIGNIFICANT CHANGE UP
GLUCOSE BLDV-MCNC: 207 MG/DL — HIGH (ref 70–99)
GLUCOSE SERPL-MCNC: 192 MG/DL — HIGH (ref 70–99)
HCO3 BLDV-SCNC: 32 MMOL/L — HIGH (ref 22–29)
HCT VFR BLD CALC: 32.2 % — LOW (ref 39–50)
HCT VFR BLDA CALC: 34 % — LOW (ref 39–51)
HGB BLD CALC-MCNC: 11.2 G/DL — LOW (ref 12.6–17.4)
HGB BLD-MCNC: 10.9 G/DL — LOW (ref 13–17)
IMM GRANULOCYTES NFR BLD AUTO: 0.4 % — SIGNIFICANT CHANGE UP (ref 0–0.9)
INR BLD: 1.11 RATIO — SIGNIFICANT CHANGE UP (ref 0.85–1.18)
LACTATE BLDV-MCNC: 2.3 MMOL/L — HIGH (ref 0.5–2)
LIDOCAIN IGE QN: 20 U/L — SIGNIFICANT CHANGE UP (ref 7–60)
LYMPHOCYTES # BLD AUTO: 1.08 K/UL — SIGNIFICANT CHANGE UP (ref 1–3.3)
LYMPHOCYTES # BLD AUTO: 9.2 % — LOW (ref 13–44)
MAGNESIUM SERPL-MCNC: 2.4 MG/DL — SIGNIFICANT CHANGE UP (ref 1.6–2.6)
MCHC RBC-ENTMCNC: 32.3 PG — SIGNIFICANT CHANGE UP (ref 27–34)
MCHC RBC-ENTMCNC: 33.9 GM/DL — SIGNIFICANT CHANGE UP (ref 32–36)
MCV RBC AUTO: 95.5 FL — SIGNIFICANT CHANGE UP (ref 80–100)
MONOCYTES # BLD AUTO: 1.41 K/UL — HIGH (ref 0–0.9)
MONOCYTES NFR BLD AUTO: 12.1 % — SIGNIFICANT CHANGE UP (ref 2–14)
NEUTROPHILS # BLD AUTO: 9.02 K/UL — HIGH (ref 1.8–7.4)
NEUTROPHILS NFR BLD AUTO: 77.2 % — HIGH (ref 43–77)
NRBC # BLD: 0 /100 WBCS — SIGNIFICANT CHANGE UP (ref 0–0)
NRBC BLD-RTO: 0 /100 WBCS — SIGNIFICANT CHANGE UP (ref 0–0)
PCO2 BLDV: 44 MMHG — SIGNIFICANT CHANGE UP (ref 42–55)
PH BLDV: 7.47 — HIGH (ref 7.32–7.43)
PLATELET # BLD AUTO: 302 K/UL — SIGNIFICANT CHANGE UP (ref 150–400)
PO2 BLDV: 39 MMHG — SIGNIFICANT CHANGE UP (ref 25–45)
POTASSIUM BLDV-SCNC: 5.2 MMOL/L — HIGH (ref 3.5–5.1)
POTASSIUM SERPL-MCNC: 5.7 MMOL/L — HIGH (ref 3.5–5.3)
POTASSIUM SERPL-SCNC: 5.7 MMOL/L — HIGH (ref 3.5–5.3)
PROT SERPL-MCNC: 8.7 G/DL — HIGH (ref 6–8.3)
PROTHROM AB SERPL-ACNC: 12.2 SEC — SIGNIFICANT CHANGE UP (ref 9.5–13)
RBC # BLD: 3.37 M/UL — LOW (ref 4.2–5.8)
RBC # FLD: 18.2 % — HIGH (ref 10.3–14.5)
SAO2 % BLDV: 64.5 % — LOW (ref 67–88)
SODIUM SERPL-SCNC: 136 MMOL/L — SIGNIFICANT CHANGE UP (ref 135–145)
WBC # BLD: 11.69 K/UL — HIGH (ref 3.8–10.5)
WBC # FLD AUTO: 11.69 K/UL — HIGH (ref 3.8–10.5)

## 2024-07-25 PROCEDURE — 93308 TTE F-UP OR LMTD: CPT | Mod: 26

## 2024-07-25 PROCEDURE — 99291 CRITICAL CARE FIRST HOUR: CPT

## 2024-07-25 PROCEDURE — 71045 X-RAY EXAM CHEST 1 VIEW: CPT | Mod: 26

## 2024-07-26 ENCOUNTER — RESULT REVIEW (OUTPATIENT)
Age: 74
End: 2024-07-26

## 2024-07-26 DIAGNOSIS — I50.20 UNSPECIFIED SYSTOLIC (CONGESTIVE) HEART FAILURE: ICD-10-CM

## 2024-07-26 DIAGNOSIS — I21.4 NON-ST ELEVATION (NSTEMI) MYOCARDIAL INFARCTION: ICD-10-CM

## 2024-07-26 DIAGNOSIS — E87.70 FLUID OVERLOAD, UNSPECIFIED: ICD-10-CM

## 2024-07-26 DIAGNOSIS — R07.9 CHEST PAIN, UNSPECIFIED: ICD-10-CM

## 2024-07-26 DIAGNOSIS — E11.9 TYPE 2 DIABETES MELLITUS WITHOUT COMPLICATIONS: ICD-10-CM

## 2024-07-26 DIAGNOSIS — F03.90 UNSPECIFIED DEMENTIA, UNSPECIFIED SEVERITY, WITHOUT BEHAVIORAL DISTURBANCE, PSYCHOTIC DISTURBANCE, MOOD DISTURBANCE, AND ANXIETY: ICD-10-CM

## 2024-07-26 DIAGNOSIS — N18.6 END STAGE RENAL DISEASE: ICD-10-CM

## 2024-07-26 LAB
ALBUMIN SERPL ELPH-MCNC: 2.5 G/DL — LOW (ref 3.3–5)
ALP SERPL-CCNC: 587 U/L — HIGH (ref 40–120)
ALT FLD-CCNC: 17 U/L — SIGNIFICANT CHANGE UP (ref 10–45)
ANION GAP SERPL CALC-SCNC: 14 MMOL/L — SIGNIFICANT CHANGE UP (ref 5–17)
APTT BLD: 34.4 SEC — SIGNIFICANT CHANGE UP (ref 24.5–35.6)
APTT BLD: 48.1 SEC — HIGH (ref 24.5–35.6)
AST SERPL-CCNC: 30 U/L — SIGNIFICANT CHANGE UP (ref 10–40)
BASE EXCESS BLDV CALC-SCNC: 8.3 MMOL/L — HIGH (ref -2–3)
BILIRUB SERPL-MCNC: 1.2 MG/DL — SIGNIFICANT CHANGE UP (ref 0.2–1.2)
BLD GP AB SCN SERPL QL: NEGATIVE — SIGNIFICANT CHANGE UP
BUN SERPL-MCNC: 28 MG/DL — HIGH (ref 7–23)
CA-I SERPL-SCNC: 1.02 MMOL/L — LOW (ref 1.15–1.33)
CALCIUM SERPL-MCNC: 7.6 MG/DL — LOW (ref 8.4–10.5)
CHLORIDE BLDV-SCNC: 101 MMOL/L — SIGNIFICANT CHANGE UP (ref 96–108)
CHLORIDE SERPL-SCNC: 99 MMOL/L — SIGNIFICANT CHANGE UP (ref 96–108)
CO2 BLDV-SCNC: 36 MMOL/L — HIGH (ref 22–26)
CO2 SERPL-SCNC: 26 MMOL/L — SIGNIFICANT CHANGE UP (ref 22–31)
CREAT SERPL-MCNC: 4.31 MG/DL — HIGH (ref 0.5–1.3)
EGFR: 14 ML/MIN/1.73M2 — LOW
EGFR: 14 ML/MIN/1.73M2 — LOW
GAS PNL BLDV: 138 MMOL/L — SIGNIFICANT CHANGE UP (ref 136–145)
GAS PNL BLDV: SIGNIFICANT CHANGE UP
GLUCOSE BLDC GLUCOMTR-MCNC: 153 MG/DL — HIGH (ref 70–99)
GLUCOSE BLDC GLUCOMTR-MCNC: 189 MG/DL — HIGH (ref 70–99)
GLUCOSE BLDC GLUCOMTR-MCNC: 258 MG/DL — HIGH (ref 70–99)
GLUCOSE BLDV-MCNC: 208 MG/DL — HIGH (ref 70–99)
GLUCOSE SERPL-MCNC: 191 MG/DL — HIGH (ref 70–99)
HBV CORE IGM SER-ACNC: SIGNIFICANT CHANGE UP
HBV SURFACE AB SER-ACNC: 401.6 MIU/ML — SIGNIFICANT CHANGE UP
HBV SURFACE AG SER-ACNC: SIGNIFICANT CHANGE UP
HCO3 BLDV-SCNC: 34 MMOL/L — HIGH (ref 22–29)
HCT VFR BLD CALC: 32.1 % — LOW (ref 39–50)
HCT VFR BLDA CALC: 32 % — LOW (ref 39–51)
HGB BLD CALC-MCNC: 10.7 G/DL — LOW (ref 12.6–17.4)
HGB BLD-MCNC: 10 G/DL — LOW (ref 13–17)
LACTATE BLDV-MCNC: 1.7 MMOL/L — SIGNIFICANT CHANGE UP (ref 0.5–2)
MCHC RBC-ENTMCNC: 30.6 PG — SIGNIFICANT CHANGE UP (ref 27–34)
MCHC RBC-ENTMCNC: 31.2 GM/DL — LOW (ref 32–36)
MCV RBC AUTO: 98.2 FL — SIGNIFICANT CHANGE UP (ref 80–100)
NRBC # BLD: 0 /100 WBCS — SIGNIFICANT CHANGE UP (ref 0–0)
NRBC BLD-RTO: 0 /100 WBCS — SIGNIFICANT CHANGE UP (ref 0–0)
NT-PROBNP SERPL-SCNC: SIGNIFICANT CHANGE UP PG/ML (ref 0–300)
PCO2 BLDV: 51 MMHG — SIGNIFICANT CHANGE UP (ref 42–55)
PH BLDV: 7.43 — SIGNIFICANT CHANGE UP (ref 7.32–7.43)
PLATELET # BLD AUTO: 280 K/UL — SIGNIFICANT CHANGE UP (ref 150–400)
PO2 BLDV: 37 MMHG — SIGNIFICANT CHANGE UP (ref 25–45)
POTASSIUM BLDV-SCNC: 3.7 MMOL/L — SIGNIFICANT CHANGE UP (ref 3.5–5.1)
POTASSIUM SERPL-MCNC: 3.6 MMOL/L — SIGNIFICANT CHANGE UP (ref 3.5–5.3)
POTASSIUM SERPL-SCNC: 3.6 MMOL/L — SIGNIFICANT CHANGE UP (ref 3.5–5.3)
PROT SERPL-MCNC: 6.8 G/DL — SIGNIFICANT CHANGE UP (ref 6–8.3)
RBC # BLD: 3.27 M/UL — LOW (ref 4.2–5.8)
RBC # FLD: 17.5 % — HIGH (ref 10.3–14.5)
RH IG SCN BLD-IMP: POSITIVE — SIGNIFICANT CHANGE UP
SAO2 % BLDV: 64.6 % — LOW (ref 67–88)
SODIUM SERPL-SCNC: 139 MMOL/L — SIGNIFICANT CHANGE UP (ref 135–145)
TROPONIN T, HIGH SENSITIVITY RESULT: 2591 NG/L — HIGH (ref 0–51)
TROPONIN T, HIGH SENSITIVITY RESULT: 2976 NG/L — HIGH (ref 0–51)
TROPONIN T, HIGH SENSITIVITY RESULT: 3014 NG/L — HIGH (ref 0–51)
WBC # BLD: 8.65 K/UL — SIGNIFICANT CHANGE UP (ref 3.8–10.5)
WBC # FLD AUTO: 8.65 K/UL — SIGNIFICANT CHANGE UP (ref 3.8–10.5)

## 2024-07-26 PROCEDURE — 76705 ECHO EXAM OF ABDOMEN: CPT | Mod: 26

## 2024-07-26 PROCEDURE — 99223 1ST HOSP IP/OBS HIGH 75: CPT | Mod: GC

## 2024-07-26 PROCEDURE — 93306 TTE W/DOPPLER COMPLETE: CPT | Mod: 26

## 2024-07-26 PROCEDURE — 99222 1ST HOSP IP/OBS MODERATE 55: CPT

## 2024-07-26 RX ORDER — DEXTROSE 50 % IN WATER 50 %
12.5 SYRINGE (ML) INTRAVENOUS ONCE
Refills: 0 | Status: DISCONTINUED | OUTPATIENT
Start: 2024-07-26 | End: 2024-08-24

## 2024-07-26 RX ORDER — MECLIZINE HCL 12.5 MG
1 TABLET ORAL
Refills: 0 | DISCHARGE

## 2024-07-26 RX ORDER — FUROSEMIDE 10 MG/ML
40 INJECTION INTRAMUSCULAR; INTRAVENOUS ONCE
Refills: 0 | Status: COMPLETED | OUTPATIENT
Start: 2024-07-26 | End: 2024-07-26

## 2024-07-26 RX ORDER — HEPARIN SODIUM 1000 [USP'U]/ML
INJECTION INTRAVENOUS; SUBCUTANEOUS
Qty: 25000 | Refills: 0 | Status: DISCONTINUED | OUTPATIENT
Start: 2024-07-26 | End: 2024-07-26

## 2024-07-26 RX ORDER — CARVEDILOL 3.12 MG/1
1 TABLET, FILM COATED ORAL
Refills: 0 | DISCHARGE

## 2024-07-26 RX ORDER — HEPARIN SODIUM 1000 [USP'U]/ML
2500 INJECTION INTRAVENOUS; SUBCUTANEOUS EVERY 6 HOURS
Refills: 0 | Status: DISCONTINUED | OUTPATIENT
Start: 2024-07-26 | End: 2024-07-26

## 2024-07-26 RX ORDER — HEPARIN SODIUM 1000 [USP'U]/ML
5000 INJECTION INTRAVENOUS; SUBCUTANEOUS ONCE
Refills: 0 | Status: DISCONTINUED | OUTPATIENT
Start: 2024-07-26 | End: 2024-07-26

## 2024-07-26 RX ORDER — TAMSULOSIN HYDROCHLORIDE 0.4 MG/1
0.4 CAPSULE ORAL AT BEDTIME
Refills: 0 | Status: DISCONTINUED | OUTPATIENT
Start: 2024-07-26 | End: 2024-08-16

## 2024-07-26 RX ORDER — CYPROHEPTADINE HYDROCHLORIDE 4 MG/1
1 TABLET ORAL
Refills: 0 | DISCHARGE

## 2024-07-26 RX ORDER — HEPARIN SODIUM 1000 [USP'U]/ML
2500 INJECTION INTRAVENOUS; SUBCUTANEOUS ONCE
Refills: 0 | Status: COMPLETED | OUTPATIENT
Start: 2024-07-26 | End: 2024-07-26

## 2024-07-26 RX ORDER — HEPARIN SODIUM 1000 [USP'U]/ML
3800 INJECTION INTRAVENOUS; SUBCUTANEOUS EVERY 6 HOURS
Refills: 0 | Status: DISCONTINUED | OUTPATIENT
Start: 2024-07-26 | End: 2024-07-26

## 2024-07-26 RX ORDER — DONEPEZIL HYDROCHLORIDE 5 MG/1
1 TABLET ORAL
Refills: 0 | DISCHARGE

## 2024-07-26 RX ORDER — GABAPENTIN 400 MG/1
300 CAPSULE ORAL AT BEDTIME
Refills: 0 | Status: DISCONTINUED | OUTPATIENT
Start: 2024-07-26 | End: 2024-07-27

## 2024-07-26 RX ORDER — ACETAMINOPHEN 500 MG/5ML
650 LIQUID (ML) ORAL EVERY 6 HOURS
Refills: 0 | Status: DISCONTINUED | OUTPATIENT
Start: 2024-07-26 | End: 2024-08-20

## 2024-07-26 RX ORDER — GLUCAGON 3 MG/1
1 POWDER NASAL ONCE
Refills: 0 | Status: DISCONTINUED | OUTPATIENT
Start: 2024-07-26 | End: 2024-08-24

## 2024-07-26 RX ORDER — INSULIN LISPRO 100 U/ML
INJECTION, SOLUTION INTRAVENOUS; SUBCUTANEOUS
Refills: 0 | Status: DISCONTINUED | OUTPATIENT
Start: 2024-07-26 | End: 2024-08-24

## 2024-07-26 RX ORDER — HEPARIN SODIUM 1000 [USP'U]/ML
1200 INJECTION INTRAVENOUS; SUBCUTANEOUS
Qty: 25000 | Refills: 0 | Status: DISCONTINUED | OUTPATIENT
Start: 2024-07-26 | End: 2024-07-26

## 2024-07-26 RX ORDER — INSULIN LISPRO 100 U/ML
INJECTION, SOLUTION INTRAVENOUS; SUBCUTANEOUS AT BEDTIME
Refills: 0 | Status: DISCONTINUED | OUTPATIENT
Start: 2024-07-26 | End: 2024-08-24

## 2024-07-26 RX ORDER — FUROSEMIDE 10 MG/ML
80 INJECTION INTRAMUSCULAR; INTRAVENOUS DAILY
Refills: 0 | Status: COMPLETED | OUTPATIENT
Start: 2024-07-27 | End: 2024-07-29

## 2024-07-26 RX ORDER — HEPARIN SODIUM 1000 [USP'U]/ML
5000 INJECTION INTRAVENOUS; SUBCUTANEOUS EVERY 6 HOURS
Refills: 0 | Status: DISCONTINUED | OUTPATIENT
Start: 2024-07-26 | End: 2024-07-29

## 2024-07-26 RX ORDER — HEPARIN SODIUM 1000 [USP'U]/ML
2500 INJECTION INTRAVENOUS; SUBCUTANEOUS EVERY 6 HOURS
Refills: 0 | Status: DISCONTINUED | OUTPATIENT
Start: 2024-07-26 | End: 2024-07-29

## 2024-07-26 RX ORDER — HEPARIN SODIUM 1000 [USP'U]/ML
1200 INJECTION INTRAVENOUS; SUBCUTANEOUS
Qty: 25000 | Refills: 0 | Status: DISCONTINUED | OUTPATIENT
Start: 2024-07-26 | End: 2024-07-29

## 2024-07-26 RX ORDER — DONEPEZIL HYDROCHLORIDE 5 MG/1
10 TABLET ORAL AT BEDTIME
Refills: 0 | Status: DISCONTINUED | OUTPATIENT
Start: 2024-07-26 | End: 2024-08-24

## 2024-07-26 RX ORDER — SODIUM CHLORIDE 9 G/1000ML
1000 INJECTION, SOLUTION INTRAVENOUS
Refills: 0 | Status: DISCONTINUED | OUTPATIENT
Start: 2024-07-26 | End: 2024-08-24

## 2024-07-26 RX ORDER — DEXTROSE 50 % IN WATER 50 %
25 SYRINGE (ML) INTRAVENOUS ONCE
Refills: 0 | Status: DISCONTINUED | OUTPATIENT
Start: 2024-07-26 | End: 2024-08-24

## 2024-07-26 RX ORDER — SACUBITRIL AND VALSARTAN 6; 6 MG/1; MG/1
1 PELLET ORAL
Refills: 0 | Status: DISCONTINUED | OUTPATIENT
Start: 2024-07-26 | End: 2024-08-14

## 2024-07-26 RX ORDER — HEPARIN SODIUM 1000 [USP'U]/ML
5000 INJECTION INTRAVENOUS; SUBCUTANEOUS EVERY 6 HOURS
Refills: 0 | Status: DISCONTINUED | OUTPATIENT
Start: 2024-07-26 | End: 2024-07-26

## 2024-07-26 RX ORDER — SODIUM ZIRCONIUM CYCLOSILICATE 5 G/5G
10 POWDER, FOR SUSPENSION ORAL
Refills: 0 | DISCHARGE

## 2024-07-26 RX ORDER — DEXTROSE 50 % IN WATER 50 %
15 SYRINGE (ML) INTRAVENOUS ONCE
Refills: 0 | Status: DISCONTINUED | OUTPATIENT
Start: 2024-07-26 | End: 2024-08-24

## 2024-07-26 RX ORDER — CARVEDILOL 3.12 MG/1
3.12 TABLET, FILM COATED ORAL EVERY 12 HOURS
Refills: 0 | Status: DISCONTINUED | OUTPATIENT
Start: 2024-07-26 | End: 2024-08-12

## 2024-07-26 RX ORDER — ASPIRIN 325 MG
81 TABLET ORAL DAILY
Refills: 0 | Status: DISCONTINUED | OUTPATIENT
Start: 2024-07-26 | End: 2024-08-24

## 2024-07-26 RX ADMIN — Medication 81 MILLIGRAM(S): at 14:03

## 2024-07-26 RX ADMIN — HEPARIN SODIUM 1500 UNIT(S)/HR: 1000 INJECTION INTRAVENOUS; SUBCUTANEOUS at 23:00

## 2024-07-26 RX ADMIN — DONEPEZIL HYDROCHLORIDE 10 MILLIGRAM(S): 5 TABLET ORAL at 21:40

## 2024-07-26 RX ADMIN — CARVEDILOL 3.12 MILLIGRAM(S): 3.12 TABLET, FILM COATED ORAL at 17:21

## 2024-07-26 RX ADMIN — FUROSEMIDE 40 MILLIGRAM(S): 10 INJECTION INTRAMUSCULAR; INTRAVENOUS at 01:44

## 2024-07-26 RX ADMIN — HEPARIN SODIUM 1200 UNIT(S)/HR: 1000 INJECTION INTRAVENOUS; SUBCUTANEOUS at 17:21

## 2024-07-26 RX ADMIN — GABAPENTIN 300 MILLIGRAM(S): 400 CAPSULE ORAL at 21:40

## 2024-07-26 RX ADMIN — TAMSULOSIN HYDROCHLORIDE 0.4 MILLIGRAM(S): 0.4 CAPSULE ORAL at 21:41

## 2024-07-26 RX ADMIN — HEPARIN SODIUM 1200 UNIT(S)/HR: 1000 INJECTION INTRAVENOUS; SUBCUTANEOUS at 14:05

## 2024-07-26 RX ADMIN — FUROSEMIDE 40 MILLIGRAM(S): 10 INJECTION INTRAMUSCULAR; INTRAVENOUS at 12:27

## 2024-07-26 RX ADMIN — INSULIN LISPRO 1: 100 INJECTION, SOLUTION INTRAVENOUS; SUBCUTANEOUS at 18:12

## 2024-07-26 RX ADMIN — INSULIN LISPRO 1: 100 INJECTION, SOLUTION INTRAVENOUS; SUBCUTANEOUS at 12:27

## 2024-07-26 RX ADMIN — SACUBITRIL AND VALSARTAN 1 TABLET(S): 6; 6 PELLET ORAL at 17:21

## 2024-07-26 RX ADMIN — HEPARIN SODIUM 5000 UNIT(S): 1000 INJECTION INTRAVENOUS; SUBCUTANEOUS at 23:35

## 2024-07-26 RX ADMIN — INSULIN LISPRO 1: 100 INJECTION, SOLUTION INTRAVENOUS; SUBCUTANEOUS at 21:45

## 2024-07-26 RX ADMIN — HEPARIN SODIUM 2500 UNIT(S): 1000 INJECTION INTRAVENOUS; SUBCUTANEOUS at 13:22

## 2024-07-26 RX ADMIN — HEPARIN SODIUM 1200 UNIT(S)/HR: 1000 INJECTION INTRAVENOUS; SUBCUTANEOUS at 13:23

## 2024-07-26 RX ADMIN — HEPARIN SODIUM 1200 UNIT(S)/HR: 1000 INJECTION INTRAVENOUS; SUBCUTANEOUS at 01:43

## 2024-07-27 DIAGNOSIS — H57.9 UNSPECIFIED DISORDER OF EYE AND ADNEXA: ICD-10-CM

## 2024-07-27 LAB
24R-OH-CALCIDIOL SERPL-MCNC: 80.2 NG/ML — HIGH (ref 30–80)
A1C WITH ESTIMATED AVERAGE GLUCOSE RESULT: 7.2 % — HIGH (ref 4–5.6)
ALBUMIN SERPL ELPH-MCNC: 2.6 G/DL — LOW (ref 3.3–5)
ALBUMIN SERPL ELPH-MCNC: 2.7 G/DL — LOW (ref 3.3–5)
ALP SERPL-CCNC: 603 U/L — HIGH (ref 40–120)
ALP SERPL-CCNC: 616 U/L — HIGH (ref 40–120)
ALT FLD-CCNC: 11 U/L — SIGNIFICANT CHANGE UP (ref 10–45)
ALT FLD-CCNC: 13 U/L — SIGNIFICANT CHANGE UP (ref 10–45)
AMMONIA BLD-MCNC: 34 UMOL/L — SIGNIFICANT CHANGE UP (ref 11–55)
ANION GAP SERPL CALC-SCNC: 13 MMOL/L — SIGNIFICANT CHANGE UP (ref 5–17)
ANION GAP SERPL CALC-SCNC: 17 MMOL/L — SIGNIFICANT CHANGE UP (ref 5–17)
APTT BLD: 79.2 SEC — HIGH (ref 24.5–35.6)
APTT BLD: 89.1 SEC — HIGH (ref 24.5–35.6)
AST SERPL-CCNC: 18 U/L — SIGNIFICANT CHANGE UP (ref 10–40)
AST SERPL-CCNC: 18 U/L — SIGNIFICANT CHANGE UP (ref 10–40)
BASE EXCESS BLDV CALC-SCNC: 5.1 MMOL/L — HIGH (ref -2–3)
BASOPHILS # BLD AUTO: 0.04 K/UL — SIGNIFICANT CHANGE UP (ref 0–0.2)
BASOPHILS # BLD AUTO: 0.05 K/UL — SIGNIFICANT CHANGE UP (ref 0–0.2)
BASOPHILS NFR BLD AUTO: 0.4 % — SIGNIFICANT CHANGE UP (ref 0–2)
BASOPHILS NFR BLD AUTO: 0.5 % — SIGNIFICANT CHANGE UP (ref 0–2)
BILIRUB SERPL-MCNC: 0.9 MG/DL — SIGNIFICANT CHANGE UP (ref 0.2–1.2)
BILIRUB SERPL-MCNC: 1 MG/DL — SIGNIFICANT CHANGE UP (ref 0.2–1.2)
BUN SERPL-MCNC: 28 MG/DL — HIGH (ref 7–23)
BUN SERPL-MCNC: 30 MG/DL — HIGH (ref 7–23)
CA-I SERPL-SCNC: 1.06 MMOL/L — LOW (ref 1.15–1.33)
CALCIUM SERPL-MCNC: 7.9 MG/DL — LOW (ref 8.4–10.5)
CALCIUM SERPL-MCNC: 8.1 MG/DL — LOW (ref 8.4–10.5)
CALCIUM SERPL-MCNC: 8.3 MG/DL — LOW (ref 8.4–10.5)
CHLORIDE BLDV-SCNC: 100 MMOL/L — SIGNIFICANT CHANGE UP (ref 96–108)
CHLORIDE SERPL-SCNC: 93 MMOL/L — LOW (ref 96–108)
CHLORIDE SERPL-SCNC: 95 MMOL/L — LOW (ref 96–108)
CK MB BLD-MCNC: 5 % — HIGH (ref 0–3.5)
CK MB CFR SERPL CALC: 1.6 NG/ML — SIGNIFICANT CHANGE UP (ref 0–6.7)
CK SERPL-CCNC: 32 U/L — SIGNIFICANT CHANGE UP (ref 30–200)
CK SERPL-CCNC: 35 U/L — SIGNIFICANT CHANGE UP (ref 30–200)
CO2 BLDV-SCNC: 33 MMOL/L — HIGH (ref 22–26)
CO2 SERPL-SCNC: 27 MMOL/L — SIGNIFICANT CHANGE UP (ref 22–31)
CO2 SERPL-SCNC: 28 MMOL/L — SIGNIFICANT CHANGE UP (ref 22–31)
CREAT SERPL-MCNC: 4.33 MG/DL — HIGH (ref 0.5–1.3)
CREAT SERPL-MCNC: 4.6 MG/DL — HIGH (ref 0.5–1.3)
CRP SERPL-MCNC: 81 MG/L — HIGH (ref 0–4)
EGFR: 13 ML/MIN/1.73M2 — LOW
EGFR: 13 ML/MIN/1.73M2 — LOW
EGFR: 14 ML/MIN/1.73M2 — LOW
EGFR: 14 ML/MIN/1.73M2 — LOW
EOSINOPHIL # BLD AUTO: 0.3 K/UL — SIGNIFICANT CHANGE UP (ref 0–0.5)
EOSINOPHIL # BLD AUTO: 0.31 K/UL — SIGNIFICANT CHANGE UP (ref 0–0.5)
EOSINOPHIL NFR BLD AUTO: 3.2 % — SIGNIFICANT CHANGE UP (ref 0–6)
EOSINOPHIL NFR BLD AUTO: 3.2 % — SIGNIFICANT CHANGE UP (ref 0–6)
ESTIMATED AVERAGE GLUCOSE: 160 MG/DL — HIGH (ref 68–114)
GAS PNL BLDV: 134 MMOL/L — LOW (ref 136–145)
GAS PNL BLDV: SIGNIFICANT CHANGE UP
GAS PNL BLDV: SIGNIFICANT CHANGE UP
GGT SERPL-CCNC: 467 U/L — HIGH (ref 9–50)
GLUCOSE BLDC GLUCOMTR-MCNC: 149 MG/DL — HIGH (ref 70–99)
GLUCOSE BLDC GLUCOMTR-MCNC: 156 MG/DL — HIGH (ref 70–99)
GLUCOSE BLDC GLUCOMTR-MCNC: 194 MG/DL — HIGH (ref 70–99)
GLUCOSE BLDC GLUCOMTR-MCNC: 212 MG/DL — HIGH (ref 70–99)
GLUCOSE BLDC GLUCOMTR-MCNC: 230 MG/DL — HIGH (ref 70–99)
GLUCOSE BLDV-MCNC: 177 MG/DL — HIGH (ref 70–99)
GLUCOSE SERPL-MCNC: 180 MG/DL — HIGH (ref 70–99)
GLUCOSE SERPL-MCNC: 288 MG/DL — HIGH (ref 70–99)
HCO3 BLDV-SCNC: 32 MMOL/L — HIGH (ref 22–29)
HCT VFR BLD CALC: 29.5 % — LOW (ref 39–50)
HCT VFR BLD CALC: 31.2 % — LOW (ref 39–50)
HCT VFR BLDA CALC: 30 % — LOW (ref 39–51)
HGB BLD CALC-MCNC: 10 G/DL — LOW (ref 12.6–17.4)
HGB BLD-MCNC: 10.1 G/DL — LOW (ref 13–17)
HGB BLD-MCNC: 9.7 G/DL — LOW (ref 13–17)
IMM GRANULOCYTES NFR BLD AUTO: 0.5 % — SIGNIFICANT CHANGE UP (ref 0–0.9)
IMM GRANULOCYTES NFR BLD AUTO: 0.6 % — SIGNIFICANT CHANGE UP (ref 0–0.9)
INR BLD: 1.28 RATIO — HIGH (ref 0.85–1.18)
LACTATE BLDV-MCNC: 1.3 MMOL/L — SIGNIFICANT CHANGE UP (ref 0.5–2)
LACTATE SERPL-SCNC: 1.9 MMOL/L — SIGNIFICANT CHANGE UP (ref 0.5–2)
LYMPHOCYTES # BLD AUTO: 0.84 K/UL — LOW (ref 1–3.3)
LYMPHOCYTES # BLD AUTO: 0.92 K/UL — LOW (ref 1–3.3)
LYMPHOCYTES # BLD AUTO: 8.7 % — LOW (ref 13–44)
LYMPHOCYTES # BLD AUTO: 9.8 % — LOW (ref 13–44)
MAGNESIUM SERPL-MCNC: 2.2 MG/DL — SIGNIFICANT CHANGE UP (ref 1.6–2.6)
MCHC RBC-ENTMCNC: 32 PG — SIGNIFICANT CHANGE UP (ref 27–34)
MCHC RBC-ENTMCNC: 32.1 PG — SIGNIFICANT CHANGE UP (ref 27–34)
MCHC RBC-ENTMCNC: 32.4 GM/DL — SIGNIFICANT CHANGE UP (ref 32–36)
MCHC RBC-ENTMCNC: 32.9 GM/DL — SIGNIFICANT CHANGE UP (ref 32–36)
MCV RBC AUTO: 97.7 FL — SIGNIFICANT CHANGE UP (ref 80–100)
MCV RBC AUTO: 98.7 FL — SIGNIFICANT CHANGE UP (ref 80–100)
MONOCYTES # BLD AUTO: 1.07 K/UL — HIGH (ref 0–0.9)
MONOCYTES # BLD AUTO: 1.19 K/UL — HIGH (ref 0–0.9)
MONOCYTES NFR BLD AUTO: 11.4 % — SIGNIFICANT CHANGE UP (ref 2–14)
MONOCYTES NFR BLD AUTO: 12.4 % — SIGNIFICANT CHANGE UP (ref 2–14)
NEUTROPHILS # BLD AUTO: 6.99 K/UL — SIGNIFICANT CHANGE UP (ref 1.8–7.4)
NEUTROPHILS # BLD AUTO: 7.2 K/UL — SIGNIFICANT CHANGE UP (ref 1.8–7.4)
NEUTROPHILS NFR BLD AUTO: 74.5 % — SIGNIFICANT CHANGE UP (ref 43–77)
NEUTROPHILS NFR BLD AUTO: 74.8 % — SIGNIFICANT CHANGE UP (ref 43–77)
NRBC # BLD: 0 /100 WBCS — SIGNIFICANT CHANGE UP (ref 0–0)
NRBC # BLD: 0 /100 WBCS — SIGNIFICANT CHANGE UP (ref 0–0)
NRBC BLD-RTO: 0 /100 WBCS — SIGNIFICANT CHANGE UP (ref 0–0)
NRBC BLD-RTO: 0 /100 WBCS — SIGNIFICANT CHANGE UP (ref 0–0)
OTHER CELLS CSF MANUAL: 10.9 ML/DL — LOW (ref 18–22)
PCO2 BLDV: 56 MMHG — HIGH (ref 42–55)
PH BLDV: 7.36 — SIGNIFICANT CHANGE UP (ref 7.32–7.43)
PHOSPHATE SERPL-MCNC: 4.6 MG/DL — HIGH (ref 2.5–4.5)
PLATELET # BLD AUTO: 286 K/UL — SIGNIFICANT CHANGE UP (ref 150–400)
PLATELET # BLD AUTO: 290 K/UL — SIGNIFICANT CHANGE UP (ref 150–400)
PO2 BLDV: 51 MMHG — HIGH (ref 25–45)
POTASSIUM BLDV-SCNC: 3.5 MMOL/L — SIGNIFICANT CHANGE UP (ref 3.5–5.1)
POTASSIUM SERPL-MCNC: 3.4 MMOL/L — LOW (ref 3.5–5.3)
POTASSIUM SERPL-MCNC: 3.4 MMOL/L — LOW (ref 3.5–5.3)
POTASSIUM SERPL-SCNC: 3.4 MMOL/L — LOW (ref 3.5–5.3)
POTASSIUM SERPL-SCNC: 3.4 MMOL/L — LOW (ref 3.5–5.3)
PROT SERPL-MCNC: 6.9 G/DL — SIGNIFICANT CHANGE UP (ref 6–8.3)
PROT SERPL-MCNC: 6.9 G/DL — SIGNIFICANT CHANGE UP (ref 6–8.3)
PROTHROM AB SERPL-ACNC: 13.3 SEC — HIGH (ref 9.5–13)
PTH-INTACT FLD-MCNC: 396 PG/ML — HIGH (ref 15–65)
RBC # BLD: 3.02 M/UL — LOW (ref 4.2–5.8)
RBC # BLD: 3.16 M/UL — LOW (ref 4.2–5.8)
RBC # FLD: 17.4 % — HIGH (ref 10.3–14.5)
RBC # FLD: 17.5 % — HIGH (ref 10.3–14.5)
SAO2 % BLDV: 78.7 % — SIGNIFICANT CHANGE UP (ref 67–88)
SODIUM SERPL-SCNC: 136 MMOL/L — SIGNIFICANT CHANGE UP (ref 135–145)
SODIUM SERPL-SCNC: 137 MMOL/L — SIGNIFICANT CHANGE UP (ref 135–145)
TROPONIN T, HIGH SENSITIVITY RESULT: 2873 NG/L — HIGH (ref 0–51)
TROPONIN T, HIGH SENSITIVITY RESULT: 2932 NG/L — HIGH (ref 0–51)
TROPONIN T, HIGH SENSITIVITY RESULT: 3116 NG/L — HIGH (ref 0–51)
WBC # BLD: 9.39 K/UL — SIGNIFICANT CHANGE UP (ref 3.8–10.5)
WBC # BLD: 9.63 K/UL — SIGNIFICANT CHANGE UP (ref 3.8–10.5)
WBC # FLD AUTO: 9.39 K/UL — SIGNIFICANT CHANGE UP (ref 3.8–10.5)
WBC # FLD AUTO: 9.63 K/UL — SIGNIFICANT CHANGE UP (ref 3.8–10.5)

## 2024-07-27 PROCEDURE — 93010 ELECTROCARDIOGRAM REPORT: CPT

## 2024-07-27 PROCEDURE — 70498 CT ANGIOGRAPHY NECK: CPT | Mod: 26

## 2024-07-27 PROCEDURE — 99222 1ST HOSP IP/OBS MODERATE 55: CPT | Mod: GC

## 2024-07-27 PROCEDURE — 0042T: CPT

## 2024-07-27 PROCEDURE — 99232 SBSQ HOSP IP/OBS MODERATE 35: CPT | Mod: GC

## 2024-07-27 PROCEDURE — 70496 CT ANGIOGRAPHY HEAD: CPT | Mod: 26

## 2024-07-27 PROCEDURE — 70450 CT HEAD/BRAIN W/O DYE: CPT | Mod: 26,59

## 2024-07-27 PROCEDURE — 99233 SBSQ HOSP IP/OBS HIGH 50: CPT | Mod: GC

## 2024-07-27 RX ORDER — ACETAMINOPHEN 500 MG/5ML
1000 LIQUID (ML) ORAL ONCE
Refills: 0 | Status: COMPLETED | OUTPATIENT
Start: 2024-07-27 | End: 2024-07-27

## 2024-07-27 RX ORDER — ACETAMINOPHEN 500 MG/5ML
975 LIQUID (ML) ORAL ONCE
Refills: 0 | Status: COMPLETED | OUTPATIENT
Start: 2024-07-27 | End: 2024-07-27

## 2024-07-27 RX ORDER — HYDROMORPHONE/SOD CHLOR,ISO/PF 2 MG/10 ML
0.2 SYRINGE (ML) INJECTION ONCE
Refills: 0 | Status: DISCONTINUED | OUTPATIENT
Start: 2024-07-27 | End: 2024-07-28

## 2024-07-27 RX ADMIN — Medication 650 MILLIGRAM(S): at 19:49

## 2024-07-27 RX ADMIN — DONEPEZIL HYDROCHLORIDE 10 MILLIGRAM(S): 5 TABLET ORAL at 21:08

## 2024-07-27 RX ADMIN — Medication 975 MILLIGRAM(S): at 11:46

## 2024-07-27 RX ADMIN — SACUBITRIL AND VALSARTAN 1 TABLET(S): 6; 6 PELLET ORAL at 06:22

## 2024-07-27 RX ADMIN — Medication 650 MILLIGRAM(S): at 18:07

## 2024-07-27 RX ADMIN — FUROSEMIDE 80 MILLIGRAM(S): 10 INJECTION INTRAMUSCULAR; INTRAVENOUS at 06:23

## 2024-07-27 RX ADMIN — HEPARIN SODIUM 1500 UNIT(S)/HR: 1000 INJECTION INTRAVENOUS; SUBCUTANEOUS at 07:43

## 2024-07-27 RX ADMIN — CARVEDILOL 3.12 MILLIGRAM(S): 3.12 TABLET, FILM COATED ORAL at 19:57

## 2024-07-27 RX ADMIN — Medication 40 MILLIEQUIVALENT(S): at 13:46

## 2024-07-27 RX ADMIN — TAMSULOSIN HYDROCHLORIDE 0.4 MILLIGRAM(S): 0.4 CAPSULE ORAL at 21:08

## 2024-07-27 RX ADMIN — Medication 400 MILLIGRAM(S): at 00:46

## 2024-07-27 RX ADMIN — HEPARIN SODIUM 1500 UNIT(S)/HR: 1000 INJECTION INTRAVENOUS; SUBCUTANEOUS at 06:13

## 2024-07-27 RX ADMIN — HEPARIN SODIUM 1500 UNIT(S)/HR: 1000 INJECTION INTRAVENOUS; SUBCUTANEOUS at 07:32

## 2024-07-27 RX ADMIN — HEPARIN SODIUM 1500 UNIT(S)/HR: 1000 INJECTION INTRAVENOUS; SUBCUTANEOUS at 11:55

## 2024-07-27 RX ADMIN — Medication 975 MILLIGRAM(S): at 12:16

## 2024-07-27 RX ADMIN — Medication 650 MILLIGRAM(S): at 01:30

## 2024-07-27 RX ADMIN — SACUBITRIL AND VALSARTAN 1 TABLET(S): 6; 6 PELLET ORAL at 19:57

## 2024-07-27 RX ADMIN — Medication 81 MILLIGRAM(S): at 11:48

## 2024-07-27 RX ADMIN — HEPARIN SODIUM 1500 UNIT(S)/HR: 1000 INJECTION INTRAVENOUS; SUBCUTANEOUS at 13:41

## 2024-07-27 RX ADMIN — CARVEDILOL 3.12 MILLIGRAM(S): 3.12 TABLET, FILM COATED ORAL at 06:22

## 2024-07-27 RX ADMIN — Medication 1000 MILLIGRAM(S): at 01:27

## 2024-07-27 RX ADMIN — Medication 650 MILLIGRAM(S): at 00:05

## 2024-07-27 RX ADMIN — INSULIN LISPRO 1: 100 INJECTION, SOLUTION INTRAVENOUS; SUBCUTANEOUS at 08:09

## 2024-07-27 RX ADMIN — INSULIN LISPRO 2: 100 INJECTION, SOLUTION INTRAVENOUS; SUBCUTANEOUS at 19:58

## 2024-07-28 LAB
24R-OH-CALCIDIOL SERPL-MCNC: 90 NG/ML — HIGH (ref 30–80)
A1C WITH ESTIMATED AVERAGE GLUCOSE RESULT: 7 % — HIGH (ref 4–5.6)
ADD ON TEST-SPECIMEN IN LAB: SIGNIFICANT CHANGE UP
ALBUMIN SERPL ELPH-MCNC: 2.8 G/DL — LOW (ref 3.3–5)
ALP SERPL-CCNC: 635 U/L — HIGH (ref 40–120)
ALT FLD-CCNC: 13 U/L — SIGNIFICANT CHANGE UP (ref 10–45)
ANION GAP SERPL CALC-SCNC: 17 MMOL/L — SIGNIFICANT CHANGE UP (ref 5–17)
APTT BLD: 89.4 SEC — HIGH (ref 24.5–35.6)
AST SERPL-CCNC: 20 U/L — SIGNIFICANT CHANGE UP (ref 10–40)
BILIRUB SERPL-MCNC: 0.7 MG/DL — SIGNIFICANT CHANGE UP (ref 0.2–1.2)
BUN SERPL-MCNC: 40 MG/DL — HIGH (ref 7–23)
CALCIUM SERPL-MCNC: 8.4 MG/DL — SIGNIFICANT CHANGE UP (ref 8.4–10.5)
CHLORIDE SERPL-SCNC: 94 MMOL/L — LOW (ref 96–108)
CHOLEST SERPL-MCNC: 183 MG/DL — SIGNIFICANT CHANGE UP
CO2 SERPL-SCNC: 27 MMOL/L — SIGNIFICANT CHANGE UP (ref 22–31)
CREAT SERPL-MCNC: 5.55 MG/DL — HIGH (ref 0.5–1.3)
EGFR: 10 ML/MIN/1.73M2 — LOW
EGFR: 10 ML/MIN/1.73M2 — LOW
ERYTHROCYTE [SEDIMENTATION RATE] IN BLOOD: 119 MM/HR — HIGH (ref 0–20)
ESTIMATED AVERAGE GLUCOSE: 154 MG/DL — HIGH (ref 68–114)
FOLATE SERPL-MCNC: 15.4 NG/ML — SIGNIFICANT CHANGE UP
GLUCOSE BLDC GLUCOMTR-MCNC: 142 MG/DL — HIGH (ref 70–99)
GLUCOSE BLDC GLUCOMTR-MCNC: 159 MG/DL — HIGH (ref 70–99)
GLUCOSE BLDC GLUCOMTR-MCNC: 166 MG/DL — HIGH (ref 70–99)
GLUCOSE BLDC GLUCOMTR-MCNC: 266 MG/DL — HIGH (ref 70–99)
GLUCOSE BLDC GLUCOMTR-MCNC: 310 MG/DL — HIGH (ref 70–99)
GLUCOSE SERPL-MCNC: 270 MG/DL — HIGH (ref 70–99)
HCT VFR BLD CALC: 32.2 % — LOW (ref 39–50)
HCYS SERPL-MCNC: 15 UMOL/L — SIGNIFICANT CHANGE UP
HDLC SERPL-MCNC: 54 MG/DL — SIGNIFICANT CHANGE UP
HGB BLD-MCNC: 10.6 G/DL — LOW (ref 13–17)
LDLC SERPL-MCNC: 116 MG/DL — HIGH
LIPID PNL WITH DIRECT LDL SERPL: 116 MG/DL — HIGH
MAGNESIUM SERPL-MCNC: 2.3 MG/DL — SIGNIFICANT CHANGE UP (ref 1.6–2.6)
MCHC RBC-ENTMCNC: 32.1 PG — SIGNIFICANT CHANGE UP (ref 27–34)
MCHC RBC-ENTMCNC: 32.9 GM/DL — SIGNIFICANT CHANGE UP (ref 32–36)
MCV RBC AUTO: 97.6 FL — SIGNIFICANT CHANGE UP (ref 80–100)
NONHDLC SERPL-MCNC: 129 MG/DL — SIGNIFICANT CHANGE UP
NRBC # BLD: 0 /100 WBCS — SIGNIFICANT CHANGE UP (ref 0–0)
NRBC BLD-RTO: 0 /100 WBCS — SIGNIFICANT CHANGE UP (ref 0–0)
PHOSPHATE SERPL-MCNC: 5.1 MG/DL — HIGH (ref 2.5–4.5)
PLATELET # BLD AUTO: 289 K/UL — SIGNIFICANT CHANGE UP (ref 150–400)
POTASSIUM SERPL-MCNC: 4.3 MMOL/L — SIGNIFICANT CHANGE UP (ref 3.5–5.3)
POTASSIUM SERPL-SCNC: 4.3 MMOL/L — SIGNIFICANT CHANGE UP (ref 3.5–5.3)
PROT SERPL-MCNC: 7.3 G/DL — SIGNIFICANT CHANGE UP (ref 6–8.3)
RBC # BLD: 3.3 M/UL — LOW (ref 4.2–5.8)
RBC # FLD: 17.7 % — HIGH (ref 10.3–14.5)
SODIUM SERPL-SCNC: 138 MMOL/L — SIGNIFICANT CHANGE UP (ref 135–145)
T4 FREE+ TSH PNL SERPL: 2.77 UIU/ML — SIGNIFICANT CHANGE UP (ref 0.27–4.2)
TRIGL SERPL-MCNC: 72 MG/DL — SIGNIFICANT CHANGE UP
TROPONIN T, HIGH SENSITIVITY RESULT: 2816 NG/L — HIGH (ref 0–51)
VIT B12 SERPL-MCNC: >2000 PG/ML — HIGH (ref 232–1245)
WBC # BLD: 9.24 K/UL — SIGNIFICANT CHANGE UP (ref 3.8–10.5)
WBC # FLD AUTO: 9.24 K/UL — SIGNIFICANT CHANGE UP (ref 3.8–10.5)

## 2024-07-28 PROCEDURE — 99233 SBSQ HOSP IP/OBS HIGH 50: CPT | Mod: GC

## 2024-07-28 PROCEDURE — 74018 RADEX ABDOMEN 1 VIEW: CPT | Mod: 26

## 2024-07-28 PROCEDURE — 70551 MRI BRAIN STEM W/O DYE: CPT | Mod: 26

## 2024-07-28 PROCEDURE — 73590 X-RAY EXAM OF LOWER LEG: CPT | Mod: 26,LT

## 2024-07-28 RX ORDER — ACETAMINOPHEN 500 MG/5ML
1000 LIQUID (ML) ORAL ONCE
Refills: 0 | Status: COMPLETED | OUTPATIENT
Start: 2024-07-28 | End: 2024-07-28

## 2024-07-28 RX ORDER — HYDROMORPHONE/SOD CHLOR,ISO/PF 2 MG/10 ML
0.2 SYRINGE (ML) INJECTION ONCE
Refills: 0 | Status: DISCONTINUED | OUTPATIENT
Start: 2024-07-28 | End: 2024-07-28

## 2024-07-28 RX ORDER — HYDROMORPHONE/SOD CHLOR,ISO/PF 2 MG/10 ML
0.5 SYRINGE (ML) INJECTION ONCE
Refills: 0 | Status: DISCONTINUED | OUTPATIENT
Start: 2024-07-28 | End: 2024-07-28

## 2024-07-28 RX ADMIN — INSULIN LISPRO 1: 100 INJECTION, SOLUTION INTRAVENOUS; SUBCUTANEOUS at 12:12

## 2024-07-28 RX ADMIN — CARVEDILOL 3.12 MILLIGRAM(S): 3.12 TABLET, FILM COATED ORAL at 06:19

## 2024-07-28 RX ADMIN — Medication 0.2 MILLIGRAM(S): at 00:27

## 2024-07-28 RX ADMIN — Medication 0.5 MILLIGRAM(S): at 14:19

## 2024-07-28 RX ADMIN — Medication 81 MILLIGRAM(S): at 12:09

## 2024-07-28 RX ADMIN — Medication 650 MILLIGRAM(S): at 09:10

## 2024-07-28 RX ADMIN — CARVEDILOL 3.12 MILLIGRAM(S): 3.12 TABLET, FILM COATED ORAL at 16:58

## 2024-07-28 RX ADMIN — SACUBITRIL AND VALSARTAN 1 TABLET(S): 6; 6 PELLET ORAL at 16:59

## 2024-07-28 RX ADMIN — FUROSEMIDE 80 MILLIGRAM(S): 10 INJECTION INTRAMUSCULAR; INTRAVENOUS at 06:19

## 2024-07-28 RX ADMIN — INSULIN LISPRO 1: 100 INJECTION, SOLUTION INTRAVENOUS; SUBCUTANEOUS at 17:00

## 2024-07-28 RX ADMIN — TAMSULOSIN HYDROCHLORIDE 0.4 MILLIGRAM(S): 0.4 CAPSULE ORAL at 22:14

## 2024-07-28 RX ADMIN — Medication 650 MILLIGRAM(S): at 19:56

## 2024-07-28 RX ADMIN — Medication 650 MILLIGRAM(S): at 21:00

## 2024-07-28 RX ADMIN — Medication 650 MILLIGRAM(S): at 10:10

## 2024-07-28 RX ADMIN — DONEPEZIL HYDROCHLORIDE 10 MILLIGRAM(S): 5 TABLET ORAL at 22:14

## 2024-07-28 RX ADMIN — Medication 0.2 MILLIGRAM(S): at 01:23

## 2024-07-28 RX ADMIN — HEPARIN SODIUM 1500 UNIT(S)/HR: 1000 INJECTION INTRAVENOUS; SUBCUTANEOUS at 09:10

## 2024-07-28 RX ADMIN — HEPARIN SODIUM 1500 UNIT(S)/HR: 1000 INJECTION INTRAVENOUS; SUBCUTANEOUS at 19:41

## 2024-07-28 RX ADMIN — HEPARIN SODIUM 1500 UNIT(S)/HR: 1000 INJECTION INTRAVENOUS; SUBCUTANEOUS at 22:58

## 2024-07-28 RX ADMIN — INSULIN LISPRO 3: 100 INJECTION, SOLUTION INTRAVENOUS; SUBCUTANEOUS at 08:02

## 2024-07-28 RX ADMIN — HEPARIN SODIUM 1500 UNIT(S)/HR: 1000 INJECTION INTRAVENOUS; SUBCUTANEOUS at 07:58

## 2024-07-28 RX ADMIN — Medication 0.5 MILLIGRAM(S): at 14:50

## 2024-07-28 RX ADMIN — SACUBITRIL AND VALSARTAN 1 TABLET(S): 6; 6 PELLET ORAL at 06:19

## 2024-07-29 DIAGNOSIS — R31.9 HEMATURIA, UNSPECIFIED: ICD-10-CM

## 2024-07-29 LAB
ALBUMIN SERPL ELPH-MCNC: 2.9 G/DL — LOW (ref 3.3–5)
ALP SERPL-CCNC: 607 U/L — HIGH (ref 40–120)
ALT FLD-CCNC: 12 U/L — SIGNIFICANT CHANGE UP (ref 10–45)
ANION GAP SERPL CALC-SCNC: 17 MMOL/L — SIGNIFICANT CHANGE UP (ref 5–17)
APTT BLD: 137 SEC — CRITICAL HIGH (ref 24.5–35.6)
APTT BLD: 159.2 SEC — CRITICAL HIGH (ref 24.5–35.6)
APTT BLD: 58.8 SEC — HIGH (ref 24.5–35.6)
AST SERPL-CCNC: 16 U/L — SIGNIFICANT CHANGE UP (ref 10–40)
B BURGDOR C6 AB SER-ACNC: NEGATIVE — SIGNIFICANT CHANGE UP
B BURGDOR IGG+IGM SER-ACNC: 0.2 INDEX — SIGNIFICANT CHANGE UP (ref 0.01–0.9)
BILIRUB SERPL-MCNC: 0.7 MG/DL — SIGNIFICANT CHANGE UP (ref 0.2–1.2)
BUN SERPL-MCNC: 53 MG/DL — HIGH (ref 7–23)
CALCIUM SERPL-MCNC: 8.6 MG/DL — SIGNIFICANT CHANGE UP (ref 8.4–10.5)
CHLORIDE SERPL-SCNC: 94 MMOL/L — LOW (ref 96–108)
CO2 SERPL-SCNC: 24 MMOL/L — SIGNIFICANT CHANGE UP (ref 22–31)
CREAT SERPL-MCNC: 6.7 MG/DL — HIGH (ref 0.5–1.3)
EGFR: 8 ML/MIN/1.73M2 — LOW
EGFR: 8 ML/MIN/1.73M2 — LOW
GLUCOSE BLDC GLUCOMTR-MCNC: 145 MG/DL — HIGH (ref 70–99)
GLUCOSE BLDC GLUCOMTR-MCNC: 151 MG/DL — HIGH (ref 70–99)
GLUCOSE BLDC GLUCOMTR-MCNC: 194 MG/DL — HIGH (ref 70–99)
GLUCOSE BLDC GLUCOMTR-MCNC: 200 MG/DL — HIGH (ref 70–99)
GLUCOSE SERPL-MCNC: 93 MG/DL — SIGNIFICANT CHANGE UP (ref 70–99)
HBV CORE AB SER-ACNC: REACTIVE
HCT VFR BLD CALC: 31.7 % — LOW (ref 39–50)
HCT VFR BLD CALC: 32 % — LOW (ref 39–50)
HGB BLD-MCNC: 10.7 G/DL — LOW (ref 13–17)
HGB BLD-MCNC: 10.7 G/DL — LOW (ref 13–17)
MAGNESIUM SERPL-MCNC: 2.4 MG/DL — SIGNIFICANT CHANGE UP (ref 1.6–2.6)
MCHC RBC-ENTMCNC: 32.3 PG — SIGNIFICANT CHANGE UP (ref 27–34)
MCHC RBC-ENTMCNC: 32.3 PG — SIGNIFICANT CHANGE UP (ref 27–34)
MCHC RBC-ENTMCNC: 33.4 GM/DL — SIGNIFICANT CHANGE UP (ref 32–36)
MCHC RBC-ENTMCNC: 33.8 GM/DL — SIGNIFICANT CHANGE UP (ref 32–36)
MCV RBC AUTO: 95.8 FL — SIGNIFICANT CHANGE UP (ref 80–100)
MCV RBC AUTO: 96.7 FL — SIGNIFICANT CHANGE UP (ref 80–100)
NRBC # BLD: 0 /100 WBCS — SIGNIFICANT CHANGE UP (ref 0–0)
NRBC # BLD: 0 /100 WBCS — SIGNIFICANT CHANGE UP (ref 0–0)
NRBC BLD-RTO: 0 /100 WBCS — SIGNIFICANT CHANGE UP (ref 0–0)
NRBC BLD-RTO: 0 /100 WBCS — SIGNIFICANT CHANGE UP (ref 0–0)
PHOSPHATE SERPL-MCNC: 4.5 MG/DL — SIGNIFICANT CHANGE UP (ref 2.5–4.5)
PLATELET # BLD AUTO: 321 K/UL — SIGNIFICANT CHANGE UP (ref 150–400)
PLATELET # BLD AUTO: 376 K/UL — SIGNIFICANT CHANGE UP (ref 150–400)
POTASSIUM SERPL-MCNC: 4.5 MMOL/L — SIGNIFICANT CHANGE UP (ref 3.5–5.3)
POTASSIUM SERPL-SCNC: 4.5 MMOL/L — SIGNIFICANT CHANGE UP (ref 3.5–5.3)
PROT SERPL-MCNC: 7.4 G/DL — SIGNIFICANT CHANGE UP (ref 6–8.3)
RBC # BLD: 3.31 M/UL — LOW (ref 4.2–5.8)
RBC # BLD: 3.31 M/UL — LOW (ref 4.2–5.8)
RBC # FLD: 17.6 % — HIGH (ref 10.3–14.5)
RBC # FLD: 17.8 % — HIGH (ref 10.3–14.5)
SODIUM SERPL-SCNC: 135 MMOL/L — SIGNIFICANT CHANGE UP (ref 135–145)
WBC # BLD: 7.92 K/UL — SIGNIFICANT CHANGE UP (ref 3.8–10.5)
WBC # BLD: 8.95 K/UL — SIGNIFICANT CHANGE UP (ref 3.8–10.5)
WBC # FLD AUTO: 7.92 K/UL — SIGNIFICANT CHANGE UP (ref 3.8–10.5)
WBC # FLD AUTO: 8.95 K/UL — SIGNIFICANT CHANGE UP (ref 3.8–10.5)

## 2024-07-29 PROCEDURE — 99232 SBSQ HOSP IP/OBS MODERATE 35: CPT

## 2024-07-29 PROCEDURE — 76881 US COMPL JOINT R-T W/IMG: CPT | Mod: 26,LT

## 2024-07-29 PROCEDURE — 76770 US EXAM ABDO BACK WALL COMP: CPT | Mod: 26

## 2024-07-29 PROCEDURE — 99232 SBSQ HOSP IP/OBS MODERATE 35: CPT | Mod: GC

## 2024-07-29 RX ORDER — MELATONIN 5 MG
3 TABLET ORAL AT BEDTIME
Refills: 0 | Status: DISCONTINUED | OUTPATIENT
Start: 2024-07-29 | End: 2024-08-24

## 2024-07-29 RX ORDER — FUROSEMIDE 10 MG/ML
20 INJECTION INTRAMUSCULAR; INTRAVENOUS THREE TIMES A DAY
Refills: 0 | Status: DISCONTINUED | OUTPATIENT
Start: 2024-07-30 | End: 2024-07-30

## 2024-07-29 RX ORDER — HEPARIN SODIUM 1000 [USP'U]/ML
INJECTION INTRAVENOUS; SUBCUTANEOUS
Qty: 25000 | Refills: 0 | Status: DISCONTINUED | OUTPATIENT
Start: 2024-07-29 | End: 2024-07-30

## 2024-07-29 RX ORDER — HEPARIN SODIUM 1000 [USP'U]/ML
3800 INJECTION INTRAVENOUS; SUBCUTANEOUS EVERY 6 HOURS
Refills: 0 | Status: DISCONTINUED | OUTPATIENT
Start: 2024-07-29 | End: 2024-07-30

## 2024-07-29 RX ORDER — ATORVASTATIN CALCIUM 80 MG/1
40 TABLET, FILM COATED ORAL AT BEDTIME
Refills: 0 | Status: DISCONTINUED | OUTPATIENT
Start: 2024-07-29 | End: 2024-08-24

## 2024-07-29 RX ADMIN — Medication 400 MILLIGRAM(S): at 00:51

## 2024-07-29 RX ADMIN — Medication 3 MILLIGRAM(S): at 23:54

## 2024-07-29 RX ADMIN — HEPARIN SODIUM 1500 UNIT(S)/HR: 1000 INJECTION INTRAVENOUS; SUBCUTANEOUS at 07:26

## 2024-07-29 RX ADMIN — Medication 650 MILLIGRAM(S): at 05:58

## 2024-07-29 RX ADMIN — HEPARIN SODIUM 1300 UNIT(S)/HR: 1000 INJECTION INTRAVENOUS; SUBCUTANEOUS at 11:19

## 2024-07-29 RX ADMIN — Medication 650 MILLIGRAM(S): at 12:50

## 2024-07-29 RX ADMIN — ATORVASTATIN CALCIUM 40 MILLIGRAM(S): 80 TABLET, FILM COATED ORAL at 22:01

## 2024-07-29 RX ADMIN — Medication 650 MILLIGRAM(S): at 12:25

## 2024-07-29 RX ADMIN — INSULIN LISPRO 1: 100 INJECTION, SOLUTION INTRAVENOUS; SUBCUTANEOUS at 07:54

## 2024-07-29 RX ADMIN — Medication 650 MILLIGRAM(S): at 23:00

## 2024-07-29 RX ADMIN — Medication 650 MILLIGRAM(S): at 22:01

## 2024-07-29 RX ADMIN — HEPARIN SODIUM 750 UNIT(S)/HR: 1000 INJECTION INTRAVENOUS; SUBCUTANEOUS at 17:47

## 2024-07-29 RX ADMIN — HEPARIN SODIUM 750 UNIT(S)/HR: 1000 INJECTION INTRAVENOUS; SUBCUTANEOUS at 19:02

## 2024-07-29 RX ADMIN — HEPARIN SODIUM 750 UNIT(S)/HR: 1000 INJECTION INTRAVENOUS; SUBCUTANEOUS at 12:25

## 2024-07-29 RX ADMIN — HEPARIN SODIUM 0 UNIT(S)/HR: 1000 INJECTION INTRAVENOUS; SUBCUTANEOUS at 10:12

## 2024-07-29 RX ADMIN — Medication 1000 MILLIGRAM(S): at 02:00

## 2024-07-29 RX ADMIN — Medication 81 MILLIGRAM(S): at 12:24

## 2024-07-29 RX ADMIN — DONEPEZIL HYDROCHLORIDE 10 MILLIGRAM(S): 5 TABLET ORAL at 22:01

## 2024-07-29 RX ADMIN — TAMSULOSIN HYDROCHLORIDE 0.4 MILLIGRAM(S): 0.4 CAPSULE ORAL at 22:01

## 2024-07-29 RX ADMIN — INSULIN LISPRO 1: 100 INJECTION, SOLUTION INTRAVENOUS; SUBCUTANEOUS at 17:46

## 2024-07-29 RX ADMIN — Medication 650 MILLIGRAM(S): at 05:03

## 2024-07-30 LAB
ANION GAP SERPL CALC-SCNC: 17 MMOL/L — SIGNIFICANT CHANGE UP (ref 5–17)
APTT BLD: 34.5 SEC — SIGNIFICANT CHANGE UP (ref 24.5–35.6)
APTT BLD: 44.9 SEC — HIGH (ref 24.5–35.6)
APTT BLD: 46.4 SEC — HIGH (ref 24.5–35.6)
BUN SERPL-MCNC: 30 MG/DL — HIGH (ref 7–23)
CALCIUM SERPL-MCNC: 8.6 MG/DL — SIGNIFICANT CHANGE UP (ref 8.4–10.5)
CHLORIDE SERPL-SCNC: 92 MMOL/L — LOW (ref 96–108)
CO2 SERPL-SCNC: 24 MMOL/L — SIGNIFICANT CHANGE UP (ref 22–31)
CREAT SERPL-MCNC: 4.6 MG/DL — HIGH (ref 0.5–1.3)
EGFR: 13 ML/MIN/1.73M2 — LOW
EGFR: 13 ML/MIN/1.73M2 — LOW
GLUCOSE BLDC GLUCOMTR-MCNC: 142 MG/DL — HIGH (ref 70–99)
GLUCOSE BLDC GLUCOMTR-MCNC: 157 MG/DL — HIGH (ref 70–99)
GLUCOSE BLDC GLUCOMTR-MCNC: 160 MG/DL — HIGH (ref 70–99)
GLUCOSE BLDC GLUCOMTR-MCNC: 187 MG/DL — HIGH (ref 70–99)
GLUCOSE SERPL-MCNC: 128 MG/DL — HIGH (ref 70–99)
HCT VFR BLD CALC: 30.6 % — LOW (ref 39–50)
HGB BLD-MCNC: 10.5 G/DL — LOW (ref 13–17)
INR BLD: 1.07 RATIO — SIGNIFICANT CHANGE UP (ref 0.85–1.18)
MAGNESIUM SERPL-MCNC: 2.1 MG/DL — SIGNIFICANT CHANGE UP (ref 1.6–2.6)
MCHC RBC-ENTMCNC: 32.8 PG — SIGNIFICANT CHANGE UP (ref 27–34)
MCHC RBC-ENTMCNC: 34.3 GM/DL — SIGNIFICANT CHANGE UP (ref 32–36)
MCV RBC AUTO: 95.6 FL — SIGNIFICANT CHANGE UP (ref 80–100)
NRBC # BLD: 0 /100 WBCS — SIGNIFICANT CHANGE UP (ref 0–0)
NRBC BLD-RTO: 0 /100 WBCS — SIGNIFICANT CHANGE UP (ref 0–0)
PHOSPHATE SERPL-MCNC: 3.1 MG/DL — SIGNIFICANT CHANGE UP (ref 2.5–4.5)
PLATELET # BLD AUTO: 337 K/UL — SIGNIFICANT CHANGE UP (ref 150–400)
POTASSIUM SERPL-MCNC: 4 MMOL/L — SIGNIFICANT CHANGE UP (ref 3.5–5.3)
POTASSIUM SERPL-SCNC: 4 MMOL/L — SIGNIFICANT CHANGE UP (ref 3.5–5.3)
PROTHROM AB SERPL-ACNC: 11.8 SEC — SIGNIFICANT CHANGE UP (ref 9.5–13)
RBC # BLD: 3.2 M/UL — LOW (ref 4.2–5.8)
RBC # FLD: 17.9 % — HIGH (ref 10.3–14.5)
RPR SER-TITR: (no result)
RPR SERPL-ACNC: REACTIVE
SODIUM SERPL-SCNC: 133 MMOL/L — LOW (ref 135–145)
T PALLIDUM AB TITR SER: POSITIVE
WBC # BLD: 7.99 K/UL — SIGNIFICANT CHANGE UP (ref 3.8–10.5)
WBC # FLD AUTO: 7.99 K/UL — SIGNIFICANT CHANGE UP (ref 3.8–10.5)
WNV RNA SPEC QL NAA+PROBE: SIGNIFICANT CHANGE UP
WNV RNA SPEC QL NAA+PROBE: SIGNIFICANT CHANGE UP

## 2024-07-30 PROCEDURE — 99232 SBSQ HOSP IP/OBS MODERATE 35: CPT

## 2024-07-30 PROCEDURE — 99232 SBSQ HOSP IP/OBS MODERATE 35: CPT | Mod: GC

## 2024-07-30 PROCEDURE — 99221 1ST HOSP IP/OBS SF/LOW 40: CPT

## 2024-07-30 RX ORDER — FUROSEMIDE 10 MG/ML
20 INJECTION INTRAMUSCULAR; INTRAVENOUS
Refills: 0 | Status: DISCONTINUED | OUTPATIENT
Start: 2024-07-30 | End: 2024-07-30

## 2024-07-30 RX ORDER — HEPARIN SODIUM 1000 [USP'U]/ML
5000 INJECTION INTRAVENOUS; SUBCUTANEOUS EVERY 8 HOURS
Refills: 0 | Status: DISCONTINUED | OUTPATIENT
Start: 2024-07-30 | End: 2024-08-01

## 2024-07-30 RX ORDER — FUROSEMIDE 10 MG/ML
80 INJECTION INTRAMUSCULAR; INTRAVENOUS
Refills: 0 | Status: DISCONTINUED | OUTPATIENT
Start: 2024-07-30 | End: 2024-08-12

## 2024-07-30 RX ORDER — MAGNESIUM, ALUMINUM HYDROXIDE 200-200 MG
30 TABLET,CHEWABLE ORAL EVERY 6 HOURS
Refills: 0 | Status: DISCONTINUED | OUTPATIENT
Start: 2024-07-30 | End: 2024-08-21

## 2024-07-30 RX ADMIN — TAMSULOSIN HYDROCHLORIDE 0.4 MILLIGRAM(S): 0.4 CAPSULE ORAL at 21:37

## 2024-07-30 RX ADMIN — DONEPEZIL HYDROCHLORIDE 10 MILLIGRAM(S): 5 TABLET ORAL at 21:37

## 2024-07-30 RX ADMIN — HEPARIN SODIUM 5000 UNIT(S): 1000 INJECTION INTRAVENOUS; SUBCUTANEOUS at 13:18

## 2024-07-30 RX ADMIN — HEPARIN SODIUM 850 UNIT(S)/HR: 1000 INJECTION INTRAVENOUS; SUBCUTANEOUS at 02:29

## 2024-07-30 RX ADMIN — HEPARIN SODIUM 950 UNIT(S)/HR: 1000 INJECTION INTRAVENOUS; SUBCUTANEOUS at 07:25

## 2024-07-30 RX ADMIN — Medication 81 MILLIGRAM(S): at 11:34

## 2024-07-30 RX ADMIN — Medication 650 MILLIGRAM(S): at 06:34

## 2024-07-30 RX ADMIN — HEPARIN SODIUM 5000 UNIT(S): 1000 INJECTION INTRAVENOUS; SUBCUTANEOUS at 21:37

## 2024-07-30 RX ADMIN — Medication 30 MILLILITER(S): at 23:43

## 2024-07-30 RX ADMIN — HEPARIN SODIUM 850 UNIT(S)/HR: 1000 INJECTION INTRAVENOUS; SUBCUTANEOUS at 07:11

## 2024-07-30 RX ADMIN — SACUBITRIL AND VALSARTAN 1 TABLET(S): 6; 6 PELLET ORAL at 05:22

## 2024-07-30 RX ADMIN — HEPARIN SODIUM 850 UNIT(S)/HR: 1000 INJECTION INTRAVENOUS; SUBCUTANEOUS at 00:14

## 2024-07-30 RX ADMIN — INSULIN LISPRO 1: 100 INJECTION, SOLUTION INTRAVENOUS; SUBCUTANEOUS at 17:40

## 2024-07-30 RX ADMIN — Medication 650 MILLIGRAM(S): at 23:43

## 2024-07-30 RX ADMIN — ATORVASTATIN CALCIUM 40 MILLIGRAM(S): 80 TABLET, FILM COATED ORAL at 21:37

## 2024-07-30 RX ADMIN — Medication 650 MILLIGRAM(S): at 05:22

## 2024-07-30 RX ADMIN — INSULIN LISPRO 1: 100 INJECTION, SOLUTION INTRAVENOUS; SUBCUTANEOUS at 11:34

## 2024-07-30 RX ADMIN — Medication 650 MILLIGRAM(S): at 12:34

## 2024-07-30 RX ADMIN — SACUBITRIL AND VALSARTAN 1 TABLET(S): 6; 6 PELLET ORAL at 17:39

## 2024-07-30 RX ADMIN — Medication 3 MILLIGRAM(S): at 21:37

## 2024-07-30 RX ADMIN — CARVEDILOL 3.12 MILLIGRAM(S): 3.12 TABLET, FILM COATED ORAL at 05:22

## 2024-07-30 RX ADMIN — Medication 650 MILLIGRAM(S): at 11:34

## 2024-07-30 RX ADMIN — CARVEDILOL 3.12 MILLIGRAM(S): 3.12 TABLET, FILM COATED ORAL at 17:40

## 2024-07-31 LAB
ANION GAP SERPL CALC-SCNC: 16 MMOL/L — SIGNIFICANT CHANGE UP (ref 5–17)
APPEARANCE UR: ABNORMAL
B BURGDOR DNA SPEC QL NAA+PROBE: NEGATIVE — SIGNIFICANT CHANGE UP
BACTERIA # UR AUTO: ABNORMAL /HPF
BILIRUB UR-MCNC: ABNORMAL
BUN SERPL-MCNC: 46 MG/DL — HIGH (ref 7–23)
CALCIUM SERPL-MCNC: 8.9 MG/DL — SIGNIFICANT CHANGE UP (ref 8.4–10.5)
CAST: 20 /LPF — HIGH (ref 0–4)
CHLORIDE SERPL-SCNC: 91 MMOL/L — LOW (ref 96–108)
CO2 SERPL-SCNC: 26 MMOL/L — SIGNIFICANT CHANGE UP (ref 22–31)
COLOR SPEC: ABNORMAL
CREAT SERPL-MCNC: 5.96 MG/DL — HIGH (ref 0.5–1.3)
DIFF PNL FLD: ABNORMAL
EGFR: 9 ML/MIN/1.73M2 — LOW
EGFR: 9 ML/MIN/1.73M2 — LOW
GLUCOSE BLDC GLUCOMTR-MCNC: 102 MG/DL — HIGH (ref 70–99)
GLUCOSE BLDC GLUCOMTR-MCNC: 109 MG/DL — HIGH (ref 70–99)
GLUCOSE BLDC GLUCOMTR-MCNC: 206 MG/DL — HIGH (ref 70–99)
GLUCOSE BLDC GLUCOMTR-MCNC: 237 MG/DL — HIGH (ref 70–99)
GLUCOSE SERPL-MCNC: 122 MG/DL — HIGH (ref 70–99)
GLUCOSE UR QL: NEGATIVE MG/DL — SIGNIFICANT CHANGE UP
HCT VFR BLD CALC: 29.6 % — LOW (ref 39–50)
HGB BLD-MCNC: 9.7 G/DL — LOW (ref 13–17)
KETONES UR-MCNC: NEGATIVE MG/DL — SIGNIFICANT CHANGE UP
LEUKOCYTE ESTERASE UR-ACNC: ABNORMAL
MAGNESIUM SERPL-MCNC: 2.4 MG/DL — SIGNIFICANT CHANGE UP (ref 1.6–2.6)
MCHC RBC-ENTMCNC: 31.7 PG — SIGNIFICANT CHANGE UP (ref 27–34)
MCHC RBC-ENTMCNC: 32.8 GM/DL — SIGNIFICANT CHANGE UP (ref 32–36)
MCV RBC AUTO: 96.7 FL — SIGNIFICANT CHANGE UP (ref 80–100)
NITRITE UR-MCNC: POSITIVE
NRBC # BLD: 0 /100 WBCS — SIGNIFICANT CHANGE UP (ref 0–0)
NRBC BLD-RTO: 0 /100 WBCS — SIGNIFICANT CHANGE UP (ref 0–0)
PH UR: 5 — SIGNIFICANT CHANGE UP (ref 5–8)
PHOSPHATE SERPL-MCNC: 4.4 MG/DL — SIGNIFICANT CHANGE UP (ref 2.5–4.5)
PLATELET # BLD AUTO: 370 K/UL — SIGNIFICANT CHANGE UP (ref 150–400)
POTASSIUM SERPL-MCNC: 4 MMOL/L — SIGNIFICANT CHANGE UP (ref 3.5–5.3)
POTASSIUM SERPL-SCNC: 4 MMOL/L — SIGNIFICANT CHANGE UP (ref 3.5–5.3)
PROT UR-MCNC: 300 MG/DL
RBC # BLD: 3.06 M/UL — LOW (ref 4.2–5.8)
RBC # FLD: 17.8 % — HIGH (ref 10.3–14.5)
RBC CASTS # UR COMP ASSIST: 1191 /HPF — HIGH (ref 0–4)
REVIEW: SIGNIFICANT CHANGE UP
SODIUM SERPL-SCNC: 133 MMOL/L — LOW (ref 135–145)
SP GR SPEC: 1.02 — SIGNIFICANT CHANGE UP (ref 1–1.03)
SQUAMOUS # UR AUTO: 10 /HPF — HIGH (ref 0–5)
UROBILINOGEN FLD QL: 0.2 MG/DL — SIGNIFICANT CHANGE UP (ref 0.2–1)
VIT B1 SERPL-MCNC: 115.8 NMOL/L — SIGNIFICANT CHANGE UP (ref 66.5–200)
WBC # BLD: 8.64 K/UL — SIGNIFICANT CHANGE UP (ref 3.8–10.5)
WBC # FLD AUTO: 8.64 K/UL — SIGNIFICANT CHANGE UP (ref 3.8–10.5)
WBC UR QL: >998 /HPF — HIGH (ref 0–5)

## 2024-07-31 PROCEDURE — 99232 SBSQ HOSP IP/OBS MODERATE 35: CPT | Mod: GC

## 2024-07-31 PROCEDURE — 90935 HEMODIALYSIS ONE EVALUATION: CPT

## 2024-07-31 RX ORDER — CEFTRIAXONE 500 MG/1
1000 INJECTION, POWDER, FOR SOLUTION INTRAMUSCULAR; INTRAVENOUS EVERY 24 HOURS
Refills: 0 | Status: COMPLETED | OUTPATIENT
Start: 2024-07-31 | End: 2024-08-03

## 2024-07-31 RX ADMIN — INSULIN LISPRO 2: 100 INJECTION, SOLUTION INTRAVENOUS; SUBCUTANEOUS at 12:58

## 2024-07-31 RX ADMIN — ATORVASTATIN CALCIUM 40 MILLIGRAM(S): 80 TABLET, FILM COATED ORAL at 21:41

## 2024-07-31 RX ADMIN — HEPARIN SODIUM 5000 UNIT(S): 1000 INJECTION INTRAVENOUS; SUBCUTANEOUS at 21:42

## 2024-07-31 RX ADMIN — SACUBITRIL AND VALSARTAN 1 TABLET(S): 6; 6 PELLET ORAL at 17:38

## 2024-07-31 RX ADMIN — Medication 650 MILLIGRAM(S): at 10:30

## 2024-07-31 RX ADMIN — INSULIN LISPRO 2: 100 INJECTION, SOLUTION INTRAVENOUS; SUBCUTANEOUS at 17:37

## 2024-07-31 RX ADMIN — HEPARIN SODIUM 5000 UNIT(S): 1000 INJECTION INTRAVENOUS; SUBCUTANEOUS at 05:38

## 2024-07-31 RX ADMIN — Medication 3 MILLIGRAM(S): at 21:41

## 2024-07-31 RX ADMIN — Medication 650 MILLIGRAM(S): at 00:43

## 2024-07-31 RX ADMIN — TAMSULOSIN HYDROCHLORIDE 0.4 MILLIGRAM(S): 0.4 CAPSULE ORAL at 21:42

## 2024-07-31 RX ADMIN — DONEPEZIL HYDROCHLORIDE 10 MILLIGRAM(S): 5 TABLET ORAL at 21:41

## 2024-07-31 RX ADMIN — CARVEDILOL 3.12 MILLIGRAM(S): 3.12 TABLET, FILM COATED ORAL at 17:38

## 2024-07-31 RX ADMIN — Medication 81 MILLIGRAM(S): at 14:48

## 2024-07-31 RX ADMIN — HEPARIN SODIUM 5000 UNIT(S): 1000 INJECTION INTRAVENOUS; SUBCUTANEOUS at 14:48

## 2024-07-31 RX ADMIN — Medication 650 MILLIGRAM(S): at 09:54

## 2024-08-01 DIAGNOSIS — Z29.9 ENCOUNTER FOR PROPHYLACTIC MEASURES, UNSPECIFIED: ICD-10-CM

## 2024-08-01 LAB
ANION GAP SERPL CALC-SCNC: 12 MMOL/L — SIGNIFICANT CHANGE UP (ref 5–17)
BUN SERPL-MCNC: 33 MG/DL — HIGH (ref 7–23)
CALCIUM SERPL-MCNC: 9.1 MG/DL — SIGNIFICANT CHANGE UP (ref 8.4–10.5)
CHLORIDE SERPL-SCNC: 94 MMOL/L — LOW (ref 96–108)
CO2 SERPL-SCNC: 26 MMOL/L — SIGNIFICANT CHANGE UP (ref 22–31)
CREAT SERPL-MCNC: 4.21 MG/DL — HIGH (ref 0.5–1.3)
EGFR: 14 ML/MIN/1.73M2 — LOW
EGFR: 14 ML/MIN/1.73M2 — LOW
GLUCOSE BLDC GLUCOMTR-MCNC: 114 MG/DL — HIGH (ref 70–99)
GLUCOSE BLDC GLUCOMTR-MCNC: 119 MG/DL — HIGH (ref 70–99)
GLUCOSE BLDC GLUCOMTR-MCNC: 197 MG/DL — HIGH (ref 70–99)
GLUCOSE BLDC GLUCOMTR-MCNC: 324 MG/DL — HIGH (ref 70–99)
GLUCOSE SERPL-MCNC: 98 MG/DL — SIGNIFICANT CHANGE UP (ref 70–99)
HCT VFR BLD CALC: 31.5 % — LOW (ref 39–50)
HGB BLD-MCNC: 10.4 G/DL — LOW (ref 13–17)
MAGNESIUM SERPL-MCNC: 2.2 MG/DL — SIGNIFICANT CHANGE UP (ref 1.6–2.6)
MCHC RBC-ENTMCNC: 31.6 PG — SIGNIFICANT CHANGE UP (ref 27–34)
MCHC RBC-ENTMCNC: 33 GM/DL — SIGNIFICANT CHANGE UP (ref 32–36)
MCV RBC AUTO: 95.7 FL — SIGNIFICANT CHANGE UP (ref 80–100)
METHYLMALONATE SERPL-SCNC: 906 NMOL/L — HIGH (ref 0–378)
NRBC # BLD: 0 /100 WBCS — SIGNIFICANT CHANGE UP (ref 0–0)
NRBC BLD-RTO: 0 /100 WBCS — SIGNIFICANT CHANGE UP (ref 0–0)
PHOSPHATE SERPL-MCNC: 3.2 MG/DL — SIGNIFICANT CHANGE UP (ref 2.5–4.5)
PLATELET # BLD AUTO: 380 K/UL — SIGNIFICANT CHANGE UP (ref 150–400)
POTASSIUM SERPL-MCNC: 4.3 MMOL/L — SIGNIFICANT CHANGE UP (ref 3.5–5.3)
POTASSIUM SERPL-SCNC: 4.3 MMOL/L — SIGNIFICANT CHANGE UP (ref 3.5–5.3)
RBC # BLD: 3.29 M/UL — LOW (ref 4.2–5.8)
RBC # FLD: 17.5 % — HIGH (ref 10.3–14.5)
SODIUM SERPL-SCNC: 132 MMOL/L — LOW (ref 135–145)
WBC # BLD: 10 K/UL — SIGNIFICANT CHANGE UP (ref 3.8–10.5)
WBC # FLD AUTO: 10 K/UL — SIGNIFICANT CHANGE UP (ref 3.8–10.5)
WNV IGG TITR FLD: POSITIVE
WNV IGM SPEC QL: NEGATIVE — SIGNIFICANT CHANGE UP

## 2024-08-01 PROCEDURE — 99232 SBSQ HOSP IP/OBS MODERATE 35: CPT | Mod: GC

## 2024-08-01 RX ADMIN — Medication 650 MILLIGRAM(S): at 18:46

## 2024-08-01 RX ADMIN — SACUBITRIL AND VALSARTAN 1 TABLET(S): 6; 6 PELLET ORAL at 05:55

## 2024-08-01 RX ADMIN — SACUBITRIL AND VALSARTAN 1 TABLET(S): 6; 6 PELLET ORAL at 17:44

## 2024-08-01 RX ADMIN — FUROSEMIDE 80 MILLIGRAM(S): 10 INJECTION INTRAMUSCULAR; INTRAVENOUS at 08:55

## 2024-08-01 RX ADMIN — HEPARIN SODIUM 5000 UNIT(S): 1000 INJECTION INTRAVENOUS; SUBCUTANEOUS at 05:56

## 2024-08-01 RX ADMIN — Medication 650 MILLIGRAM(S): at 17:46

## 2024-08-01 RX ADMIN — TAMSULOSIN HYDROCHLORIDE 0.4 MILLIGRAM(S): 0.4 CAPSULE ORAL at 21:22

## 2024-08-01 RX ADMIN — Medication 650 MILLIGRAM(S): at 09:55

## 2024-08-01 RX ADMIN — ATORVASTATIN CALCIUM 40 MILLIGRAM(S): 80 TABLET, FILM COATED ORAL at 21:23

## 2024-08-01 RX ADMIN — CARVEDILOL 3.12 MILLIGRAM(S): 3.12 TABLET, FILM COATED ORAL at 17:44

## 2024-08-01 RX ADMIN — CARVEDILOL 3.12 MILLIGRAM(S): 3.12 TABLET, FILM COATED ORAL at 05:55

## 2024-08-01 RX ADMIN — Medication 3 MILLIGRAM(S): at 21:22

## 2024-08-01 RX ADMIN — Medication 650 MILLIGRAM(S): at 08:55

## 2024-08-01 RX ADMIN — DONEPEZIL HYDROCHLORIDE 10 MILLIGRAM(S): 5 TABLET ORAL at 21:22

## 2024-08-01 RX ADMIN — CEFTRIAXONE 100 MILLIGRAM(S): 500 INJECTION, POWDER, FOR SOLUTION INTRAMUSCULAR; INTRAVENOUS at 11:29

## 2024-08-01 RX ADMIN — INSULIN LISPRO 4: 100 INJECTION, SOLUTION INTRAVENOUS; SUBCUTANEOUS at 17:43

## 2024-08-02 LAB
ANION GAP SERPL CALC-SCNC: 15 MMOL/L — SIGNIFICANT CHANGE UP (ref 5–17)
BUN SERPL-MCNC: 49 MG/DL — HIGH (ref 7–23)
CALCIUM SERPL-MCNC: 9.1 MG/DL — SIGNIFICANT CHANGE UP (ref 8.4–10.5)
CHLORIDE SERPL-SCNC: 94 MMOL/L — LOW (ref 96–108)
CO2 SERPL-SCNC: 24 MMOL/L — SIGNIFICANT CHANGE UP (ref 22–31)
CREAT SERPL-MCNC: 5.68 MG/DL — HIGH (ref 0.5–1.3)
EGFR: 10 ML/MIN/1.73M2 — LOW
EGFR: 10 ML/MIN/1.73M2 — LOW
GLUCOSE BLDC GLUCOMTR-MCNC: 148 MG/DL — HIGH (ref 70–99)
GLUCOSE BLDC GLUCOMTR-MCNC: 165 MG/DL — HIGH (ref 70–99)
GLUCOSE BLDC GLUCOMTR-MCNC: 176 MG/DL — HIGH (ref 70–99)
GLUCOSE BLDC GLUCOMTR-MCNC: 219 MG/DL — HIGH (ref 70–99)
GLUCOSE BLDC GLUCOMTR-MCNC: 220 MG/DL — HIGH (ref 70–99)
GLUCOSE BLDC GLUCOMTR-MCNC: 231 MG/DL — HIGH (ref 70–99)
GLUCOSE SERPL-MCNC: 213 MG/DL — HIGH (ref 70–99)
HCT VFR BLD CALC: 33.1 % — LOW (ref 39–50)
HGB BLD-MCNC: 10.5 G/DL — LOW (ref 13–17)
MAGNESIUM SERPL-MCNC: 2.4 MG/DL — SIGNIFICANT CHANGE UP (ref 1.6–2.6)
MCHC RBC-ENTMCNC: 31 PG — SIGNIFICANT CHANGE UP (ref 27–34)
MCHC RBC-ENTMCNC: 31.7 GM/DL — LOW (ref 32–36)
MCV RBC AUTO: 97.6 FL — SIGNIFICANT CHANGE UP (ref 80–100)
NRBC # BLD: 0 /100 WBCS — SIGNIFICANT CHANGE UP (ref 0–0)
NRBC BLD-RTO: 0 /100 WBCS — SIGNIFICANT CHANGE UP (ref 0–0)
PHOSPHATE SERPL-MCNC: 3.7 MG/DL — SIGNIFICANT CHANGE UP (ref 2.5–4.5)
PLATELET # BLD AUTO: 415 K/UL — HIGH (ref 150–400)
POTASSIUM SERPL-MCNC: 4.7 MMOL/L — SIGNIFICANT CHANGE UP (ref 3.5–5.3)
POTASSIUM SERPL-SCNC: 4.7 MMOL/L — SIGNIFICANT CHANGE UP (ref 3.5–5.3)
RBC # BLD: 3.39 M/UL — LOW (ref 4.2–5.8)
RBC # FLD: 17.4 % — HIGH (ref 10.3–14.5)
SODIUM SERPL-SCNC: 133 MMOL/L — LOW (ref 135–145)
WBC # BLD: 12.89 K/UL — HIGH (ref 3.8–10.5)
WBC # FLD AUTO: 12.89 K/UL — HIGH (ref 3.8–10.5)

## 2024-08-02 PROCEDURE — 99232 SBSQ HOSP IP/OBS MODERATE 35: CPT | Mod: GC

## 2024-08-02 RX ORDER — ACETAMINOPHEN 500 MG/5ML
650 LIQUID (ML) ORAL ONCE
Refills: 0 | Status: COMPLETED | OUTPATIENT
Start: 2024-08-02 | End: 2024-08-02

## 2024-08-02 RX ORDER — NITROGLYCERIN 20 MG/G
0.4 OINTMENT TOPICAL
Refills: 0 | Status: DISCONTINUED | OUTPATIENT
Start: 2024-08-02 | End: 2024-08-02

## 2024-08-02 RX ORDER — NITROGLYCERIN 20 MG/G
0.4 OINTMENT TOPICAL
Refills: 0 | Status: DISCONTINUED | OUTPATIENT
Start: 2024-08-02 | End: 2024-08-24

## 2024-08-02 RX ADMIN — INSULIN LISPRO 1: 100 INJECTION, SOLUTION INTRAVENOUS; SUBCUTANEOUS at 08:40

## 2024-08-02 RX ADMIN — Medication 3 MILLIGRAM(S): at 21:39

## 2024-08-02 RX ADMIN — DONEPEZIL HYDROCHLORIDE 10 MILLIGRAM(S): 5 TABLET ORAL at 21:39

## 2024-08-02 RX ADMIN — CEFTRIAXONE 100 MILLIGRAM(S): 500 INJECTION, POWDER, FOR SOLUTION INTRAMUSCULAR; INTRAVENOUS at 15:12

## 2024-08-02 RX ADMIN — Medication 650 MILLIGRAM(S): at 12:01

## 2024-08-02 RX ADMIN — TAMSULOSIN HYDROCHLORIDE 0.4 MILLIGRAM(S): 0.4 CAPSULE ORAL at 21:39

## 2024-08-02 RX ADMIN — ATORVASTATIN CALCIUM 40 MILLIGRAM(S): 80 TABLET, FILM COATED ORAL at 21:39

## 2024-08-02 RX ADMIN — CARVEDILOL 3.12 MILLIGRAM(S): 3.12 TABLET, FILM COATED ORAL at 18:17

## 2024-08-02 RX ADMIN — Medication 650 MILLIGRAM(S): at 21:38

## 2024-08-02 RX ADMIN — Medication 650 MILLIGRAM(S): at 09:45

## 2024-08-02 RX ADMIN — Medication 650 MILLIGRAM(S): at 22:40

## 2024-08-02 RX ADMIN — Medication 81 MILLIGRAM(S): at 15:12

## 2024-08-02 RX ADMIN — INSULIN LISPRO 2: 100 INJECTION, SOLUTION INTRAVENOUS; SUBCUTANEOUS at 15:10

## 2024-08-02 RX ADMIN — SACUBITRIL AND VALSARTAN 1 TABLET(S): 6; 6 PELLET ORAL at 18:17

## 2024-08-02 RX ADMIN — Medication 650 MILLIGRAM(S): at 09:11

## 2024-08-03 LAB
ANION GAP SERPL CALC-SCNC: 15 MMOL/L — SIGNIFICANT CHANGE UP (ref 5–17)
BUN SERPL-MCNC: 34 MG/DL — HIGH (ref 7–23)
CALCIUM SERPL-MCNC: 9 MG/DL — SIGNIFICANT CHANGE UP (ref 8.4–10.5)
CHLORIDE SERPL-SCNC: 95 MMOL/L — LOW (ref 96–108)
CO2 SERPL-SCNC: 25 MMOL/L — SIGNIFICANT CHANGE UP (ref 22–31)
CREAT SERPL-MCNC: 4.38 MG/DL — HIGH (ref 0.5–1.3)
CULTURE RESULTS: SIGNIFICANT CHANGE UP
EGFR: 14 ML/MIN/1.73M2 — LOW
EGFR: 14 ML/MIN/1.73M2 — LOW
GLUCOSE BLDC GLUCOMTR-MCNC: 114 MG/DL — HIGH (ref 70–99)
GLUCOSE BLDC GLUCOMTR-MCNC: 166 MG/DL — HIGH (ref 70–99)
GLUCOSE BLDC GLUCOMTR-MCNC: 228 MG/DL — HIGH (ref 70–99)
GLUCOSE BLDC GLUCOMTR-MCNC: 264 MG/DL — HIGH (ref 70–99)
GLUCOSE SERPL-MCNC: 161 MG/DL — HIGH (ref 70–99)
HCT VFR BLD CALC: 32.6 % — LOW (ref 39–50)
HGB BLD-MCNC: 10.7 G/DL — LOW (ref 13–17)
MAGNESIUM SERPL-MCNC: 2.2 MG/DL — SIGNIFICANT CHANGE UP (ref 1.6–2.6)
MCHC RBC-ENTMCNC: 31.8 PG — SIGNIFICANT CHANGE UP (ref 27–34)
MCHC RBC-ENTMCNC: 32.8 GM/DL — SIGNIFICANT CHANGE UP (ref 32–36)
MCV RBC AUTO: 97 FL — SIGNIFICANT CHANGE UP (ref 80–100)
NRBC # BLD: 0 /100 WBCS — SIGNIFICANT CHANGE UP (ref 0–0)
NRBC BLD-RTO: 0 /100 WBCS — SIGNIFICANT CHANGE UP (ref 0–0)
PHOSPHATE SERPL-MCNC: 3.7 MG/DL — SIGNIFICANT CHANGE UP (ref 2.5–4.5)
PLATELET # BLD AUTO: 415 K/UL — HIGH (ref 150–400)
POTASSIUM SERPL-MCNC: 3.8 MMOL/L — SIGNIFICANT CHANGE UP (ref 3.5–5.3)
POTASSIUM SERPL-SCNC: 3.8 MMOL/L — SIGNIFICANT CHANGE UP (ref 3.5–5.3)
RBC # BLD: 3.36 M/UL — LOW (ref 4.2–5.8)
RBC # FLD: 17.7 % — HIGH (ref 10.3–14.5)
SODIUM SERPL-SCNC: 135 MMOL/L — SIGNIFICANT CHANGE UP (ref 135–145)
SPECIMEN SOURCE: SIGNIFICANT CHANGE UP
WBC # BLD: 12.88 K/UL — HIGH (ref 3.8–10.5)
WBC # FLD AUTO: 12.88 K/UL — HIGH (ref 3.8–10.5)

## 2024-08-03 PROCEDURE — 99233 SBSQ HOSP IP/OBS HIGH 50: CPT

## 2024-08-03 RX ORDER — CLOPIDOGREL BISULFATE 75 MG/1
75 TABLET, FILM COATED ORAL DAILY
Refills: 0 | Status: DISCONTINUED | OUTPATIENT
Start: 2024-08-03 | End: 2024-08-08

## 2024-08-03 RX ORDER — CEFTRIAXONE 500 MG/1
1000 INJECTION, POWDER, FOR SOLUTION INTRAMUSCULAR; INTRAVENOUS EVERY 24 HOURS
Refills: 0 | Status: COMPLETED | OUTPATIENT
Start: 2024-08-04 | End: 2024-08-05

## 2024-08-03 RX ADMIN — SACUBITRIL AND VALSARTAN 1 TABLET(S): 6; 6 PELLET ORAL at 05:07

## 2024-08-03 RX ADMIN — ATORVASTATIN CALCIUM 40 MILLIGRAM(S): 80 TABLET, FILM COATED ORAL at 21:44

## 2024-08-03 RX ADMIN — CLOPIDOGREL BISULFATE 75 MILLIGRAM(S): 75 TABLET, FILM COATED ORAL at 13:19

## 2024-08-03 RX ADMIN — Medication 650 MILLIGRAM(S): at 08:08

## 2024-08-03 RX ADMIN — Medication 650 MILLIGRAM(S): at 14:09

## 2024-08-03 RX ADMIN — FUROSEMIDE 80 MILLIGRAM(S): 10 INJECTION INTRAMUSCULAR; INTRAVENOUS at 08:07

## 2024-08-03 RX ADMIN — TAMSULOSIN HYDROCHLORIDE 0.4 MILLIGRAM(S): 0.4 CAPSULE ORAL at 21:44

## 2024-08-03 RX ADMIN — CARVEDILOL 3.12 MILLIGRAM(S): 3.12 TABLET, FILM COATED ORAL at 17:25

## 2024-08-03 RX ADMIN — Medication 3 MILLIGRAM(S): at 21:44

## 2024-08-03 RX ADMIN — Medication 650 MILLIGRAM(S): at 09:08

## 2024-08-03 RX ADMIN — CARVEDILOL 3.12 MILLIGRAM(S): 3.12 TABLET, FILM COATED ORAL at 05:07

## 2024-08-03 RX ADMIN — SACUBITRIL AND VALSARTAN 1 TABLET(S): 6; 6 PELLET ORAL at 17:25

## 2024-08-03 RX ADMIN — INSULIN LISPRO 3: 100 INJECTION, SOLUTION INTRAVENOUS; SUBCUTANEOUS at 17:24

## 2024-08-03 RX ADMIN — DONEPEZIL HYDROCHLORIDE 10 MILLIGRAM(S): 5 TABLET ORAL at 21:44

## 2024-08-03 RX ADMIN — CEFTRIAXONE 100 MILLIGRAM(S): 500 INJECTION, POWDER, FOR SOLUTION INTRAMUSCULAR; INTRAVENOUS at 11:47

## 2024-08-03 RX ADMIN — INSULIN LISPRO 1: 100 INJECTION, SOLUTION INTRAVENOUS; SUBCUTANEOUS at 08:08

## 2024-08-03 RX ADMIN — Medication 81 MILLIGRAM(S): at 11:48

## 2024-08-03 RX ADMIN — Medication 650 MILLIGRAM(S): at 15:09

## 2024-08-03 RX ADMIN — INSULIN LISPRO 2: 100 INJECTION, SOLUTION INTRAVENOUS; SUBCUTANEOUS at 11:48

## 2024-08-04 LAB
ANION GAP SERPL CALC-SCNC: 17 MMOL/L — SIGNIFICANT CHANGE UP (ref 5–17)
BUN SERPL-MCNC: 54 MG/DL — HIGH (ref 7–23)
CALCIUM SERPL-MCNC: 9.3 MG/DL — SIGNIFICANT CHANGE UP (ref 8.4–10.5)
CHLORIDE SERPL-SCNC: 96 MMOL/L — SIGNIFICANT CHANGE UP (ref 96–108)
CO2 SERPL-SCNC: 24 MMOL/L — SIGNIFICANT CHANGE UP (ref 22–31)
CREAT SERPL-MCNC: 5.88 MG/DL — HIGH (ref 0.5–1.3)
EGFR: 9 ML/MIN/1.73M2 — LOW
EGFR: 9 ML/MIN/1.73M2 — LOW
GLUCOSE BLDC GLUCOMTR-MCNC: 119 MG/DL — HIGH (ref 70–99)
GLUCOSE BLDC GLUCOMTR-MCNC: 156 MG/DL — HIGH (ref 70–99)
GLUCOSE BLDC GLUCOMTR-MCNC: 184 MG/DL — HIGH (ref 70–99)
GLUCOSE BLDC GLUCOMTR-MCNC: 202 MG/DL — HIGH (ref 70–99)
GLUCOSE SERPL-MCNC: 113 MG/DL — HIGH (ref 70–99)
HCT VFR BLD CALC: 33.1 % — LOW (ref 39–50)
HGB BLD-MCNC: 10.7 G/DL — LOW (ref 13–17)
MAGNESIUM SERPL-MCNC: 2.5 MG/DL — SIGNIFICANT CHANGE UP (ref 1.6–2.6)
MCHC RBC-ENTMCNC: 31.4 PG — SIGNIFICANT CHANGE UP (ref 27–34)
MCHC RBC-ENTMCNC: 32.3 GM/DL — SIGNIFICANT CHANGE UP (ref 32–36)
MCV RBC AUTO: 97.1 FL — SIGNIFICANT CHANGE UP (ref 80–100)
NRBC # BLD: 0 /100 WBCS — SIGNIFICANT CHANGE UP (ref 0–0)
NRBC BLD-RTO: 0 /100 WBCS — SIGNIFICANT CHANGE UP (ref 0–0)
PHOSPHATE SERPL-MCNC: 4.2 MG/DL — SIGNIFICANT CHANGE UP (ref 2.5–4.5)
PLATELET # BLD AUTO: 469 K/UL — HIGH (ref 150–400)
POTASSIUM SERPL-MCNC: 4 MMOL/L — SIGNIFICANT CHANGE UP (ref 3.5–5.3)
POTASSIUM SERPL-SCNC: 4 MMOL/L — SIGNIFICANT CHANGE UP (ref 3.5–5.3)
PYRIDOXAL PHOS SERPL-MCNC: SIGNIFICANT CHANGE UP UG/L
RBC # BLD: 3.41 M/UL — LOW (ref 4.2–5.8)
RBC # FLD: 17.8 % — HIGH (ref 10.3–14.5)
SODIUM SERPL-SCNC: 137 MMOL/L — SIGNIFICANT CHANGE UP (ref 135–145)
WBC # BLD: 14.89 K/UL — HIGH (ref 3.8–10.5)
WBC # FLD AUTO: 14.89 K/UL — HIGH (ref 3.8–10.5)

## 2024-08-04 PROCEDURE — 99232 SBSQ HOSP IP/OBS MODERATE 35: CPT

## 2024-08-04 RX ORDER — ATORVASTATIN CALCIUM 80 MG/1
1 TABLET, FILM COATED ORAL
Qty: 0 | Refills: 0 | DISCHARGE
Start: 2024-08-04

## 2024-08-04 RX ORDER — APIXABAN 2.5 MG/1
1 TABLET, FILM COATED ORAL
Refills: 0 | DISCHARGE

## 2024-08-04 RX ORDER — ASPIRIN 325 MG
1 TABLET ORAL
Qty: 0 | Refills: 0 | DISCHARGE
Start: 2024-08-04

## 2024-08-04 RX ORDER — CLOPIDOGREL BISULFATE 75 MG/1
1 TABLET, FILM COATED ORAL
Qty: 0 | Refills: 0 | DISCHARGE
Start: 2024-08-04

## 2024-08-04 RX ADMIN — INSULIN LISPRO 1: 100 INJECTION, SOLUTION INTRAVENOUS; SUBCUTANEOUS at 17:51

## 2024-08-04 RX ADMIN — Medication 3 MILLIGRAM(S): at 21:44

## 2024-08-04 RX ADMIN — ATORVASTATIN CALCIUM 40 MILLIGRAM(S): 80 TABLET, FILM COATED ORAL at 21:44

## 2024-08-04 RX ADMIN — SACUBITRIL AND VALSARTAN 1 TABLET(S): 6; 6 PELLET ORAL at 17:51

## 2024-08-04 RX ADMIN — Medication 81 MILLIGRAM(S): at 11:38

## 2024-08-04 RX ADMIN — CLOPIDOGREL BISULFATE 75 MILLIGRAM(S): 75 TABLET, FILM COATED ORAL at 11:38

## 2024-08-04 RX ADMIN — CARVEDILOL 3.12 MILLIGRAM(S): 3.12 TABLET, FILM COATED ORAL at 17:50

## 2024-08-04 RX ADMIN — TAMSULOSIN HYDROCHLORIDE 0.4 MILLIGRAM(S): 0.4 CAPSULE ORAL at 21:44

## 2024-08-04 RX ADMIN — CEFTRIAXONE 100 MILLIGRAM(S): 500 INJECTION, POWDER, FOR SOLUTION INTRAMUSCULAR; INTRAVENOUS at 11:38

## 2024-08-04 RX ADMIN — Medication 650 MILLIGRAM(S): at 21:45

## 2024-08-04 RX ADMIN — CARVEDILOL 3.12 MILLIGRAM(S): 3.12 TABLET, FILM COATED ORAL at 05:53

## 2024-08-04 RX ADMIN — Medication 650 MILLIGRAM(S): at 22:29

## 2024-08-04 RX ADMIN — INSULIN LISPRO 2: 100 INJECTION, SOLUTION INTRAVENOUS; SUBCUTANEOUS at 11:38

## 2024-08-04 RX ADMIN — DONEPEZIL HYDROCHLORIDE 10 MILLIGRAM(S): 5 TABLET ORAL at 21:44

## 2024-08-04 RX ADMIN — SACUBITRIL AND VALSARTAN 1 TABLET(S): 6; 6 PELLET ORAL at 05:52

## 2024-08-05 DIAGNOSIS — Z71.89 OTHER SPECIFIED COUNSELING: ICD-10-CM

## 2024-08-05 LAB
ANION GAP SERPL CALC-SCNC: 20 MMOL/L — HIGH (ref 5–17)
BUN SERPL-MCNC: 80 MG/DL — HIGH (ref 7–23)
CALCIUM SERPL-MCNC: 9.3 MG/DL — SIGNIFICANT CHANGE UP (ref 8.4–10.5)
CHLORIDE SERPL-SCNC: 95 MMOL/L — LOW (ref 96–108)
CO2 SERPL-SCNC: 22 MMOL/L — SIGNIFICANT CHANGE UP (ref 22–31)
CREAT SERPL-MCNC: 7.48 MG/DL — HIGH (ref 0.5–1.3)
EGFR: 7 ML/MIN/1.73M2 — LOW
EGFR: 7 ML/MIN/1.73M2 — LOW
GLUCOSE BLDC GLUCOMTR-MCNC: 148 MG/DL — HIGH (ref 70–99)
GLUCOSE BLDC GLUCOMTR-MCNC: 212 MG/DL — HIGH (ref 70–99)
GLUCOSE BLDC GLUCOMTR-MCNC: 277 MG/DL — HIGH (ref 70–99)
GLUCOSE SERPL-MCNC: 159 MG/DL — HIGH (ref 70–99)
HCT VFR BLD CALC: 29.5 % — LOW (ref 39–50)
HGB BLD-MCNC: 9.9 G/DL — LOW (ref 13–17)
MAGNESIUM SERPL-MCNC: 2.7 MG/DL — HIGH (ref 1.6–2.6)
MCHC RBC-ENTMCNC: 31.8 PG — SIGNIFICANT CHANGE UP (ref 27–34)
MCHC RBC-ENTMCNC: 33.6 GM/DL — SIGNIFICANT CHANGE UP (ref 32–36)
MCV RBC AUTO: 94.9 FL — SIGNIFICANT CHANGE UP (ref 80–100)
NRBC # BLD: 0 /100 WBCS — SIGNIFICANT CHANGE UP (ref 0–0)
NRBC BLD-RTO: 0 /100 WBCS — SIGNIFICANT CHANGE UP (ref 0–0)
PHOSPHATE SERPL-MCNC: 4.7 MG/DL — HIGH (ref 2.5–4.5)
PLATELET # BLD AUTO: 418 K/UL — HIGH (ref 150–400)
POTASSIUM SERPL-MCNC: 4 MMOL/L — SIGNIFICANT CHANGE UP (ref 3.5–5.3)
POTASSIUM SERPL-SCNC: 4 MMOL/L — SIGNIFICANT CHANGE UP (ref 3.5–5.3)
RBC # BLD: 3.11 M/UL — LOW (ref 4.2–5.8)
RBC # FLD: 17.2 % — HIGH (ref 10.3–14.5)
SODIUM SERPL-SCNC: 137 MMOL/L — SIGNIFICANT CHANGE UP (ref 135–145)
WBC # BLD: 11.31 K/UL — HIGH (ref 3.8–10.5)
WBC # FLD AUTO: 11.31 K/UL — HIGH (ref 3.8–10.5)

## 2024-08-05 PROCEDURE — 99232 SBSQ HOSP IP/OBS MODERATE 35: CPT | Mod: GC

## 2024-08-05 PROCEDURE — 99233 SBSQ HOSP IP/OBS HIGH 50: CPT | Mod: GC

## 2024-08-05 PROCEDURE — 99233 SBSQ HOSP IP/OBS HIGH 50: CPT

## 2024-08-05 RX ADMIN — INSULIN LISPRO 3: 100 INJECTION, SOLUTION INTRAVENOUS; SUBCUTANEOUS at 17:49

## 2024-08-05 RX ADMIN — ATORVASTATIN CALCIUM 40 MILLIGRAM(S): 80 TABLET, FILM COATED ORAL at 22:25

## 2024-08-05 RX ADMIN — Medication 650 MILLIGRAM(S): at 10:18

## 2024-08-05 RX ADMIN — Medication 3 MILLIGRAM(S): at 22:26

## 2024-08-05 RX ADMIN — TAMSULOSIN HYDROCHLORIDE 0.4 MILLIGRAM(S): 0.4 CAPSULE ORAL at 22:26

## 2024-08-05 RX ADMIN — Medication 81 MILLIGRAM(S): at 17:48

## 2024-08-05 RX ADMIN — CARVEDILOL 3.12 MILLIGRAM(S): 3.12 TABLET, FILM COATED ORAL at 05:26

## 2024-08-05 RX ADMIN — SACUBITRIL AND VALSARTAN 1 TABLET(S): 6; 6 PELLET ORAL at 17:49

## 2024-08-05 RX ADMIN — Medication 650 MILLIGRAM(S): at 11:30

## 2024-08-05 RX ADMIN — CARVEDILOL 3.12 MILLIGRAM(S): 3.12 TABLET, FILM COATED ORAL at 17:49

## 2024-08-05 RX ADMIN — CEFTRIAXONE 100 MILLIGRAM(S): 500 INJECTION, POWDER, FOR SOLUTION INTRAMUSCULAR; INTRAVENOUS at 17:48

## 2024-08-05 RX ADMIN — SACUBITRIL AND VALSARTAN 1 TABLET(S): 6; 6 PELLET ORAL at 05:26

## 2024-08-05 RX ADMIN — DONEPEZIL HYDROCHLORIDE 10 MILLIGRAM(S): 5 TABLET ORAL at 22:26

## 2024-08-05 RX ADMIN — CLOPIDOGREL BISULFATE 75 MILLIGRAM(S): 75 TABLET, FILM COATED ORAL at 17:49

## 2024-08-06 DIAGNOSIS — R53.2 FUNCTIONAL QUADRIPLEGIA: ICD-10-CM

## 2024-08-06 DIAGNOSIS — G93.41 METABOLIC ENCEPHALOPATHY: ICD-10-CM

## 2024-08-06 DIAGNOSIS — Z51.5 ENCOUNTER FOR PALLIATIVE CARE: ICD-10-CM

## 2024-08-06 DIAGNOSIS — M25.562 PAIN IN LEFT KNEE: ICD-10-CM

## 2024-08-06 LAB
ANION GAP SERPL CALC-SCNC: 17 MMOL/L — SIGNIFICANT CHANGE UP (ref 5–17)
BASOPHILS # BLD AUTO: 0.07 K/UL — SIGNIFICANT CHANGE UP (ref 0–0.2)
BASOPHILS NFR BLD AUTO: 0.6 % — SIGNIFICANT CHANGE UP (ref 0–2)
BUN SERPL-MCNC: 49 MG/DL — HIGH (ref 7–23)
CALCIUM SERPL-MCNC: 9.5 MG/DL — SIGNIFICANT CHANGE UP (ref 8.4–10.5)
CHLORIDE SERPL-SCNC: 94 MMOL/L — LOW (ref 96–108)
CO2 SERPL-SCNC: 24 MMOL/L — SIGNIFICANT CHANGE UP (ref 22–31)
CREAT SERPL-MCNC: 5.2 MG/DL — HIGH (ref 0.5–1.3)
EGFR: 11 ML/MIN/1.73M2 — LOW
EGFR: 11 ML/MIN/1.73M2 — LOW
EOSINOPHIL # BLD AUTO: 0.25 K/UL — SIGNIFICANT CHANGE UP (ref 0–0.5)
EOSINOPHIL NFR BLD AUTO: 2.2 % — SIGNIFICANT CHANGE UP (ref 0–6)
GLUCOSE BLDC GLUCOMTR-MCNC: 139 MG/DL — HIGH (ref 70–99)
GLUCOSE BLDC GLUCOMTR-MCNC: 142 MG/DL — HIGH (ref 70–99)
GLUCOSE BLDC GLUCOMTR-MCNC: 212 MG/DL — HIGH (ref 70–99)
GLUCOSE BLDC GLUCOMTR-MCNC: 218 MG/DL — HIGH (ref 70–99)
GLUCOSE SERPL-MCNC: 141 MG/DL — HIGH (ref 70–99)
HCT VFR BLD CALC: 31.4 % — LOW (ref 39–50)
HGB BLD-MCNC: 10.5 G/DL — LOW (ref 13–17)
IMM GRANULOCYTES NFR BLD AUTO: 0.3 % — SIGNIFICANT CHANGE UP (ref 0–0.9)
LYMPHOCYTES # BLD AUTO: 1.44 K/UL — SIGNIFICANT CHANGE UP (ref 1–3.3)
LYMPHOCYTES # BLD AUTO: 12.9 % — LOW (ref 13–44)
MAGNESIUM SERPL-MCNC: 2.4 MG/DL — SIGNIFICANT CHANGE UP (ref 1.6–2.6)
MCHC RBC-ENTMCNC: 31.7 PG — SIGNIFICANT CHANGE UP (ref 27–34)
MCHC RBC-ENTMCNC: 33.4 GM/DL — SIGNIFICANT CHANGE UP (ref 32–36)
MCV RBC AUTO: 94.9 FL — SIGNIFICANT CHANGE UP (ref 80–100)
MONOCYTES # BLD AUTO: 1.54 K/UL — HIGH (ref 0–0.9)
MONOCYTES NFR BLD AUTO: 13.8 % — SIGNIFICANT CHANGE UP (ref 2–14)
NEUTROPHILS # BLD AUTO: 7.79 K/UL — HIGH (ref 1.8–7.4)
NEUTROPHILS NFR BLD AUTO: 70.2 % — SIGNIFICANT CHANGE UP (ref 43–77)
NRBC # BLD: 0 /100 WBCS — SIGNIFICANT CHANGE UP (ref 0–0)
NRBC BLD-RTO: 0 /100 WBCS — SIGNIFICANT CHANGE UP (ref 0–0)
PHOSPHATE SERPL-MCNC: 3.8 MG/DL — SIGNIFICANT CHANGE UP (ref 2.5–4.5)
PLATELET # BLD AUTO: 440 K/UL — HIGH (ref 150–400)
POTASSIUM SERPL-MCNC: 3.7 MMOL/L — SIGNIFICANT CHANGE UP (ref 3.5–5.3)
POTASSIUM SERPL-SCNC: 3.7 MMOL/L — SIGNIFICANT CHANGE UP (ref 3.5–5.3)
RBC # BLD: 3.31 M/UL — LOW (ref 4.2–5.8)
RBC # FLD: 17.1 % — HIGH (ref 10.3–14.5)
SODIUM SERPL-SCNC: 135 MMOL/L — SIGNIFICANT CHANGE UP (ref 135–145)
WBC # BLD: 11.12 K/UL — HIGH (ref 3.8–10.5)
WBC # FLD AUTO: 11.12 K/UL — HIGH (ref 3.8–10.5)

## 2024-08-06 PROCEDURE — 99222 1ST HOSP IP/OBS MODERATE 55: CPT

## 2024-08-06 PROCEDURE — 93010 ELECTROCARDIOGRAM REPORT: CPT

## 2024-08-06 PROCEDURE — 99233 SBSQ HOSP IP/OBS HIGH 50: CPT | Mod: GC

## 2024-08-06 RX ORDER — B1/B2/B3/B5/B6/B12/VIT C/FOLIC 500-0.5 MG
1 TABLET ORAL DAILY
Refills: 0 | Status: DISCONTINUED | OUTPATIENT
Start: 2024-08-06 | End: 2024-08-24

## 2024-08-06 RX ORDER — ISOSORBIDE MONONITRATE 60 MG/1
30 TABLET, EXTENDED RELEASE ORAL DAILY
Refills: 0 | Status: DISCONTINUED | OUTPATIENT
Start: 2024-08-06 | End: 2024-08-12

## 2024-08-06 RX ADMIN — CARVEDILOL 3.12 MILLIGRAM(S): 3.12 TABLET, FILM COATED ORAL at 05:55

## 2024-08-06 RX ADMIN — Medication 650 MILLIGRAM(S): at 23:40

## 2024-08-06 RX ADMIN — DONEPEZIL HYDROCHLORIDE 10 MILLIGRAM(S): 5 TABLET ORAL at 23:15

## 2024-08-06 RX ADMIN — INSULIN LISPRO 2: 100 INJECTION, SOLUTION INTRAVENOUS; SUBCUTANEOUS at 12:09

## 2024-08-06 RX ADMIN — INSULIN LISPRO 2: 100 INJECTION, SOLUTION INTRAVENOUS; SUBCUTANEOUS at 17:51

## 2024-08-06 RX ADMIN — ATORVASTATIN CALCIUM 40 MILLIGRAM(S): 80 TABLET, FILM COATED ORAL at 23:16

## 2024-08-06 RX ADMIN — SACUBITRIL AND VALSARTAN 1 TABLET(S): 6; 6 PELLET ORAL at 17:52

## 2024-08-06 RX ADMIN — Medication 650 MILLIGRAM(S): at 23:17

## 2024-08-06 RX ADMIN — SACUBITRIL AND VALSARTAN 1 TABLET(S): 6; 6 PELLET ORAL at 05:55

## 2024-08-06 RX ADMIN — Medication 3 MILLIGRAM(S): at 23:15

## 2024-08-06 RX ADMIN — Medication 81 MILLIGRAM(S): at 12:20

## 2024-08-06 RX ADMIN — FUROSEMIDE 80 MILLIGRAM(S): 10 INJECTION INTRAMUSCULAR; INTRAVENOUS at 08:20

## 2024-08-06 RX ADMIN — CLOPIDOGREL BISULFATE 75 MILLIGRAM(S): 75 TABLET, FILM COATED ORAL at 12:20

## 2024-08-06 RX ADMIN — TAMSULOSIN HYDROCHLORIDE 0.4 MILLIGRAM(S): 0.4 CAPSULE ORAL at 23:16

## 2024-08-06 RX ADMIN — CARVEDILOL 3.12 MILLIGRAM(S): 3.12 TABLET, FILM COATED ORAL at 17:52

## 2024-08-07 LAB
ANION GAP SERPL CALC-SCNC: 21 MMOL/L — HIGH (ref 5–17)
BASOPHILS # BLD AUTO: 0.06 K/UL — SIGNIFICANT CHANGE UP (ref 0–0.2)
BASOPHILS NFR BLD AUTO: 0.4 % — SIGNIFICANT CHANGE UP (ref 0–2)
BUN SERPL-MCNC: 74 MG/DL — HIGH (ref 7–23)
CALCIUM SERPL-MCNC: 9.3 MG/DL — SIGNIFICANT CHANGE UP (ref 8.4–10.5)
CHLORIDE SERPL-SCNC: 94 MMOL/L — LOW (ref 96–108)
CO2 SERPL-SCNC: 20 MMOL/L — LOW (ref 22–31)
CREAT SERPL-MCNC: 7.13 MG/DL — HIGH (ref 0.5–1.3)
EGFR: 8 ML/MIN/1.73M2 — LOW
EGFR: 8 ML/MIN/1.73M2 — LOW
EOSINOPHIL # BLD AUTO: 0.17 K/UL — SIGNIFICANT CHANGE UP (ref 0–0.5)
EOSINOPHIL NFR BLD AUTO: 1 % — SIGNIFICANT CHANGE UP (ref 0–6)
GLUCOSE BLDC GLUCOMTR-MCNC: 168 MG/DL — HIGH (ref 70–99)
GLUCOSE BLDC GLUCOMTR-MCNC: 174 MG/DL — HIGH (ref 70–99)
GLUCOSE BLDC GLUCOMTR-MCNC: 179 MG/DL — HIGH (ref 70–99)
GLUCOSE BLDC GLUCOMTR-MCNC: 182 MG/DL — HIGH (ref 70–99)
GLUCOSE SERPL-MCNC: 188 MG/DL — HIGH (ref 70–99)
HCT VFR BLD CALC: 30 % — LOW (ref 39–50)
HGB BLD-MCNC: 10 G/DL — LOW (ref 13–17)
IMM GRANULOCYTES NFR BLD AUTO: 0.5 % — SIGNIFICANT CHANGE UP (ref 0–0.9)
LYMPHOCYTES # BLD AUTO: 1.42 K/UL — SIGNIFICANT CHANGE UP (ref 1–3.3)
LYMPHOCYTES # BLD AUTO: 8.6 % — LOW (ref 13–44)
MAGNESIUM SERPL-MCNC: 2.6 MG/DL — SIGNIFICANT CHANGE UP (ref 1.6–2.6)
MCHC RBC-ENTMCNC: 31.6 PG — SIGNIFICANT CHANGE UP (ref 27–34)
MCHC RBC-ENTMCNC: 33.3 GM/DL — SIGNIFICANT CHANGE UP (ref 32–36)
MCV RBC AUTO: 94.9 FL — SIGNIFICANT CHANGE UP (ref 80–100)
MONOCYTES # BLD AUTO: 1.43 K/UL — HIGH (ref 0–0.9)
MONOCYTES NFR BLD AUTO: 8.6 % — SIGNIFICANT CHANGE UP (ref 2–14)
NEUTROPHILS # BLD AUTO: 13.38 K/UL — HIGH (ref 1.8–7.4)
NEUTROPHILS NFR BLD AUTO: 80.9 % — HIGH (ref 43–77)
NRBC # BLD: 0 /100 WBCS — SIGNIFICANT CHANGE UP (ref 0–0)
NRBC BLD-RTO: 0 /100 WBCS — SIGNIFICANT CHANGE UP (ref 0–0)
PHOSPHATE SERPL-MCNC: 4.8 MG/DL — HIGH (ref 2.5–4.5)
PLATELET # BLD AUTO: 449 K/UL — HIGH (ref 150–400)
POTASSIUM SERPL-MCNC: 4.1 MMOL/L — SIGNIFICANT CHANGE UP (ref 3.5–5.3)
POTASSIUM SERPL-SCNC: 4.1 MMOL/L — SIGNIFICANT CHANGE UP (ref 3.5–5.3)
RBC # BLD: 3.16 M/UL — LOW (ref 4.2–5.8)
RBC # FLD: 16.7 % — HIGH (ref 10.3–14.5)
SODIUM SERPL-SCNC: 135 MMOL/L — SIGNIFICANT CHANGE UP (ref 135–145)
WBC # BLD: 16.55 K/UL — HIGH (ref 3.8–10.5)
WBC # FLD AUTO: 16.55 K/UL — HIGH (ref 3.8–10.5)

## 2024-08-07 PROCEDURE — 99233 SBSQ HOSP IP/OBS HIGH 50: CPT

## 2024-08-07 PROCEDURE — 99232 SBSQ HOSP IP/OBS MODERATE 35: CPT | Mod: GC

## 2024-08-07 RX ADMIN — CARVEDILOL 3.12 MILLIGRAM(S): 3.12 TABLET, FILM COATED ORAL at 17:21

## 2024-08-07 RX ADMIN — CARVEDILOL 3.12 MILLIGRAM(S): 3.12 TABLET, FILM COATED ORAL at 05:35

## 2024-08-07 RX ADMIN — SACUBITRIL AND VALSARTAN 1 TABLET(S): 6; 6 PELLET ORAL at 17:21

## 2024-08-07 RX ADMIN — INSULIN LISPRO 1: 100 INJECTION, SOLUTION INTRAVENOUS; SUBCUTANEOUS at 08:12

## 2024-08-07 RX ADMIN — Medication 81 MILLIGRAM(S): at 11:39

## 2024-08-07 RX ADMIN — Medication 1 TABLET(S): at 11:40

## 2024-08-07 RX ADMIN — Medication 650 MILLIGRAM(S): at 08:45

## 2024-08-07 RX ADMIN — INSULIN LISPRO 1: 100 INJECTION, SOLUTION INTRAVENOUS; SUBCUTANEOUS at 11:44

## 2024-08-07 RX ADMIN — INSULIN LISPRO 1: 100 INJECTION, SOLUTION INTRAVENOUS; SUBCUTANEOUS at 17:20

## 2024-08-07 RX ADMIN — Medication 650 MILLIGRAM(S): at 09:30

## 2024-08-07 RX ADMIN — SACUBITRIL AND VALSARTAN 1 TABLET(S): 6; 6 PELLET ORAL at 05:35

## 2024-08-07 RX ADMIN — CLOPIDOGREL BISULFATE 75 MILLIGRAM(S): 75 TABLET, FILM COATED ORAL at 11:39

## 2024-08-07 RX ADMIN — ISOSORBIDE MONONITRATE 30 MILLIGRAM(S): 60 TABLET, EXTENDED RELEASE ORAL at 11:44

## 2024-08-08 LAB
ANION GAP SERPL CALC-SCNC: 16 MMOL/L — SIGNIFICANT CHANGE UP (ref 5–17)
BASOPHILS # BLD AUTO: 0.07 K/UL — SIGNIFICANT CHANGE UP (ref 0–0.2)
BASOPHILS NFR BLD AUTO: 0.6 % — SIGNIFICANT CHANGE UP (ref 0–2)
BUN SERPL-MCNC: 40 MG/DL — HIGH (ref 7–23)
CALCIUM SERPL-MCNC: 9.5 MG/DL — SIGNIFICANT CHANGE UP (ref 8.4–10.5)
CHLORIDE SERPL-SCNC: 95 MMOL/L — LOW (ref 96–108)
CO2 SERPL-SCNC: 26 MMOL/L — SIGNIFICANT CHANGE UP (ref 22–31)
CREAT SERPL-MCNC: 4.56 MG/DL — HIGH (ref 0.5–1.3)
CRP SERPL-MCNC: 129 MG/L — HIGH (ref 0–4)
EGFR: 13 ML/MIN/1.73M2 — LOW
EGFR: 13 ML/MIN/1.73M2 — LOW
EOSINOPHIL # BLD AUTO: 0.16 K/UL — SIGNIFICANT CHANGE UP (ref 0–0.5)
EOSINOPHIL NFR BLD AUTO: 1.4 % — SIGNIFICANT CHANGE UP (ref 0–6)
ERYTHROCYTE [SEDIMENTATION RATE] IN BLOOD: 120 MM/HR — HIGH (ref 0–20)
GLUCOSE BLDC GLUCOMTR-MCNC: 179 MG/DL — HIGH (ref 70–99)
GLUCOSE BLDC GLUCOMTR-MCNC: 230 MG/DL — HIGH (ref 70–99)
GLUCOSE BLDC GLUCOMTR-MCNC: 236 MG/DL — HIGH (ref 70–99)
GLUCOSE BLDC GLUCOMTR-MCNC: 249 MG/DL — HIGH (ref 70–99)
GLUCOSE BLDC GLUCOMTR-MCNC: 361 MG/DL — HIGH (ref 70–99)
GLUCOSE SERPL-MCNC: 193 MG/DL — HIGH (ref 70–99)
HCT VFR BLD CALC: 30.2 % — LOW (ref 39–50)
HGB BLD-MCNC: 9.9 G/DL — LOW (ref 13–17)
IMM GRANULOCYTES NFR BLD AUTO: 0.3 % — SIGNIFICANT CHANGE UP (ref 0–0.9)
LYMPHOCYTES # BLD AUTO: 1.4 K/UL — SIGNIFICANT CHANGE UP (ref 1–3.3)
LYMPHOCYTES # BLD AUTO: 12.1 % — LOW (ref 13–44)
MAGNESIUM SERPL-MCNC: 2.3 MG/DL — SIGNIFICANT CHANGE UP (ref 1.6–2.6)
MCHC RBC-ENTMCNC: 31.6 PG — SIGNIFICANT CHANGE UP (ref 27–34)
MCHC RBC-ENTMCNC: 32.8 GM/DL — SIGNIFICANT CHANGE UP (ref 32–36)
MCV RBC AUTO: 96.5 FL — SIGNIFICANT CHANGE UP (ref 80–100)
MONOCYTES # BLD AUTO: 1.45 K/UL — HIGH (ref 0–0.9)
MONOCYTES NFR BLD AUTO: 12.5 % — SIGNIFICANT CHANGE UP (ref 2–14)
NEUTROPHILS # BLD AUTO: 8.46 K/UL — HIGH (ref 1.8–7.4)
NEUTROPHILS NFR BLD AUTO: 73.1 % — SIGNIFICANT CHANGE UP (ref 43–77)
NRBC # BLD: 0 /100 WBCS — SIGNIFICANT CHANGE UP (ref 0–0)
NRBC BLD-RTO: 0 /100 WBCS — SIGNIFICANT CHANGE UP (ref 0–0)
PHOSPHATE SERPL-MCNC: 3.6 MG/DL — SIGNIFICANT CHANGE UP (ref 2.5–4.5)
PLATELET # BLD AUTO: 489 K/UL — HIGH (ref 150–400)
POTASSIUM SERPL-MCNC: 3.3 MMOL/L — LOW (ref 3.5–5.3)
POTASSIUM SERPL-SCNC: 3.3 MMOL/L — LOW (ref 3.5–5.3)
RBC # BLD: 3.13 M/UL — LOW (ref 4.2–5.8)
RBC # FLD: 17.1 % — HIGH (ref 10.3–14.5)
SODIUM SERPL-SCNC: 137 MMOL/L — SIGNIFICANT CHANGE UP (ref 135–145)
WBC # BLD: 11.58 K/UL — HIGH (ref 3.8–10.5)
WBC # FLD AUTO: 11.58 K/UL — HIGH (ref 3.8–10.5)

## 2024-08-08 PROCEDURE — 73552 X-RAY EXAM OF FEMUR 2/>: CPT | Mod: 26,LT

## 2024-08-08 PROCEDURE — 93010 ELECTROCARDIOGRAM REPORT: CPT

## 2024-08-08 PROCEDURE — 99221 1ST HOSP IP/OBS SF/LOW 40: CPT

## 2024-08-08 PROCEDURE — 73502 X-RAY EXAM HIP UNI 2-3 VIEWS: CPT | Mod: 26,LT

## 2024-08-08 PROCEDURE — 73564 X-RAY EXAM KNEE 4 OR MORE: CPT | Mod: 26,LT

## 2024-08-08 PROCEDURE — 99233 SBSQ HOSP IP/OBS HIGH 50: CPT | Mod: GC

## 2024-08-08 PROCEDURE — 74177 CT ABD & PELVIS W/CONTRAST: CPT | Mod: 26

## 2024-08-08 PROCEDURE — 73610 X-RAY EXAM OF ANKLE: CPT | Mod: 26,LT

## 2024-08-08 PROCEDURE — 73701 CT LOWER EXTREMITY W/DYE: CPT | Mod: 26,LT

## 2024-08-08 PROCEDURE — 73590 X-RAY EXAM OF LOWER LEG: CPT | Mod: 26,LT

## 2024-08-08 RX ORDER — HEPARIN SODIUM 1000 [USP'U]/ML
5000 INJECTION INTRAVENOUS; SUBCUTANEOUS EVERY 8 HOURS
Refills: 0 | Status: DISCONTINUED | OUTPATIENT
Start: 2024-08-08 | End: 2024-08-09

## 2024-08-08 RX ADMIN — TAMSULOSIN HYDROCHLORIDE 0.4 MILLIGRAM(S): 0.4 CAPSULE ORAL at 21:41

## 2024-08-08 RX ADMIN — Medication 40 MILLIGRAM(S): at 17:46

## 2024-08-08 RX ADMIN — INSULIN LISPRO 5: 100 INJECTION, SOLUTION INTRAVENOUS; SUBCUTANEOUS at 17:44

## 2024-08-08 RX ADMIN — TAMSULOSIN HYDROCHLORIDE 0.4 MILLIGRAM(S): 0.4 CAPSULE ORAL at 00:05

## 2024-08-08 RX ADMIN — HEPARIN SODIUM 5000 UNIT(S): 1000 INJECTION INTRAVENOUS; SUBCUTANEOUS at 21:41

## 2024-08-08 RX ADMIN — SACUBITRIL AND VALSARTAN 1 TABLET(S): 6; 6 PELLET ORAL at 17:44

## 2024-08-08 RX ADMIN — Medication 1000 MILLILITER(S): at 09:59

## 2024-08-08 RX ADMIN — DONEPEZIL HYDROCHLORIDE 10 MILLIGRAM(S): 5 TABLET ORAL at 21:41

## 2024-08-08 RX ADMIN — Medication 3 MILLIGRAM(S): at 21:41

## 2024-08-08 RX ADMIN — Medication 650 MILLIGRAM(S): at 22:05

## 2024-08-08 RX ADMIN — Medication 1000 MILLILITER(S): at 06:00

## 2024-08-08 RX ADMIN — ATORVASTATIN CALCIUM 40 MILLIGRAM(S): 80 TABLET, FILM COATED ORAL at 00:05

## 2024-08-08 RX ADMIN — ATORVASTATIN CALCIUM 40 MILLIGRAM(S): 80 TABLET, FILM COATED ORAL at 21:41

## 2024-08-08 RX ADMIN — CARVEDILOL 3.12 MILLIGRAM(S): 3.12 TABLET, FILM COATED ORAL at 17:44

## 2024-08-08 RX ADMIN — Medication 650 MILLIGRAM(S): at 21:41

## 2024-08-08 RX ADMIN — Medication 3 MILLIGRAM(S): at 00:05

## 2024-08-08 RX ADMIN — DONEPEZIL HYDROCHLORIDE 10 MILLIGRAM(S): 5 TABLET ORAL at 00:05

## 2024-08-09 DIAGNOSIS — I95.9 HYPOTENSION, UNSPECIFIED: ICD-10-CM

## 2024-08-09 LAB
ANION GAP SERPL CALC-SCNC: 16 MMOL/L — SIGNIFICANT CHANGE UP (ref 5–17)
ANION GAP SERPL CALC-SCNC: 20 MMOL/L — HIGH (ref 5–17)
ANISOCYTOSIS BLD QL: SLIGHT — SIGNIFICANT CHANGE UP
BASOPHILS # BLD AUTO: 0.21 K/UL — HIGH (ref 0–0.2)
BASOPHILS NFR BLD AUTO: 1.7 % — SIGNIFICANT CHANGE UP (ref 0–2)
BUN SERPL-MCNC: 44 MG/DL — HIGH (ref 7–23)
BUN SERPL-MCNC: 61 MG/DL — HIGH (ref 7–23)
CALCIUM SERPL-MCNC: 9 MG/DL — SIGNIFICANT CHANGE UP (ref 8.4–10.5)
CALCIUM SERPL-MCNC: 9 MG/DL — SIGNIFICANT CHANGE UP (ref 8.4–10.5)
CHLORIDE SERPL-SCNC: 95 MMOL/L — LOW (ref 96–108)
CHLORIDE SERPL-SCNC: 95 MMOL/L — LOW (ref 96–108)
CO2 SERPL-SCNC: 21 MMOL/L — LOW (ref 22–31)
CO2 SERPL-SCNC: 23 MMOL/L — SIGNIFICANT CHANGE UP (ref 22–31)
CREAT SERPL-MCNC: 4.04 MG/DL — HIGH (ref 0.5–1.3)
CREAT SERPL-MCNC: 5.84 MG/DL — HIGH (ref 0.5–1.3)
DACRYOCYTES BLD QL SMEAR: SLIGHT — SIGNIFICANT CHANGE UP
EGFR: 10 ML/MIN/1.73M2 — LOW
EGFR: 10 ML/MIN/1.73M2 — LOW
EGFR: 15 ML/MIN/1.73M2 — LOW
EGFR: 15 ML/MIN/1.73M2 — LOW
ELLIPTOCYTES BLD QL SMEAR: SLIGHT — SIGNIFICANT CHANGE UP
EOSINOPHIL # BLD AUTO: 0 K/UL — SIGNIFICANT CHANGE UP (ref 0–0.5)
EOSINOPHIL NFR BLD AUTO: 0 % — SIGNIFICANT CHANGE UP (ref 0–6)
GIANT PLATELETS BLD QL SMEAR: PRESENT — SIGNIFICANT CHANGE UP
GLUCOSE BLDC GLUCOMTR-MCNC: 167 MG/DL — HIGH (ref 70–99)
GLUCOSE BLDC GLUCOMTR-MCNC: 213 MG/DL — HIGH (ref 70–99)
GLUCOSE BLDC GLUCOMTR-MCNC: 272 MG/DL — HIGH (ref 70–99)
GLUCOSE BLDC GLUCOMTR-MCNC: 279 MG/DL — HIGH (ref 70–99)
GLUCOSE SERPL-MCNC: 201 MG/DL — HIGH (ref 70–99)
GLUCOSE SERPL-MCNC: 217 MG/DL — HIGH (ref 70–99)
HCT VFR BLD CALC: 24.8 % — LOW (ref 39–50)
HCT VFR BLD CALC: 27 % — LOW (ref 39–50)
HCT VFR BLD CALC: 27 % — LOW (ref 39–50)
HGB BLD-MCNC: 8.4 G/DL — LOW (ref 13–17)
HGB BLD-MCNC: 8.5 G/DL — LOW (ref 13–17)
HGB BLD-MCNC: 8.6 G/DL — LOW (ref 13–17)
HYPOCHROMIA BLD QL: SLIGHT — SIGNIFICANT CHANGE UP
LYMPHOCYTES # BLD AUTO: 1.17 K/UL — SIGNIFICANT CHANGE UP (ref 1–3.3)
LYMPHOCYTES # BLD AUTO: 9.6 % — LOW (ref 13–44)
MACROCYTES BLD QL: SLIGHT — SIGNIFICANT CHANGE UP
MAGNESIUM SERPL-MCNC: 2.4 MG/DL — SIGNIFICANT CHANGE UP (ref 1.6–2.6)
MANUAL SMEAR VERIFICATION: SIGNIFICANT CHANGE UP
MCHC RBC-ENTMCNC: 30.8 PG — SIGNIFICANT CHANGE UP (ref 27–34)
MCHC RBC-ENTMCNC: 31.2 PG — SIGNIFICANT CHANGE UP (ref 27–34)
MCHC RBC-ENTMCNC: 31.5 GM/DL — LOW (ref 32–36)
MCHC RBC-ENTMCNC: 31.9 GM/DL — LOW (ref 32–36)
MCHC RBC-ENTMCNC: 32.2 PG — SIGNIFICANT CHANGE UP (ref 27–34)
MCHC RBC-ENTMCNC: 33.9 GM/DL — SIGNIFICANT CHANGE UP (ref 32–36)
MCV RBC AUTO: 95 FL — SIGNIFICANT CHANGE UP (ref 80–100)
MCV RBC AUTO: 97.8 FL — SIGNIFICANT CHANGE UP (ref 80–100)
MCV RBC AUTO: 97.8 FL — SIGNIFICANT CHANGE UP (ref 80–100)
MONOCYTES # BLD AUTO: 1.5 K/UL — HIGH (ref 0–0.9)
MONOCYTES NFR BLD AUTO: 12.3 % — SIGNIFICANT CHANGE UP (ref 2–14)
NEUTROPHILS # BLD AUTO: 8.98 K/UL — HIGH (ref 1.8–7.4)
NEUTROPHILS NFR BLD AUTO: 72.8 % — SIGNIFICANT CHANGE UP (ref 43–77)
NEUTS BAND # BLD: 0.9 % — SIGNIFICANT CHANGE UP (ref 0–8)
NEUTS BAND NFR BLD: 0.9 % — SIGNIFICANT CHANGE UP (ref 0–8)
NRBC # BLD: 0 /100 WBCS — SIGNIFICANT CHANGE UP (ref 0–0)
NRBC # BLD: 0 /100 WBCS — SIGNIFICANT CHANGE UP (ref 0–0)
NRBC BLD-RTO: 0 /100 WBCS — SIGNIFICANT CHANGE UP (ref 0–0)
NRBC BLD-RTO: 0 /100 WBCS — SIGNIFICANT CHANGE UP (ref 0–0)
OVALOCYTES BLD QL SMEAR: SLIGHT — SIGNIFICANT CHANGE UP
PHOSPHATE SERPL-MCNC: 3.9 MG/DL — SIGNIFICANT CHANGE UP (ref 2.5–4.5)
PLAT MORPH BLD: ABNORMAL
PLATELET # BLD AUTO: 409 K/UL — HIGH (ref 150–400)
PLATELET # BLD AUTO: 419 K/UL — HIGH (ref 150–400)
PLATELET # BLD AUTO: 432 K/UL — HIGH (ref 150–400)
POIKILOCYTOSIS BLD QL AUTO: SLIGHT — SIGNIFICANT CHANGE UP
POTASSIUM SERPL-MCNC: 3.5 MMOL/L — SIGNIFICANT CHANGE UP (ref 3.5–5.3)
POTASSIUM SERPL-MCNC: 3.5 MMOL/L — SIGNIFICANT CHANGE UP (ref 3.5–5.3)
POTASSIUM SERPL-SCNC: 3.5 MMOL/L — SIGNIFICANT CHANGE UP (ref 3.5–5.3)
POTASSIUM SERPL-SCNC: 3.5 MMOL/L — SIGNIFICANT CHANGE UP (ref 3.5–5.3)
PROMYELOCYTES # FLD: 0.9 % — HIGH (ref 0–0)
PROMYELOCYTES NFR BLD: 0.9 % — HIGH (ref 0–0)
RBC # BLD: 2.61 M/UL — LOW (ref 4.2–5.8)
RBC # BLD: 2.76 M/UL — LOW (ref 4.2–5.8)
RBC # BLD: 2.76 M/UL — LOW (ref 4.2–5.8)
RBC # FLD: 16.7 % — HIGH (ref 10.3–14.5)
RBC # FLD: 16.7 % — HIGH (ref 10.3–14.5)
RBC # FLD: 16.9 % — HIGH (ref 10.3–14.5)
RBC BLD AUTO: ABNORMAL
SODIUM SERPL-SCNC: 134 MMOL/L — LOW (ref 135–145)
SODIUM SERPL-SCNC: 136 MMOL/L — SIGNIFICANT CHANGE UP (ref 135–145)
TARGETS BLD QL SMEAR: SLIGHT — SIGNIFICANT CHANGE UP
TROPONIN T, HIGH SENSITIVITY RESULT: 347 NG/L — HIGH (ref 0–51)
VARIANT LYMPHS # BLD: 1.8 % — SIGNIFICANT CHANGE UP (ref 0–6)
VARIANT LYMPHS NFR BLD MANUAL: 1.8 % — SIGNIFICANT CHANGE UP (ref 0–6)
WBC # BLD: 12.18 K/UL — HIGH (ref 3.8–10.5)
WBC # BLD: 12.6 K/UL — HIGH (ref 3.8–10.5)
WBC # BLD: 15.29 K/UL — HIGH (ref 3.8–10.5)
WBC # FLD AUTO: 12.18 K/UL — HIGH (ref 3.8–10.5)
WBC # FLD AUTO: 12.6 K/UL — HIGH (ref 3.8–10.5)
WBC # FLD AUTO: 15.29 K/UL — HIGH (ref 3.8–10.5)

## 2024-08-09 PROCEDURE — 99232 SBSQ HOSP IP/OBS MODERATE 35: CPT | Mod: GC

## 2024-08-09 PROCEDURE — 99233 SBSQ HOSP IP/OBS HIGH 50: CPT | Mod: GC

## 2024-08-09 PROCEDURE — 93010 ELECTROCARDIOGRAM REPORT: CPT

## 2024-08-09 PROCEDURE — 99222 1ST HOSP IP/OBS MODERATE 55: CPT | Mod: GC

## 2024-08-09 RX ORDER — HEPARIN SODIUM 1000 [USP'U]/ML
5000 INJECTION INTRAVENOUS; SUBCUTANEOUS EVERY 12 HOURS
Refills: 0 | Status: DISCONTINUED | OUTPATIENT
Start: 2024-08-09 | End: 2024-08-11

## 2024-08-09 RX ORDER — MIDODRINE HYDROCHLORIDE 5 MG/1
10 TABLET ORAL ONCE
Refills: 0 | Status: COMPLETED | OUTPATIENT
Start: 2024-08-09 | End: 2024-08-09

## 2024-08-09 RX ORDER — PIPERACILLIN-TAZO-DEXTROSE,ISO 3.375G/5
3.38 IV SOLUTION, PIGGYBACK PREMIX FROZEN(ML) INTRAVENOUS EVERY 12 HOURS
Refills: 0 | Status: DISCONTINUED | OUTPATIENT
Start: 2024-08-10 | End: 2024-08-12

## 2024-08-09 RX ORDER — MIDODRINE HYDROCHLORIDE 5 MG/1
10 TABLET ORAL
Refills: 0 | Status: DISCONTINUED | OUTPATIENT
Start: 2024-08-09 | End: 2024-08-17

## 2024-08-09 RX ORDER — PIPERACILLIN-TAZO-DEXTROSE,ISO 3.375G/5
3.38 IV SOLUTION, PIGGYBACK PREMIX FROZEN(ML) INTRAVENOUS ONCE
Refills: 0 | Status: COMPLETED | OUTPATIENT
Start: 2024-08-09 | End: 2024-08-09

## 2024-08-09 RX ORDER — ACETAMINOPHEN 500 MG/5ML
1000 LIQUID (ML) ORAL ONCE
Refills: 0 | Status: COMPLETED | OUTPATIENT
Start: 2024-08-09 | End: 2024-08-09

## 2024-08-09 RX ADMIN — Medication 1 TABLET(S): at 12:03

## 2024-08-09 RX ADMIN — Medication 3 MILLIGRAM(S): at 21:26

## 2024-08-09 RX ADMIN — Medication 650 MILLIGRAM(S): at 09:50

## 2024-08-09 RX ADMIN — INSULIN LISPRO 1: 100 INJECTION, SOLUTION INTRAVENOUS; SUBCUTANEOUS at 12:03

## 2024-08-09 RX ADMIN — ATORVASTATIN CALCIUM 40 MILLIGRAM(S): 80 TABLET, FILM COATED ORAL at 21:26

## 2024-08-09 RX ADMIN — Medication 650 MILLIGRAM(S): at 08:36

## 2024-08-09 RX ADMIN — Medication 1000 MILLIGRAM(S): at 23:35

## 2024-08-09 RX ADMIN — Medication 25 GRAM(S): at 21:25

## 2024-08-09 RX ADMIN — Medication 200 GRAM(S): at 13:25

## 2024-08-09 RX ADMIN — INSULIN LISPRO 3: 100 INJECTION, SOLUTION INTRAVENOUS; SUBCUTANEOUS at 08:19

## 2024-08-09 RX ADMIN — Medication 1000 MILLILITER(S): at 05:22

## 2024-08-09 RX ADMIN — MIDODRINE HYDROCHLORIDE 10 MILLIGRAM(S): 5 TABLET ORAL at 15:47

## 2024-08-09 RX ADMIN — Medication 650 MILLIGRAM(S): at 18:55

## 2024-08-09 RX ADMIN — HEPARIN SODIUM 5000 UNIT(S): 1000 INJECTION INTRAVENOUS; SUBCUTANEOUS at 05:23

## 2024-08-09 RX ADMIN — Medication 81 MILLIGRAM(S): at 12:01

## 2024-08-09 RX ADMIN — DONEPEZIL HYDROCHLORIDE 10 MILLIGRAM(S): 5 TABLET ORAL at 21:26

## 2024-08-09 RX ADMIN — Medication 400 MILLIGRAM(S): at 23:05

## 2024-08-09 RX ADMIN — ISOSORBIDE MONONITRATE 30 MILLIGRAM(S): 60 TABLET, EXTENDED RELEASE ORAL at 12:03

## 2024-08-09 RX ADMIN — Medication 650 MILLIGRAM(S): at 19:45

## 2024-08-09 RX ADMIN — TAMSULOSIN HYDROCHLORIDE 0.4 MILLIGRAM(S): 0.4 CAPSULE ORAL at 21:27

## 2024-08-09 RX ADMIN — Medication 40 MILLIGRAM(S): at 05:23

## 2024-08-10 LAB
ANION GAP SERPL CALC-SCNC: 18 MMOL/L — HIGH (ref 5–17)
BASOPHILS # BLD AUTO: 0.06 K/UL — SIGNIFICANT CHANGE UP (ref 0–0.2)
BASOPHILS NFR BLD AUTO: 0.4 % — SIGNIFICANT CHANGE UP (ref 0–2)
BUN SERPL-MCNC: 50 MG/DL — HIGH (ref 7–23)
CALCIUM SERPL-MCNC: 9.3 MG/DL — SIGNIFICANT CHANGE UP (ref 8.4–10.5)
CHLORIDE SERPL-SCNC: 94 MMOL/L — LOW (ref 96–108)
CO2 SERPL-SCNC: 23 MMOL/L — SIGNIFICANT CHANGE UP (ref 22–31)
CREAT SERPL-MCNC: 5.05 MG/DL — HIGH (ref 0.5–1.3)
EGFR: 11 ML/MIN/1.73M2 — LOW
EGFR: 11 ML/MIN/1.73M2 — LOW
EOSINOPHIL # BLD AUTO: 0.3 K/UL — SIGNIFICANT CHANGE UP (ref 0–0.5)
EOSINOPHIL NFR BLD AUTO: 2.1 % — SIGNIFICANT CHANGE UP (ref 0–6)
FERRITIN SERPL-MCNC: 791 NG/ML — HIGH (ref 30–400)
GLUCOSE BLDC GLUCOMTR-MCNC: 130 MG/DL — HIGH (ref 70–99)
GLUCOSE BLDC GLUCOMTR-MCNC: 143 MG/DL — HIGH (ref 70–99)
GLUCOSE BLDC GLUCOMTR-MCNC: 275 MG/DL — HIGH (ref 70–99)
GLUCOSE BLDC GLUCOMTR-MCNC: 289 MG/DL — HIGH (ref 70–99)
GLUCOSE SERPL-MCNC: 150 MG/DL — HIGH (ref 70–99)
HCT VFR BLD CALC: 25.5 % — LOW (ref 39–50)
HGB BLD-MCNC: 8.1 G/DL — LOW (ref 13–17)
IMM GRANULOCYTES NFR BLD AUTO: 0.5 % — SIGNIFICANT CHANGE UP (ref 0–0.9)
IRON SATN MFR SERPL: 16 % — SIGNIFICANT CHANGE UP (ref 16–55)
IRON SATN MFR SERPL: 22 UG/DL — LOW (ref 45–165)
LYMPHOCYTES # BLD AUTO: 1.54 K/UL — SIGNIFICANT CHANGE UP (ref 1–3.3)
LYMPHOCYTES # BLD AUTO: 10.8 % — LOW (ref 13–44)
MAGNESIUM SERPL-MCNC: 2.2 MG/DL — SIGNIFICANT CHANGE UP (ref 1.6–2.6)
MCHC RBC-ENTMCNC: 31 PG — SIGNIFICANT CHANGE UP (ref 27–34)
MCHC RBC-ENTMCNC: 31.8 GM/DL — LOW (ref 32–36)
MCV RBC AUTO: 97.7 FL — SIGNIFICANT CHANGE UP (ref 80–100)
MONOCYTES # BLD AUTO: 1.56 K/UL — HIGH (ref 0–0.9)
MONOCYTES NFR BLD AUTO: 11 % — SIGNIFICANT CHANGE UP (ref 2–14)
NEUTROPHILS # BLD AUTO: 10.71 K/UL — HIGH (ref 1.8–7.4)
NEUTROPHILS NFR BLD AUTO: 75.2 % — SIGNIFICANT CHANGE UP (ref 43–77)
NRBC # BLD: 0 /100 WBCS — SIGNIFICANT CHANGE UP (ref 0–0)
NRBC BLD-RTO: 0 /100 WBCS — SIGNIFICANT CHANGE UP (ref 0–0)
PHOSPHATE SERPL-MCNC: 2.8 MG/DL — SIGNIFICANT CHANGE UP (ref 2.5–4.5)
PLATELET # BLD AUTO: 427 K/UL — HIGH (ref 150–400)
POTASSIUM SERPL-MCNC: 3.6 MMOL/L — SIGNIFICANT CHANGE UP (ref 3.5–5.3)
POTASSIUM SERPL-SCNC: 3.6 MMOL/L — SIGNIFICANT CHANGE UP (ref 3.5–5.3)
RBC # BLD: 2.61 M/UL — LOW (ref 4.2–5.8)
RBC # FLD: 17 % — HIGH (ref 10.3–14.5)
SODIUM SERPL-SCNC: 135 MMOL/L — SIGNIFICANT CHANGE UP (ref 135–145)
TIBC SERPL-MCNC: 141 UG/DL — LOW (ref 220–430)
UIBC SERPL-MCNC: 118 UG/DL — SIGNIFICANT CHANGE UP (ref 110–370)
WBC # BLD: 14.24 K/UL — HIGH (ref 3.8–10.5)
WBC # FLD AUTO: 14.24 K/UL — HIGH (ref 3.8–10.5)

## 2024-08-10 PROCEDURE — 71045 X-RAY EXAM CHEST 1 VIEW: CPT | Mod: 26

## 2024-08-10 PROCEDURE — 99233 SBSQ HOSP IP/OBS HIGH 50: CPT

## 2024-08-10 RX ORDER — ACETAMINOPHEN 500 MG/5ML
1000 LIQUID (ML) ORAL ONCE
Refills: 0 | Status: COMPLETED | OUTPATIENT
Start: 2024-08-10 | End: 2024-08-10

## 2024-08-10 RX ORDER — LOPERAMIDE HCL 1 MG/7.5ML
2 SOLUTION ORAL DAILY
Refills: 0 | Status: DISCONTINUED | OUTPATIENT
Start: 2024-08-10 | End: 2024-08-24

## 2024-08-10 RX ORDER — OXYCODONE HYDROCHLORIDE 30 MG/1
2.5 TABLET ORAL ONCE
Refills: 0 | Status: DISCONTINUED | OUTPATIENT
Start: 2024-08-10 | End: 2024-08-10

## 2024-08-10 RX ORDER — LIDOCAINE HYDROCHLORIDE 20 MG/ML
1 JELLY TOPICAL ONCE
Refills: 0 | Status: COMPLETED | OUTPATIENT
Start: 2024-08-10 | End: 2024-08-10

## 2024-08-10 RX ORDER — LIDOCAINE HYDROCHLORIDE 20 MG/ML
1 JELLY TOPICAL DAILY
Refills: 0 | Status: DISCONTINUED | OUTPATIENT
Start: 2024-08-10 | End: 2024-08-21

## 2024-08-10 RX ORDER — HYDROMORPHONE/SOD CHLOR,ISO/PF 2 MG/10 ML
2 SYRINGE (ML) INJECTION EVERY 6 HOURS
Refills: 0 | Status: DISCONTINUED | OUTPATIENT
Start: 2024-08-10 | End: 2024-08-10

## 2024-08-10 RX ORDER — HYDROMORPHONE/SOD CHLOR,ISO/PF 2 MG/10 ML
2 SYRINGE (ML) INJECTION EVERY 6 HOURS
Refills: 0 | Status: DISCONTINUED | OUTPATIENT
Start: 2024-08-10 | End: 2024-08-14

## 2024-08-10 RX ORDER — HYDROMORPHONE/SOD CHLOR,ISO/PF 2 MG/10 ML
2 SYRINGE (ML) INJECTION ONCE
Refills: 0 | Status: DISCONTINUED | OUTPATIENT
Start: 2024-08-10 | End: 2024-08-10

## 2024-08-10 RX ADMIN — Medication 81 MILLIGRAM(S): at 11:43

## 2024-08-10 RX ADMIN — ISOSORBIDE MONONITRATE 30 MILLIGRAM(S): 60 TABLET, EXTENDED RELEASE ORAL at 11:45

## 2024-08-10 RX ADMIN — CARVEDILOL 3.12 MILLIGRAM(S): 3.12 TABLET, FILM COATED ORAL at 17:16

## 2024-08-10 RX ADMIN — Medication 400 MILLIGRAM(S): at 05:21

## 2024-08-10 RX ADMIN — FUROSEMIDE 80 MILLIGRAM(S): 10 INJECTION INTRAMUSCULAR; INTRAVENOUS at 07:43

## 2024-08-10 RX ADMIN — INSULIN LISPRO 3: 100 INJECTION, SOLUTION INTRAVENOUS; SUBCUTANEOUS at 07:44

## 2024-08-10 RX ADMIN — DONEPEZIL HYDROCHLORIDE 10 MILLIGRAM(S): 5 TABLET ORAL at 21:17

## 2024-08-10 RX ADMIN — OXYCODONE HYDROCHLORIDE 2.5 MILLIGRAM(S): 30 TABLET ORAL at 03:45

## 2024-08-10 RX ADMIN — LIDOCAINE HYDROCHLORIDE 1 PATCH: 20 JELLY TOPICAL at 07:52

## 2024-08-10 RX ADMIN — Medication 25 GRAM(S): at 17:16

## 2024-08-10 RX ADMIN — Medication 1000 MILLIGRAM(S): at 05:51

## 2024-08-10 RX ADMIN — Medication 2 MILLIGRAM(S): at 17:15

## 2024-08-10 RX ADMIN — INSULIN LISPRO 3: 100 INJECTION, SOLUTION INTRAVENOUS; SUBCUTANEOUS at 17:15

## 2024-08-10 RX ADMIN — Medication 3 MILLIGRAM(S): at 21:18

## 2024-08-10 RX ADMIN — LOPERAMIDE HCL 2 MILLIGRAM(S): 1 SOLUTION ORAL at 16:28

## 2024-08-10 RX ADMIN — Medication 40 MILLIGRAM(S): at 17:16

## 2024-08-10 RX ADMIN — LIDOCAINE HYDROCHLORIDE 1 PATCH: 20 JELLY TOPICAL at 15:51

## 2024-08-10 RX ADMIN — Medication 1 TABLET(S): at 11:43

## 2024-08-10 RX ADMIN — SACUBITRIL AND VALSARTAN 1 TABLET(S): 6; 6 PELLET ORAL at 17:16

## 2024-08-10 RX ADMIN — Medication 40 MILLIGRAM(S): at 05:22

## 2024-08-10 RX ADMIN — ATORVASTATIN CALCIUM 40 MILLIGRAM(S): 80 TABLET, FILM COATED ORAL at 21:17

## 2024-08-10 RX ADMIN — OXYCODONE HYDROCHLORIDE 2.5 MILLIGRAM(S): 30 TABLET ORAL at 04:15

## 2024-08-10 RX ADMIN — Medication 25 GRAM(S): at 05:21

## 2024-08-10 RX ADMIN — TAMSULOSIN HYDROCHLORIDE 0.4 MILLIGRAM(S): 0.4 CAPSULE ORAL at 21:17

## 2024-08-10 RX ADMIN — HEPARIN SODIUM 5000 UNIT(S): 1000 INJECTION INTRAVENOUS; SUBCUTANEOUS at 05:22

## 2024-08-10 RX ADMIN — LIDOCAINE HYDROCHLORIDE 1 PATCH: 20 JELLY TOPICAL at 02:54

## 2024-08-10 RX ADMIN — Medication 2 MILLIGRAM(S): at 21:17

## 2024-08-10 RX ADMIN — HEPARIN SODIUM 5000 UNIT(S): 1000 INJECTION INTRAVENOUS; SUBCUTANEOUS at 17:15

## 2024-08-11 DIAGNOSIS — D64.9 ANEMIA, UNSPECIFIED: ICD-10-CM

## 2024-08-11 LAB
ALBUMIN SERPL ELPH-MCNC: 2.9 G/DL — LOW (ref 3.3–5)
ALP SERPL-CCNC: 479 U/L — HIGH (ref 40–120)
ALT FLD-CCNC: 17 U/L — SIGNIFICANT CHANGE UP (ref 10–45)
ANION GAP SERPL CALC-SCNC: 18 MMOL/L — HIGH (ref 5–17)
AST SERPL-CCNC: 25 U/L — SIGNIFICANT CHANGE UP (ref 10–40)
BASOPHILS # BLD AUTO: 0.08 K/UL — SIGNIFICANT CHANGE UP (ref 0–0.2)
BASOPHILS NFR BLD AUTO: 0.5 % — SIGNIFICANT CHANGE UP (ref 0–2)
BILIRUB SERPL-MCNC: 0.5 MG/DL — SIGNIFICANT CHANGE UP (ref 0.2–1.2)
BUN SERPL-MCNC: 70 MG/DL — HIGH (ref 7–23)
CALCIUM SERPL-MCNC: 9.4 MG/DL — SIGNIFICANT CHANGE UP (ref 8.4–10.5)
CHLORIDE SERPL-SCNC: 94 MMOL/L — LOW (ref 96–108)
CO2 SERPL-SCNC: 22 MMOL/L — SIGNIFICANT CHANGE UP (ref 22–31)
CREAT SERPL-MCNC: 6.54 MG/DL — HIGH (ref 0.5–1.3)
EGFR: 8 ML/MIN/1.73M2 — LOW
EGFR: 8 ML/MIN/1.73M2 — LOW
EOSINOPHIL # BLD AUTO: 0.36 K/UL — SIGNIFICANT CHANGE UP (ref 0–0.5)
EOSINOPHIL NFR BLD AUTO: 2.4 % — SIGNIFICANT CHANGE UP (ref 0–6)
GLUCOSE BLDC GLUCOMTR-MCNC: 133 MG/DL — HIGH (ref 70–99)
GLUCOSE BLDC GLUCOMTR-MCNC: 166 MG/DL — HIGH (ref 70–99)
GLUCOSE BLDC GLUCOMTR-MCNC: 239 MG/DL — HIGH (ref 70–99)
GLUCOSE BLDC GLUCOMTR-MCNC: 240 MG/DL — HIGH (ref 70–99)
GLUCOSE SERPL-MCNC: 175 MG/DL — HIGH (ref 70–99)
HCT VFR BLD CALC: 23.4 % — LOW (ref 39–50)
HCT VFR BLD CALC: 23.5 % — LOW (ref 39–50)
HGB BLD-MCNC: 7.3 G/DL — LOW (ref 13–17)
HGB BLD-MCNC: 7.5 G/DL — LOW (ref 13–17)
IMM GRANULOCYTES NFR BLD AUTO: 0.7 % — SIGNIFICANT CHANGE UP (ref 0–0.9)
LYMPHOCYTES # BLD AUTO: 1.73 K/UL — SIGNIFICANT CHANGE UP (ref 1–3.3)
LYMPHOCYTES # BLD AUTO: 11.7 % — LOW (ref 13–44)
MAGNESIUM SERPL-MCNC: 2.5 MG/DL — SIGNIFICANT CHANGE UP (ref 1.6–2.6)
MCHC RBC-ENTMCNC: 30.9 PG — SIGNIFICANT CHANGE UP (ref 27–34)
MCHC RBC-ENTMCNC: 31.2 GM/DL — LOW (ref 32–36)
MCHC RBC-ENTMCNC: 31.5 PG — SIGNIFICANT CHANGE UP (ref 27–34)
MCHC RBC-ENTMCNC: 31.9 GM/DL — LOW (ref 32–36)
MCV RBC AUTO: 98.7 FL — SIGNIFICANT CHANGE UP (ref 80–100)
MCV RBC AUTO: 99.2 FL — SIGNIFICANT CHANGE UP (ref 80–100)
MONOCYTES # BLD AUTO: 1.69 K/UL — HIGH (ref 0–0.9)
MONOCYTES NFR BLD AUTO: 11.4 % — SIGNIFICANT CHANGE UP (ref 2–14)
NEUTROPHILS # BLD AUTO: 10.85 K/UL — HIGH (ref 1.8–7.4)
NEUTROPHILS NFR BLD AUTO: 73.3 % — SIGNIFICANT CHANGE UP (ref 43–77)
NRBC # BLD: 0 /100 WBCS — SIGNIFICANT CHANGE UP (ref 0–0)
NRBC # BLD: 0 /100 WBCS — SIGNIFICANT CHANGE UP (ref 0–0)
NRBC BLD-RTO: 0 /100 WBCS — SIGNIFICANT CHANGE UP (ref 0–0)
NRBC BLD-RTO: 0 /100 WBCS — SIGNIFICANT CHANGE UP (ref 0–0)
OB PNL STL IA: POSITIVE
PHOSPHATE SERPL-MCNC: 3.5 MG/DL — SIGNIFICANT CHANGE UP (ref 2.5–4.5)
PLATELET # BLD AUTO: 393 K/UL — SIGNIFICANT CHANGE UP (ref 150–400)
PLATELET # BLD AUTO: 428 K/UL — HIGH (ref 150–400)
POTASSIUM SERPL-MCNC: 3.9 MMOL/L — SIGNIFICANT CHANGE UP (ref 3.5–5.3)
POTASSIUM SERPL-SCNC: 3.9 MMOL/L — SIGNIFICANT CHANGE UP (ref 3.5–5.3)
PROT SERPL-MCNC: 7.6 G/DL — SIGNIFICANT CHANGE UP (ref 6–8.3)
RBC # BLD: 2.36 M/UL — LOW (ref 4.2–5.8)
RBC # BLD: 2.38 M/UL — LOW (ref 4.2–5.8)
RBC # FLD: 17.2 % — HIGH (ref 10.3–14.5)
RBC # FLD: 17.2 % — HIGH (ref 10.3–14.5)
SODIUM SERPL-SCNC: 134 MMOL/L — LOW (ref 135–145)
WBC # BLD: 12.99 K/UL — HIGH (ref 3.8–10.5)
WBC # BLD: 14.81 K/UL — HIGH (ref 3.8–10.5)
WBC # FLD AUTO: 12.99 K/UL — HIGH (ref 3.8–10.5)
WBC # FLD AUTO: 14.81 K/UL — HIGH (ref 3.8–10.5)

## 2024-08-11 PROCEDURE — 99233 SBSQ HOSP IP/OBS HIGH 50: CPT

## 2024-08-11 RX ORDER — OXYCODONE HYDROCHLORIDE 30 MG/1
2.5 TABLET ORAL EVERY 6 HOURS
Refills: 0 | Status: DISCONTINUED | OUTPATIENT
Start: 2024-08-11 | End: 2024-08-16

## 2024-08-11 RX ADMIN — CARVEDILOL 3.12 MILLIGRAM(S): 3.12 TABLET, FILM COATED ORAL at 05:01

## 2024-08-11 RX ADMIN — Medication 40 MILLIGRAM(S): at 17:42

## 2024-08-11 RX ADMIN — Medication 2 MILLIGRAM(S): at 12:42

## 2024-08-11 RX ADMIN — TAMSULOSIN HYDROCHLORIDE 0.4 MILLIGRAM(S): 0.4 CAPSULE ORAL at 21:11

## 2024-08-11 RX ADMIN — Medication 2 MILLIGRAM(S): at 11:42

## 2024-08-11 RX ADMIN — SACUBITRIL AND VALSARTAN 1 TABLET(S): 6; 6 PELLET ORAL at 17:41

## 2024-08-11 RX ADMIN — HEPARIN SODIUM 5000 UNIT(S): 1000 INJECTION INTRAVENOUS; SUBCUTANEOUS at 05:02

## 2024-08-11 RX ADMIN — OXYCODONE HYDROCHLORIDE 2.5 MILLIGRAM(S): 30 TABLET ORAL at 18:40

## 2024-08-11 RX ADMIN — Medication 2 MILLIGRAM(S): at 03:47

## 2024-08-11 RX ADMIN — SACUBITRIL AND VALSARTAN 1 TABLET(S): 6; 6 PELLET ORAL at 05:01

## 2024-08-11 RX ADMIN — OXYCODONE HYDROCHLORIDE 2.5 MILLIGRAM(S): 30 TABLET ORAL at 17:40

## 2024-08-11 RX ADMIN — INSULIN LISPRO 2: 100 INJECTION, SOLUTION INTRAVENOUS; SUBCUTANEOUS at 08:09

## 2024-08-11 RX ADMIN — Medication 25 GRAM(S): at 17:41

## 2024-08-11 RX ADMIN — Medication 40 MILLIGRAM(S): at 05:02

## 2024-08-11 RX ADMIN — INSULIN LISPRO 2: 100 INJECTION, SOLUTION INTRAVENOUS; SUBCUTANEOUS at 17:42

## 2024-08-11 RX ADMIN — DONEPEZIL HYDROCHLORIDE 10 MILLIGRAM(S): 5 TABLET ORAL at 21:11

## 2024-08-11 RX ADMIN — LOPERAMIDE HCL 2 MILLIGRAM(S): 1 SOLUTION ORAL at 11:42

## 2024-08-11 RX ADMIN — INSULIN LISPRO 1: 100 INJECTION, SOLUTION INTRAVENOUS; SUBCUTANEOUS at 11:42

## 2024-08-11 RX ADMIN — ATORVASTATIN CALCIUM 40 MILLIGRAM(S): 80 TABLET, FILM COATED ORAL at 21:10

## 2024-08-11 RX ADMIN — Medication 81 MILLIGRAM(S): at 11:42

## 2024-08-11 RX ADMIN — Medication 3 MILLIGRAM(S): at 21:10

## 2024-08-11 RX ADMIN — CARVEDILOL 3.12 MILLIGRAM(S): 3.12 TABLET, FILM COATED ORAL at 17:41

## 2024-08-11 RX ADMIN — ISOSORBIDE MONONITRATE 30 MILLIGRAM(S): 60 TABLET, EXTENDED RELEASE ORAL at 11:42

## 2024-08-11 RX ADMIN — Medication 1 TABLET(S): at 11:42

## 2024-08-11 RX ADMIN — Medication 25 GRAM(S): at 05:02

## 2024-08-12 LAB
ALBUMIN SERPL ELPH-MCNC: 2.8 G/DL — LOW (ref 3.3–5)
ALP SERPL-CCNC: 414 U/L — HIGH (ref 40–120)
ALT FLD-CCNC: 13 U/L — SIGNIFICANT CHANGE UP (ref 10–45)
ANION GAP SERPL CALC-SCNC: 21 MMOL/L — HIGH (ref 5–17)
AST SERPL-CCNC: 25 U/L — SIGNIFICANT CHANGE UP (ref 10–40)
BASOPHILS # BLD AUTO: 0.07 K/UL — SIGNIFICANT CHANGE UP (ref 0–0.2)
BASOPHILS NFR BLD AUTO: 0.5 % — SIGNIFICANT CHANGE UP (ref 0–2)
BILIRUB SERPL-MCNC: 0.4 MG/DL — SIGNIFICANT CHANGE UP (ref 0.2–1.2)
BUN SERPL-MCNC: 79 MG/DL — HIGH (ref 7–23)
CALCIUM SERPL-MCNC: 9.4 MG/DL — SIGNIFICANT CHANGE UP (ref 8.4–10.5)
CHLORIDE SERPL-SCNC: 93 MMOL/L — LOW (ref 96–108)
CO2 SERPL-SCNC: 20 MMOL/L — LOW (ref 22–31)
CREAT SERPL-MCNC: 7.89 MG/DL — HIGH (ref 0.5–1.3)
EGFR: 7 ML/MIN/1.73M2 — LOW
EGFR: 7 ML/MIN/1.73M2 — LOW
EOSINOPHIL # BLD AUTO: 0.34 K/UL — SIGNIFICANT CHANGE UP (ref 0–0.5)
EOSINOPHIL NFR BLD AUTO: 2.4 % — SIGNIFICANT CHANGE UP (ref 0–6)
GLUCOSE BLDC GLUCOMTR-MCNC: 149 MG/DL — HIGH (ref 70–99)
GLUCOSE BLDC GLUCOMTR-MCNC: 151 MG/DL — HIGH (ref 70–99)
GLUCOSE BLDC GLUCOMTR-MCNC: 186 MG/DL — HIGH (ref 70–99)
GLUCOSE BLDC GLUCOMTR-MCNC: 209 MG/DL — HIGH (ref 70–99)
GLUCOSE SERPL-MCNC: 120 MG/DL — HIGH (ref 70–99)
HCT VFR BLD CALC: 22.9 % — LOW (ref 39–50)
HCT VFR BLD CALC: 24.7 % — LOW (ref 39–50)
HGB BLD-MCNC: 7.3 G/DL — LOW (ref 13–17)
HGB BLD-MCNC: 7.8 G/DL — LOW (ref 13–17)
IMM GRANULOCYTES NFR BLD AUTO: 0.7 % — SIGNIFICANT CHANGE UP (ref 0–0.9)
LYMPHOCYTES # BLD AUTO: 1.37 K/UL — SIGNIFICANT CHANGE UP (ref 1–3.3)
LYMPHOCYTES # BLD AUTO: 9.7 % — LOW (ref 13–44)
MAGNESIUM SERPL-MCNC: 2.6 MG/DL — SIGNIFICANT CHANGE UP (ref 1.6–2.6)
MCHC RBC-ENTMCNC: 31.2 PG — SIGNIFICANT CHANGE UP (ref 27–34)
MCHC RBC-ENTMCNC: 31.6 GM/DL — LOW (ref 32–36)
MCHC RBC-ENTMCNC: 31.6 PG — SIGNIFICANT CHANGE UP (ref 27–34)
MCHC RBC-ENTMCNC: 31.9 GM/DL — LOW (ref 32–36)
MCV RBC AUTO: 98.8 FL — SIGNIFICANT CHANGE UP (ref 80–100)
MCV RBC AUTO: 99.1 FL — SIGNIFICANT CHANGE UP (ref 80–100)
MONOCYTES # BLD AUTO: 1.49 K/UL — HIGH (ref 0–0.9)
MONOCYTES NFR BLD AUTO: 10.5 % — SIGNIFICANT CHANGE UP (ref 2–14)
NEUTROPHILS # BLD AUTO: 10.81 K/UL — HIGH (ref 1.8–7.4)
NEUTROPHILS NFR BLD AUTO: 76.2 % — SIGNIFICANT CHANGE UP (ref 43–77)
NRBC # BLD: 0 /100 WBCS — SIGNIFICANT CHANGE UP (ref 0–0)
NRBC # BLD: 0 /100 WBCS — SIGNIFICANT CHANGE UP (ref 0–0)
NRBC BLD-RTO: 0 /100 WBCS — SIGNIFICANT CHANGE UP (ref 0–0)
NRBC BLD-RTO: 0 /100 WBCS — SIGNIFICANT CHANGE UP (ref 0–0)
PHOSPHATE SERPL-MCNC: 4.7 MG/DL — HIGH (ref 2.5–4.5)
PLATELET # BLD AUTO: 409 K/UL — HIGH (ref 150–400)
PLATELET # BLD AUTO: 430 K/UL — HIGH (ref 150–400)
POTASSIUM SERPL-MCNC: 4.4 MMOL/L — SIGNIFICANT CHANGE UP (ref 3.5–5.3)
POTASSIUM SERPL-SCNC: 4.4 MMOL/L — SIGNIFICANT CHANGE UP (ref 3.5–5.3)
PROT SERPL-MCNC: 7.6 G/DL — SIGNIFICANT CHANGE UP (ref 6–8.3)
RBC # BLD: 2.31 M/UL — LOW (ref 4.2–5.8)
RBC # BLD: 2.5 M/UL — LOW (ref 4.2–5.8)
RBC # FLD: 17.2 % — HIGH (ref 10.3–14.5)
RBC # FLD: 17.4 % — HIGH (ref 10.3–14.5)
SODIUM SERPL-SCNC: 134 MMOL/L — LOW (ref 135–145)
WBC # BLD: 13.83 K/UL — HIGH (ref 3.8–10.5)
WBC # BLD: 14.18 K/UL — HIGH (ref 3.8–10.5)
WBC # FLD AUTO: 13.83 K/UL — HIGH (ref 3.8–10.5)
WBC # FLD AUTO: 14.18 K/UL — HIGH (ref 3.8–10.5)

## 2024-08-12 PROCEDURE — 76882 US LMTD JT/FCL EVL NVASC XTR: CPT | Mod: 26,LT

## 2024-08-12 PROCEDURE — 99232 SBSQ HOSP IP/OBS MODERATE 35: CPT | Mod: GC

## 2024-08-12 PROCEDURE — 99233 SBSQ HOSP IP/OBS HIGH 50: CPT | Mod: GC

## 2024-08-12 RX ORDER — EPOETIN ALFA 10000 [IU]/ML
4000 SOLUTION INTRAVENOUS; SUBCUTANEOUS
Refills: 0 | Status: DISCONTINUED | OUTPATIENT
Start: 2024-08-12 | End: 2024-08-17

## 2024-08-12 RX ORDER — MIDODRINE HYDROCHLORIDE 5 MG/1
10 TABLET ORAL ONCE
Refills: 0 | Status: COMPLETED | OUTPATIENT
Start: 2024-08-12 | End: 2024-08-12

## 2024-08-12 RX ORDER — PIPERACILLIN-TAZO-DEXTROSE,ISO 3.375G/5
3.38 IV SOLUTION, PIGGYBACK PREMIX FROZEN(ML) INTRAVENOUS EVERY 12 HOURS
Refills: 0 | Status: DISCONTINUED | OUTPATIENT
Start: 2024-08-12 | End: 2024-08-13

## 2024-08-12 RX ADMIN — Medication 1 TABLET(S): at 11:12

## 2024-08-12 RX ADMIN — SACUBITRIL AND VALSARTAN 1 TABLET(S): 6; 6 PELLET ORAL at 05:10

## 2024-08-12 RX ADMIN — Medication 650 MILLIGRAM(S): at 10:10

## 2024-08-12 RX ADMIN — INSULIN LISPRO 1: 100 INJECTION, SOLUTION INTRAVENOUS; SUBCUTANEOUS at 08:15

## 2024-08-12 RX ADMIN — OXYCODONE HYDROCHLORIDE 2.5 MILLIGRAM(S): 30 TABLET ORAL at 00:03

## 2024-08-12 RX ADMIN — MIDODRINE HYDROCHLORIDE 10 MILLIGRAM(S): 5 TABLET ORAL at 08:21

## 2024-08-12 RX ADMIN — OXYCODONE HYDROCHLORIDE 2.5 MILLIGRAM(S): 30 TABLET ORAL at 11:30

## 2024-08-12 RX ADMIN — LOPERAMIDE HCL 2 MILLIGRAM(S): 1 SOLUTION ORAL at 11:13

## 2024-08-12 RX ADMIN — TAMSULOSIN HYDROCHLORIDE 0.4 MILLIGRAM(S): 0.4 CAPSULE ORAL at 21:09

## 2024-08-12 RX ADMIN — Medication 25 GRAM(S): at 05:10

## 2024-08-12 RX ADMIN — OXYCODONE HYDROCHLORIDE 2.5 MILLIGRAM(S): 30 TABLET ORAL at 06:07

## 2024-08-12 RX ADMIN — Medication 40 MILLIGRAM(S): at 05:09

## 2024-08-12 RX ADMIN — OXYCODONE HYDROCHLORIDE 2.5 MILLIGRAM(S): 30 TABLET ORAL at 17:09

## 2024-08-12 RX ADMIN — Medication 650 MILLIGRAM(S): at 17:05

## 2024-08-12 RX ADMIN — INSULIN LISPRO 2: 100 INJECTION, SOLUTION INTRAVENOUS; SUBCUTANEOUS at 11:14

## 2024-08-12 RX ADMIN — Medication 25 GRAM(S): at 17:09

## 2024-08-12 RX ADMIN — Medication 650 MILLIGRAM(S): at 09:40

## 2024-08-12 RX ADMIN — OXYCODONE HYDROCHLORIDE 2.5 MILLIGRAM(S): 30 TABLET ORAL at 23:13

## 2024-08-12 RX ADMIN — OXYCODONE HYDROCHLORIDE 2.5 MILLIGRAM(S): 30 TABLET ORAL at 01:28

## 2024-08-12 RX ADMIN — OXYCODONE HYDROCHLORIDE 2.5 MILLIGRAM(S): 30 TABLET ORAL at 11:12

## 2024-08-12 RX ADMIN — CARVEDILOL 3.12 MILLIGRAM(S): 3.12 TABLET, FILM COATED ORAL at 05:09

## 2024-08-12 RX ADMIN — Medication 3 MILLIGRAM(S): at 21:09

## 2024-08-12 RX ADMIN — DONEPEZIL HYDROCHLORIDE 10 MILLIGRAM(S): 5 TABLET ORAL at 21:09

## 2024-08-12 RX ADMIN — Medication 81 MILLIGRAM(S): at 11:13

## 2024-08-12 RX ADMIN — OXYCODONE HYDROCHLORIDE 2.5 MILLIGRAM(S): 30 TABLET ORAL at 05:10

## 2024-08-12 RX ADMIN — Medication 40 MILLIGRAM(S): at 17:06

## 2024-08-12 RX ADMIN — MIDODRINE HYDROCHLORIDE 10 MILLIGRAM(S): 5 TABLET ORAL at 08:55

## 2024-08-12 RX ADMIN — ATORVASTATIN CALCIUM 40 MILLIGRAM(S): 80 TABLET, FILM COATED ORAL at 21:09

## 2024-08-12 RX ADMIN — SACUBITRIL AND VALSARTAN 1 TABLET(S): 6; 6 PELLET ORAL at 17:06

## 2024-08-13 LAB
ANION GAP SERPL CALC-SCNC: 24 MMOL/L — HIGH (ref 5–17)
BASOPHILS # BLD AUTO: 0.11 K/UL — SIGNIFICANT CHANGE UP (ref 0–0.2)
BASOPHILS NFR BLD AUTO: 0.7 % — SIGNIFICANT CHANGE UP (ref 0–2)
BUN SERPL-MCNC: 88 MG/DL — HIGH (ref 7–23)
CALCIUM SERPL-MCNC: 9 MG/DL — SIGNIFICANT CHANGE UP (ref 8.4–10.5)
CHLORIDE SERPL-SCNC: 93 MMOL/L — LOW (ref 96–108)
CO2 SERPL-SCNC: 19 MMOL/L — LOW (ref 22–31)
CREAT SERPL-MCNC: 9.29 MG/DL — HIGH (ref 0.5–1.3)
CULTURE RESULTS: SIGNIFICANT CHANGE UP
CULTURE RESULTS: SIGNIFICANT CHANGE UP
EGFR: 5 ML/MIN/1.73M2 — LOW
EGFR: 5 ML/MIN/1.73M2 — LOW
EOSINOPHIL # BLD AUTO: 0.53 K/UL — HIGH (ref 0–0.5)
EOSINOPHIL NFR BLD AUTO: 3.6 % — SIGNIFICANT CHANGE UP (ref 0–6)
GLUCOSE BLDC GLUCOMTR-MCNC: 127 MG/DL — HIGH (ref 70–99)
GLUCOSE BLDC GLUCOMTR-MCNC: 155 MG/DL — HIGH (ref 70–99)
GLUCOSE BLDC GLUCOMTR-MCNC: 177 MG/DL — HIGH (ref 70–99)
GLUCOSE BLDC GLUCOMTR-MCNC: 197 MG/DL — HIGH (ref 70–99)
GLUCOSE SERPL-MCNC: 115 MG/DL — HIGH (ref 70–99)
HCT VFR BLD CALC: 23.7 % — LOW (ref 39–50)
HCT VFR BLD CALC: 25.7 % — LOW (ref 39–50)
HGB BLD-MCNC: 7.4 G/DL — LOW (ref 13–17)
HGB BLD-MCNC: 8.3 G/DL — LOW (ref 13–17)
IMM GRANULOCYTES NFR BLD AUTO: 0.5 % — SIGNIFICANT CHANGE UP (ref 0–0.9)
LYMPHOCYTES # BLD AUTO: 1.41 K/UL — SIGNIFICANT CHANGE UP (ref 1–3.3)
LYMPHOCYTES # BLD AUTO: 9.6 % — LOW (ref 13–44)
MAGNESIUM SERPL-MCNC: 2.7 MG/DL — HIGH (ref 1.6–2.6)
MCHC RBC-ENTMCNC: 31 PG — SIGNIFICANT CHANGE UP (ref 27–34)
MCHC RBC-ENTMCNC: 31.2 GM/DL — LOW (ref 32–36)
MCHC RBC-ENTMCNC: 31.8 PG — SIGNIFICANT CHANGE UP (ref 27–34)
MCHC RBC-ENTMCNC: 32.3 GM/DL — SIGNIFICANT CHANGE UP (ref 32–36)
MCV RBC AUTO: 98.5 FL — SIGNIFICANT CHANGE UP (ref 80–100)
MCV RBC AUTO: 99.2 FL — SIGNIFICANT CHANGE UP (ref 80–100)
MONOCYTES # BLD AUTO: 1.48 K/UL — HIGH (ref 0–0.9)
MONOCYTES NFR BLD AUTO: 10 % — SIGNIFICANT CHANGE UP (ref 2–14)
NEUTROPHILS # BLD AUTO: 11.12 K/UL — HIGH (ref 1.8–7.4)
NEUTROPHILS NFR BLD AUTO: 75.6 % — SIGNIFICANT CHANGE UP (ref 43–77)
NRBC # BLD: 0 /100 WBCS — SIGNIFICANT CHANGE UP (ref 0–0)
NRBC # BLD: 0 /100 WBCS — SIGNIFICANT CHANGE UP (ref 0–0)
NRBC BLD-RTO: 0 /100 WBCS — SIGNIFICANT CHANGE UP (ref 0–0)
NRBC BLD-RTO: 0 /100 WBCS — SIGNIFICANT CHANGE UP (ref 0–0)
PHOSPHATE SERPL-MCNC: 6.1 MG/DL — HIGH (ref 2.5–4.5)
PLATELET # BLD AUTO: 430 K/UL — HIGH (ref 150–400)
PLATELET # BLD AUTO: 452 K/UL — HIGH (ref 150–400)
POTASSIUM SERPL-MCNC: 4.5 MMOL/L — SIGNIFICANT CHANGE UP (ref 3.5–5.3)
POTASSIUM SERPL-SCNC: 4.5 MMOL/L — SIGNIFICANT CHANGE UP (ref 3.5–5.3)
RBC # BLD: 2.39 M/UL — LOW (ref 4.2–5.8)
RBC # BLD: 2.61 M/UL — LOW (ref 4.2–5.8)
RBC # FLD: 17.3 % — HIGH (ref 10.3–14.5)
RBC # FLD: 17.4 % — HIGH (ref 10.3–14.5)
SODIUM SERPL-SCNC: 136 MMOL/L — SIGNIFICANT CHANGE UP (ref 135–145)
SPECIMEN SOURCE: SIGNIFICANT CHANGE UP
SPECIMEN SOURCE: SIGNIFICANT CHANGE UP
WBC # BLD: 12.52 K/UL — HIGH (ref 3.8–10.5)
WBC # BLD: 14.73 K/UL — HIGH (ref 3.8–10.5)
WBC # FLD AUTO: 12.52 K/UL — HIGH (ref 3.8–10.5)
WBC # FLD AUTO: 14.73 K/UL — HIGH (ref 3.8–10.5)

## 2024-08-13 PROCEDURE — 99233 SBSQ HOSP IP/OBS HIGH 50: CPT | Mod: GC

## 2024-08-13 PROCEDURE — 99232 SBSQ HOSP IP/OBS MODERATE 35: CPT | Mod: GC

## 2024-08-13 RX ADMIN — LOPERAMIDE HCL 2 MILLIGRAM(S): 1 SOLUTION ORAL at 11:50

## 2024-08-13 RX ADMIN — INSULIN LISPRO 1: 100 INJECTION, SOLUTION INTRAVENOUS; SUBCUTANEOUS at 17:50

## 2024-08-13 RX ADMIN — SACUBITRIL AND VALSARTAN 1 TABLET(S): 6; 6 PELLET ORAL at 17:57

## 2024-08-13 RX ADMIN — EPOETIN ALFA 4000 UNIT(S): 10000 SOLUTION INTRAVENOUS; SUBCUTANEOUS at 14:39

## 2024-08-13 RX ADMIN — Medication 40 MILLIGRAM(S): at 05:29

## 2024-08-13 RX ADMIN — OXYCODONE HYDROCHLORIDE 2.5 MILLIGRAM(S): 30 TABLET ORAL at 11:50

## 2024-08-13 RX ADMIN — ATORVASTATIN CALCIUM 40 MILLIGRAM(S): 80 TABLET, FILM COATED ORAL at 21:35

## 2024-08-13 RX ADMIN — MIDODRINE HYDROCHLORIDE 10 MILLIGRAM(S): 5 TABLET ORAL at 14:34

## 2024-08-13 RX ADMIN — Medication 40 MILLIGRAM(S): at 17:54

## 2024-08-13 RX ADMIN — Medication 3 MILLIGRAM(S): at 21:35

## 2024-08-13 RX ADMIN — OXYCODONE HYDROCHLORIDE 2.5 MILLIGRAM(S): 30 TABLET ORAL at 00:25

## 2024-08-13 RX ADMIN — OXYCODONE HYDROCHLORIDE 2.5 MILLIGRAM(S): 30 TABLET ORAL at 18:56

## 2024-08-13 RX ADMIN — TAMSULOSIN HYDROCHLORIDE 0.4 MILLIGRAM(S): 0.4 CAPSULE ORAL at 21:35

## 2024-08-13 RX ADMIN — OXYCODONE HYDROCHLORIDE 2.5 MILLIGRAM(S): 30 TABLET ORAL at 06:25

## 2024-08-13 RX ADMIN — DONEPEZIL HYDROCHLORIDE 10 MILLIGRAM(S): 5 TABLET ORAL at 21:34

## 2024-08-13 RX ADMIN — Medication 25 GRAM(S): at 05:29

## 2024-08-13 RX ADMIN — OXYCODONE HYDROCHLORIDE 2.5 MILLIGRAM(S): 30 TABLET ORAL at 05:29

## 2024-08-13 RX ADMIN — Medication 1 TABLET(S): at 11:47

## 2024-08-13 RX ADMIN — Medication 650 MILLIGRAM(S): at 15:33

## 2024-08-13 RX ADMIN — SACUBITRIL AND VALSARTAN 1 TABLET(S): 6; 6 PELLET ORAL at 05:29

## 2024-08-13 RX ADMIN — Medication 81 MILLIGRAM(S): at 11:47

## 2024-08-13 RX ADMIN — Medication 650 MILLIGRAM(S): at 14:33

## 2024-08-13 RX ADMIN — INSULIN LISPRO 1: 100 INJECTION, SOLUTION INTRAVENOUS; SUBCUTANEOUS at 08:20

## 2024-08-13 RX ADMIN — OXYCODONE HYDROCHLORIDE 2.5 MILLIGRAM(S): 30 TABLET ORAL at 12:50

## 2024-08-13 RX ADMIN — OXYCODONE HYDROCHLORIDE 2.5 MILLIGRAM(S): 30 TABLET ORAL at 17:56

## 2024-08-14 LAB
ANION GAP SERPL CALC-SCNC: 17 MMOL/L — SIGNIFICANT CHANGE UP (ref 5–17)
ANISOCYTOSIS BLD QL: SIGNIFICANT CHANGE UP
BASOPHILS # BLD AUTO: 0.11 K/UL — SIGNIFICANT CHANGE UP (ref 0–0.2)
BASOPHILS NFR BLD AUTO: 0.9 % — SIGNIFICANT CHANGE UP (ref 0–2)
BUN SERPL-MCNC: 40 MG/DL — HIGH (ref 7–23)
CALCIUM SERPL-MCNC: 9 MG/DL — SIGNIFICANT CHANGE UP (ref 8.4–10.5)
CHLORIDE SERPL-SCNC: 98 MMOL/L — SIGNIFICANT CHANGE UP (ref 96–108)
CO2 SERPL-SCNC: 24 MMOL/L — SIGNIFICANT CHANGE UP (ref 22–31)
CREAT SERPL-MCNC: 5.62 MG/DL — HIGH (ref 0.5–1.3)
DACRYOCYTES BLD QL SMEAR: SLIGHT — SIGNIFICANT CHANGE UP
EGFR: 10 ML/MIN/1.73M2 — LOW
EGFR: 10 ML/MIN/1.73M2 — LOW
EOSINOPHIL # BLD AUTO: 0.11 K/UL — SIGNIFICANT CHANGE UP (ref 0–0.5)
EOSINOPHIL NFR BLD AUTO: 0.9 % — SIGNIFICANT CHANGE UP (ref 0–6)
GIANT PLATELETS BLD QL SMEAR: PRESENT — SIGNIFICANT CHANGE UP
GLUCOSE BLDC GLUCOMTR-MCNC: 110 MG/DL — HIGH (ref 70–99)
GLUCOSE BLDC GLUCOMTR-MCNC: 178 MG/DL — HIGH (ref 70–99)
GLUCOSE BLDC GLUCOMTR-MCNC: 189 MG/DL — HIGH (ref 70–99)
GLUCOSE BLDC GLUCOMTR-MCNC: 243 MG/DL — HIGH (ref 70–99)
GLUCOSE SERPL-MCNC: 171 MG/DL — HIGH (ref 70–99)
HCT VFR BLD CALC: 24.9 % — LOW (ref 39–50)
HGB BLD-MCNC: 7.8 G/DL — LOW (ref 13–17)
LYMPHOCYTES # BLD AUTO: 0.85 K/UL — LOW (ref 1–3.3)
LYMPHOCYTES # BLD AUTO: 6.9 % — LOW (ref 13–44)
MACROCYTES BLD QL: SIGNIFICANT CHANGE UP
MAGNESIUM SERPL-MCNC: 2.4 MG/DL — SIGNIFICANT CHANGE UP (ref 1.6–2.6)
MANUAL SMEAR VERIFICATION: SIGNIFICANT CHANGE UP
MCHC RBC-ENTMCNC: 31.3 GM/DL — LOW (ref 32–36)
MCHC RBC-ENTMCNC: 31.3 PG — SIGNIFICANT CHANGE UP (ref 27–34)
MCV RBC AUTO: 100 FL — SIGNIFICANT CHANGE UP (ref 80–100)
MONOCYTES # BLD AUTO: 1.17 K/UL — HIGH (ref 0–0.9)
MONOCYTES NFR BLD AUTO: 9.5 % — SIGNIFICANT CHANGE UP (ref 2–14)
NEUTROPHILS # BLD AUTO: 9.96 K/UL — HIGH (ref 1.8–7.4)
NEUTROPHILS NFR BLD AUTO: 80.9 % — HIGH (ref 43–77)
PHOSPHATE SERPL-MCNC: 4.1 MG/DL — SIGNIFICANT CHANGE UP (ref 2.5–4.5)
PLAT MORPH BLD: ABNORMAL
PLATELET # BLD AUTO: 471 K/UL — HIGH (ref 150–400)
POIKILOCYTOSIS BLD QL AUTO: SLIGHT — SIGNIFICANT CHANGE UP
POLYCHROMASIA BLD QL SMEAR: SLIGHT — SIGNIFICANT CHANGE UP
POTASSIUM SERPL-MCNC: 3.6 MMOL/L — SIGNIFICANT CHANGE UP (ref 3.5–5.3)
POTASSIUM SERPL-SCNC: 3.6 MMOL/L — SIGNIFICANT CHANGE UP (ref 3.5–5.3)
PROMYELOCYTES # FLD: 0.9 % — HIGH (ref 0–0)
PROMYELOCYTES NFR BLD: 0.9 % — HIGH (ref 0–0)
RBC # BLD: 2.49 M/UL — LOW (ref 4.2–5.8)
RBC # FLD: 18 % — HIGH (ref 10.3–14.5)
RBC BLD AUTO: ABNORMAL
SODIUM SERPL-SCNC: 139 MMOL/L — SIGNIFICANT CHANGE UP (ref 135–145)
TARGETS BLD QL SMEAR: SIGNIFICANT CHANGE UP
WBC # BLD: 12.31 K/UL — HIGH (ref 3.8–10.5)
WBC # FLD AUTO: 12.31 K/UL — HIGH (ref 3.8–10.5)

## 2024-08-14 PROCEDURE — 93010 ELECTROCARDIOGRAM REPORT: CPT

## 2024-08-14 PROCEDURE — 99233 SBSQ HOSP IP/OBS HIGH 50: CPT | Mod: GC

## 2024-08-14 RX ADMIN — OXYCODONE HYDROCHLORIDE 2.5 MILLIGRAM(S): 30 TABLET ORAL at 06:17

## 2024-08-14 RX ADMIN — Medication 40 MILLIGRAM(S): at 17:29

## 2024-08-14 RX ADMIN — Medication 1000 MILLILITER(S): at 04:48

## 2024-08-14 RX ADMIN — LOPERAMIDE HCL 2 MILLIGRAM(S): 1 SOLUTION ORAL at 11:49

## 2024-08-14 RX ADMIN — Medication 1 TABLET(S): at 11:49

## 2024-08-14 RX ADMIN — INSULIN LISPRO 1: 100 INJECTION, SOLUTION INTRAVENOUS; SUBCUTANEOUS at 07:31

## 2024-08-14 RX ADMIN — Medication 2 MILLIGRAM(S): at 07:34

## 2024-08-14 RX ADMIN — OXYCODONE HYDROCHLORIDE 2.5 MILLIGRAM(S): 30 TABLET ORAL at 01:00

## 2024-08-14 RX ADMIN — OXYCODONE HYDROCHLORIDE 2.5 MILLIGRAM(S): 30 TABLET ORAL at 00:05

## 2024-08-14 RX ADMIN — Medication 40 MILLIGRAM(S): at 05:14

## 2024-08-14 RX ADMIN — INSULIN LISPRO 1: 100 INJECTION, SOLUTION INTRAVENOUS; SUBCUTANEOUS at 17:28

## 2024-08-14 RX ADMIN — OXYCODONE HYDROCHLORIDE 2.5 MILLIGRAM(S): 30 TABLET ORAL at 12:51

## 2024-08-14 RX ADMIN — OXYCODONE HYDROCHLORIDE 2.5 MILLIGRAM(S): 30 TABLET ORAL at 18:30

## 2024-08-14 RX ADMIN — Medication 650 MILLIGRAM(S): at 03:17

## 2024-08-14 RX ADMIN — OXYCODONE HYDROCHLORIDE 2.5 MILLIGRAM(S): 30 TABLET ORAL at 11:51

## 2024-08-14 RX ADMIN — Medication 2 MILLIGRAM(S): at 08:34

## 2024-08-14 RX ADMIN — Medication 81 MILLIGRAM(S): at 11:48

## 2024-08-14 RX ADMIN — INSULIN LISPRO 2: 100 INJECTION, SOLUTION INTRAVENOUS; SUBCUTANEOUS at 11:47

## 2024-08-14 RX ADMIN — OXYCODONE HYDROCHLORIDE 2.5 MILLIGRAM(S): 30 TABLET ORAL at 17:30

## 2024-08-14 RX ADMIN — OXYCODONE HYDROCHLORIDE 2.5 MILLIGRAM(S): 30 TABLET ORAL at 05:13

## 2024-08-15 LAB
ANION GAP SERPL CALC-SCNC: 19 MMOL/L — HIGH (ref 5–17)
BASOPHILS # BLD AUTO: 0.08 K/UL — SIGNIFICANT CHANGE UP (ref 0–0.2)
BASOPHILS NFR BLD AUTO: 0.6 % — SIGNIFICANT CHANGE UP (ref 0–2)
BUN SERPL-MCNC: 51 MG/DL — HIGH (ref 7–23)
CALCIUM SERPL-MCNC: 9.6 MG/DL — SIGNIFICANT CHANGE UP (ref 8.4–10.5)
CHLORIDE SERPL-SCNC: 97 MMOL/L — SIGNIFICANT CHANGE UP (ref 96–108)
CO2 SERPL-SCNC: 24 MMOL/L — SIGNIFICANT CHANGE UP (ref 22–31)
CREAT SERPL-MCNC: 7.51 MG/DL — HIGH (ref 0.5–1.3)
EGFR: 7 ML/MIN/1.73M2 — LOW
EGFR: 7 ML/MIN/1.73M2 — LOW
EOSINOPHIL # BLD AUTO: 0.45 K/UL — SIGNIFICANT CHANGE UP (ref 0–0.5)
EOSINOPHIL NFR BLD AUTO: 3.5 % — SIGNIFICANT CHANGE UP (ref 0–6)
GLUCOSE BLDC GLUCOMTR-MCNC: 166 MG/DL — HIGH (ref 70–99)
GLUCOSE BLDC GLUCOMTR-MCNC: 212 MG/DL — HIGH (ref 70–99)
GLUCOSE BLDC GLUCOMTR-MCNC: 264 MG/DL — HIGH (ref 70–99)
GLUCOSE BLDC GLUCOMTR-MCNC: 296 MG/DL — HIGH (ref 70–99)
GLUCOSE SERPL-MCNC: 136 MG/DL — HIGH (ref 70–99)
HCT VFR BLD CALC: 27.1 % — LOW (ref 39–50)
HGB BLD-MCNC: 8.3 G/DL — LOW (ref 13–17)
IMM GRANULOCYTES NFR BLD AUTO: 0.8 % — SIGNIFICANT CHANGE UP (ref 0–0.9)
LYMPHOCYTES # BLD AUTO: 1.36 K/UL — SIGNIFICANT CHANGE UP (ref 1–3.3)
LYMPHOCYTES # BLD AUTO: 10.5 % — LOW (ref 13–44)
MAGNESIUM SERPL-MCNC: 2.6 MG/DL — SIGNIFICANT CHANGE UP (ref 1.6–2.6)
MCHC RBC-ENTMCNC: 30.6 GM/DL — LOW (ref 32–36)
MCHC RBC-ENTMCNC: 31.2 PG — SIGNIFICANT CHANGE UP (ref 27–34)
MCV RBC AUTO: 101.9 FL — HIGH (ref 80–100)
MONOCYTES # BLD AUTO: 1.45 K/UL — HIGH (ref 0–0.9)
MONOCYTES NFR BLD AUTO: 11.1 % — SIGNIFICANT CHANGE UP (ref 2–14)
NEUTROPHILS # BLD AUTO: 9.56 K/UL — HIGH (ref 1.8–7.4)
NEUTROPHILS NFR BLD AUTO: 73.5 % — SIGNIFICANT CHANGE UP (ref 43–77)
NRBC # BLD: 0 /100 WBCS — SIGNIFICANT CHANGE UP (ref 0–0)
NRBC BLD-RTO: 0 /100 WBCS — SIGNIFICANT CHANGE UP (ref 0–0)
PHOSPHATE SERPL-MCNC: 5 MG/DL — HIGH (ref 2.5–4.5)
PLATELET # BLD AUTO: 528 K/UL — HIGH (ref 150–400)
POTASSIUM SERPL-MCNC: 3.9 MMOL/L — SIGNIFICANT CHANGE UP (ref 3.5–5.3)
POTASSIUM SERPL-SCNC: 3.9 MMOL/L — SIGNIFICANT CHANGE UP (ref 3.5–5.3)
RBC # BLD: 2.66 M/UL — LOW (ref 4.2–5.8)
RBC # FLD: 18.4 % — HIGH (ref 10.3–14.5)
SODIUM SERPL-SCNC: 140 MMOL/L — SIGNIFICANT CHANGE UP (ref 135–145)
WBC # BLD: 13.01 K/UL — HIGH (ref 3.8–10.5)
WBC # FLD AUTO: 13.01 K/UL — HIGH (ref 3.8–10.5)

## 2024-08-15 PROCEDURE — 73562 X-RAY EXAM OF KNEE 3: CPT | Mod: 26,LT

## 2024-08-15 PROCEDURE — 99233 SBSQ HOSP IP/OBS HIGH 50: CPT | Mod: GC

## 2024-08-15 PROCEDURE — 99232 SBSQ HOSP IP/OBS MODERATE 35: CPT | Mod: GC

## 2024-08-15 RX ORDER — METOPROLOL SUCCINATE 50 MG/1
12.5 TABLET, EXTENDED RELEASE ORAL EVERY 12 HOURS
Refills: 0 | Status: DISCONTINUED | OUTPATIENT
Start: 2024-08-15 | End: 2024-08-24

## 2024-08-15 RX ORDER — MIDODRINE HYDROCHLORIDE 5 MG/1
10 TABLET ORAL ONCE
Refills: 0 | Status: COMPLETED | OUTPATIENT
Start: 2024-08-15 | End: 2024-08-15

## 2024-08-15 RX ADMIN — Medication 40 MILLIGRAM(S): at 05:10

## 2024-08-15 RX ADMIN — OXYCODONE HYDROCHLORIDE 2.5 MILLIGRAM(S): 30 TABLET ORAL at 17:57

## 2024-08-15 RX ADMIN — Medication 1000 MILLILITER(S): at 13:55

## 2024-08-15 RX ADMIN — OXYCODONE HYDROCHLORIDE 2.5 MILLIGRAM(S): 30 TABLET ORAL at 14:26

## 2024-08-15 RX ADMIN — Medication 1000 MILLILITER(S): at 18:11

## 2024-08-15 RX ADMIN — INSULIN LISPRO 1: 100 INJECTION, SOLUTION INTRAVENOUS; SUBCUTANEOUS at 07:51

## 2024-08-15 RX ADMIN — OXYCODONE HYDROCHLORIDE 2.5 MILLIGRAM(S): 30 TABLET ORAL at 01:46

## 2024-08-15 RX ADMIN — LOPERAMIDE HCL 2 MILLIGRAM(S): 1 SOLUTION ORAL at 13:26

## 2024-08-15 RX ADMIN — OXYCODONE HYDROCHLORIDE 2.5 MILLIGRAM(S): 30 TABLET ORAL at 13:26

## 2024-08-15 RX ADMIN — Medication 2000 MILLILITER(S): at 18:47

## 2024-08-15 RX ADMIN — DONEPEZIL HYDROCHLORIDE 10 MILLIGRAM(S): 5 TABLET ORAL at 21:49

## 2024-08-15 RX ADMIN — OXYCODONE HYDROCHLORIDE 2.5 MILLIGRAM(S): 30 TABLET ORAL at 05:10

## 2024-08-15 RX ADMIN — INSULIN LISPRO 3: 100 INJECTION, SOLUTION INTRAVENOUS; SUBCUTANEOUS at 17:57

## 2024-08-15 RX ADMIN — Medication 1 TABLET(S): at 13:26

## 2024-08-15 RX ADMIN — EPOETIN ALFA 4000 UNIT(S): 10000 SOLUTION INTRAVENOUS; SUBCUTANEOUS at 11:38

## 2024-08-15 RX ADMIN — OXYCODONE HYDROCHLORIDE 2.5 MILLIGRAM(S): 30 TABLET ORAL at 18:57

## 2024-08-15 RX ADMIN — ATORVASTATIN CALCIUM 40 MILLIGRAM(S): 80 TABLET, FILM COATED ORAL at 21:49

## 2024-08-15 RX ADMIN — OXYCODONE HYDROCHLORIDE 2.5 MILLIGRAM(S): 30 TABLET ORAL at 00:17

## 2024-08-15 RX ADMIN — Medication 81 MILLIGRAM(S): at 13:26

## 2024-08-15 RX ADMIN — Medication 3 MILLIGRAM(S): at 21:49

## 2024-08-15 RX ADMIN — Medication 40 MILLIGRAM(S): at 17:58

## 2024-08-15 RX ADMIN — INSULIN LISPRO 3: 100 INJECTION, SOLUTION INTRAVENOUS; SUBCUTANEOUS at 13:25

## 2024-08-15 RX ADMIN — MIDODRINE HYDROCHLORIDE 10 MILLIGRAM(S): 5 TABLET ORAL at 19:02

## 2024-08-16 LAB
ANION GAP SERPL CALC-SCNC: 15 MMOL/L — SIGNIFICANT CHANGE UP (ref 5–17)
BASOPHILS # BLD AUTO: 0.09 K/UL — SIGNIFICANT CHANGE UP (ref 0–0.2)
BASOPHILS NFR BLD AUTO: 0.6 % — SIGNIFICANT CHANGE UP (ref 0–2)
BUN SERPL-MCNC: 33 MG/DL — HIGH (ref 7–23)
CALCIUM SERPL-MCNC: 9.3 MG/DL — SIGNIFICANT CHANGE UP (ref 8.4–10.5)
CHLORIDE SERPL-SCNC: 94 MMOL/L — LOW (ref 96–108)
CO2 SERPL-SCNC: 24 MMOL/L — SIGNIFICANT CHANGE UP (ref 22–31)
CREAT SERPL-MCNC: 4.91 MG/DL — HIGH (ref 0.5–1.3)
EGFR: 12 ML/MIN/1.73M2 — LOW
EGFR: 12 ML/MIN/1.73M2 — LOW
EOSINOPHIL # BLD AUTO: 0.38 K/UL — SIGNIFICANT CHANGE UP (ref 0–0.5)
EOSINOPHIL NFR BLD AUTO: 2.5 % — SIGNIFICANT CHANGE UP (ref 0–6)
GLUCOSE BLDC GLUCOMTR-MCNC: 183 MG/DL — HIGH (ref 70–99)
GLUCOSE BLDC GLUCOMTR-MCNC: 185 MG/DL — HIGH (ref 70–99)
GLUCOSE BLDC GLUCOMTR-MCNC: 218 MG/DL — HIGH (ref 70–99)
GLUCOSE BLDC GLUCOMTR-MCNC: 238 MG/DL — HIGH (ref 70–99)
GLUCOSE SERPL-MCNC: 150 MG/DL — HIGH (ref 70–99)
HCT VFR BLD CALC: 26.3 % — LOW (ref 39–50)
HGB BLD-MCNC: 8.2 G/DL — LOW (ref 13–17)
IMM GRANULOCYTES NFR BLD AUTO: 0.9 % — SIGNIFICANT CHANGE UP (ref 0–0.9)
LYMPHOCYTES # BLD AUTO: 1.74 K/UL — SIGNIFICANT CHANGE UP (ref 1–3.3)
LYMPHOCYTES # BLD AUTO: 11.5 % — LOW (ref 13–44)
MAGNESIUM SERPL-MCNC: 2.2 MG/DL — SIGNIFICANT CHANGE UP (ref 1.6–2.6)
MCHC RBC-ENTMCNC: 31.2 GM/DL — LOW (ref 32–36)
MCHC RBC-ENTMCNC: 31.7 PG — SIGNIFICANT CHANGE UP (ref 27–34)
MCV RBC AUTO: 101.5 FL — HIGH (ref 80–100)
MONOCYTES # BLD AUTO: 2.37 K/UL — HIGH (ref 0–0.9)
MONOCYTES NFR BLD AUTO: 15.7 % — HIGH (ref 2–14)
NEUTROPHILS # BLD AUTO: 10.37 K/UL — HIGH (ref 1.8–7.4)
NEUTROPHILS NFR BLD AUTO: 68.8 % — SIGNIFICANT CHANGE UP (ref 43–77)
NRBC # BLD: 0 /100 WBCS — SIGNIFICANT CHANGE UP (ref 0–0)
NRBC BLD-RTO: 0 /100 WBCS — SIGNIFICANT CHANGE UP (ref 0–0)
PHOSPHATE SERPL-MCNC: 3.4 MG/DL — SIGNIFICANT CHANGE UP (ref 2.5–4.5)
PLATELET # BLD AUTO: 512 K/UL — HIGH (ref 150–400)
POTASSIUM SERPL-MCNC: 4.1 MMOL/L — SIGNIFICANT CHANGE UP (ref 3.5–5.3)
POTASSIUM SERPL-SCNC: 4.1 MMOL/L — SIGNIFICANT CHANGE UP (ref 3.5–5.3)
RBC # BLD: 2.59 M/UL — LOW (ref 4.2–5.8)
RBC # FLD: 18.4 % — HIGH (ref 10.3–14.5)
SODIUM SERPL-SCNC: 133 MMOL/L — LOW (ref 135–145)
WBC # BLD: 15.08 K/UL — HIGH (ref 3.8–10.5)
WBC # FLD AUTO: 15.08 K/UL — HIGH (ref 3.8–10.5)

## 2024-08-16 PROCEDURE — 99232 SBSQ HOSP IP/OBS MODERATE 35: CPT | Mod: GC

## 2024-08-16 RX ADMIN — ATORVASTATIN CALCIUM 40 MILLIGRAM(S): 80 TABLET, FILM COATED ORAL at 21:27

## 2024-08-16 RX ADMIN — INSULIN LISPRO 1: 100 INJECTION, SOLUTION INTRAVENOUS; SUBCUTANEOUS at 08:15

## 2024-08-16 RX ADMIN — LOPERAMIDE HCL 2 MILLIGRAM(S): 1 SOLUTION ORAL at 11:59

## 2024-08-16 RX ADMIN — Medication 650 MILLIGRAM(S): at 08:26

## 2024-08-16 RX ADMIN — INSULIN LISPRO 2: 100 INJECTION, SOLUTION INTRAVENOUS; SUBCUTANEOUS at 17:45

## 2024-08-16 RX ADMIN — Medication 40 MILLIGRAM(S): at 05:38

## 2024-08-16 RX ADMIN — Medication 3 MILLIGRAM(S): at 21:28

## 2024-08-16 RX ADMIN — Medication 40 MILLIGRAM(S): at 17:47

## 2024-08-16 RX ADMIN — INSULIN LISPRO 2: 100 INJECTION, SOLUTION INTRAVENOUS; SUBCUTANEOUS at 11:59

## 2024-08-16 RX ADMIN — Medication 1 TABLET(S): at 11:58

## 2024-08-16 RX ADMIN — DONEPEZIL HYDROCHLORIDE 10 MILLIGRAM(S): 5 TABLET ORAL at 21:34

## 2024-08-16 RX ADMIN — Medication 81 MILLIGRAM(S): at 11:59

## 2024-08-17 LAB
GLUCOSE BLDC GLUCOMTR-MCNC: 173 MG/DL — HIGH (ref 70–99)
GLUCOSE BLDC GLUCOMTR-MCNC: 174 MG/DL — HIGH (ref 70–99)
GLUCOSE BLDC GLUCOMTR-MCNC: 250 MG/DL — HIGH (ref 70–99)
GLUCOSE BLDC GLUCOMTR-MCNC: 265 MG/DL — HIGH (ref 70–99)

## 2024-08-17 PROCEDURE — 99232 SBSQ HOSP IP/OBS MODERATE 35: CPT | Mod: GC

## 2024-08-17 RX ORDER — HEPARIN SODIUM 1000 [USP'U]/ML
5000 INJECTION INTRAVENOUS; SUBCUTANEOUS EVERY 12 HOURS
Refills: 0 | Status: DISCONTINUED | OUTPATIENT
Start: 2024-08-17 | End: 2024-08-24

## 2024-08-17 RX ORDER — EPOETIN ALFA 10000 [IU]/ML
4000 SOLUTION INTRAVENOUS; SUBCUTANEOUS
Refills: 0 | Status: DISCONTINUED | OUTPATIENT
Start: 2024-08-17 | End: 2024-08-21

## 2024-08-17 RX ORDER — MIDODRINE HYDROCHLORIDE 5 MG/1
10 TABLET ORAL
Refills: 0 | Status: DISCONTINUED | OUTPATIENT
Start: 2024-08-17 | End: 2024-08-22

## 2024-08-17 RX ORDER — MIDODRINE HYDROCHLORIDE 5 MG/1
10 TABLET ORAL ONCE
Refills: 0 | Status: COMPLETED | OUTPATIENT
Start: 2024-08-17 | End: 2024-08-17

## 2024-08-17 RX ADMIN — Medication 40 MILLIGRAM(S): at 05:40

## 2024-08-17 RX ADMIN — Medication 1 TABLET(S): at 17:19

## 2024-08-17 RX ADMIN — Medication 81 MILLIGRAM(S): at 17:19

## 2024-08-17 RX ADMIN — LOPERAMIDE HCL 2 MILLIGRAM(S): 1 SOLUTION ORAL at 17:19

## 2024-08-17 RX ADMIN — METOPROLOL SUCCINATE 12.5 MILLIGRAM(S): 50 TABLET, EXTENDED RELEASE ORAL at 17:20

## 2024-08-17 RX ADMIN — HEPARIN SODIUM 5000 UNIT(S): 1000 INJECTION INTRAVENOUS; SUBCUTANEOUS at 17:20

## 2024-08-17 RX ADMIN — Medication 40 MILLIGRAM(S): at 17:19

## 2024-08-17 RX ADMIN — ATORVASTATIN CALCIUM 40 MILLIGRAM(S): 80 TABLET, FILM COATED ORAL at 21:20

## 2024-08-17 RX ADMIN — INSULIN LISPRO 1: 100 INJECTION, SOLUTION INTRAVENOUS; SUBCUTANEOUS at 21:21

## 2024-08-17 RX ADMIN — INSULIN LISPRO 1: 100 INJECTION, SOLUTION INTRAVENOUS; SUBCUTANEOUS at 17:18

## 2024-08-17 RX ADMIN — INSULIN LISPRO 1: 100 INJECTION, SOLUTION INTRAVENOUS; SUBCUTANEOUS at 07:53

## 2024-08-17 RX ADMIN — Medication 3 MILLIGRAM(S): at 21:20

## 2024-08-17 RX ADMIN — INSULIN LISPRO 2: 100 INJECTION, SOLUTION INTRAVENOUS; SUBCUTANEOUS at 11:37

## 2024-08-17 RX ADMIN — EPOETIN ALFA 4000 UNIT(S): 10000 SOLUTION INTRAVENOUS; SUBCUTANEOUS at 17:00

## 2024-08-17 RX ADMIN — MIDODRINE HYDROCHLORIDE 10 MILLIGRAM(S): 5 TABLET ORAL at 07:18

## 2024-08-17 RX ADMIN — DONEPEZIL HYDROCHLORIDE 10 MILLIGRAM(S): 5 TABLET ORAL at 21:20

## 2024-08-18 LAB
ANION GAP SERPL CALC-SCNC: 16 MMOL/L — SIGNIFICANT CHANGE UP (ref 5–17)
ANISOCYTOSIS BLD QL: SIGNIFICANT CHANGE UP
BASOPHILS # BLD AUTO: 0 K/UL — SIGNIFICANT CHANGE UP (ref 0–0.2)
BASOPHILS NFR BLD AUTO: 0 % — SIGNIFICANT CHANGE UP (ref 0–2)
BUN SERPL-MCNC: 26 MG/DL — HIGH (ref 7–23)
CALCIUM SERPL-MCNC: 9.3 MG/DL — SIGNIFICANT CHANGE UP (ref 8.4–10.5)
CHLORIDE SERPL-SCNC: 97 MMOL/L — SIGNIFICANT CHANGE UP (ref 96–108)
CO2 SERPL-SCNC: 27 MMOL/L — SIGNIFICANT CHANGE UP (ref 22–31)
CREAT SERPL-MCNC: 4.37 MG/DL — HIGH (ref 0.5–1.3)
EGFR: 14 ML/MIN/1.73M2 — LOW
EGFR: 14 ML/MIN/1.73M2 — LOW
EOSINOPHIL # BLD AUTO: 0.3 K/UL — SIGNIFICANT CHANGE UP (ref 0–0.5)
EOSINOPHIL NFR BLD AUTO: 1.8 % — SIGNIFICANT CHANGE UP (ref 0–6)
GIANT PLATELETS BLD QL SMEAR: PRESENT — SIGNIFICANT CHANGE UP
GLUCOSE BLDC GLUCOMTR-MCNC: 152 MG/DL — HIGH (ref 70–99)
GLUCOSE BLDC GLUCOMTR-MCNC: 179 MG/DL — HIGH (ref 70–99)
GLUCOSE BLDC GLUCOMTR-MCNC: 201 MG/DL — HIGH (ref 70–99)
GLUCOSE BLDC GLUCOMTR-MCNC: 205 MG/DL — HIGH (ref 70–99)
GLUCOSE SERPL-MCNC: 162 MG/DL — HIGH (ref 70–99)
HCT VFR BLD CALC: 28.8 % — LOW (ref 39–50)
HGB BLD-MCNC: 8.7 G/DL — LOW (ref 13–17)
LYMPHOCYTES # BLD AUTO: 1.76 K/UL — SIGNIFICANT CHANGE UP (ref 1–3.3)
LYMPHOCYTES # BLD AUTO: 10.5 % — LOW (ref 13–44)
MACROCYTES BLD QL: SIGNIFICANT CHANGE UP
MAGNESIUM SERPL-MCNC: 2.2 MG/DL — SIGNIFICANT CHANGE UP (ref 1.6–2.6)
MANUAL SMEAR VERIFICATION: SIGNIFICANT CHANGE UP
MCHC RBC-ENTMCNC: 30.2 GM/DL — LOW (ref 32–36)
MCHC RBC-ENTMCNC: 30.7 PG — SIGNIFICANT CHANGE UP (ref 27–34)
MCV RBC AUTO: 101.8 FL — HIGH (ref 80–100)
MONOCYTES # BLD AUTO: 2.8 K/UL — HIGH (ref 0–0.9)
MONOCYTES NFR BLD AUTO: 16.7 % — HIGH (ref 2–14)
NEUTROPHILS # BLD AUTO: 11.9 K/UL — HIGH (ref 1.8–7.4)
NEUTROPHILS NFR BLD AUTO: 71 % — SIGNIFICANT CHANGE UP (ref 43–77)
OVALOCYTES BLD QL SMEAR: SLIGHT — SIGNIFICANT CHANGE UP
PHOSPHATE SERPL-MCNC: 2.7 MG/DL — SIGNIFICANT CHANGE UP (ref 2.5–4.5)
PLAT MORPH BLD: ABNORMAL
PLATELET # BLD AUTO: 553 K/UL — HIGH (ref 150–400)
POIKILOCYTOSIS BLD QL AUTO: SLIGHT — SIGNIFICANT CHANGE UP
POLYCHROMASIA BLD QL SMEAR: SLIGHT — SIGNIFICANT CHANGE UP
POTASSIUM SERPL-MCNC: 3.6 MMOL/L — SIGNIFICANT CHANGE UP (ref 3.5–5.3)
POTASSIUM SERPL-SCNC: 3.6 MMOL/L — SIGNIFICANT CHANGE UP (ref 3.5–5.3)
RBC # BLD: 2.83 M/UL — LOW (ref 4.2–5.8)
RBC # FLD: 18.3 % — HIGH (ref 10.3–14.5)
RBC BLD AUTO: ABNORMAL
SCHISTOCYTES BLD QL AUTO: SLIGHT — SIGNIFICANT CHANGE UP
SODIUM SERPL-SCNC: 140 MMOL/L — SIGNIFICANT CHANGE UP (ref 135–145)
TARGETS BLD QL SMEAR: SLIGHT — SIGNIFICANT CHANGE UP
WBC # BLD: 16.76 K/UL — HIGH (ref 3.8–10.5)
WBC # FLD AUTO: 16.76 K/UL — HIGH (ref 3.8–10.5)

## 2024-08-18 PROCEDURE — 99232 SBSQ HOSP IP/OBS MODERATE 35: CPT | Mod: GC

## 2024-08-18 RX ADMIN — DONEPEZIL HYDROCHLORIDE 10 MILLIGRAM(S): 5 TABLET ORAL at 21:22

## 2024-08-18 RX ADMIN — Medication 40 MILLIGRAM(S): at 05:31

## 2024-08-18 RX ADMIN — HEPARIN SODIUM 5000 UNIT(S): 1000 INJECTION INTRAVENOUS; SUBCUTANEOUS at 17:30

## 2024-08-18 RX ADMIN — METOPROLOL SUCCINATE 12.5 MILLIGRAM(S): 50 TABLET, EXTENDED RELEASE ORAL at 17:30

## 2024-08-18 RX ADMIN — ATORVASTATIN CALCIUM 40 MILLIGRAM(S): 80 TABLET, FILM COATED ORAL at 21:22

## 2024-08-18 RX ADMIN — Medication 1 TABLET(S): at 11:54

## 2024-08-18 RX ADMIN — Medication 650 MILLIGRAM(S): at 06:21

## 2024-08-18 RX ADMIN — Medication 40 MILLIGRAM(S): at 17:30

## 2024-08-18 RX ADMIN — INSULIN LISPRO 2: 100 INJECTION, SOLUTION INTRAVENOUS; SUBCUTANEOUS at 17:30

## 2024-08-18 RX ADMIN — Medication 3 MILLIGRAM(S): at 21:22

## 2024-08-18 RX ADMIN — LOPERAMIDE HCL 2 MILLIGRAM(S): 1 SOLUTION ORAL at 11:58

## 2024-08-18 RX ADMIN — METOPROLOL SUCCINATE 12.5 MILLIGRAM(S): 50 TABLET, EXTENDED RELEASE ORAL at 05:33

## 2024-08-18 RX ADMIN — INSULIN LISPRO 1: 100 INJECTION, SOLUTION INTRAVENOUS; SUBCUTANEOUS at 11:57

## 2024-08-18 RX ADMIN — HEPARIN SODIUM 5000 UNIT(S): 1000 INJECTION INTRAVENOUS; SUBCUTANEOUS at 05:31

## 2024-08-18 RX ADMIN — Medication 81 MILLIGRAM(S): at 11:58

## 2024-08-18 RX ADMIN — INSULIN LISPRO 2: 100 INJECTION, SOLUTION INTRAVENOUS; SUBCUTANEOUS at 07:51

## 2024-08-18 RX ADMIN — Medication 650 MILLIGRAM(S): at 19:49

## 2024-08-18 RX ADMIN — Medication 650 MILLIGRAM(S): at 20:19

## 2024-08-19 LAB
ANION GAP SERPL CALC-SCNC: 23 MMOL/L — HIGH (ref 5–17)
BASOPHILS # BLD AUTO: 0.11 K/UL — SIGNIFICANT CHANGE UP (ref 0–0.2)
BASOPHILS NFR BLD AUTO: 0.6 % — SIGNIFICANT CHANGE UP (ref 0–2)
BUN SERPL-MCNC: 41 MG/DL — HIGH (ref 7–23)
CALCIUM SERPL-MCNC: 9.5 MG/DL — SIGNIFICANT CHANGE UP (ref 8.4–10.5)
CHLORIDE SERPL-SCNC: 96 MMOL/L — SIGNIFICANT CHANGE UP (ref 96–108)
CO2 SERPL-SCNC: 20 MMOL/L — LOW (ref 22–31)
CREAT SERPL-MCNC: 5.78 MG/DL — HIGH (ref 0.5–1.3)
EGFR: 10 ML/MIN/1.73M2 — LOW
EGFR: 10 ML/MIN/1.73M2 — LOW
EOSINOPHIL # BLD AUTO: 0.28 K/UL — SIGNIFICANT CHANGE UP (ref 0–0.5)
EOSINOPHIL NFR BLD AUTO: 1.5 % — SIGNIFICANT CHANGE UP (ref 0–6)
GLUCOSE BLDC GLUCOMTR-MCNC: 155 MG/DL — HIGH (ref 70–99)
GLUCOSE BLDC GLUCOMTR-MCNC: 168 MG/DL — HIGH (ref 70–99)
GLUCOSE BLDC GLUCOMTR-MCNC: 181 MG/DL — HIGH (ref 70–99)
GLUCOSE BLDC GLUCOMTR-MCNC: 182 MG/DL — HIGH (ref 70–99)
GLUCOSE SERPL-MCNC: 135 MG/DL — HIGH (ref 70–99)
HCT VFR BLD CALC: 29.4 % — LOW (ref 39–50)
HGB BLD-MCNC: 9 G/DL — LOW (ref 13–17)
IMM GRANULOCYTES NFR BLD AUTO: 0.8 % — SIGNIFICANT CHANGE UP (ref 0–0.9)
LYMPHOCYTES # BLD AUTO: 1.64 K/UL — SIGNIFICANT CHANGE UP (ref 1–3.3)
LYMPHOCYTES # BLD AUTO: 8.9 % — LOW (ref 13–44)
MAGNESIUM SERPL-MCNC: 2.4 MG/DL — SIGNIFICANT CHANGE UP (ref 1.6–2.6)
MCHC RBC-ENTMCNC: 30.6 GM/DL — LOW (ref 32–36)
MCHC RBC-ENTMCNC: 31.5 PG — SIGNIFICANT CHANGE UP (ref 27–34)
MCV RBC AUTO: 102.8 FL — HIGH (ref 80–100)
MONOCYTES # BLD AUTO: 2.02 K/UL — HIGH (ref 0–0.9)
MONOCYTES NFR BLD AUTO: 10.9 % — SIGNIFICANT CHANGE UP (ref 2–14)
NEUTROPHILS # BLD AUTO: 14.31 K/UL — HIGH (ref 1.8–7.4)
NEUTROPHILS NFR BLD AUTO: 77.3 % — HIGH (ref 43–77)
NRBC # BLD: 0 /100 WBCS — SIGNIFICANT CHANGE UP (ref 0–0)
NRBC BLD-RTO: 0 /100 WBCS — SIGNIFICANT CHANGE UP (ref 0–0)
PHOSPHATE SERPL-MCNC: 3.6 MG/DL — SIGNIFICANT CHANGE UP (ref 2.5–4.5)
PLATELET # BLD AUTO: 543 K/UL — HIGH (ref 150–400)
POTASSIUM SERPL-MCNC: 4.3 MMOL/L — SIGNIFICANT CHANGE UP (ref 3.5–5.3)
POTASSIUM SERPL-SCNC: 4.3 MMOL/L — SIGNIFICANT CHANGE UP (ref 3.5–5.3)
RBC # BLD: 2.86 M/UL — LOW (ref 4.2–5.8)
RBC # FLD: 18.6 % — HIGH (ref 10.3–14.5)
SODIUM SERPL-SCNC: 139 MMOL/L — SIGNIFICANT CHANGE UP (ref 135–145)
WBC # BLD: 18.5 K/UL — HIGH (ref 3.8–10.5)
WBC # FLD AUTO: 18.5 K/UL — HIGH (ref 3.8–10.5)

## 2024-08-19 PROCEDURE — 99233 SBSQ HOSP IP/OBS HIGH 50: CPT | Mod: GC

## 2024-08-19 PROCEDURE — 99232 SBSQ HOSP IP/OBS MODERATE 35: CPT

## 2024-08-19 RX ORDER — MIDODRINE HYDROCHLORIDE 5 MG/1
10 TABLET ORAL ONCE
Refills: 0 | Status: COMPLETED | OUTPATIENT
Start: 2024-08-19 | End: 2024-08-19

## 2024-08-19 RX ADMIN — Medication 1 TABLET(S): at 11:12

## 2024-08-19 RX ADMIN — METOPROLOL SUCCINATE 12.5 MILLIGRAM(S): 50 TABLET, EXTENDED RELEASE ORAL at 05:19

## 2024-08-19 RX ADMIN — Medication 3 MILLIGRAM(S): at 21:42

## 2024-08-19 RX ADMIN — HEPARIN SODIUM 5000 UNIT(S): 1000 INJECTION INTRAVENOUS; SUBCUTANEOUS at 05:19

## 2024-08-19 RX ADMIN — INSULIN LISPRO 1: 100 INJECTION, SOLUTION INTRAVENOUS; SUBCUTANEOUS at 12:05

## 2024-08-19 RX ADMIN — LOPERAMIDE HCL 2 MILLIGRAM(S): 1 SOLUTION ORAL at 11:12

## 2024-08-19 RX ADMIN — METOPROLOL SUCCINATE 12.5 MILLIGRAM(S): 50 TABLET, EXTENDED RELEASE ORAL at 18:00

## 2024-08-19 RX ADMIN — Medication 40 MILLIGRAM(S): at 05:20

## 2024-08-19 RX ADMIN — Medication 81 MILLIGRAM(S): at 11:12

## 2024-08-19 RX ADMIN — ATORVASTATIN CALCIUM 40 MILLIGRAM(S): 80 TABLET, FILM COATED ORAL at 21:42

## 2024-08-19 RX ADMIN — INSULIN LISPRO 1: 100 INJECTION, SOLUTION INTRAVENOUS; SUBCUTANEOUS at 07:51

## 2024-08-19 RX ADMIN — INSULIN LISPRO 1: 100 INJECTION, SOLUTION INTRAVENOUS; SUBCUTANEOUS at 18:00

## 2024-08-19 RX ADMIN — DONEPEZIL HYDROCHLORIDE 10 MILLIGRAM(S): 5 TABLET ORAL at 21:42

## 2024-08-19 RX ADMIN — MIDODRINE HYDROCHLORIDE 10 MILLIGRAM(S): 5 TABLET ORAL at 12:44

## 2024-08-19 RX ADMIN — Medication 650 MILLIGRAM(S): at 11:09

## 2024-08-19 RX ADMIN — Medication 1000 MILLILITER(S): at 11:50

## 2024-08-19 RX ADMIN — HEPARIN SODIUM 5000 UNIT(S): 1000 INJECTION INTRAVENOUS; SUBCUTANEOUS at 17:59

## 2024-08-19 RX ADMIN — Medication 40 MILLIGRAM(S): at 18:00

## 2024-08-20 LAB
ANION GAP SERPL CALC-SCNC: 20 MMOL/L — HIGH (ref 5–17)
BUN SERPL-MCNC: 55 MG/DL — HIGH (ref 7–23)
CALCIUM SERPL-MCNC: 9.2 MG/DL — SIGNIFICANT CHANGE UP (ref 8.4–10.5)
CHLORIDE SERPL-SCNC: 94 MMOL/L — LOW (ref 96–108)
CO2 SERPL-SCNC: 23 MMOL/L — SIGNIFICANT CHANGE UP (ref 22–31)
CREAT SERPL-MCNC: 7.14 MG/DL — HIGH (ref 0.5–1.3)
EGFR: 8 ML/MIN/1.73M2 — LOW
EGFR: 8 ML/MIN/1.73M2 — LOW
GLUCOSE BLDC GLUCOMTR-MCNC: 143 MG/DL — HIGH (ref 70–99)
GLUCOSE BLDC GLUCOMTR-MCNC: 143 MG/DL — HIGH (ref 70–99)
GLUCOSE BLDC GLUCOMTR-MCNC: 166 MG/DL — HIGH (ref 70–99)
GLUCOSE BLDC GLUCOMTR-MCNC: 204 MG/DL — HIGH (ref 70–99)
GLUCOSE SERPL-MCNC: 178 MG/DL — HIGH (ref 70–99)
HCT VFR BLD CALC: 29.8 % — LOW (ref 39–50)
HGB BLD-MCNC: 9.1 G/DL — LOW (ref 13–17)
MAGNESIUM SERPL-MCNC: 2.4 MG/DL — SIGNIFICANT CHANGE UP (ref 1.6–2.6)
MCHC RBC-ENTMCNC: 30.5 GM/DL — LOW (ref 32–36)
MCHC RBC-ENTMCNC: 30.8 PG — SIGNIFICANT CHANGE UP (ref 27–34)
MCV RBC AUTO: 101 FL — HIGH (ref 80–100)
NRBC # BLD: 0 /100 WBCS — SIGNIFICANT CHANGE UP (ref 0–0)
NRBC BLD-RTO: 0 /100 WBCS — SIGNIFICANT CHANGE UP (ref 0–0)
PHOSPHATE SERPL-MCNC: 4.7 MG/DL — HIGH (ref 2.5–4.5)
PLATELET # BLD AUTO: 567 K/UL — HIGH (ref 150–400)
POTASSIUM SERPL-MCNC: 4.3 MMOL/L — SIGNIFICANT CHANGE UP (ref 3.5–5.3)
POTASSIUM SERPL-SCNC: 4.3 MMOL/L — SIGNIFICANT CHANGE UP (ref 3.5–5.3)
RBC # BLD: 2.95 M/UL — LOW (ref 4.2–5.8)
RBC # FLD: 17.8 % — HIGH (ref 10.3–14.5)
SODIUM SERPL-SCNC: 137 MMOL/L — SIGNIFICANT CHANGE UP (ref 135–145)
WBC # BLD: 16.63 K/UL — HIGH (ref 3.8–10.5)
WBC # FLD AUTO: 16.63 K/UL — HIGH (ref 3.8–10.5)

## 2024-08-20 PROCEDURE — 90935 HEMODIALYSIS ONE EVALUATION: CPT

## 2024-08-20 PROCEDURE — 99232 SBSQ HOSP IP/OBS MODERATE 35: CPT | Mod: GC

## 2024-08-20 RX ORDER — IBUPROFEN 200 MG
400 TABLET ORAL ONCE
Refills: 0 | Status: COMPLETED | OUTPATIENT
Start: 2024-08-20 | End: 2024-08-20

## 2024-08-20 RX ORDER — ACETAMINOPHEN 500 MG/5ML
1000 LIQUID (ML) ORAL EVERY 6 HOURS
Refills: 0 | Status: DISCONTINUED | OUTPATIENT
Start: 2024-08-20 | End: 2024-08-21

## 2024-08-20 RX ORDER — SODIUM CHLORIDE 9 G/1000ML
250 INJECTION, SOLUTION INTRAVENOUS ONCE
Refills: 0 | Status: COMPLETED | OUTPATIENT
Start: 2024-08-20 | End: 2024-08-20

## 2024-08-20 RX ADMIN — Medication 650 MILLIGRAM(S): at 00:20

## 2024-08-20 RX ADMIN — Medication 1 TABLET(S): at 12:39

## 2024-08-20 RX ADMIN — HEPARIN SODIUM 5000 UNIT(S): 1000 INJECTION INTRAVENOUS; SUBCUTANEOUS at 05:37

## 2024-08-20 RX ADMIN — Medication 650 MILLIGRAM(S): at 01:20

## 2024-08-20 RX ADMIN — INSULIN LISPRO 1: 100 INJECTION, SOLUTION INTRAVENOUS; SUBCUTANEOUS at 07:30

## 2024-08-20 RX ADMIN — Medication 650 MILLIGRAM(S): at 09:17

## 2024-08-20 RX ADMIN — LOPERAMIDE HCL 2 MILLIGRAM(S): 1 SOLUTION ORAL at 12:39

## 2024-08-20 RX ADMIN — ATORVASTATIN CALCIUM 40 MILLIGRAM(S): 80 TABLET, FILM COATED ORAL at 21:57

## 2024-08-20 RX ADMIN — Medication 81 MILLIGRAM(S): at 12:38

## 2024-08-20 RX ADMIN — Medication 40 MILLIGRAM(S): at 05:36

## 2024-08-20 RX ADMIN — DONEPEZIL HYDROCHLORIDE 10 MILLIGRAM(S): 5 TABLET ORAL at 21:58

## 2024-08-20 RX ADMIN — Medication 400 MILLIGRAM(S): at 12:38

## 2024-08-20 RX ADMIN — Medication 650 MILLIGRAM(S): at 10:17

## 2024-08-20 RX ADMIN — SODIUM CHLORIDE 250 MILLILITER(S): 9 INJECTION, SOLUTION INTRAVENOUS at 05:20

## 2024-08-20 RX ADMIN — Medication 3 MILLIGRAM(S): at 21:58

## 2024-08-20 RX ADMIN — HEPARIN SODIUM 5000 UNIT(S): 1000 INJECTION INTRAVENOUS; SUBCUTANEOUS at 17:22

## 2024-08-20 RX ADMIN — Medication 400 MILLIGRAM(S): at 13:15

## 2024-08-20 RX ADMIN — Medication 40 MILLIGRAM(S): at 17:22

## 2024-08-21 DIAGNOSIS — D72.829 ELEVATED WHITE BLOOD CELL COUNT, UNSPECIFIED: ICD-10-CM

## 2024-08-21 LAB
ALBUMIN SERPL ELPH-MCNC: 2.8 G/DL — LOW (ref 3.3–5)
ALP SERPL-CCNC: 409 U/L — HIGH (ref 40–120)
ALT FLD-CCNC: 10 U/L — SIGNIFICANT CHANGE UP (ref 10–45)
ANION GAP SERPL CALC-SCNC: 18 MMOL/L — HIGH (ref 5–17)
AST SERPL-CCNC: 25 U/L — SIGNIFICANT CHANGE UP (ref 10–40)
BILIRUB SERPL-MCNC: 0.5 MG/DL — SIGNIFICANT CHANGE UP (ref 0.2–1.2)
BUN SERPL-MCNC: 32 MG/DL — HIGH (ref 7–23)
CALCIUM SERPL-MCNC: 9 MG/DL — SIGNIFICANT CHANGE UP (ref 8.4–10.5)
CHLORIDE SERPL-SCNC: 95 MMOL/L — LOW (ref 96–108)
CO2 SERPL-SCNC: 24 MMOL/L — SIGNIFICANT CHANGE UP (ref 22–31)
CREAT SERPL-MCNC: 4.68 MG/DL — HIGH (ref 0.5–1.3)
EGFR: 12 ML/MIN/1.73M2 — LOW
EGFR: 12 ML/MIN/1.73M2 — LOW
GLUCOSE BLDC GLUCOMTR-MCNC: 125 MG/DL — HIGH (ref 70–99)
GLUCOSE BLDC GLUCOMTR-MCNC: 148 MG/DL — HIGH (ref 70–99)
GLUCOSE BLDC GLUCOMTR-MCNC: 188 MG/DL — HIGH (ref 70–99)
GLUCOSE BLDC GLUCOMTR-MCNC: 223 MG/DL — HIGH (ref 70–99)
GLUCOSE SERPL-MCNC: 106 MG/DL — HIGH (ref 70–99)
HCT VFR BLD CALC: 28 % — LOW (ref 39–50)
HGB BLD-MCNC: 8.6 G/DL — LOW (ref 13–17)
MAGNESIUM SERPL-MCNC: 2.3 MG/DL — SIGNIFICANT CHANGE UP (ref 1.6–2.6)
MCHC RBC-ENTMCNC: 30.2 PG — SIGNIFICANT CHANGE UP (ref 27–34)
MCHC RBC-ENTMCNC: 30.7 GM/DL — LOW (ref 32–36)
MCV RBC AUTO: 98.2 FL — SIGNIFICANT CHANGE UP (ref 80–100)
NRBC # BLD: 0 /100 WBCS — SIGNIFICANT CHANGE UP (ref 0–0)
NRBC BLD-RTO: 0 /100 WBCS — SIGNIFICANT CHANGE UP (ref 0–0)
PHOSPHATE SERPL-MCNC: 3.8 MG/DL — SIGNIFICANT CHANGE UP (ref 2.5–4.5)
PLATELET # BLD AUTO: 606 K/UL — HIGH (ref 150–400)
POTASSIUM SERPL-MCNC: 3.8 MMOL/L — SIGNIFICANT CHANGE UP (ref 3.5–5.3)
POTASSIUM SERPL-SCNC: 3.8 MMOL/L — SIGNIFICANT CHANGE UP (ref 3.5–5.3)
PROT SERPL-MCNC: 8.2 G/DL — SIGNIFICANT CHANGE UP (ref 6–8.3)
RBC # BLD: 2.85 M/UL — LOW (ref 4.2–5.8)
RBC # FLD: 17.8 % — HIGH (ref 10.3–14.5)
SODIUM SERPL-SCNC: 137 MMOL/L — SIGNIFICANT CHANGE UP (ref 135–145)
WBC # BLD: 17.43 K/UL — HIGH (ref 3.8–10.5)
WBC # FLD AUTO: 17.43 K/UL — HIGH (ref 3.8–10.5)

## 2024-08-21 PROCEDURE — 99232 SBSQ HOSP IP/OBS MODERATE 35: CPT | Mod: GC

## 2024-08-21 PROCEDURE — 99232 SBSQ HOSP IP/OBS MODERATE 35: CPT

## 2024-08-21 RX ORDER — OXYCODONE HYDROCHLORIDE 30 MG/1
2.5 TABLET ORAL EVERY 4 HOURS
Refills: 0 | Status: DISCONTINUED | OUTPATIENT
Start: 2024-08-21 | End: 2024-08-24

## 2024-08-21 RX ORDER — OXYCODONE HYDROCHLORIDE 30 MG/1
5 TABLET ORAL EVERY 4 HOURS
Refills: 0 | Status: DISCONTINUED | OUTPATIENT
Start: 2024-08-21 | End: 2024-08-24

## 2024-08-21 RX ORDER — EPOETIN ALFA 10000 [IU]/ML
8000 SOLUTION INTRAVENOUS; SUBCUTANEOUS
Refills: 0 | Status: DISCONTINUED | OUTPATIENT
Start: 2024-08-21 | End: 2024-08-24

## 2024-08-21 RX ORDER — PSYLLIUM SEED (WITH DEXTROSE)
1 POWDER (GRAM) ORAL
Refills: 0 | Status: DISCONTINUED | OUTPATIENT
Start: 2024-08-21 | End: 2024-08-24

## 2024-08-21 RX ORDER — ACETAMINOPHEN 500 MG/5ML
1000 LIQUID (ML) ORAL EVERY 6 HOURS
Refills: 0 | Status: DISCONTINUED | OUTPATIENT
Start: 2024-08-21 | End: 2024-08-24

## 2024-08-21 RX ORDER — DICLOFENAC SODIUM 10 MG/G
4 GEL TOPICAL THREE TIMES A DAY
Refills: 0 | Status: DISCONTINUED | OUTPATIENT
Start: 2024-08-21 | End: 2024-08-24

## 2024-08-21 RX ADMIN — METOPROLOL SUCCINATE 12.5 MILLIGRAM(S): 50 TABLET, EXTENDED RELEASE ORAL at 17:34

## 2024-08-21 RX ADMIN — INSULIN LISPRO 1: 100 INJECTION, SOLUTION INTRAVENOUS; SUBCUTANEOUS at 11:33

## 2024-08-21 RX ADMIN — DICLOFENAC SODIUM 4 GRAM(S): 10 GEL TOPICAL at 22:33

## 2024-08-21 RX ADMIN — Medication 1000 MILLIGRAM(S): at 18:35

## 2024-08-21 RX ADMIN — OXYCODONE HYDROCHLORIDE 5 MILLIGRAM(S): 30 TABLET ORAL at 17:39

## 2024-08-21 RX ADMIN — OXYCODONE HYDROCHLORIDE 5 MILLIGRAM(S): 30 TABLET ORAL at 18:39

## 2024-08-21 RX ADMIN — Medication 1 TABLET(S): at 11:34

## 2024-08-21 RX ADMIN — Medication 1000 MILLIGRAM(S): at 17:35

## 2024-08-21 RX ADMIN — Medication 1000 MILLIGRAM(S): at 23:54

## 2024-08-21 RX ADMIN — INSULIN LISPRO 2: 100 INJECTION, SOLUTION INTRAVENOUS; SUBCUTANEOUS at 17:31

## 2024-08-21 RX ADMIN — LIDOCAINE HYDROCHLORIDE 1 PATCH: 20 JELLY TOPICAL at 19:00

## 2024-08-21 RX ADMIN — Medication 40 MILLIGRAM(S): at 06:16

## 2024-08-21 RX ADMIN — Medication 1 PACKET(S): at 17:35

## 2024-08-21 RX ADMIN — Medication 1000 MILLIGRAM(S): at 11:52

## 2024-08-21 RX ADMIN — DONEPEZIL HYDROCHLORIDE 10 MILLIGRAM(S): 5 TABLET ORAL at 22:32

## 2024-08-21 RX ADMIN — ATORVASTATIN CALCIUM 40 MILLIGRAM(S): 80 TABLET, FILM COATED ORAL at 22:32

## 2024-08-21 RX ADMIN — LIDOCAINE HYDROCHLORIDE 1 PATCH: 20 JELLY TOPICAL at 10:53

## 2024-08-21 RX ADMIN — LIDOCAINE HYDROCHLORIDE 1 PATCH: 20 JELLY TOPICAL at 22:43

## 2024-08-21 RX ADMIN — Medication 1000 MILLIGRAM(S): at 10:52

## 2024-08-21 RX ADMIN — HEPARIN SODIUM 5000 UNIT(S): 1000 INJECTION INTRAVENOUS; SUBCUTANEOUS at 17:34

## 2024-08-21 RX ADMIN — Medication 3 MILLIGRAM(S): at 22:32

## 2024-08-21 RX ADMIN — DICLOFENAC SODIUM 4 GRAM(S): 10 GEL TOPICAL at 13:59

## 2024-08-21 RX ADMIN — LOPERAMIDE HCL 2 MILLIGRAM(S): 1 SOLUTION ORAL at 11:35

## 2024-08-21 RX ADMIN — Medication 1000 MILLIGRAM(S): at 13:48

## 2024-08-21 RX ADMIN — HEPARIN SODIUM 5000 UNIT(S): 1000 INJECTION INTRAVENOUS; SUBCUTANEOUS at 06:16

## 2024-08-21 RX ADMIN — Medication 40 MILLIGRAM(S): at 17:34

## 2024-08-21 RX ADMIN — Medication 81 MILLIGRAM(S): at 11:34

## 2024-08-21 RX ADMIN — Medication 1000 MILLIGRAM(S): at 14:48

## 2024-08-22 LAB
GLUCOSE BLDC GLUCOMTR-MCNC: 105 MG/DL — HIGH (ref 70–99)
GLUCOSE BLDC GLUCOMTR-MCNC: 145 MG/DL — HIGH (ref 70–99)
GLUCOSE BLDC GLUCOMTR-MCNC: 161 MG/DL — HIGH (ref 70–99)
GLUCOSE BLDC GLUCOMTR-MCNC: 179 MG/DL — HIGH (ref 70–99)
GLUCOSE BLDC GLUCOMTR-MCNC: 180 MG/DL — HIGH (ref 70–99)

## 2024-08-22 PROCEDURE — 73552 X-RAY EXAM OF FEMUR 2/>: CPT | Mod: 26,LT

## 2024-08-22 PROCEDURE — 73562 X-RAY EXAM OF KNEE 3: CPT | Mod: 26,LT

## 2024-08-22 PROCEDURE — 99232 SBSQ HOSP IP/OBS MODERATE 35: CPT | Mod: GC

## 2024-08-22 PROCEDURE — 90935 HEMODIALYSIS ONE EVALUATION: CPT

## 2024-08-22 RX ORDER — MIDODRINE HYDROCHLORIDE 5 MG/1
10 TABLET ORAL ONCE
Refills: 0 | Status: COMPLETED | OUTPATIENT
Start: 2024-08-22 | End: 2024-08-22

## 2024-08-22 RX ORDER — MIDODRINE HYDROCHLORIDE 5 MG/1
20 TABLET ORAL
Refills: 0 | Status: DISCONTINUED | OUTPATIENT
Start: 2024-08-22 | End: 2024-08-24

## 2024-08-22 RX ORDER — OXYCODONE HYDROCHLORIDE 30 MG/1
5 TABLET ORAL EVERY 24 HOURS
Refills: 0 | Status: DISCONTINUED | OUTPATIENT
Start: 2024-08-22 | End: 2024-08-24

## 2024-08-22 RX ORDER — MIDODRINE HYDROCHLORIDE 5 MG/1
10 TABLET ORAL
Refills: 0 | Status: DISCONTINUED | OUTPATIENT
Start: 2024-08-22 | End: 2024-08-24

## 2024-08-22 RX ADMIN — DONEPEZIL HYDROCHLORIDE 10 MILLIGRAM(S): 5 TABLET ORAL at 22:09

## 2024-08-22 RX ADMIN — OXYCODONE HYDROCHLORIDE 2.5 MILLIGRAM(S): 30 TABLET ORAL at 08:35

## 2024-08-22 RX ADMIN — ATORVASTATIN CALCIUM 40 MILLIGRAM(S): 80 TABLET, FILM COATED ORAL at 22:09

## 2024-08-22 RX ADMIN — Medication 40 MILLIGRAM(S): at 06:33

## 2024-08-22 RX ADMIN — DICLOFENAC SODIUM 4 GRAM(S): 10 GEL TOPICAL at 22:09

## 2024-08-22 RX ADMIN — DICLOFENAC SODIUM 4 GRAM(S): 10 GEL TOPICAL at 13:06

## 2024-08-22 RX ADMIN — Medication 1000 MILLIGRAM(S): at 13:33

## 2024-08-22 RX ADMIN — HEPARIN SODIUM 5000 UNIT(S): 1000 INJECTION INTRAVENOUS; SUBCUTANEOUS at 17:26

## 2024-08-22 RX ADMIN — Medication 1000 MILLIGRAM(S): at 17:26

## 2024-08-22 RX ADMIN — Medication 1 TABLET(S): at 13:06

## 2024-08-22 RX ADMIN — LOPERAMIDE HCL 2 MILLIGRAM(S): 1 SOLUTION ORAL at 13:11

## 2024-08-22 RX ADMIN — EPOETIN ALFA 8000 UNIT(S): 10000 SOLUTION INTRAVENOUS; SUBCUTANEOUS at 11:07

## 2024-08-22 RX ADMIN — Medication 1000 MILLIGRAM(S): at 18:26

## 2024-08-22 RX ADMIN — OXYCODONE HYDROCHLORIDE 2.5 MILLIGRAM(S): 30 TABLET ORAL at 03:48

## 2024-08-22 RX ADMIN — OXYCODONE HYDROCHLORIDE 2.5 MILLIGRAM(S): 30 TABLET ORAL at 02:48

## 2024-08-22 RX ADMIN — Medication 1000 MILLIGRAM(S): at 07:33

## 2024-08-22 RX ADMIN — Medication 1 PACKET(S): at 17:25

## 2024-08-22 RX ADMIN — HEPARIN SODIUM 5000 UNIT(S): 1000 INJECTION INTRAVENOUS; SUBCUTANEOUS at 06:33

## 2024-08-22 RX ADMIN — Medication 81 MILLIGRAM(S): at 13:06

## 2024-08-22 RX ADMIN — OXYCODONE HYDROCHLORIDE 5 MILLIGRAM(S): 30 TABLET ORAL at 23:00

## 2024-08-22 RX ADMIN — Medication 3 MILLIGRAM(S): at 22:09

## 2024-08-22 RX ADMIN — OXYCODONE HYDROCHLORIDE 5 MILLIGRAM(S): 30 TABLET ORAL at 14:10

## 2024-08-22 RX ADMIN — OXYCODONE HYDROCHLORIDE 2.5 MILLIGRAM(S): 30 TABLET ORAL at 07:35

## 2024-08-22 RX ADMIN — Medication 1 PACKET(S): at 06:34

## 2024-08-22 RX ADMIN — Medication 40 MILLIGRAM(S): at 17:25

## 2024-08-22 RX ADMIN — METOPROLOL SUCCINATE 12.5 MILLIGRAM(S): 50 TABLET, EXTENDED RELEASE ORAL at 07:07

## 2024-08-22 RX ADMIN — DICLOFENAC SODIUM 4 GRAM(S): 10 GEL TOPICAL at 07:07

## 2024-08-22 RX ADMIN — OXYCODONE HYDROCHLORIDE 5 MILLIGRAM(S): 30 TABLET ORAL at 13:10

## 2024-08-22 RX ADMIN — OXYCODONE HYDROCHLORIDE 5 MILLIGRAM(S): 30 TABLET ORAL at 22:09

## 2024-08-22 RX ADMIN — Medication 1000 MILLIGRAM(S): at 00:54

## 2024-08-22 RX ADMIN — METOPROLOL SUCCINATE 12.5 MILLIGRAM(S): 50 TABLET, EXTENDED RELEASE ORAL at 17:25

## 2024-08-22 RX ADMIN — MIDODRINE HYDROCHLORIDE 10 MILLIGRAM(S): 5 TABLET ORAL at 09:15

## 2024-08-22 RX ADMIN — INSULIN LISPRO 1: 100 INJECTION, SOLUTION INTRAVENOUS; SUBCUTANEOUS at 13:11

## 2024-08-22 RX ADMIN — Medication 1000 MILLIGRAM(S): at 12:33

## 2024-08-22 RX ADMIN — Medication 1000 MILLIGRAM(S): at 06:33

## 2024-08-23 LAB
GLUCOSE BLDC GLUCOMTR-MCNC: 101 MG/DL — HIGH (ref 70–99)
GLUCOSE BLDC GLUCOMTR-MCNC: 106 MG/DL — HIGH (ref 70–99)
GLUCOSE BLDC GLUCOMTR-MCNC: 197 MG/DL — HIGH (ref 70–99)
GLUCOSE BLDC GLUCOMTR-MCNC: 220 MG/DL — HIGH (ref 70–99)
GLUCOSE BLDC GLUCOMTR-MCNC: 91 MG/DL — SIGNIFICANT CHANGE UP (ref 70–99)
GLUCOSE BLDC GLUCOMTR-MCNC: 97 MG/DL — SIGNIFICANT CHANGE UP (ref 70–99)
HAV IGM SER-ACNC: SIGNIFICANT CHANGE UP
HBV CORE IGM SER-ACNC: SIGNIFICANT CHANGE UP
HBV SURFACE AG SER-ACNC: SIGNIFICANT CHANGE UP
HCV AB S/CO SERPL IA: 0.4 S/CO — SIGNIFICANT CHANGE UP (ref 0–0.99)
HCV AB SERPL-IMP: SIGNIFICANT CHANGE UP

## 2024-08-23 PROCEDURE — 99232 SBSQ HOSP IP/OBS MODERATE 35: CPT

## 2024-08-23 PROCEDURE — 99232 SBSQ HOSP IP/OBS MODERATE 35: CPT | Mod: GC

## 2024-08-23 RX ADMIN — LOPERAMIDE HCL 2 MILLIGRAM(S): 1 SOLUTION ORAL at 13:12

## 2024-08-23 RX ADMIN — OXYCODONE HYDROCHLORIDE 5 MILLIGRAM(S): 30 TABLET ORAL at 18:24

## 2024-08-23 RX ADMIN — DICLOFENAC SODIUM 4 GRAM(S): 10 GEL TOPICAL at 05:09

## 2024-08-23 RX ADMIN — Medication 40 MILLIGRAM(S): at 18:25

## 2024-08-23 RX ADMIN — DICLOFENAC SODIUM 4 GRAM(S): 10 GEL TOPICAL at 22:21

## 2024-08-23 RX ADMIN — Medication 1 TABLET(S): at 13:11

## 2024-08-23 RX ADMIN — Medication 1000 MILLIGRAM(S): at 05:08

## 2024-08-23 RX ADMIN — Medication 1000 MILLIGRAM(S): at 14:15

## 2024-08-23 RX ADMIN — DONEPEZIL HYDROCHLORIDE 10 MILLIGRAM(S): 5 TABLET ORAL at 22:30

## 2024-08-23 RX ADMIN — Medication 40 MILLIGRAM(S): at 05:08

## 2024-08-23 RX ADMIN — Medication 1000 MILLIGRAM(S): at 23:45

## 2024-08-23 RX ADMIN — Medication 1000 MILLIGRAM(S): at 19:35

## 2024-08-23 RX ADMIN — Medication 1000 MILLIGRAM(S): at 18:24

## 2024-08-23 RX ADMIN — ATORVASTATIN CALCIUM 40 MILLIGRAM(S): 80 TABLET, FILM COATED ORAL at 22:20

## 2024-08-23 RX ADMIN — Medication 1000 MILLIGRAM(S): at 01:35

## 2024-08-23 RX ADMIN — Medication 1 PACKET(S): at 05:08

## 2024-08-23 RX ADMIN — INSULIN LISPRO 2: 100 INJECTION, SOLUTION INTRAVENOUS; SUBCUTANEOUS at 13:36

## 2024-08-23 RX ADMIN — METOPROLOL SUCCINATE 12.5 MILLIGRAM(S): 50 TABLET, EXTENDED RELEASE ORAL at 05:08

## 2024-08-23 RX ADMIN — Medication 1000 MILLIGRAM(S): at 00:02

## 2024-08-23 RX ADMIN — OXYCODONE HYDROCHLORIDE 5 MILLIGRAM(S): 30 TABLET ORAL at 19:35

## 2024-08-23 RX ADMIN — HEPARIN SODIUM 5000 UNIT(S): 1000 INJECTION INTRAVENOUS; SUBCUTANEOUS at 18:24

## 2024-08-23 RX ADMIN — Medication 1000 MILLIGRAM(S): at 13:12

## 2024-08-23 RX ADMIN — Medication 3 MILLIGRAM(S): at 22:21

## 2024-08-23 RX ADMIN — Medication 1000 MILLIGRAM(S): at 06:00

## 2024-08-23 RX ADMIN — OXYCODONE HYDROCHLORIDE 5 MILLIGRAM(S): 30 TABLET ORAL at 14:15

## 2024-08-23 RX ADMIN — Medication 81 MILLIGRAM(S): at 13:11

## 2024-08-23 RX ADMIN — OXYCODONE HYDROCHLORIDE 5 MILLIGRAM(S): 30 TABLET ORAL at 13:11

## 2024-08-23 RX ADMIN — OXYCODONE HYDROCHLORIDE 5 MILLIGRAM(S): 30 TABLET ORAL at 23:45

## 2024-08-23 RX ADMIN — HEPARIN SODIUM 5000 UNIT(S): 1000 INJECTION INTRAVENOUS; SUBCUTANEOUS at 05:08

## 2024-08-24 ENCOUNTER — TRANSCRIPTION ENCOUNTER (OUTPATIENT)
Age: 74
End: 2024-08-24

## 2024-08-24 VITALS
SYSTOLIC BLOOD PRESSURE: 113 MMHG | DIASTOLIC BLOOD PRESSURE: 63 MMHG | OXYGEN SATURATION: 93 % | HEART RATE: 79 BPM | TEMPERATURE: 98 F | RESPIRATION RATE: 18 BRPM

## 2024-08-24 LAB
ANION GAP SERPL CALC-SCNC: 20 MMOL/L — HIGH (ref 5–17)
BUN SERPL-MCNC: 53 MG/DL — HIGH (ref 7–23)
CALCIUM SERPL-MCNC: 8.8 MG/DL — SIGNIFICANT CHANGE UP (ref 8.4–10.5)
CHLORIDE SERPL-SCNC: 95 MMOL/L — LOW (ref 96–108)
CO2 SERPL-SCNC: 23 MMOL/L — SIGNIFICANT CHANGE UP (ref 22–31)
CREAT SERPL-MCNC: 6.44 MG/DL — HIGH (ref 0.5–1.3)
CULTURE RESULTS: SIGNIFICANT CHANGE UP
EGFR: 8 ML/MIN/1.73M2 — LOW
EGFR: 8 ML/MIN/1.73M2 — LOW
GLUCOSE BLDC GLUCOMTR-MCNC: 103 MG/DL — HIGH (ref 70–99)
GLUCOSE BLDC GLUCOMTR-MCNC: 103 MG/DL — HIGH (ref 70–99)
GLUCOSE BLDC GLUCOMTR-MCNC: 93 MG/DL — SIGNIFICANT CHANGE UP (ref 70–99)
GLUCOSE SERPL-MCNC: 97 MG/DL — SIGNIFICANT CHANGE UP (ref 70–99)
HCT VFR BLD CALC: 26.8 % — LOW (ref 39–50)
HGB BLD-MCNC: 8.9 G/DL — LOW (ref 13–17)
MAGNESIUM SERPL-MCNC: 2.4 MG/DL — SIGNIFICANT CHANGE UP (ref 1.6–2.6)
MCHC RBC-ENTMCNC: 32.2 PG — SIGNIFICANT CHANGE UP (ref 27–34)
MCHC RBC-ENTMCNC: 33.2 GM/DL — SIGNIFICANT CHANGE UP (ref 32–36)
MCV RBC AUTO: 97.1 FL — SIGNIFICANT CHANGE UP (ref 80–100)
NRBC # BLD: 0 /100 WBCS — SIGNIFICANT CHANGE UP (ref 0–0)
NRBC BLD-RTO: 0 /100 WBCS — SIGNIFICANT CHANGE UP (ref 0–0)
PHOSPHATE SERPL-MCNC: 4.1 MG/DL — SIGNIFICANT CHANGE UP (ref 2.5–4.5)
PLATELET # BLD AUTO: 579 K/UL — HIGH (ref 150–400)
POTASSIUM SERPL-MCNC: 4.1 MMOL/L — SIGNIFICANT CHANGE UP (ref 3.5–5.3)
POTASSIUM SERPL-SCNC: 4.1 MMOL/L — SIGNIFICANT CHANGE UP (ref 3.5–5.3)
RBC # BLD: 2.76 M/UL — LOW (ref 4.2–5.8)
RBC # FLD: 17.6 % — HIGH (ref 10.3–14.5)
SODIUM SERPL-SCNC: 138 MMOL/L — SIGNIFICANT CHANGE UP (ref 135–145)
SPECIMEN SOURCE: SIGNIFICANT CHANGE UP
WBC # BLD: 14.57 K/UL — HIGH (ref 3.8–10.5)
WBC # FLD AUTO: 14.57 K/UL — HIGH (ref 3.8–10.5)

## 2024-08-24 PROCEDURE — 93005 ELECTROCARDIOGRAM TRACING: CPT

## 2024-08-24 PROCEDURE — 73701 CT LOWER EXTREMITY W/DYE: CPT | Mod: MC

## 2024-08-24 PROCEDURE — 0042T: CPT | Mod: MC

## 2024-08-24 PROCEDURE — 80048 BASIC METABOLIC PNL TOTAL CA: CPT

## 2024-08-24 PROCEDURE — 82553 CREATINE MB FRACTION: CPT

## 2024-08-24 PROCEDURE — 74177 CT ABD & PELVIS W/CONTRAST: CPT | Mod: MC

## 2024-08-24 PROCEDURE — 80074 ACUTE HEPATITIS PANEL: CPT

## 2024-08-24 PROCEDURE — 83735 ASSAY OF MAGNESIUM: CPT

## 2024-08-24 PROCEDURE — 82435 ASSAY OF BLOOD CHLORIDE: CPT

## 2024-08-24 PROCEDURE — 85025 COMPLETE CBC W/AUTO DIFF WBC: CPT

## 2024-08-24 PROCEDURE — 99232 SBSQ HOSP IP/OBS MODERATE 35: CPT

## 2024-08-24 PROCEDURE — 73610 X-RAY EXAM OF ANKLE: CPT

## 2024-08-24 PROCEDURE — 73502 X-RAY EXAM HIP UNI 2-3 VIEWS: CPT

## 2024-08-24 PROCEDURE — 82728 ASSAY OF FERRITIN: CPT

## 2024-08-24 PROCEDURE — 85652 RBC SED RATE AUTOMATED: CPT

## 2024-08-24 PROCEDURE — 83605 ASSAY OF LACTIC ACID: CPT

## 2024-08-24 PROCEDURE — 74018 RADEX ABDOMEN 1 VIEW: CPT

## 2024-08-24 PROCEDURE — 82306 VITAMIN D 25 HYDROXY: CPT

## 2024-08-24 PROCEDURE — 87798 DETECT AGENT NOS DNA AMP: CPT

## 2024-08-24 PROCEDURE — 82550 ASSAY OF CK (CPK): CPT

## 2024-08-24 PROCEDURE — 84207 ASSAY OF VITAMIN B-6: CPT

## 2024-08-24 PROCEDURE — 82746 ASSAY OF FOLIC ACID SERUM: CPT

## 2024-08-24 PROCEDURE — 73564 X-RAY EXAM KNEE 4 OR MORE: CPT

## 2024-08-24 PROCEDURE — 82947 ASSAY GLUCOSE BLOOD QUANT: CPT

## 2024-08-24 PROCEDURE — 86780 TREPONEMA PALLIDUM: CPT

## 2024-08-24 PROCEDURE — 82962 GLUCOSE BLOOD TEST: CPT

## 2024-08-24 PROCEDURE — 83921 ORGANIC ACID SINGLE QUANT: CPT

## 2024-08-24 PROCEDURE — 84484 ASSAY OF TROPONIN QUANT: CPT

## 2024-08-24 PROCEDURE — 76770 US EXAM ABDO BACK WALL COMP: CPT

## 2024-08-24 PROCEDURE — 73552 X-RAY EXAM OF FEMUR 2/>: CPT

## 2024-08-24 PROCEDURE — 93308 TTE F-UP OR LMTD: CPT

## 2024-08-24 PROCEDURE — 85610 PROTHROMBIN TIME: CPT

## 2024-08-24 PROCEDURE — 86789 WEST NILE VIRUS ANTIBODY: CPT

## 2024-08-24 PROCEDURE — 36415 COLL VENOUS BLD VENIPUNCTURE: CPT

## 2024-08-24 PROCEDURE — 97162 PT EVAL MOD COMPLEX 30 MIN: CPT

## 2024-08-24 PROCEDURE — 70551 MRI BRAIN STEM W/O DYE: CPT | Mod: MC

## 2024-08-24 PROCEDURE — 85014 HEMATOCRIT: CPT

## 2024-08-24 PROCEDURE — 99239 HOSP IP/OBS DSCHRG MGMT >30: CPT | Mod: GC

## 2024-08-24 PROCEDURE — 83970 ASSAY OF PARATHORMONE: CPT

## 2024-08-24 PROCEDURE — 70498 CT ANGIOGRAPHY NECK: CPT | Mod: MC

## 2024-08-24 PROCEDURE — 82140 ASSAY OF AMMONIA: CPT

## 2024-08-24 PROCEDURE — 76705 ECHO EXAM OF ABDOMEN: CPT

## 2024-08-24 PROCEDURE — 86901 BLOOD TYPING SEROLOGIC RH(D): CPT

## 2024-08-24 PROCEDURE — 86704 HEP B CORE ANTIBODY TOTAL: CPT

## 2024-08-24 PROCEDURE — 83880 ASSAY OF NATRIURETIC PEPTIDE: CPT

## 2024-08-24 PROCEDURE — 97112 NEUROMUSCULAR REEDUCATION: CPT

## 2024-08-24 PROCEDURE — 86705 HEP B CORE ANTIBODY IGM: CPT

## 2024-08-24 PROCEDURE — 84425 ASSAY OF VITAMIN B-1: CPT

## 2024-08-24 PROCEDURE — 86788 WEST NILE VIRUS AB IGM: CPT

## 2024-08-24 PROCEDURE — 76881 US COMPL JOINT R-T W/IMG: CPT

## 2024-08-24 PROCEDURE — 83540 ASSAY OF IRON: CPT

## 2024-08-24 PROCEDURE — 80053 COMPREHEN METABOLIC PANEL: CPT

## 2024-08-24 PROCEDURE — 87086 URINE CULTURE/COLONY COUNT: CPT

## 2024-08-24 PROCEDURE — 76882 US LMTD JT/FCL EVL NVASC XTR: CPT

## 2024-08-24 PROCEDURE — 86618 LYME DISEASE ANTIBODY: CPT

## 2024-08-24 PROCEDURE — 81001 URINALYSIS AUTO W/SCOPE: CPT

## 2024-08-24 PROCEDURE — 82310 ASSAY OF CALCIUM: CPT

## 2024-08-24 PROCEDURE — 73590 X-RAY EXAM OF LOWER LEG: CPT

## 2024-08-24 PROCEDURE — 93306 TTE W/DOPPLER COMPLETE: CPT

## 2024-08-24 PROCEDURE — 87040 BLOOD CULTURE FOR BACTERIA: CPT

## 2024-08-24 PROCEDURE — 86140 C-REACTIVE PROTEIN: CPT

## 2024-08-24 PROCEDURE — 71045 X-RAY EXAM CHEST 1 VIEW: CPT

## 2024-08-24 PROCEDURE — 85027 COMPLETE CBC AUTOMATED: CPT

## 2024-08-24 PROCEDURE — 86592 SYPHILIS TEST NON-TREP QUAL: CPT

## 2024-08-24 PROCEDURE — 84132 ASSAY OF SERUM POTASSIUM: CPT

## 2024-08-24 PROCEDURE — 83550 IRON BINDING TEST: CPT

## 2024-08-24 PROCEDURE — 86706 HEP B SURFACE ANTIBODY: CPT

## 2024-08-24 PROCEDURE — 99261: CPT

## 2024-08-24 PROCEDURE — 80061 LIPID PANEL: CPT

## 2024-08-24 PROCEDURE — 84443 ASSAY THYROID STIM HORMONE: CPT

## 2024-08-24 PROCEDURE — 86900 BLOOD TYPING SEROLOGIC ABO: CPT

## 2024-08-24 PROCEDURE — 85730 THROMBOPLASTIN TIME PARTIAL: CPT

## 2024-08-24 PROCEDURE — 85018 HEMOGLOBIN: CPT

## 2024-08-24 PROCEDURE — 87340 HEPATITIS B SURFACE AG IA: CPT

## 2024-08-24 PROCEDURE — 82274 ASSAY TEST FOR BLOOD FECAL: CPT

## 2024-08-24 PROCEDURE — 87476 LYME DIS DNA AMP PROBE: CPT

## 2024-08-24 PROCEDURE — 97116 GAIT TRAINING THERAPY: CPT

## 2024-08-24 PROCEDURE — 97110 THERAPEUTIC EXERCISES: CPT

## 2024-08-24 PROCEDURE — 99285 EMERGENCY DEPT VISIT HI MDM: CPT | Mod: 25

## 2024-08-24 PROCEDURE — 86850 RBC ANTIBODY SCREEN: CPT

## 2024-08-24 PROCEDURE — 70450 CT HEAD/BRAIN W/O DYE: CPT | Mod: MC

## 2024-08-24 PROCEDURE — 84100 ASSAY OF PHOSPHORUS: CPT

## 2024-08-24 PROCEDURE — 83036 HEMOGLOBIN GLYCOSYLATED A1C: CPT

## 2024-08-24 PROCEDURE — 82607 VITAMIN B-12: CPT

## 2024-08-24 PROCEDURE — 83090 ASSAY OF HOMOCYSTEINE: CPT

## 2024-08-24 PROCEDURE — 73562 X-RAY EXAM OF KNEE 3: CPT

## 2024-08-24 PROCEDURE — 82330 ASSAY OF CALCIUM: CPT

## 2024-08-24 PROCEDURE — 97530 THERAPEUTIC ACTIVITIES: CPT

## 2024-08-24 PROCEDURE — 84295 ASSAY OF SERUM SODIUM: CPT

## 2024-08-24 PROCEDURE — 82977 ASSAY OF GGT: CPT

## 2024-08-24 PROCEDURE — 83690 ASSAY OF LIPASE: CPT

## 2024-08-24 PROCEDURE — 70496 CT ANGIOGRAPHY HEAD: CPT | Mod: MC

## 2024-08-24 PROCEDURE — 82803 BLOOD GASES ANY COMBINATION: CPT

## 2024-08-24 PROCEDURE — 86593 SYPHILIS TEST NON-TREP QUANT: CPT

## 2024-08-24 RX ORDER — METOPROLOL SUCCINATE 50 MG/1
12.5 TABLET, EXTENDED RELEASE ORAL
Qty: 0 | Refills: 0 | DISCHARGE
Start: 2024-08-24

## 2024-08-24 RX ORDER — ACETAMINOPHEN 500 MG/5ML
2 LIQUID (ML) ORAL
Qty: 0 | Refills: 0 | DISCHARGE
Start: 2024-08-24

## 2024-08-24 RX ORDER — LOPERAMIDE HCL 1 MG/7.5ML
1 SOLUTION ORAL
Qty: 0 | Refills: 0 | DISCHARGE
Start: 2024-08-24

## 2024-08-24 RX ORDER — HEPARIN SODIUM 1000 [USP'U]/ML
5000 INJECTION INTRAVENOUS; SUBCUTANEOUS
Qty: 0 | Refills: 0 | DISCHARGE
Start: 2024-08-24

## 2024-08-24 RX ORDER — B1/B2/B3/B5/B6/B12/VIT C/FOLIC 500-0.5 MG
1 TABLET ORAL
Qty: 0 | Refills: 0 | DISCHARGE
Start: 2024-08-24

## 2024-08-24 RX ORDER — MIDODRINE HYDROCHLORIDE 5 MG/1
2 TABLET ORAL
Qty: 0 | Refills: 0 | DISCHARGE
Start: 2024-08-24

## 2024-08-24 RX ORDER — OXYCODONE HYDROCHLORIDE 30 MG/1
1 TABLET ORAL
Qty: 0 | Refills: 0 | DISCHARGE
Start: 2024-08-24

## 2024-08-24 RX ORDER — DICLOFENAC SODIUM 10 MG/G
4 GEL TOPICAL
Qty: 0 | Refills: 0 | DISCHARGE
Start: 2024-08-24

## 2024-08-24 RX ADMIN — OXYCODONE HYDROCHLORIDE 5 MILLIGRAM(S): 30 TABLET ORAL at 01:00

## 2024-08-24 RX ADMIN — Medication 1 PACKET(S): at 17:35

## 2024-08-24 RX ADMIN — HEPARIN SODIUM 5000 UNIT(S): 1000 INJECTION INTRAVENOUS; SUBCUTANEOUS at 17:35

## 2024-08-24 RX ADMIN — LOPERAMIDE HCL 2 MILLIGRAM(S): 1 SOLUTION ORAL at 12:27

## 2024-08-24 RX ADMIN — Medication 40 MILLIGRAM(S): at 17:35

## 2024-08-24 RX ADMIN — MIDODRINE HYDROCHLORIDE 20 MILLIGRAM(S): 5 TABLET ORAL at 07:23

## 2024-08-24 RX ADMIN — Medication 1000 MILLIGRAM(S): at 05:39

## 2024-08-24 RX ADMIN — DICLOFENAC SODIUM 4 GRAM(S): 10 GEL TOPICAL at 05:38

## 2024-08-24 RX ADMIN — HEPARIN SODIUM 5000 UNIT(S): 1000 INJECTION INTRAVENOUS; SUBCUTANEOUS at 05:38

## 2024-08-24 RX ADMIN — Medication 1000 MILLIGRAM(S): at 06:30

## 2024-08-24 RX ADMIN — Medication 40 MILLIGRAM(S): at 05:38

## 2024-08-24 RX ADMIN — Medication 1000 MILLIGRAM(S): at 01:00

## 2024-08-24 RX ADMIN — Medication 1000 MILLIGRAM(S): at 12:34

## 2024-08-24 RX ADMIN — DICLOFENAC SODIUM 4 GRAM(S): 10 GEL TOPICAL at 13:13

## 2024-08-24 RX ADMIN — Medication 81 MILLIGRAM(S): at 12:27

## 2024-08-24 RX ADMIN — Medication 1000 MILLIGRAM(S): at 17:35

## 2024-08-24 RX ADMIN — Medication 1000 MILLIGRAM(S): at 11:12

## 2024-08-24 RX ADMIN — Medication 1 TABLET(S): at 12:28

## 2024-08-24 RX ADMIN — Medication 1 PACKET(S): at 05:39

## 2024-08-24 RX ADMIN — EPOETIN ALFA 8000 UNIT(S): 10000 SOLUTION INTRAVENOUS; SUBCUTANEOUS at 09:45

## 2024-08-24 RX ADMIN — METOPROLOL SUCCINATE 12.5 MILLIGRAM(S): 50 TABLET, EXTENDED RELEASE ORAL at 17:36

## 2024-08-24 RX ADMIN — MIDODRINE HYDROCHLORIDE 10 MILLIGRAM(S): 5 TABLET ORAL at 09:22

## 2024-11-07 NOTE — ED PROVIDER NOTE - NS ED NOTE AC HIGH RISK COUNTRIES

## 2024-12-03 NOTE — H&P ADULT - GLASGOW COMA SCALE: SCORE, MLM
Subjective   Follow-up on acute delirium:    Patient seen and examined. No acute events overnight. Patient is not interactive.     Objective     Current Facility-Administered Medications   Medication Dose Route Frequency Provider Last Rate Last Admin    acetaminophen (Tylenol) oral liquid 1,000 mg  1,000 mg nasogastric tube q8h KULWANT NEVIN Trujillo-C   1,000 mg at 12/03/24 0532    atorvastatin (Lipitor) tablet 10 mg  10 mg nasogastric tube q PM SUNIL TrujilloC   10 mg at 12/02/24 2010    dextrose 50 % injection 12.5 g  12.5 g intravenous q15 min PRN Cenk M MD Justo        dextrose 50 % injection 25 g  25 g intravenous q15 min PRN Cenk M MD Justo        enoxaparin (Lovenox) syringe 30 mg  30 mg subcutaneous BID Chas Zendejas PA-C   30 mg at 12/03/24 0820    FLUoxetine (PROzac) solution 40 mg  40 mg nasogastric tube Daily SUNIL TrujilloC   40 mg at 12/03/24 0923    glucagon (Glucagen) injection 1 mg  1 mg intramuscular q15 min PRN Cenk SANJAY Kemp MD        glucagon (Glucagen) injection 1 mg  1 mg intramuscular q15 min PRN Cenk M MD Justo        insulin glargine (Lantus) injection 55 Units  55 Units subcutaneous Nightly Monica Mathis PA-C   55 Units at 12/02/24 2025    insulin lispro injection 0-15 Units  0-15 Units subcutaneous q6h SUNIL TrujilloC   6 Units at 12/03/24 1108    levothyroxine (Synthroid, Levoxyl) tablet 50 mcg  50 mcg nasogastric tube Daily SUNIL TrujilloC   50 mcg at 12/03/24 0533    melatonin tablet 5 mg  5 mg nasogastric tube Nightly SUNIL TrujilloC   5 mg at 12/02/24 2010    memantine (Namenda) tablet 10 mg  10 mg nasogastric tube BID FerSUNIL ZepedaC   10 mg at 12/03/24 0820    OLANZapine (ZyPREXA) tablet 10 mg  10 mg oral BID PRN Cenk SANJAY Kemp MD   10 mg at 12/01/24 2043    polyethylene glycol (Glycolax, Miralax) packet 17 g  17 g nasogastric tube Daily Fer Gilbert PA-C   17 g at 12/03/24 0820    sennosides-docusate sodium (Prabha-Colace)  8.6-50 mg per tablet 1 tablet  1 tablet nasogastric tube Nightly Rachana Kemp MD   1 tablet at 12/02/24 2010       Physical Exam  GEN: Alert, not responsive to questions, not pale, not jaundiced, corpak in situ  CVS: HS I +II, regular, no murmurs  RESP: Diminished air entry  ABD: Full, soft, BS present, nontender, no palpable organs,   EXT: No bilateral leg edema    Confusion Assessment Method(CAM) for diagnosis of delirium:    1.  Acute onset or fluctuating course: absent/present: Present  2.  Inattention: absent/present: Present  3.  Disorganized thinking: Present  4.  Altered level of consciousness: absent/present: Absent  CAM: positive    AT Score For Assessment of Delirium and Cognitive Impairment:    Alertness: 0  Normal(fully alert,but not agitated, throughout assessment)=0  Mild sleepiness for <10 seconds after walking, then normal=0  Clearly abnormal=4  2.  AMT4: 2  No mistakes=0  One mistake=1  Two or more mistakes/untestable=2  3.  Attention: 2  Achieves seven months or more correctly=0  Starts but scores <7 months/ refuses to start=1  Untestable(cannot start because unwell, drowsy, inattentive)=2  4.  Acute: 4  No=0  Yes=4    Total Score: 4  4 or above: Possible delirium +/- cognitive impairment  1-3: Possible cognitive impairment  0: Delirium or severe cognitive impairment unlikely(but delirium still possible if (4) information incomplete)      Last Recorded Vitals      12/2/2024     7:00 AM 12/2/2024    12:21 PM 12/2/2024     5:35 PM 12/2/2024     8:45 PM 12/2/2024    11:59 PM 12/3/2024     4:09 AM 12/3/2024     8:59 AM   Vitals   Systolic 128 130 144 131 122 137 127   Diastolic 64 77 61 72 68 62 60   BP Location Right arm Right arm Right arm Right arm Right arm Right arm Right arm   Heart Rate 57 57 60 59 55 58 56   Temp 35.8 °C (96.4 °F) 36 °C (96.8 °F) 35.6 °C (96.1 °F) 36.8 °C (98.2 °F) 36.3 °C (97.3 °F) 36.7 °C (98.1 °F) 36 °C (96.8 °F)   Resp 16  16 16 16 14 16      Vitals:    11/19/24 0955    Weight: 60 kg (132 lb 4.4 oz)        Relevant Results  Lab Results   Component Value Date    TSH 1.85 11/26/2024    EESSEXDY85 1,269 (H) 11/26/2024    VITD25 43 11/26/2024    HGBA1C 7.4 (H) 11/23/2024     Results from last 7 days   Lab Units 12/03/24  0816 11/30/24  0954 11/28/24  0732   SODIUM mmol/L 141 139 141   POTASSIUM mmol/L 4.5 4.2 3.7   CHLORIDE mmol/L 107 106 111*   CREATININE mg/dL 0.65 0.55 0.52   BUN mg/dL 27* 17 9   CO2 mmol/L 27 26 22          CT head wo IV contrast  Narrative: Interpreted By:  Oliver Uriostegui,   STUDY:  CT HEAD WO IV CONTRAST;  12/1/2024 7:25 pm      INDICATION:  Signs/Symptoms:2 week monitoring of SDH.      COMPARISON:  11/22/2024      ACCESSION NUMBER(S):  [id number]      ORDERING CLINICIAN:  KATEY RODRIGUEZ      TECHNIQUE:  Axial CT images of the head were obtained without intravenous  contrast administration.      FINDINGS:  Postoperative changes compatible with a previous left-sided inez  holes are again noted.      There is residual but improved pneumocephalus adjacent to the left  frontal lobe when compared with 11/22/2024.      There is a residual but smaller mixed attenuation predominantly  low-density left hemispheric subdural hematoma currently measuring  approximately 11 mm in thickness adjacent to the left frontal lobe  compared with 16 mm in thickness on the prior CT study dated  11/22/2024. There is a residual but improved mass effect upon the  adjacent left cerebral hemisphere. There is residual but improved  bowing of the midline structures from left to right currently  measuring 4 mm compared with 11/22/2024 where it measured 6 mm.      The above findings are superimposed upon moderate brain parenchymal  volume loss.      There are nonspecific white matter changes noted within cerebral  hemispheres bilaterally which while nonspecific, given the patient's  age, likely represent sequelae of small-vessel ischemic change.      A small air/fluid levels noted within  the sphenoid sinus. The  remaining visualized paranasal sinuses and mastoid air cells are  clear.      A partially imaged nasogastric tube is noted extending through the  right nasal cavity.      Impression: Postoperative changes compatible with a previous left-sided inez  holes are again noted.      There is residual but improved pneumocephalus adjacent to the left  frontal lobe when compared with 11/22/2024.      There is a residual but smaller mixed attenuation predominantly  low-density left hemispheric subdural hematoma currently measuring  approximately 11 mm in thickness adjacent to the left frontal lobe  compared with 16 mm in thickness on the prior CT study dated  11/22/2024. There is a residual but improved mass effect upon the  adjacent left cerebral hemisphere. There is residual but improved  bowing of the midline structures from left to right currently  measuring 4 mm compared with 11/22/2024 where it measured 6 mm.      The above findings are superimposed upon moderate brain parenchymal  volume loss.      There are nonspecific white matter changes noted within cerebral  hemispheres bilaterally which while nonspecific, given the patient's  age, likely represent sequelae of small-vessel ischemic change.      MACRO:  None.      Signed by: Oliver Uriostegui 12/2/2024 6:37 AM  Dictation workstation:   [id number]    DATA:  EKG: QTC  Encounter Date: 11/19/24   ECG 12 Lead   Result Value    Ventricular Rate 63    Atrial Rate 63    AZ Interval 190    QRS Duration 100    QT Interval 436    QTC Calculation(Bazett) 446    P Axis 21    R Axis -45    T Axis -82    QRS Count 10    Q Onset 212    P Onset 117    P Offset 170    T Offset 430    QTC Fredericia 443    Narrative    Normal sinus rhythm  Left axis deviation  Septal infarct (cited on or before 18-NOV-2024)  Abnormal ECG  When compared with ECG of 19-NOV-2024 03:30,  No significant change was found  See ED provider note for full interpretation and clinical  correlation  Confirmed by Kwame Yoo ([id number]) on 11/20/2024 5:49:16 AM      Anti-psychotics in 48 hours: No  Opioids/Benzodiazepines in 48 hours: No  Anticholinergics on board:No  Restraints:No  Indwelling catheters:No  Last BM:  UO in 24 hours: Multiple voids  Activity in the past 24 hours: In bed, working with PT  Need for ambulatory devices: Patient is a total assist      Assessment/Plan     80-year-old female with past medical history of severe dementia, resident in the memory care unit, depression, type II DM, hypothyroidism, hypertension, hyperlipidemia, CAD, CKD, history of recurrent UTIs, who presents after a unwitnessed fall and found to have a large left subdural hematoma with rightward midline shift status post left bur hole.  Geriatrics was consulted for goals of care discussion in the setting of malnutrition due to poor p.o. intake and acute on chronic dysphagia.    Acute fall, unwitnessed in a patient with no history of gait impairment  acute on chronic dysphagia, status post Dobbhoff  Acute hyperactive delirium, likely related to discomfort from the Dobbhoff  Severe dementia  Type II DM  History of recurrent UTIs  Acute Constipation    Plan:   Will continue with goals of care discussions with  who had indicated yesterday that he planned on discontinuing top of when the patient gets to the skilled nursing facility and feed her by mouth as he will be closer, about 15 minutes from the house.  He is willing to accept risk for aspiration  Attempt to get him to discuss so that we could start that perhaps in house prior to discharge was unsuccessful.  Will continue to reach out  In the meantime we will continue with current regimen of scheduled pain medication, Lantus for hyperglycemia  Continue with delirium precautions: avoid anticholinergics, benzos, opioids. Encourage po intake, OOB to chair, stimulation during the day, uninterrupted  Will continue to follow  Continue on MiraLAX, increase senna  to 2 tabs twice daily      4M AGE-FRIENDLY INITIATIVE:  What matters most to patient: Family  Medications: Memantine  Mentation: CAM positive, 4AT 8, oriented only to self at baseline  Mobility: Total assist per PT evaluation    Geriatric medicine will continue to follow the patient. Thank you for allowing geriatric medicine to be involved in the care of your patient. Geriatric medicine consultation team is available during work hours Monday through Friday. For any emergency issues requiring immediate assistance over the weekend, please page Geriatrics pager [phone]    Kylah Cerna MD       14
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
